# Patient Record
Sex: MALE | Race: WHITE | NOT HISPANIC OR LATINO | URBAN - METROPOLITAN AREA
[De-identification: names, ages, dates, MRNs, and addresses within clinical notes are randomized per-mention and may not be internally consistent; named-entity substitution may affect disease eponyms.]

---

## 2022-03-13 ENCOUNTER — INPATIENT (INPATIENT)
Facility: HOSPITAL | Age: 87
LOS: 4 days | Discharge: HOME | DRG: 698 | End: 2022-03-18
Attending: STUDENT IN AN ORGANIZED HEALTH CARE EDUCATION/TRAINING PROGRAM | Admitting: STUDENT IN AN ORGANIZED HEALTH CARE EDUCATION/TRAINING PROGRAM
Payer: MEDICAID

## 2022-03-13 VITALS
HEART RATE: 86 BPM | DIASTOLIC BLOOD PRESSURE: 65 MMHG | RESPIRATION RATE: 19 BRPM | SYSTOLIC BLOOD PRESSURE: 115 MMHG | OXYGEN SATURATION: 97 %

## 2022-03-13 DIAGNOSIS — N17.9 ACUTE KIDNEY FAILURE, UNSPECIFIED: ICD-10-CM

## 2022-03-13 DIAGNOSIS — E87.1 HYPO-OSMOLALITY AND HYPONATREMIA: ICD-10-CM

## 2022-03-13 DIAGNOSIS — E78.5 HYPERLIPIDEMIA, UNSPECIFIED: ICD-10-CM

## 2022-03-13 DIAGNOSIS — E87.5 HYPERKALEMIA: ICD-10-CM

## 2022-03-13 DIAGNOSIS — I48.91 UNSPECIFIED ATRIAL FIBRILLATION: ICD-10-CM

## 2022-03-13 DIAGNOSIS — I10 ESSENTIAL (PRIMARY) HYPERTENSION: ICD-10-CM

## 2022-03-13 DIAGNOSIS — N40.0 BENIGN PROSTATIC HYPERPLASIA WITHOUT LOWER URINARY TRACT SYMPTOMS: ICD-10-CM

## 2022-03-13 DIAGNOSIS — Z29.9 ENCOUNTER FOR PROPHYLACTIC MEASURES, UNSPECIFIED: ICD-10-CM

## 2022-03-13 DIAGNOSIS — I50.9 HEART FAILURE, UNSPECIFIED: ICD-10-CM

## 2022-03-13 DIAGNOSIS — R07.9 CHEST PAIN, UNSPECIFIED: ICD-10-CM

## 2022-03-13 DIAGNOSIS — N39.0 URINARY TRACT INFECTION, SITE NOT SPECIFIED: ICD-10-CM

## 2022-03-13 DIAGNOSIS — Z71.89 OTHER SPECIFIED COUNSELING: ICD-10-CM

## 2022-03-13 LAB
ALBUMIN SERPL ELPH-MCNC: 3.3 G/DL — LOW (ref 3.5–5)
ALBUMIN SERPL ELPH-MCNC: 3.6 G/DL — SIGNIFICANT CHANGE UP (ref 3.5–5)
ALP SERPL-CCNC: 79 U/L — SIGNIFICANT CHANGE UP (ref 40–120)
ALP SERPL-CCNC: 80 U/L — SIGNIFICANT CHANGE UP (ref 40–120)
ALT FLD-CCNC: 35 U/L DA — SIGNIFICANT CHANGE UP (ref 10–60)
ALT FLD-CCNC: 42 U/L DA — SIGNIFICANT CHANGE UP (ref 10–60)
ANION GAP SERPL CALC-SCNC: 12 MMOL/L — SIGNIFICANT CHANGE UP (ref 5–17)
ANION GAP SERPL CALC-SCNC: 14 MMOL/L — SIGNIFICANT CHANGE UP (ref 5–17)
ANION GAP SERPL CALC-SCNC: 14 MMOL/L — SIGNIFICANT CHANGE UP (ref 5–17)
APPEARANCE UR: ABNORMAL
APPEARANCE UR: CLEAR — SIGNIFICANT CHANGE UP
APTT BLD: 22.1 SEC — LOW (ref 27.5–35.5)
AST SERPL-CCNC: 20 U/L — SIGNIFICANT CHANGE UP (ref 10–40)
AST SERPL-CCNC: 24 U/L — SIGNIFICANT CHANGE UP (ref 10–40)
BACTERIA # UR AUTO: ABNORMAL /HPF
BASE EXCESS BLDV CALC-SCNC: -7.3 MMOL/L — SIGNIFICANT CHANGE UP
BASOPHILS # BLD AUTO: 0.02 K/UL — SIGNIFICANT CHANGE UP (ref 0–0.2)
BASOPHILS NFR BLD AUTO: 0.1 % — SIGNIFICANT CHANGE UP (ref 0–2)
BILIRUB SERPL-MCNC: 0.4 MG/DL — SIGNIFICANT CHANGE UP (ref 0.2–1.2)
BILIRUB SERPL-MCNC: 0.5 MG/DL — SIGNIFICANT CHANGE UP (ref 0.2–1.2)
BILIRUB UR-MCNC: NEGATIVE — SIGNIFICANT CHANGE UP
BILIRUB UR-MCNC: NEGATIVE — SIGNIFICANT CHANGE UP
BUN SERPL-MCNC: 63 MG/DL — HIGH (ref 7–18)
BUN SERPL-MCNC: 66 MG/DL — HIGH (ref 7–18)
BUN SERPL-MCNC: 68 MG/DL — HIGH (ref 7–18)
CALCIUM SERPL-MCNC: 7.9 MG/DL — LOW (ref 8.4–10.5)
CALCIUM SERPL-MCNC: 8.1 MG/DL — LOW (ref 8.4–10.5)
CALCIUM SERPL-MCNC: 8.6 MG/DL — SIGNIFICANT CHANGE UP (ref 8.4–10.5)
CHLORIDE SERPL-SCNC: 91 MMOL/L — LOW (ref 96–108)
CHLORIDE SERPL-SCNC: 94 MMOL/L — LOW (ref 96–108)
CHLORIDE SERPL-SCNC: 94 MMOL/L — LOW (ref 96–108)
CO2 SERPL-SCNC: 17 MMOL/L — LOW (ref 22–31)
CO2 SERPL-SCNC: 19 MMOL/L — LOW (ref 22–31)
CO2 SERPL-SCNC: 21 MMOL/L — LOW (ref 22–31)
COLOR SPEC: ABNORMAL
COLOR SPEC: YELLOW — SIGNIFICANT CHANGE UP
CREAT ?TM UR-MCNC: 62 MG/DL — SIGNIFICANT CHANGE UP
CREAT SERPL-MCNC: 2.78 MG/DL — HIGH (ref 0.5–1.3)
CREAT SERPL-MCNC: 2.89 MG/DL — HIGH (ref 0.5–1.3)
CREAT SERPL-MCNC: 3.13 MG/DL — HIGH (ref 0.5–1.3)
DIFF PNL FLD: ABNORMAL
DIFF PNL FLD: ABNORMAL
EGFR: 18 ML/MIN/1.73M2 — LOW
EGFR: 20 ML/MIN/1.73M2 — LOW
EGFR: 21 ML/MIN/1.73M2 — LOW
EOSINOPHIL # BLD AUTO: 0.02 K/UL — SIGNIFICANT CHANGE UP (ref 0–0.5)
EOSINOPHIL NFR BLD AUTO: 0.1 % — SIGNIFICANT CHANGE UP (ref 0–6)
EPI CELLS # UR: ABNORMAL /HPF
GLUCOSE BLDC GLUCOMTR-MCNC: 175 MG/DL — HIGH (ref 70–99)
GLUCOSE SERPL-MCNC: 138 MG/DL — HIGH (ref 70–99)
GLUCOSE SERPL-MCNC: 173 MG/DL — HIGH (ref 70–99)
GLUCOSE SERPL-MCNC: 186 MG/DL — HIGH (ref 70–99)
GLUCOSE UR QL: NEGATIVE — SIGNIFICANT CHANGE UP
GLUCOSE UR QL: NEGATIVE — SIGNIFICANT CHANGE UP
HCO3 BLDV-SCNC: 19 MMOL/L — LOW (ref 22–29)
HCT VFR BLD CALC: 37.9 % — LOW (ref 39–50)
HCT VFR BLD CALC: 38.9 % — LOW (ref 39–50)
HGB BLD-MCNC: 12.8 G/DL — LOW (ref 13–17)
HGB BLD-MCNC: 12.9 G/DL — LOW (ref 13–17)
HYALINE CASTS # UR AUTO: ABNORMAL /LPF
IMM GRANULOCYTES NFR BLD AUTO: 0.7 % — SIGNIFICANT CHANGE UP (ref 0–1.5)
INR BLD: 1.27 RATIO — HIGH (ref 0.88–1.16)
KETONES UR-MCNC: NEGATIVE — SIGNIFICANT CHANGE UP
KETONES UR-MCNC: NEGATIVE — SIGNIFICANT CHANGE UP
LEUKOCYTE ESTERASE UR-ACNC: ABNORMAL
LEUKOCYTE ESTERASE UR-ACNC: ABNORMAL
LIDOCAIN IGE QN: 144 U/L — SIGNIFICANT CHANGE UP (ref 73–393)
LYMPHOCYTES # BLD AUTO: 1.39 K/UL — SIGNIFICANT CHANGE UP (ref 1–3.3)
LYMPHOCYTES # BLD AUTO: 10.4 % — LOW (ref 13–44)
MAGNESIUM SERPL-MCNC: 2.5 MG/DL — SIGNIFICANT CHANGE UP (ref 1.6–2.6)
MAGNESIUM SERPL-MCNC: 2.6 MG/DL — SIGNIFICANT CHANGE UP (ref 1.6–2.6)
MCHC RBC-ENTMCNC: 28.4 PG — SIGNIFICANT CHANGE UP (ref 27–34)
MCHC RBC-ENTMCNC: 28.5 PG — SIGNIFICANT CHANGE UP (ref 27–34)
MCHC RBC-ENTMCNC: 33.2 GM/DL — SIGNIFICANT CHANGE UP (ref 32–36)
MCHC RBC-ENTMCNC: 33.8 GM/DL — SIGNIFICANT CHANGE UP (ref 32–36)
MCV RBC AUTO: 84.4 FL — SIGNIFICANT CHANGE UP (ref 80–100)
MCV RBC AUTO: 85.7 FL — SIGNIFICANT CHANGE UP (ref 80–100)
MONOCYTES # BLD AUTO: 0.59 K/UL — SIGNIFICANT CHANGE UP (ref 0–0.9)
MONOCYTES NFR BLD AUTO: 4.4 % — SIGNIFICANT CHANGE UP (ref 2–14)
NEUTROPHILS # BLD AUTO: 11.27 K/UL — HIGH (ref 1.8–7.4)
NEUTROPHILS NFR BLD AUTO: 84.3 % — HIGH (ref 43–77)
NITRITE UR-MCNC: NEGATIVE — SIGNIFICANT CHANGE UP
NITRITE UR-MCNC: POSITIVE
NRBC # BLD: 0 /100 WBCS — SIGNIFICANT CHANGE UP (ref 0–0)
NRBC # BLD: 0 /100 WBCS — SIGNIFICANT CHANGE UP (ref 0–0)
NT-PROBNP SERPL-SCNC: 5522 PG/ML — HIGH (ref 0–450)
OSMOLALITY UR: 379 MOS/KG — SIGNIFICANT CHANGE UP (ref 50–1200)
PCO2 BLDV: 39 MMHG — LOW (ref 42–55)
PH BLDV: 7.29 — LOW (ref 7.32–7.43)
PH UR: 5 — SIGNIFICANT CHANGE UP (ref 5–8)
PH UR: 6 — SIGNIFICANT CHANGE UP (ref 5–8)
PHOSPHATE SERPL-MCNC: 4.3 MG/DL — SIGNIFICANT CHANGE UP (ref 2.5–4.5)
PHOSPHATE SERPL-MCNC: 4.5 MG/DL — SIGNIFICANT CHANGE UP (ref 2.5–4.5)
PLATELET # BLD AUTO: 188 K/UL — SIGNIFICANT CHANGE UP (ref 150–400)
PLATELET # BLD AUTO: 205 K/UL — SIGNIFICANT CHANGE UP (ref 150–400)
PO2 BLDV: 43 MMHG — SIGNIFICANT CHANGE UP
POTASSIUM SERPL-MCNC: 5.4 MMOL/L — HIGH (ref 3.5–5.3)
POTASSIUM SERPL-MCNC: 5.6 MMOL/L — HIGH (ref 3.5–5.3)
POTASSIUM SERPL-MCNC: 5.7 MMOL/L — HIGH (ref 3.5–5.3)
POTASSIUM SERPL-SCNC: 5.4 MMOL/L — HIGH (ref 3.5–5.3)
POTASSIUM SERPL-SCNC: 5.6 MMOL/L — HIGH (ref 3.5–5.3)
POTASSIUM SERPL-SCNC: 5.7 MMOL/L — HIGH (ref 3.5–5.3)
PROT SERPL-MCNC: 6.8 G/DL — SIGNIFICANT CHANGE UP (ref 6–8.3)
PROT SERPL-MCNC: 7 G/DL — SIGNIFICANT CHANGE UP (ref 6–8.3)
PROT UR-MCNC: 100
PROT UR-MCNC: 100
PROTHROM AB SERPL-ACNC: 15.2 SEC — HIGH (ref 10.5–13.4)
RBC # BLD: 4.49 M/UL — SIGNIFICANT CHANGE UP (ref 4.2–5.8)
RBC # BLD: 4.54 M/UL — SIGNIFICANT CHANGE UP (ref 4.2–5.8)
RBC # FLD: 11.9 % — SIGNIFICANT CHANGE UP (ref 10.3–14.5)
RBC # FLD: 12.4 % — SIGNIFICANT CHANGE UP (ref 10.3–14.5)
RBC CASTS # UR COMP ASSIST: >50 /HPF (ref 0–2)
SAO2 % BLDV: 69.6 % — SIGNIFICANT CHANGE UP
SARS-COV-2 RNA SPEC QL NAA+PROBE: SIGNIFICANT CHANGE UP
SODIUM SERPL-SCNC: 124 MMOL/L — LOW (ref 135–145)
SODIUM SERPL-SCNC: 125 MMOL/L — LOW (ref 135–145)
SODIUM SERPL-SCNC: 127 MMOL/L — LOW (ref 135–145)
SODIUM UR-SCNC: 20 MMOL/L — SIGNIFICANT CHANGE UP
SP GR SPEC: 1.01 — SIGNIFICANT CHANGE UP (ref 1.01–1.02)
SP GR SPEC: 1.01 — SIGNIFICANT CHANGE UP (ref 1.01–1.02)
TROPONIN I, HIGH SENSITIVITY RESULT: 21.4 NG/L — SIGNIFICANT CHANGE UP
TROPONIN I, HIGH SENSITIVITY RESULT: 26.1 NG/L — SIGNIFICANT CHANGE UP
UROBILINOGEN FLD QL: NEGATIVE — SIGNIFICANT CHANGE UP
UROBILINOGEN FLD QL: NEGATIVE — SIGNIFICANT CHANGE UP
WBC # BLD: 12.93 K/UL — HIGH (ref 3.8–10.5)
WBC # BLD: 13.38 K/UL — HIGH (ref 3.8–10.5)
WBC # FLD AUTO: 12.93 K/UL — HIGH (ref 3.8–10.5)
WBC # FLD AUTO: 13.38 K/UL — HIGH (ref 3.8–10.5)
WBC UR QL: ABNORMAL /HPF (ref 0–5)

## 2022-03-13 PROCEDURE — G1004: CPT

## 2022-03-13 PROCEDURE — 71045 X-RAY EXAM CHEST 1 VIEW: CPT | Mod: 26

## 2022-03-13 PROCEDURE — 71045 X-RAY EXAM CHEST 1 VIEW: CPT | Mod: 26,77

## 2022-03-13 PROCEDURE — 93010 ELECTROCARDIOGRAM REPORT: CPT

## 2022-03-13 PROCEDURE — 99223 1ST HOSP IP/OBS HIGH 75: CPT | Mod: GC

## 2022-03-13 PROCEDURE — 74176 CT ABD & PELVIS W/O CONTRAST: CPT | Mod: 26,MG

## 2022-03-13 PROCEDURE — 99285 EMERGENCY DEPT VISIT HI MDM: CPT

## 2022-03-13 RX ORDER — FAMOTIDINE 10 MG/ML
20 INJECTION INTRAVENOUS ONCE
Refills: 0 | Status: COMPLETED | OUTPATIENT
Start: 2022-03-13 | End: 2022-03-13

## 2022-03-13 RX ORDER — ACETAMINOPHEN 500 MG
650 TABLET ORAL EVERY 6 HOURS
Refills: 0 | Status: DISCONTINUED | OUTPATIENT
Start: 2022-03-13 | End: 2022-03-18

## 2022-03-13 RX ORDER — SODIUM CHLORIDE 9 MG/ML
1000 INJECTION INTRAMUSCULAR; INTRAVENOUS; SUBCUTANEOUS ONCE
Refills: 0 | Status: COMPLETED | OUTPATIENT
Start: 2022-03-13 | End: 2022-03-13

## 2022-03-13 RX ORDER — DEXTROSE 50 % IN WATER 50 %
50 SYRINGE (ML) INTRAVENOUS ONCE
Refills: 0 | Status: COMPLETED | OUTPATIENT
Start: 2022-03-13 | End: 2022-03-13

## 2022-03-13 RX ORDER — PANTOPRAZOLE SODIUM 20 MG/1
40 TABLET, DELAYED RELEASE ORAL
Refills: 0 | Status: DISCONTINUED | OUTPATIENT
Start: 2022-03-13 | End: 2022-03-18

## 2022-03-13 RX ORDER — CEFTRIAXONE 500 MG/1
INJECTION, POWDER, FOR SOLUTION INTRAMUSCULAR; INTRAVENOUS
Refills: 0 | Status: COMPLETED | OUTPATIENT
Start: 2022-03-13 | End: 2022-03-17

## 2022-03-13 RX ORDER — SIMVASTATIN 20 MG/1
20 TABLET, FILM COATED ORAL AT BEDTIME
Refills: 0 | Status: DISCONTINUED | OUTPATIENT
Start: 2022-03-13 | End: 2022-03-18

## 2022-03-13 RX ORDER — HALOPERIDOL DECANOATE 100 MG/ML
0.5 INJECTION INTRAMUSCULAR ONCE
Refills: 0 | Status: COMPLETED | OUTPATIENT
Start: 2022-03-13 | End: 2022-03-13

## 2022-03-13 RX ORDER — HALOPERIDOL DECANOATE 100 MG/ML
2 INJECTION INTRAMUSCULAR ONCE
Refills: 0 | Status: COMPLETED | OUTPATIENT
Start: 2022-03-13 | End: 2022-03-13

## 2022-03-13 RX ORDER — SODIUM ZIRCONIUM CYCLOSILICATE 10 G/10G
5 POWDER, FOR SUSPENSION ORAL EVERY 8 HOURS
Refills: 0 | Status: COMPLETED | OUTPATIENT
Start: 2022-03-13 | End: 2022-03-14

## 2022-03-13 RX ORDER — SODIUM ZIRCONIUM CYCLOSILICATE 10 G/10G
10 POWDER, FOR SUSPENSION ORAL ONCE
Refills: 0 | Status: COMPLETED | OUTPATIENT
Start: 2022-03-13 | End: 2022-03-13

## 2022-03-13 RX ORDER — ASPIRIN/CALCIUM CARB/MAGNESIUM 324 MG
81 TABLET ORAL DAILY
Refills: 0 | Status: DISCONTINUED | OUTPATIENT
Start: 2022-03-13 | End: 2022-03-16

## 2022-03-13 RX ORDER — METOPROLOL TARTRATE 50 MG
100 TABLET ORAL DAILY
Refills: 0 | Status: DISCONTINUED | OUTPATIENT
Start: 2022-03-13 | End: 2022-03-14

## 2022-03-13 RX ORDER — CEFTRIAXONE 500 MG/1
1000 INJECTION, POWDER, FOR SOLUTION INTRAMUSCULAR; INTRAVENOUS ONCE
Refills: 0 | Status: COMPLETED | OUTPATIENT
Start: 2022-03-13 | End: 2022-03-13

## 2022-03-13 RX ORDER — APIXABAN 2.5 MG/1
2.5 TABLET, FILM COATED ORAL
Refills: 0 | Status: DISCONTINUED | OUTPATIENT
Start: 2022-03-13 | End: 2022-03-13

## 2022-03-13 RX ORDER — HALOPERIDOL DECANOATE 100 MG/ML
1 INJECTION INTRAMUSCULAR EVERY 6 HOURS
Refills: 0 | Status: DISCONTINUED | OUTPATIENT
Start: 2022-03-13 | End: 2022-03-18

## 2022-03-13 RX ORDER — INSULIN HUMAN 100 [IU]/ML
5 INJECTION, SOLUTION SUBCUTANEOUS ONCE
Refills: 0 | Status: COMPLETED | OUTPATIENT
Start: 2022-03-13 | End: 2022-03-13

## 2022-03-13 RX ORDER — CEFTRIAXONE 500 MG/1
1000 INJECTION, POWDER, FOR SOLUTION INTRAMUSCULAR; INTRAVENOUS EVERY 24 HOURS
Refills: 0 | Status: COMPLETED | OUTPATIENT
Start: 2022-03-14 | End: 2022-03-16

## 2022-03-13 RX ORDER — HALOPERIDOL DECANOATE 100 MG/ML
1 INJECTION INTRAMUSCULAR EVERY 4 HOURS
Refills: 0 | Status: DISCONTINUED | OUTPATIENT
Start: 2022-03-13 | End: 2022-03-13

## 2022-03-13 RX ORDER — ONDANSETRON 8 MG/1
4 TABLET, FILM COATED ORAL EVERY 8 HOURS
Refills: 0 | Status: DISCONTINUED | OUTPATIENT
Start: 2022-03-13 | End: 2022-03-18

## 2022-03-13 RX ORDER — LANOLIN ALCOHOL/MO/W.PET/CERES
3 CREAM (GRAM) TOPICAL AT BEDTIME
Refills: 0 | Status: DISCONTINUED | OUTPATIENT
Start: 2022-03-13 | End: 2022-03-18

## 2022-03-13 RX ORDER — ONDANSETRON 8 MG/1
4 TABLET, FILM COATED ORAL ONCE
Refills: 0 | Status: COMPLETED | OUTPATIENT
Start: 2022-03-13 | End: 2022-03-13

## 2022-03-13 RX ORDER — FUROSEMIDE 40 MG
40 TABLET ORAL ONCE
Refills: 0 | Status: COMPLETED | OUTPATIENT
Start: 2022-03-13 | End: 2022-03-13

## 2022-03-13 RX ORDER — HEPARIN SODIUM 5000 [USP'U]/ML
5000 INJECTION INTRAVENOUS; SUBCUTANEOUS EVERY 12 HOURS
Refills: 0 | Status: DISCONTINUED | OUTPATIENT
Start: 2022-03-13 | End: 2022-03-14

## 2022-03-13 RX ORDER — DOXAZOSIN MESYLATE 4 MG
2 TABLET ORAL AT BEDTIME
Refills: 0 | Status: DISCONTINUED | OUTPATIENT
Start: 2022-03-13 | End: 2022-03-18

## 2022-03-13 RX ADMIN — SIMVASTATIN 20 MILLIGRAM(S): 20 TABLET, FILM COATED ORAL at 21:22

## 2022-03-13 RX ADMIN — Medication 2 MILLIGRAM(S): at 21:22

## 2022-03-13 RX ADMIN — HALOPERIDOL DECANOATE 0.5 MILLIGRAM(S): 100 INJECTION INTRAMUSCULAR at 13:26

## 2022-03-13 RX ADMIN — CEFTRIAXONE 100 MILLIGRAM(S): 500 INJECTION, POWDER, FOR SOLUTION INTRAMUSCULAR; INTRAVENOUS at 11:18

## 2022-03-13 RX ADMIN — FAMOTIDINE 20 MILLIGRAM(S): 10 INJECTION INTRAVENOUS at 06:08

## 2022-03-13 RX ADMIN — SODIUM ZIRCONIUM CYCLOSILICATE 5 GRAM(S): 10 POWDER, FOR SUSPENSION ORAL at 21:22

## 2022-03-13 RX ADMIN — SODIUM CHLORIDE 1000 MILLILITER(S): 9 INJECTION INTRAMUSCULAR; INTRAVENOUS; SUBCUTANEOUS at 06:57

## 2022-03-13 RX ADMIN — HEPARIN SODIUM 5000 UNIT(S): 5000 INJECTION INTRAVENOUS; SUBCUTANEOUS at 17:08

## 2022-03-13 RX ADMIN — Medication 100 MILLIGRAM(S): at 21:22

## 2022-03-13 RX ADMIN — Medication 3 MILLIGRAM(S): at 23:54

## 2022-03-13 RX ADMIN — HALOPERIDOL DECANOATE 1 MILLIGRAM(S): 100 INJECTION INTRAMUSCULAR at 23:54

## 2022-03-13 RX ADMIN — INSULIN HUMAN 5 UNIT(S): 100 INJECTION, SOLUTION SUBCUTANEOUS at 11:19

## 2022-03-13 RX ADMIN — Medication 30 MILLILITER(S): at 07:25

## 2022-03-13 RX ADMIN — Medication 50 MILLILITER(S): at 11:18

## 2022-03-13 RX ADMIN — HALOPERIDOL DECANOATE 2 MILLIGRAM(S): 100 INJECTION INTRAMUSCULAR at 13:43

## 2022-03-13 RX ADMIN — SODIUM CHLORIDE 1000 MILLILITER(S): 9 INJECTION INTRAMUSCULAR; INTRAVENOUS; SUBCUTANEOUS at 06:07

## 2022-03-13 RX ADMIN — HALOPERIDOL DECANOATE 1 MILLIGRAM(S): 100 INJECTION INTRAMUSCULAR at 16:54

## 2022-03-13 RX ADMIN — ONDANSETRON 4 MILLIGRAM(S): 8 TABLET, FILM COATED ORAL at 06:08

## 2022-03-13 RX ADMIN — SODIUM ZIRCONIUM CYCLOSILICATE 10 GRAM(S): 10 POWDER, FOR SUSPENSION ORAL at 07:35

## 2022-03-13 RX ADMIN — Medication 40 MILLIGRAM(S): at 13:28

## 2022-03-13 NOTE — CONSULT NOTE ADULT - ASSESSMENT
89 yo M with b/l hydronephrosis, UTI likely secondary to clogged Chinchilla catheter.    1. Keep Chinchilla catheter  2. Irrigate as needed  3. Monitor urine output  4. F/u urine culture  5. IV abx 89 yo M with b/l hydronephrosis, UTI likely secondary to obstructed Chinchilla catheter, hematuria.    1. Keep Chinchilla catheter  2. Irrigate as needed  3. Monitor urine output  4. F/u urine culture  5. IV abx  6. trend creatinine  7. labs reviewed  8. CT images reviewed  9 cystoscopy as an outpatient for work up of hematuria with his urologist  10. discussed with PA staff  11. will follow

## 2022-03-13 NOTE — H&P ADULT - HISTORY OF PRESENT ILLNESS
89 y/o male with PMHx of HTN, BPH on chronic salmon since 2016 , A. fib on eliquis, anemia, hyperlipidemia presents with nausea, vomiting and chest discomfort yesterday night .  As per daughter at bedside, she reports that yesterday  the patient told them he did not feel well. and that he felt nauseous and vomited along with some hurt burn . they checked his BP and piulse and he had low BP and low HR as per daughter ( 80/60 , HR 41 ) which was concerning for them because he usually runs on the higher side. Patient him self is denying any symptoms at the time of interview and he reports that all his symptoms resolved , however he looked uncomfortable in the bed and was attributing this to the uncomfortablity of ED beds .   patient denies any headache, chills, fever , dizziness, chest pain, palpitations, shortness of breath, abdominal pain, nausea/vomiting/diarrhea, urinary symptoms or any other acute complaint at the time of interview.

## 2022-03-13 NOTE — ED ADULT NURSE REASSESSMENT NOTE - NS ED NURSE REASSESS COMMENT FT1
Pt resting in bed, A&Ox3, daughter Della at bedside. No acute distress noted, denies chest pain, no shortness of breath indicated. Pt resting in bed, A&Ox3, daughter Della at bedside. 16FR Chinchilla cathter noted, leg bag. Area clean/intact. No acute distress noted, denies chest pain, no shortness of breath indicated.

## 2022-03-13 NOTE — ED PROVIDER NOTE - NSICDXPASTMEDICALHX_GEN_ALL_CORE_FT
PAST MEDICAL HISTORY:  Atrial fibrillation     BPH (benign prostatic hyperplasia)     Hypertension

## 2022-03-13 NOTE — CONSULT NOTE ADULT - SUBJECTIVE AND OBJECTIVE BOX
Urology Consultation Note    Patient is a 89y old  Male who presents with a chief complaint of CP,vomiting (13 Mar 2022 13:36)      HPI:  89 y/o male with PMHx of HTN, BPH on chronic salmon since 2016 , A. fib on eliquis, anemia, hyperlipidemia presents with nausea, vomiting and chest discomfort yesterday night .  As per daughter at bedside, she reports that yesterday  the patient told them he did not feel well. and that he felt nauseous and vomited along with some hurt burn . they checked his BP and piulse and he had low BP and low HR as per daughter ( 80/60 , HR 41 ) which was concerning for them because he usually runs on the higher side. Patient him self is denying any symptoms at the time of interview and he reports that all his symptoms resolved , however he looked uncomfortable in the bed and was attributing this to the uncomfortablity of ED beds .   patient denies any headache, chills, fever , dizziness, chest pain, palpitations, shortness of breath, abdominal pain, nausea/vomiting/diarrhea, urinary symptoms or any other acute complaint at the time of interview.    (13 Mar 2022 11:05)      INTERVAL HPI:  89yo M with PMH HTN, BPH with chronic Salmon since , Afib on eliquis presented with chest discomfort yesterday.  Per daughter at bedside, Pt c/o not feeling well yesterday and had vomiting.  She noticed that the BP was lower than usual.  Pt was also noted to have altered mental status.  Daughter denies fever, chills, urinary urgency or frequency.      PAST MEDICAL & SURGICAL HISTORY:  Hypertension    Atrial fibrillation    BPH (benign prostatic hyperplasia)    Chronic kidney disease, unspecified CKD stage    Hyperlipidemia        Allergies    No Known Allergies    Intolerances        MEDICATIONS  (STANDING):  aspirin  chewable 81 milliGRAM(s) Oral daily  cefTRIAXone   IVPB      doxazosin 2 milliGRAM(s) Oral at bedtime  heparin   Injectable 5000 Unit(s) SubCutaneous every 12 hours  metoprolol tartrate 100 milliGRAM(s) Oral daily  pantoprazole    Tablet 40 milliGRAM(s) Oral before breakfast  simvastatin 20 milliGRAM(s) Oral at bedtime  sodium zirconium cyclosilicate 5 Gram(s) Oral every 8 hours    MEDICATIONS  (PRN):  acetaminophen     Tablet .. 650 milliGRAM(s) Oral every 6 hours PRN Temp greater or equal to 38C (100.4F), Mild Pain (1 - 3)  haloperidol    Injectable 1 milliGRAM(s) IV Push every 4 hours PRN agitation  melatonin 3 milliGRAM(s) Oral at bedtime PRN Insomnia  ondansetron Injectable 4 milliGRAM(s) IV Push every 8 hours PRN Nausea and/or Vomiting      Vital Signs Last 24 Hrs  T(C): 36.3 (13 Mar 2022 11:11), Max: 36.3 (13 Mar 2022 06:14)  T(F): 97.4 (13 Mar 2022 11:11), Max: 97.4 (13 Mar 2022 06:14)  HR: 87 (13 Mar 2022 11:11) (81 - 87)  BP: 118/70 (13 Mar 2022 11:11) (115/65 - 118/70)  BP(mean): --  RR: 20 (13 Mar 2022 11:11) (18 - 20)  SpO2: 97% (13 Mar 2022 11:11) (97% - 98%)    Physical Exam:  Gen: awake not alert or oriented,. Very confused   HEENT: anicteric  Abd: obese soft  Back:  unable to assess  Pelvic: small amount of blood or urethral meatus.  16F Salmon catheter placed without difficulty.   Ext: warm to touch no c/c/e    Labs:                          12.9   13.38 )-----------( 205      ( 13 Mar 2022 06:03 )             38.9     03-13    125<L>  |  94<L>  |  63<H>  ----------------------------<  173<H>  5.7<H>   |  17<L>  |  2.89<H>    Ca    7.9<L>      13 Mar 2022 10:25  Phos  4.5     03-13  Mg     2.6     03-13    TPro  7.0  /  Alb  3.6  /  TBili  0.5  /  DBili  x   /  AST  24  /  ALT  42  /  AlkPhos  79  03-13    PT/INR - ( 13 Mar 2022 08:39 )   PT: 15.2 sec;   INR: 1.27 ratio         PTT - ( 13 Mar 2022 08:39 )  PTT:22.1 sec  Urinalysis Basic - ( 13 Mar 2022 10:28 )    Color: Yellow / Appearance: Clear / S.015 / pH: x  Gluc: x / Ketone: Negative  / Bili: Negative / Urobili: Negative   Blood: x / Protein: 100 / Nitrite: Negative   Leuk Esterase: Moderate / RBC: 5-10 /HPF / WBC 6-10 /HPF   Sq Epi: x / Non Sq Epi: Few /HPF / Bacteria: Moderate /HPF        Radiological Exams:  < from: CT Abdomen and Pelvis No Cont (22 @ 08:17) >  IMPRESSION:    1. Mild bilateral hydroureteronephrosis.  2. Hyperdense material within the urinary bladder, possibly blood   products. Recommend direct visualization forfurther assessment.      < end of copied text >   Urology Consultation Note    Patient is a 89y old  Male who presents with a chief complaint of CP,vomiting (13 Mar 2022 13:36)      HPI:  89 y/o male with PMHx of HTN, BPH with chronic salmon since 2016 , A. fib on eliquis, anemia, hyperlipidemia presents with nausea, vomiting and chest discomfort yesterday.  As per daughter at bedside, she reports that yesterday the patient told them he did not feel well. and that he felt nauseated and vomited along with some "hurtburn". they checked his BP and piulse and he had low BP and low HR as per daughter ( 80/60 , HR 41). Patient denies any symptoms at the time of interview and he reports that all his symptoms resolved. Patient denies any headache, chills, fever, dizziness, chest pain, palpitations, shortness of breath, abdominal pain, nausea/vomiting/diarrhea, urinary symptoms or any other acute complaint at the time of interview.    (13 Mar 2022 11:05)      INTERVAL HPI:  89yo M with PMH HTN, BPH with chronic Salmon since , Afib on eliquis presented with chest discomfort yesterday.  Per daughter at bedside, Pt c/o not feeling well yesterday and had vomiting.  She noticed that the BP was lower than usual.  Pt was also noted to have altered mental status.  Daughter denies fever, chills, urinary urgency or frequency.  No specific timing to his symptoms. No aggravating or alleviating factors that they know of. Patient has had a chronic catheter for 6 years, changed at regular intervals without issue.    PAST MEDICAL & SURGICAL HISTORY:  Hypertension    Atrial fibrillation    BPH (benign prostatic hyperplasia)    Chronic kidney disease, unspecified CKD stage    Hyperlipidemia    Family History: No family history of  malignancy  Social History: Does not smoke    ROS: All others negative except as noted above.      Allergies    No Known Allergies    Intolerances        MEDICATIONS  (STANDING):  aspirin  chewable 81 milliGRAM(s) Oral daily  cefTRIAXone   IVPB      doxazosin 2 milliGRAM(s) Oral at bedtime  heparin   Injectable 5000 Unit(s) SubCutaneous every 12 hours  metoprolol tartrate 100 milliGRAM(s) Oral daily  pantoprazole    Tablet 40 milliGRAM(s) Oral before breakfast  simvastatin 20 milliGRAM(s) Oral at bedtime  sodium zirconium cyclosilicate 5 Gram(s) Oral every 8 hours    MEDICATIONS  (PRN):  acetaminophen     Tablet .. 650 milliGRAM(s) Oral every 6 hours PRN Temp greater or equal to 38C (100.4F), Mild Pain (1 - 3)  haloperidol    Injectable 1 milliGRAM(s) IV Push every 4 hours PRN agitation  melatonin 3 milliGRAM(s) Oral at bedtime PRN Insomnia  ondansetron Injectable 4 milliGRAM(s) IV Push every 8 hours PRN Nausea and/or Vomiting      Vital Signs Last 24 Hrs  T(C): 36.3 (13 Mar 2022 11:11), Max: 36.3 (13 Mar 2022 06:14)  T(F): 97.4 (13 Mar 2022 11:11), Max: 97.4 (13 Mar 2022 06:14)  HR: 87 (13 Mar 2022 11:11) (81 - 87)  BP: 118/70 (13 Mar 2022 11:11) (115/65 - 118/70)  BP(mean): --  RR: 20 (13 Mar 2022 11:11) (18 - 20)  SpO2: 97% (13 Mar 2022 11:11) (97% - 98%)      Labs:                          12.9   13.38 )-----------( 205      ( 13 Mar 2022 06:03 )             38.9     03-13    125<L>  |  94<L>  |  63<H>  ----------------------------<  173<H>  5.7<H>   |  17<L>  |  2.89<H>    Ca    7.9<L>      13 Mar 2022 10:25  Phos  4.5     03-13  Mg     2.6     03-13    TPro  7.0  /  Alb  3.6  /  TBili  0.5  /  DBili  x   /  AST  24  /  ALT  42  /  AlkPhos  79  03-13    PT/INR - ( 13 Mar 2022 08:39 )   PT: 15.2 sec;   INR: 1.27 ratio         PTT - ( 13 Mar 2022 08:39 )  PTT:22.1 sec  Urinalysis Basic - ( 13 Mar 2022 10:28 )    Color: Yellow / Appearance: Clear / S.015 / pH: x  Gluc: x / Ketone: Negative  / Bili: Negative / Urobili: Negative   Blood: x / Protein: 100 / Nitrite: Negative   Leuk Esterase: Moderate / RBC: 5-10 /HPF / WBC 6-10 /HPF   Sq Epi: x / Non Sq Epi: Few /HPF / Bacteria: Moderate /HPF        Radiological Exams:  < from: CT Abdomen and Pelvis No Cont (22 @ 08:17) >  IMPRESSION:    1. Mild bilateral hydroureteronephrosis.  2. Hyperdense material within the urinary bladder, possibly blood   products. Recommend direct visualization for further assessment.      < end of copied text >

## 2022-03-13 NOTE — H&P ADULT - PROBLEM SELECTOR PLAN 5
- Patient c/o SOB after 1 l bolus in ED   - chest xray showed Cardiomegaly. Mildly worsened mild pulmonary vascular congestion. Small   right pleural effusion.  - ProBNP 5522  - trop neg x 2   - monitor on tele   - cardiology consulted  - Patient c/o SOB after 1 l bolus in ED   - chest xray showed Cardiomegaly. Mildly worsened mild pulmonary vascular congestion. Small   right pleural effusion.  - ProBNP 5522  - trop neg x 2   - monitor on tele   - given 40 IV lasix   - cardiology consulted Dr Garcia

## 2022-03-13 NOTE — CONSULT NOTE ADULT - ASSESSMENT
89 y/o male with PMHx of HTN, BPH on chronic salmon since 2016 , A. fib on eliquis, anemia, hyperlipidemia,CHF (F 30%) presents with nausea, vomiting and chest discomfort yesterday night,renal failure,hematurea,hyponatremia.  1.Tele monitoring.  2.JAMES and hyperkalemia-s/p salmon-?CBI-hold eliquis.  3.Hyponatremia-consider nacl tabsno further IVF.  4.CHF and HTN-cont b blocker.  5.Hyperkalemia-lokelma.  6.GI and DVT prophylaxis.  7.Will obtain outpatient records.  8.Hematurea and UTI-urine cx, abx started.  9.Check TSH.

## 2022-03-13 NOTE — H&P ADULT - PROBLEM SELECTOR PROBLEM 6
"This note was copied from the mother's chart.     11/01/20 1050   Maternal Assessment   Breast Shape Bilateral:;round   Breast Density Bilateral:;soft   Areola Bilateral:;elastic   Nipples Bilateral:;everted   Left Nipple Symptoms bruised  (per mom)   Maternal Infant Feeding   Maternal Emotional State assist needed  (verbal)   Infant Positioning clutch/football   Signs of Milk Transfer audible swallow;infant jaw motion present   Pain with Feeding yes  (initially "tender" then goes to "0")   Pain Location nipple, right   Pain Description soreness   Nipple Shape After Feeding, Right round   Latch Assistance no   Lactation note:  To room to assist with breastfeeding. Mom nursing infant and only provided minimal verbal assistance to make position more comfortable for baby. Infant nursing effectively with stimulation. Reviewed normal day 2 expectations for feedings. Mom to feed baby 8 or more times in 24 hours with cues until content.  phone number on board for further assistance as needed.  "
This note was copied from the mother's chart.     10/31/20 1715   Maternal Assessment   Breast Shape Bilateral:;round   Breast Density Bilateral:;soft   Areola Bilateral:;elastic   Nipples Bilateral:;everted   Maternal Infant Feeding   Maternal Emotional State assist needed   Infant Positioning clutch/football   Signs of Milk Transfer audible swallow;infant jaw motion present   Latch Assistance yes   assisted pt with position and latch. Baby able to latch to breast easily. Good tugs and pulls observed. Basic breastfeeding education provided questions answered. Pt hs bruise to right areola from incorrect latch. Discuss proper latch to prevent increase bruising.Encouraged skin to skin.   
BPH (benign prostatic hyperplasia)

## 2022-03-13 NOTE — H&P ADULT - NSICDXPASTMEDICALHX_GEN_ALL_CORE_FT
PAST MEDICAL HISTORY:  Atrial fibrillation     BPH (benign prostatic hyperplasia)     Chronic kidney disease, unspecified CKD stage     Hyperlipidemia     Hypertension

## 2022-03-13 NOTE — ED PROVIDER NOTE - PROGRESS NOTE DETAILS
potassium 5.6 non-hemolyzed.  Creat of 3, unknown baseline.  CT pending, changed to non-contrast. PT has indwelling catheter for past 5 years. On my evaluation, patient noted to be mildly dyspneic, sat 90% on room air, increases to 95 on 4 liters PT has indwelling catheter for past 5 years. On my evaluation, patient noted to be mildly dyspneic, sat 90% on room air, increases to 95 on 4 liters. will repeat chemistry troponin and xray.

## 2022-03-13 NOTE — CONSULT NOTE ADULT - SUBJECTIVE AND OBJECTIVE BOX
CHIEF COMPLAINT:Patient is a 89y old  Male who presents with a chief complaint of CP,vomiting    HPI:  89 y/o male with PMHx of HTN, BPH on chronic salmon since 2016 , A. sujata on eliquis, anemia, hyperlipidemia,CHF (F 30%) presents with nausea, vomiting and chest discomfort yesterday night .  As per daughter at bedside, she reports that yesterday  the patient told them he did not feel well. and that he felt nauseous and vomited along with some hurt burn . they checked his BP and piulse and he had low BP and low HR as per daughter ( 80/60 , HR 41 ) which was concerning for them because he usually runs on the higher side. Patient him self is denying any symptoms at the time of interview and he reports that all his symptoms resolved , however he looked uncomfortable in the bed and was attributing this to the uncomfortablity of ED beds .   patient denies any headache, chills, fever , dizziness, chest pain, palpitations, shortness of breath, abdominal pain, nausea/vomiting/diarrhea, urinary symptoms or any other acute complaint at the time of interview.    (13 Mar 2022 11:05)      PAST MEDICAL & SURGICAL HISTORY:  Hypertension    Atrial fibrillation    BPH (benign prostatic hyperplasia)    Chronic kidney disease, unspecified CKD stage    Hyperlipidemia        MEDICATIONS  (STANDING):  apixaban 2.5 milliGRAM(s) Oral two times a day  cefTRIAXone   IVPB      doxazosin 2 milliGRAM(s) Oral at bedtime  haloperidol    Injectable 2 milliGRAM(s) IV Push once  metoprolol tartrate 100 milliGRAM(s) Oral daily  simvastatin 20 milliGRAM(s) Oral at bedtime  sodium zirconium cyclosilicate 5 Gram(s) Oral every 8 hours    MEDICATIONS  (PRN):  acetaminophen     Tablet .. 650 milliGRAM(s) Oral every 6 hours PRN Temp greater or equal to 38C (100.4F), Mild Pain (1 - 3)  melatonin 3 milliGRAM(s) Oral at bedtime PRN Insomnia  ondansetron Injectable 4 milliGRAM(s) IV Push every 8 hours PRN Nausea and/or Vomiting      FAMILY HISTORY:unable to obtain      SOCIAL HISTORY:    unable to obtain    Allergies    No Known Allergies    Intolerances    	    REVIEW OF SYSTEMS:  unable to obtain    PHYSICAL EXAM:  T(C): 36.3 (03-13-22 @ 11:11), Max: 36.3 (03-13-22 @ 06:14)  HR: 87 (03-13-22 @ 11:11) (81 - 87)  BP: 118/70 (03-13-22 @ 11:11) (115/65 - 118/70)  RR: 20 (03-13-22 @ 11:11) (18 - 20)  SpO2: 97% (03-13-22 @ 11:11) (97% - 98%)  Wt(kg): --  I&O's Summary    13 Mar 2022 07:01  -  13 Mar 2022 13:37  --------------------------------------------------------  IN: 0 mL / OUT: 250 mL / NET: -250 mL        Appearance: Normal	  HEENT:   Normal oral mucosa, PERRL, EOMI	  Lymphatic: No lymphadenopathy  Cardiovascular: Normal S1 S2, No JVD, No murmurs, No edema  Respiratory: Dec BS at bases  Gastrointestinal:  Soft, Non-tender, + BS	  Skin: No rashes, No ecchymoses, No cyanosis	  Extremities: Normal range of motion, No clubbing, cyanosis or edema  Vascular: Peripheral pulses palpable 2+ bilaterally    	    ECG:  	nsr,rbbb,lafb  	  	  LABS:	 	    Troponin I, High Sensitivity Result: 26.1 ng/L (03-13-22 @ 10:25)  Troponin I, High Sensitivity Result: 21.4 ng/L (03-13-22 @ 06:03)                          12.9   13.38 )-----------( 205      ( 13 Mar 2022 06:03 )             38.9     03-13    125<L>  |  94<L>  |  63<H>  ----------------------------<  173<H>  5.7<H>   |  17<L>  |  2.89<H>    Ca    7.9<L>      13 Mar 2022 10:25  Phos  4.5     03-13  Mg     2.6     03-13    TPro  7.0  /  Alb  3.6  /  TBili  0.5  /  DBili  x   /  AST  24  /  ALT  42  /  AlkPhos  79  03-13    proBNP: Serum Pro-Brain Natriuretic Peptide: 5522 pg/mL (03-13 @ 10:25)  rd< from: CT Abdomen and Pelvis No Cont (03.13.22 @ 08:17) >  ACC: [id number] EXAM:  CT ABDOMEN AND PELVIS                          PROCEDURE DATE:  03/13/2022          INTERPRETATION:  CLINICAL INFORMATION: Abdominal pain    COMPARISON: None.    CONTRAST/COMPLICATIONS:  IV Contrast: NONE  Oral Contrast: NONE  Complications: None reported at time of study completion    PROCEDURE:  CT of the Abdomen and Pelvis was performed.  Sagittal and coronal reformats were performed.    FINDINGS:  LOWER CHEST: Small bilateral pleural effusions. Basilar atelectasis.    Please note that evaluation of the abdominal organs and vascular   structures is limited by lack of intravenous contrast.    LIVER: Multiple hypodensities, possibly cysts.  BILE DUCTS: Mild dilatation.  GALLBLADDER: Within normal limits.  SPLEEN: Within normal limits.  PANCREAS: Within normal limits.  ADRENALS: Nonspecific left adrenal thickening.  KIDNEYS/URETERS: Bilateral hypodensities, possibly cysts. Mild bilateral   hydroureteronephrosis.    BLADDER: Salmon catheter. Contains hyperdense material.  REPRODUCTIVE ORGANS: Prostate gland calcifications.    BOWEL: No bowel obstruction. Appendix is within normal limits.  PERITONEUM: No ascites.  VESSELS: Atherosclerotic calcifications.  RETROPERITONEUM/LYMPH NODES: No lymphadenopathy.  ABDOMINAL WALL: Umbilical hernia containing fat and fluid.  BONES: Degenerative changes.    IMPRESSION:    1. Mild bilateral hydroureteronephrosis.  2. Hyperdense material within the urinary bladder, possibly blood   products. Recommend direct visualization forfurther assessment.        --- End of Report ---            MARCO العراقي MD; Attending Radiologist    < end of copied text >  < from: Xray Chest 1 View-PORTABLE IMMEDIATE (Xray Chest 1 View-PORTABLE IMMEDIATE .) (03.13.22 @ 10:35) >  ACC: [id number] EXAM:  XR CHEST PORTABLE IMMED 1V                          PROCEDURE DATE:  03/13/2022          INTERPRETATION:  CLINICAL STATEMENT: Shortness of breath. Hypotension    TECHNIQUE: AP view of the chest.      COMPARISON: Earlier study same day at 6:34 AM    The heart is enlarged, may be exaggerated by AP technique. Aortic   calcification. Mildly worsened mild pulmonary vascular congestion. Small   right pleural effusion. Otherwise, no focal lung consolidation. No   significant osseous abnormality.    IMPRESSION:    Cardiomegaly. Mildly worsened mild pulmonary vascular congestion. Small   right pleural effusion.    --- End of Report ---            BUNNY ALVAREZ MD; Attending Radiologist  This document has been electronically signed. Mar 13 2022 10:39AM    < end of copied text >

## 2022-03-13 NOTE — H&P ADULT - ASSESSMENT
89 y/o male with PMHx of HTN, BPH on chronic salmon since 2016 , A. fib on eliquis, anemia, hyperlipidemia presents with nausea, vomiting and chest discomfort yesterday night .

## 2022-03-13 NOTE — H&P ADULT - NSHPPHYSICALEXAM_GEN_ALL_CORE
ICU Vital Signs Last 24 Hrs  T(C): 36.3 (13 Mar 2022 11:11), Max: 36.3 (13 Mar 2022 06:14)  T(F): 97.4 (13 Mar 2022 11:11), Max: 97.4 (13 Mar 2022 06:14)  HR: 87 (13 Mar 2022 11:11) (81 - 87)  BP: 118/70 (13 Mar 2022 11:11) (115/65 - 118/70)  BP(mean): --  ABP: --  ABP(mean): --  RR: 20 (13 Mar 2022 11:11) (18 - 20)  SpO2: 97% (13 Mar 2022 11:11) (97% - 98%)  GENERAL: NAD, uncomfortable in bed   HEAD:  Atraumatic, Normocephalic  EYES: EOMI, PERRLA, conjunctiva and sclera clear  NECK: Supple, No JVD  CHEST/LUNG: Crepitations in lower rt lung base , on NC   HEART: Regular rate and rhythm; No murmurs, rubs, or gallops  ABDOMEN: Soft, Nontender, Nondistended; Bowel sounds present. + salmon   EXTREMITIES:   No clubbing, cyanosis, or edema, chronic skin discolouration   PSYCH: AAOx3  NEUROLOGY: non-focal

## 2022-03-13 NOTE — H&P ADULT - PROBLEM SELECTOR PLAN 4
- Patient PW hyponatremia 124 , given 1 l bolus NS in ED , Na 125   - f/u urine Na and osmolality - Patient PW hyponatremia 124 , given 1 l bolus NS in ED , Na 125   - will give a trial of 40 IV lasix and repeat BMP   - f/u urine Na and osmolality

## 2022-03-13 NOTE — H&P ADULT - PROBLEM SELECTOR PLAN 2
- Patient PW elevated WBCs, on chronic salmon   - UA positive   - started on rocephin   - f/u urine culture

## 2022-03-13 NOTE — ED PROVIDER NOTE - OBJECTIVE STATEMENT
89 y/o male with PMHx of HTN, BPH, A. fib on eliquis, anemia, hyperlipidemia presents with chest/ abdominal pain.  pt is Vietnamese speaking.  Translation services offered however patient prefers to use family at bedside.  As per family, the patient told them he did not feel well. and that he felt nauseous.  The patient than pointed to chest area, saying he felt pain.  The patient went to bed, however was not able to sleep secondary to chest pain and nausea.  Pt denies difficulty breathing or cough.

## 2022-03-13 NOTE — H&P ADULT - PROBLEM SELECTOR PLAN 6
- Patient has history of BPH on chronic salmon since 2016 following with Dr David Doherty urology replacing salmon once/ month . due to replace next sunday   - Resumed home meds   - Asked RN to change salmon in ED - Patient has history of BPH on chronic salmon since 2016 following with Dr David Doherty urology replacing salmon once/ month . due to replace next sunday   - Resumed home meds   - on attempt to change salmon , there was difficulty for reinsertion , urology consultyed to reinsert   - f/u urology

## 2022-03-13 NOTE — H&P ADULT - PROBLEM SELECTOR PLAN 10
- Discussed with patient and daughter at bedside and they wanted some time to think about it , will remain full code for now .

## 2022-03-13 NOTE — H&P ADULT - ATTENDING COMMENTS
Patient seen and examined. Case discussed with Dr. Daniel. American translation provided by grand-daughter at bedside. In brief, this is a 89 yo M with BPH s/p chronic salmon since 2016, HTN, A-fib (on Eliquis), HLD, CKD (unclear baseline) who presents with n/v and epigastric discomfort s/p IVF bolus in the ED with resultant hypoxia and increased WOB. CXR concerning for worsening vascular congestion. Also noted to be hyponatremic and hyperkalemic on lab work with a Cr of 3.13 that did improve with IVF. Per grand-daughter at bedside, patient has been hospitalized in the past for hyponatremia twice in [facility] and was told to limit his fluid intake. History suggestive of possible SIADH vs hypervolemic hypernatremia. Given patient requiring O2 and with pulmonary vascular congestion on imaging, will trial Lasix 40mg IV x1 and reassess blood work. Agree with Lokelma for hyperkalemia. Patient also with +UA, requiring urology assistance for replacement of salmon catheter. Suspect likely acute cystitis with hematuria in the setting of urinary retention from blood clot. Continue ceftriaxone, hold Eliquis (ok with cards) and start Asa 81mg for AC. Patient pulling at the salmon catheter, given Haldol 2mg IV to calm him down. Will leave PRN haldol for now and monitor response. Remaining care as above.

## 2022-03-13 NOTE — H&P ADULT - PROBLEM SELECTOR PLAN 9
RISK                                                          Points  [] Previous VTE                                           3  [] Thrombophilia                                        2  [] Lower limb paralysis                              2   [] Current Cancer                                       2   [] Immobilization > 24 hrs                        1  [] ICU/CCU stay > 24 hours                       1  [x] Age > 60                                                   1    eliquis

## 2022-03-13 NOTE — PATIENT PROFILE ADULT - FALL HARM RISK - HARM RISK INTERVENTIONS

## 2022-03-13 NOTE — H&P ADULT - PROBLEM SELECTOR PLAN 1
- Patient presented with  elevated creatinine level 3.31 , baseline unknown but patient ahs PMH of CKD at baseline  - s/p 1 l bolus in ED , repeat creat 2.89  - CT abdomen showed Mild bilateral hydroureteronephrosis. Hyperdense material within the urinary bladder, possibly blood  products.   - F/U urine electrolytes

## 2022-03-13 NOTE — ED PROVIDER NOTE - CLINICAL SUMMARY MEDICAL DECISION MAKING FREE TEXT BOX
89 y/o male with PMHx of HTN. BPH. a. fib on Eliquis, anemia, HLD presents with chest/epigastric pain.  EKG is RBBB, exam remarkable for epigastric/ pantera umbilical discomfort.  Will check labs, CT abd/pelvis, GI cocktail, and reassess.

## 2022-03-13 NOTE — ED PROVIDER NOTE - CARE PLAN
Principal Discharge DX:	Chest pain   1 Principal Discharge DX:	Chest pain  Secondary Diagnosis:	Hypoxia

## 2022-03-13 NOTE — H&P ADULT - PROBLEM SELECTOR PLAN 3
- Patient PW k 5.6 in ED in the setting of JAMES  , given lokelma   - repeat K 5.7 , given regular insulin and lokelma   - f/u repeat BMP

## 2022-03-14 DIAGNOSIS — R31.9 HEMATURIA, UNSPECIFIED: ICD-10-CM

## 2022-03-14 LAB
ALBUMIN SERPL ELPH-MCNC: 3 G/DL — LOW (ref 3.5–5)
ALBUMIN SERPL ELPH-MCNC: 3.1 G/DL — LOW (ref 3.5–5)
ALP SERPL-CCNC: 75 U/L — SIGNIFICANT CHANGE UP (ref 40–120)
ALP SERPL-CCNC: 76 U/L — SIGNIFICANT CHANGE UP (ref 40–120)
ALT FLD-CCNC: 31 U/L DA — SIGNIFICANT CHANGE UP (ref 10–60)
ALT FLD-CCNC: 33 U/L DA — SIGNIFICANT CHANGE UP (ref 10–60)
ANION GAP SERPL CALC-SCNC: 7 MMOL/L — SIGNIFICANT CHANGE UP (ref 5–17)
ANION GAP SERPL CALC-SCNC: 7 MMOL/L — SIGNIFICANT CHANGE UP (ref 5–17)
ANION GAP SERPL CALC-SCNC: 9 MMOL/L — SIGNIFICANT CHANGE UP (ref 5–17)
AST SERPL-CCNC: 18 U/L — SIGNIFICANT CHANGE UP (ref 10–40)
AST SERPL-CCNC: 30 U/L — SIGNIFICANT CHANGE UP (ref 10–40)
BASOPHILS # BLD AUTO: 0.01 K/UL — SIGNIFICANT CHANGE UP (ref 0–0.2)
BASOPHILS NFR BLD AUTO: 0.1 % — SIGNIFICANT CHANGE UP (ref 0–2)
BILIRUB SERPL-MCNC: 0.5 MG/DL — SIGNIFICANT CHANGE UP (ref 0.2–1.2)
BILIRUB SERPL-MCNC: 0.6 MG/DL — SIGNIFICANT CHANGE UP (ref 0.2–1.2)
BUN SERPL-MCNC: 63 MG/DL — HIGH (ref 7–18)
BUN SERPL-MCNC: 63 MG/DL — HIGH (ref 7–18)
BUN SERPL-MCNC: 64 MG/DL — HIGH (ref 7–18)
CALCIUM SERPL-MCNC: 8.1 MG/DL — LOW (ref 8.4–10.5)
CALCIUM SERPL-MCNC: 8.4 MG/DL — SIGNIFICANT CHANGE UP (ref 8.4–10.5)
CALCIUM SERPL-MCNC: 8.4 MG/DL — SIGNIFICANT CHANGE UP (ref 8.4–10.5)
CHLORIDE SERPL-SCNC: 100 MMOL/L — SIGNIFICANT CHANGE UP (ref 96–108)
CHLORIDE SERPL-SCNC: 95 MMOL/L — LOW (ref 96–108)
CHLORIDE SERPL-SCNC: 98 MMOL/L — SIGNIFICANT CHANGE UP (ref 96–108)
CO2 SERPL-SCNC: 22 MMOL/L — SIGNIFICANT CHANGE UP (ref 22–31)
CO2 SERPL-SCNC: 23 MMOL/L — SIGNIFICANT CHANGE UP (ref 22–31)
CO2 SERPL-SCNC: 25 MMOL/L — SIGNIFICANT CHANGE UP (ref 22–31)
CREAT SERPL-MCNC: 2.42 MG/DL — HIGH (ref 0.5–1.3)
CREAT SERPL-MCNC: 2.59 MG/DL — HIGH (ref 0.5–1.3)
CREAT SERPL-MCNC: 2.8 MG/DL — HIGH (ref 0.5–1.3)
CULTURE RESULTS: SIGNIFICANT CHANGE UP
EGFR: 21 ML/MIN/1.73M2 — LOW
EGFR: 23 ML/MIN/1.73M2 — LOW
EGFR: 24 ML/MIN/1.73M2 — LOW
EOSINOPHIL # BLD AUTO: 0 K/UL — SIGNIFICANT CHANGE UP (ref 0–0.5)
EOSINOPHIL NFR BLD AUTO: 0 % — SIGNIFICANT CHANGE UP (ref 0–6)
GLUCOSE SERPL-MCNC: 126 MG/DL — HIGH (ref 70–99)
GLUCOSE SERPL-MCNC: 132 MG/DL — HIGH (ref 70–99)
GLUCOSE SERPL-MCNC: 137 MG/DL — HIGH (ref 70–99)
HCT VFR BLD CALC: 34.9 % — LOW (ref 39–50)
HGB BLD-MCNC: 12 G/DL — LOW (ref 13–17)
IMM GRANULOCYTES NFR BLD AUTO: 0.7 % — SIGNIFICANT CHANGE UP (ref 0–1.5)
LYMPHOCYTES # BLD AUTO: 1.17 K/UL — SIGNIFICANT CHANGE UP (ref 1–3.3)
LYMPHOCYTES # BLD AUTO: 9.5 % — LOW (ref 13–44)
MAGNESIUM SERPL-MCNC: 2.8 MG/DL — HIGH (ref 1.6–2.6)
MCHC RBC-ENTMCNC: 29 PG — SIGNIFICANT CHANGE UP (ref 27–34)
MCHC RBC-ENTMCNC: 34.4 GM/DL — SIGNIFICANT CHANGE UP (ref 32–36)
MCV RBC AUTO: 84.3 FL — SIGNIFICANT CHANGE UP (ref 80–100)
MONOCYTES # BLD AUTO: 1.25 K/UL — HIGH (ref 0–0.9)
MONOCYTES NFR BLD AUTO: 10.2 % — SIGNIFICANT CHANGE UP (ref 2–14)
NEUTROPHILS # BLD AUTO: 9.79 K/UL — HIGH (ref 1.8–7.4)
NEUTROPHILS NFR BLD AUTO: 79.5 % — HIGH (ref 43–77)
NRBC # BLD: 0 /100 WBCS — SIGNIFICANT CHANGE UP (ref 0–0)
PHOSPHATE SERPL-MCNC: 4.6 MG/DL — HIGH (ref 2.5–4.5)
PLATELET # BLD AUTO: 174 K/UL — SIGNIFICANT CHANGE UP (ref 150–400)
POTASSIUM SERPL-MCNC: 4.4 MMOL/L — SIGNIFICANT CHANGE UP (ref 3.5–5.3)
POTASSIUM SERPL-MCNC: 4.6 MMOL/L — SIGNIFICANT CHANGE UP (ref 3.5–5.3)
POTASSIUM SERPL-MCNC: 5.7 MMOL/L — HIGH (ref 3.5–5.3)
POTASSIUM SERPL-SCNC: 4.4 MMOL/L — SIGNIFICANT CHANGE UP (ref 3.5–5.3)
POTASSIUM SERPL-SCNC: 4.6 MMOL/L — SIGNIFICANT CHANGE UP (ref 3.5–5.3)
POTASSIUM SERPL-SCNC: 5.7 MMOL/L — HIGH (ref 3.5–5.3)
PROT SERPL-MCNC: 6.3 G/DL — SIGNIFICANT CHANGE UP (ref 6–8.3)
PROT SERPL-MCNC: 6.5 G/DL — SIGNIFICANT CHANGE UP (ref 6–8.3)
RBC # BLD: 4.14 M/UL — LOW (ref 4.2–5.8)
RBC # FLD: 12.5 % — SIGNIFICANT CHANGE UP (ref 10.3–14.5)
SODIUM SERPL-SCNC: 126 MMOL/L — LOW (ref 135–145)
SODIUM SERPL-SCNC: 130 MMOL/L — LOW (ref 135–145)
SODIUM SERPL-SCNC: 130 MMOL/L — LOW (ref 135–145)
SPECIMEN SOURCE: SIGNIFICANT CHANGE UP
TSH SERPL-MCNC: 3.68 UU/ML — SIGNIFICANT CHANGE UP (ref 0.34–4.82)
WBC # BLD: 12.31 K/UL — HIGH (ref 3.8–10.5)
WBC # FLD AUTO: 12.31 K/UL — HIGH (ref 3.8–10.5)

## 2022-03-14 PROCEDURE — 99233 SBSQ HOSP IP/OBS HIGH 50: CPT | Mod: GC

## 2022-03-14 PROCEDURE — 99223 1ST HOSP IP/OBS HIGH 75: CPT

## 2022-03-14 PROCEDURE — 93306 TTE W/DOPPLER COMPLETE: CPT | Mod: 26

## 2022-03-14 RX ORDER — ISOSORBIDE DINITRATE 5 MG/1
10 TABLET ORAL THREE TIMES A DAY
Refills: 0 | Status: DISCONTINUED | OUTPATIENT
Start: 2022-03-14 | End: 2022-03-18

## 2022-03-14 RX ORDER — FINASTERIDE 5 MG/1
5 TABLET, FILM COATED ORAL DAILY
Refills: 0 | Status: DISCONTINUED | OUTPATIENT
Start: 2022-03-14 | End: 2022-03-18

## 2022-03-14 RX ORDER — METOPROLOL TARTRATE 50 MG
25 TABLET ORAL
Refills: 0 | Status: DISCONTINUED | OUTPATIENT
Start: 2022-03-14 | End: 2022-03-15

## 2022-03-14 RX ORDER — HYDRALAZINE HCL 50 MG
10 TABLET ORAL EVERY 8 HOURS
Refills: 0 | Status: DISCONTINUED | OUTPATIENT
Start: 2022-03-14 | End: 2022-03-18

## 2022-03-14 RX ORDER — ONDANSETRON 8 MG/1
4 TABLET, FILM COATED ORAL EVERY 8 HOURS
Refills: 0 | Status: DISCONTINUED | OUTPATIENT
Start: 2022-03-14 | End: 2022-03-18

## 2022-03-14 RX ORDER — CALCIUM CARBONATE 500(1250)
1 TABLET ORAL ONCE
Refills: 0 | Status: COMPLETED | OUTPATIENT
Start: 2022-03-14 | End: 2022-03-14

## 2022-03-14 RX ADMIN — HEPARIN SODIUM 5000 UNIT(S): 5000 INJECTION INTRAVENOUS; SUBCUTANEOUS at 06:30

## 2022-03-14 RX ADMIN — Medication 81 MILLIGRAM(S): at 11:20

## 2022-03-14 RX ADMIN — Medication 3 MILLIGRAM(S): at 22:21

## 2022-03-14 RX ADMIN — Medication 10 MILLIGRAM(S): at 22:16

## 2022-03-14 RX ADMIN — Medication 100 MILLIGRAM(S): at 06:31

## 2022-03-14 RX ADMIN — SIMVASTATIN 20 MILLIGRAM(S): 20 TABLET, FILM COATED ORAL at 22:16

## 2022-03-14 RX ADMIN — Medication 1 DROP(S): at 17:47

## 2022-03-14 RX ADMIN — SODIUM ZIRCONIUM CYCLOSILICATE 5 GRAM(S): 10 POWDER, FOR SUSPENSION ORAL at 06:30

## 2022-03-14 RX ADMIN — CEFTRIAXONE 100 MILLIGRAM(S): 500 INJECTION, POWDER, FOR SOLUTION INTRAMUSCULAR; INTRAVENOUS at 11:20

## 2022-03-14 RX ADMIN — Medication 1 DROP(S): at 22:15

## 2022-03-14 RX ADMIN — Medication 1 TABLET(S): at 22:18

## 2022-03-14 RX ADMIN — PANTOPRAZOLE SODIUM 40 MILLIGRAM(S): 20 TABLET, DELAYED RELEASE ORAL at 06:30

## 2022-03-14 RX ADMIN — Medication 2 MILLIGRAM(S): at 22:16

## 2022-03-14 RX ADMIN — Medication 25 MILLIGRAM(S): at 17:47

## 2022-03-14 RX ADMIN — ISOSORBIDE DINITRATE 10 MILLIGRAM(S): 5 TABLET ORAL at 17:47

## 2022-03-14 RX ADMIN — ISOSORBIDE DINITRATE 10 MILLIGRAM(S): 5 TABLET ORAL at 11:20

## 2022-03-14 RX ADMIN — Medication 10 MILLIGRAM(S): at 13:59

## 2022-03-14 NOTE — PROGRESS NOTE ADULT - SUBJECTIVE AND OBJECTIVE BOX
Interval events:     SUBJECTIVE:        OBJECTIVE:  Vital Signs Last 24 Hrs  T(C): 36.7 (14 Mar 2022 10:45), Max: 37.5 (13 Mar 2022 19:58)  T(F): 98 (14 Mar 2022 10:45), Max: 99.5 (13 Mar 2022 19:58)  HR: 101 (14 Mar 2022 10:45) (80 - 101)  BP: 119/52 (14 Mar 2022 10:45) (112/78 - 133/65)  BP(mean): 86 (13 Mar 2022 19:58) (86 - 88)  RR: 20 (14 Mar 2022 10:45) (18 - 22)  SpO2: 97% (14 Mar 2022 10:45) (89% - 97%)    Physical Examination:  GEN: NAD, resting quietly  PULM: symmetric chest rise bilaterally, no increased WOB  ABD: soft        LABS:                        12.0   12.31 )-----------( 174      ( 14 Mar 2022 05:41 )             34.9       03-14    130<L>  |  98  |  63<H>  ----------------------------<  132<H>  4.6   |  25  |  2.59<H>    Ca    8.4      14 Mar 2022 05:41  Phos  4.6     03-14  Mg     2.8     03-14    TPro  6.3  /  Alb  3.0<L>  /  TBili  0.5  /  DBili  x   /  AST  18  /  ALT  31  /  AlkPhos  75  03-14

## 2022-03-14 NOTE — PROGRESS NOTE ADULT - SUBJECTIVE AND OBJECTIVE BOX
PGY-1 Progress Note discussed with attending    PAGER #: [[phone]] TILL 5:00 PM  PLEASE CONTACT ON CALL TEAM:  - On Call Team (Please refer to Angel) FROM 5:00 PM - 8:30PM  - Nightfloat Team FROM 8:30 -7:30 AM    CHIEF COMPLAINT & BRIEF HOSPITAL COURSE:    INTERVAL HPI/OVERNIGHT EVENTS:       REVIEW OF SYSTEMS:  CONSTITUTIONAL: No fever, weight loss, or fatigue  RESPIRATORY: No cough, wheezing, chills or hemoptysis; No shortness of breath  CARDIOVASCULAR: No chest pain, palpitations, dizziness, or leg swelling  GASTROINTESTINAL: No abdominal pain. No nausea, vomiting, or hematemesis; No diarrhea or constipation. No melena or hematochezia.  GENITOURINARY: No dysuria or hematuria, urinary frequency  NEUROLOGICAL: No headaches, memory loss, loss of strength, numbness, or tremors  SKIN: No itching, burning, rashes, or lesions     Vital Signs Last 24 Hrs  T(C): 36.4 (14 Mar 2022 07:35), Max: 37.5 (13 Mar 2022 19:58)  T(F): 97.6 (14 Mar 2022 07:35), Max: 99.5 (13 Mar 2022 19:58)  HR: 96 (14 Mar 2022 07:35) (80 - 98)  BP: 122/59 (14 Mar 2022 07:35) (112/78 - 133/65)  BP(mean): 86 (13 Mar 2022 19:58) (86 - 88)  RR: 18 (14 Mar 2022 07:35) (18 - 22)  SpO2: 96% (14 Mar 2022 07:35) (89% - 97%)    PHYSICAL EXAMINATION:  GENERAL: NAD, well built  HEAD:  Atraumatic, Normocephalic  EYES:  conjunctiva and sclera clear  NECK: Supple, No JVD, Normal thyroid  CHEST/LUNG: Clear to auscultation. Clear to percussion bilaterally; No rales, rhonchi, wheezing, or rubs  HEART: Regular rate and rhythm; No murmurs, rubs, or gallops  ABDOMEN: Soft, Nontender, Nondistended; Bowel sounds present  NERVOUS SYSTEM:  Alert & Oriented X3,    EXTREMITIES:  2+ Peripheral Pulses, No clubbing, cyanosis, or edema  SKIN: warm dry                          12.0   12.31 )-----------( 174      ( 14 Mar 2022 05:41 )             34.9     03-14    130<L>  |  98  |  63<H>  ----------------------------<  132<H>  4.6   |  25  |  2.59<H>    Ca    8.4      14 Mar 2022 05:41  Phos  4.6     03-14  Mg     2.8     03-14    TPro  6.3  /  Alb  3.0<L>  /  TBili  0.5  /  DBili  x   /  AST  18  /  ALT  31  /  AlkPhos  75  03-14    LIVER FUNCTIONS - ( 14 Mar 2022 05:41 )  Alb: 3.0 g/dL / Pro: 6.3 g/dL / ALK PHOS: 75 U/L / ALT: 31 U/L DA / AST: 18 U/L / GGT: x               PT/INR - ( 13 Mar 2022 08:39 )   PT: 15.2 sec;   INR: 1.27 ratio         PTT - ( 13 Mar 2022 08:39 )  PTT:22.1 sec    CAPILLARY BLOOD GLUCOSE      RADIOLOGY & ADDITIONAL TESTS:                   PGY-1 Progress Note discussed with attending    PAGER #: [[phone]] TILL 5:00 PM  PLEASE CONTACT ON CALL TEAM:  - On Call Team (Please refer to Angel) FROM 5:00 PM - 8:30PM  - Nightfloat Team FROM 8:30 -7:30 AM        INTERVAL HPI/OVERNIGHT EVENTS:  Pt feels ok, c/o nausea. Conversation translated by daughter who was at bedside.       REVIEW OF SYSTEMS:  CONSTITUTIONAL: No fever, weight loss, or fatigue  RESPIRATORY: No cough, wheezing, chills or hemoptysis; No shortness of breath  CARDIOVASCULAR: No chest pain, palpitations, dizziness, or leg swelling  GASTROINTESTINAL: No abdominal pain. + nausea,  no vomiting, or hematemesis; No diarrhea or constipation. No melena or hematochezia.  GENITOURINARY: No dysuria or hematuria, urinary frequency  NEUROLOGICAL: No headaches, memory loss, loss of strength, numbness, or tremors  SKIN: No itching, burning, rashes, or lesions     Vital Signs Last 24 Hrs  T(C): 36.4 (14 Mar 2022 07:35), Max: 37.5 (13 Mar 2022 19:58)  T(F): 97.6 (14 Mar 2022 07:35), Max: 99.5 (13 Mar 2022 19:58)  HR: 96 (14 Mar 2022 07:35) (80 - 98)  BP: 122/59 (14 Mar 2022 07:35) (112/78 - 133/65)  BP(mean): 86 (13 Mar 2022 19:58) (86 - 88)  RR: 18 (14 Mar 2022 07:35) (18 - 22)  SpO2: 96% (14 Mar 2022 07:35) (89% - 97%)    PHYSICAL EXAMINATION:  GENERAL: NAD, well built  HEAD:  Atraumatic, Normocephalic  EYES:  conjunctiva and sclera clear  NECK: Supple, No JVD  CHEST/LUNG: Clear to auscultation. Clear to percussion bilaterally; No rales, rhonchi, wheezing, or rubs  HEART: Regular rate and rhythm; No murmurs, rubs, or gallops  ABDOMEN: Soft, Nontender, Nondistended; Bowel sounds present  : salmon catheter in place, noted gross hematuria w/ clots   NERVOUS SYSTEM:  Alert & Oriented X3,    EXTREMITIES:  2+ Peripheral Pulses, No clubbing, cyanosis, or edema  SKIN: warm dry                          12.0   12.31 )-----------( 174      ( 14 Mar 2022 05:41 )             34.9     03-14    130<L>  |  98  |  63<H>  ----------------------------<  132<H>  4.6   |  25  |  2.59<H>    Ca    8.4      14 Mar 2022 05:41  Phos  4.6     03-14  Mg     2.8     03-14    TPro  6.3  /  Alb  3.0<L>  /  TBili  0.5  /  DBili  x   /  AST  18  /  ALT  31  /  AlkPhos  75  03-14    LIVER FUNCTIONS - ( 14 Mar 2022 05:41 )  Alb: 3.0 g/dL / Pro: 6.3 g/dL / ALK PHOS: 75 U/L / ALT: 31 U/L DA / AST: 18 U/L / GGT: x               PT/INR - ( 13 Mar 2022 08:39 )   PT: 15.2 sec;   INR: 1.27 ratio         PTT - ( 13 Mar 2022 08:39 )  PTT:22.1 sec    CAPILLARY BLOOD GLUCOSE      RADIOLOGY & ADDITIONAL TESTS:

## 2022-03-14 NOTE — PROGRESS NOTE ADULT - PROBLEM SELECTOR PLAN 6
- Patient c/o SOB after 1 l bolus in ED   - chest xray showed Cardiomegaly. Mildly worsened mild pulmonary vascular congestion. Small   right pleural effusion.  - ProBNP 5522  - trop neg x 2   - monitor on tele   - cardiology consulted Dr Garcia

## 2022-03-14 NOTE — PROGRESS NOTE ADULT - ASSESSMENT
89 yo M with b/l hydronephrosis, UTI likely secondary to obstructed Chinchilla catheter, hematuria.    1. Keep Chinchilla catheter  2. Irrigate as needed  3. Monitor urine output  4. F/u urine culture  5. IV abx  6. trend creatinine  7. labs reviewed  8. CT images reviewed  9 cystoscopy as an outpatient for work up of hematuria with his urologist  10. discussed with PA staff  11. will follow

## 2022-03-14 NOTE — PROGRESS NOTE ADULT - SUBJECTIVE AND OBJECTIVE BOX
CHIEF COMPLAINT:Patient is a 89y old  Male who presents with a chief complaint of CP,vomiting.Pt appears comfortable.    	  REVIEW OF SYSTEMS:  CONSTITUTIONAL: No fever, weight loss, or fatigue  EYES: No eye pain, visual disturbances, or discharge  ENT:  No difficulty hearing, tinnitus, vertigo; No sinus or throat pain  NECK: No pain or stiffness  RESPIRATORY: No cough, wheezing, chills or hemoptysis; No Shortness of Breath  CARDIOVASCULAR: No chest pain, palpitations, passing out, dizziness, or leg swelling  GASTROINTESTINAL: No abdominal or epigastric pain. No nausea, vomiting, or hematemesis; No diarrhea or constipation. No melena or hematochezia.  GENITOURINARY: No dysuria, frequency, hematuria, or incontinence  NEUROLOGICAL: No headaches, memory loss, loss of strength, numbness, or tremors  SKIN: No itching, burning, rashes, or lesions   LYMPH Nodes: No enlarged glands  ENDOCRINE: No heat or cold intolerance; No hair loss  MUSCULOSKELETAL: No joint pain or swelling; No muscle, back, or extremity pain  PSYCHIATRIC: No depression, anxiety, mood swings, or difficulty sleeping  HEME/LYMPH: No easy bruising, or bleeding gums  ALLERGY AND IMMUNOLOGIC: No hives or eczema	      PHYSICAL EXAM:  T(C): 36.4 (03-14-22 @ 07:35), Max: 37.5 (03-13-22 @ 19:58)  HR: 96 (03-14-22 @ 07:35) (80 - 98)  BP: 122/59 (03-14-22 @ 07:35) (112/78 - 133/65)  RR: 18 (03-14-22 @ 07:35) (18 - 22)  SpO2: 96% (03-14-22 @ 07:35) (89% - 97%)  Wt(kg): --  I&O's Summary    13 Mar 2022 07:01  -  14 Mar 2022 07:00  --------------------------------------------------------  IN: 0 mL / OUT: 3130 mL / NET: -3130 mL        Appearance: Normal	  HEENT:   Normal oral mucosa, PERRL, EOMI	  Lymphatic: No lymphadenopathy  Cardiovascular: Normal S1 S2, No JVD, No murmurs, No edema  Respiratory: Lungs clear to auscultation	  Psychiatry: A & O x 3, Mood & affect appropriate  Gastrointestinal:  Soft, Non-tender, + BS	  Skin: No rashes, No ecchymoses, No cyanosis	  Neurologic: Non-focal  Extremities: Normal range of motion, No clubbing, cyanosis or edema  Vascular: Peripheral pulses palpable 2+ bilaterally    MEDICATIONS  (STANDING):  aspirin  chewable 81 milliGRAM(s) Oral daily  cefTRIAXone   IVPB      cefTRIAXone   IVPB 1000 milliGRAM(s) IV Intermittent every 24 hours  doxazosin 2 milliGRAM(s) Oral at bedtime  metoprolol tartrate 100 milliGRAM(s) Oral daily  pantoprazole    Tablet 40 milliGRAM(s) Oral before breakfast  simvastatin 20 milliGRAM(s) Oral at bedtime          LABS:	 	    Troponin I, High Sensitivity Result: 26.1 ng/L (03-13 @ 10:25)  Troponin I, High Sensitivity Result: 21.4 ng/L (03-13 @ 06:03)                            12.0   12.31 )-----------( 174      ( 14 Mar 2022 05:41 )             34.9     03-14    130<L>  |  98  |  63<H>  ----------------------------<  132<H>  4.6   |  25  |  2.59<H>    Ca    8.4      14 Mar 2022 05:41  Phos  4.6     03-14  Mg     2.8     03-14    TPro  6.3  /  Alb  3.0<L>  /  TBili  0.5  /  DBili  x   /  AST  18  /  ALT  31  /  AlkPhos  75  03-14    proBNP: Serum Pro-Brain Natriuretic Peptide: 5522 pg/mL (03-13 @ 10:25)    TSH: Thyroid Stimulating Hormone, Serum: 3.68 uU/mL (03-14 @ 05:41)

## 2022-03-14 NOTE — PROGRESS NOTE ADULT - ASSESSMENT
89 y/o male with PMHx of HTN, BPH on chronic salmon since 2016 , A. fib on eliquis, anemia, hyperlipidemia,CHF (F 30%) presents with nausea, vomiting and chest discomfort yesterday night,renal failure,hematurea,hyponatremia.  1.Tele monitoring.  2.JAMES and hyperkalemia-s/p salmon-?CBI-hold eliquis.  3.Hyponatremia-improved.  4.CHF and HTN-dec dose of  b blocker,add low dose isordil and hydralazine.  5.Hyperkalemia-lokelma.  6.GI and DVT prophylaxis.  7.Will obtain outpatient records.  8.Hematurea and UTI-urine cx, abx started.

## 2022-03-14 NOTE — PROGRESS NOTE ADULT - PROBLEM SELECTOR PLAN 5
- Patient PW hyponatremia 124 , given 1 l bolus NS in ED , Na 125   - Na improved to 130   - Ordered hypernatremia w/u studies

## 2022-03-14 NOTE — PROGRESS NOTE ADULT - ATTENDING COMMENTS
Patient was seen and examined at bedside   Denies any complains, family at bedside helping with translation    Vitals noted     P/E:  NAD  AAOx2, no focal deficit   CTABL  S1S2 WNL   Abd soft, non tender, BS present   BL LE no edema or calf tenderness   Chinchilla draining hematuria     Labs noted     A/P:  Acute systolic Heart failure   Acute hyponatremia   UTI   JAMES on CKD  HTN   Afib   BPH on chronic Chinchilla   Anemia of chronic disease     Plan:  Cont fluid restriction, watch off Lasix, patient is not clinically volume overloaded   Serum and urine osmolarity, BMP q8hrs   Cont Ceftriaxone, f/u urine culture   Monitor renal function   Cont Chinchilla, irrigate as needed, urology following   Hold Eliquis, verify home meds   Discussed the plan with daughter at bedside   Full code   Rest of the management as above

## 2022-03-14 NOTE — PROGRESS NOTE ADULT - PROBLEM SELECTOR PLAN 4
- Patient PW k 5.6 in ED in the setting of JAMES  , given lokelma   - repeat K 5.7 , was given regular insulin and lokelma   - K normal today  - resolved  - continue monitoring

## 2022-03-14 NOTE — PROGRESS NOTE ADULT - PROBLEM SELECTOR PLAN 8
- Patient has history of Afib on eliquis  - EKG showed not in Afib   - holding NOAC  - c/w BB home dose

## 2022-03-14 NOTE — PROGRESS NOTE ADULT - PROBLEM SELECTOR PLAN 1
- Patient presented with  elevated creatinine level 3.31 , baseline unknown but patient ahs PMH of CKD at baseline  - s/p 1 l bolus in ED , repeat creat 2.89  - Likely pre-renal given improvement after IVF  - CT abdomen showed Mild bilateral hydroureteronephrosis. Hyperdense material within the urinary bladder, possibly blood  products.   - SCr trending down  - F/U urine electrolytes

## 2022-03-14 NOTE — PROGRESS NOTE ADULT - PROBLEM SELECTOR PLAN 7
- Patient has history of BPH on chronic salmon since 2016 following with Dr David Doherty urology replacing salmon once/ month   - Pt needs to continue w/ salmon  - on Doxazosin   - Urology approved starting pt on finasteride

## 2022-03-14 NOTE — PROGRESS NOTE ADULT - PROBLEM SELECTOR PLAN 10
RISK                                                          Points  [] Previous VTE                                           3  [] Thrombophilia                                        2  [] Lower limb paralysis                              2   [] Current Cancer                                       2   [] Immobilization > 24 hrs                        1  [] ICU/CCU stay > 24 hours                       1  [x] Age > 60                                                   1    SCD

## 2022-03-15 LAB
ALBUMIN SERPL ELPH-MCNC: 2.8 G/DL — LOW (ref 3.5–5)
ALP SERPL-CCNC: 68 U/L — SIGNIFICANT CHANGE UP (ref 40–120)
ALT FLD-CCNC: 25 U/L DA — SIGNIFICANT CHANGE UP (ref 10–60)
ANION GAP SERPL CALC-SCNC: 8 MMOL/L — SIGNIFICANT CHANGE UP (ref 5–17)
AST SERPL-CCNC: 20 U/L — SIGNIFICANT CHANGE UP (ref 10–40)
BILIRUB SERPL-MCNC: 0.5 MG/DL — SIGNIFICANT CHANGE UP (ref 0.2–1.2)
BUN SERPL-MCNC: 50 MG/DL — HIGH (ref 7–18)
CALCIUM SERPL-MCNC: 8.6 MG/DL — SIGNIFICANT CHANGE UP (ref 8.4–10.5)
CHLORIDE SERPL-SCNC: 102 MMOL/L — SIGNIFICANT CHANGE UP (ref 96–108)
CO2 SERPL-SCNC: 25 MMOL/L — SIGNIFICANT CHANGE UP (ref 22–31)
CREAT ?TM UR-MCNC: 42 MG/DL — SIGNIFICANT CHANGE UP
CREAT SERPL-MCNC: 2.15 MG/DL — HIGH (ref 0.5–1.3)
EGFR: 28 ML/MIN/1.73M2 — LOW
GLUCOSE SERPL-MCNC: 123 MG/DL — HIGH (ref 70–99)
HCT VFR BLD CALC: 34.4 % — LOW (ref 39–50)
HGB BLD-MCNC: 11.7 G/DL — LOW (ref 13–17)
MAGNESIUM SERPL-MCNC: 3 MG/DL — HIGH (ref 1.6–2.6)
MCHC RBC-ENTMCNC: 28.7 PG — SIGNIFICANT CHANGE UP (ref 27–34)
MCHC RBC-ENTMCNC: 34 GM/DL — SIGNIFICANT CHANGE UP (ref 32–36)
MCV RBC AUTO: 84.3 FL — SIGNIFICANT CHANGE UP (ref 80–100)
NRBC # BLD: 0 /100 WBCS — SIGNIFICANT CHANGE UP (ref 0–0)
OSMOLALITY SERPL: 297 MOSMOL/KG — SIGNIFICANT CHANGE UP (ref 280–301)
OSMOLALITY UR: 260 MOS/KG — SIGNIFICANT CHANGE UP (ref 50–1200)
PHOSPHATE SERPL-MCNC: 3.3 MG/DL — SIGNIFICANT CHANGE UP (ref 2.5–4.5)
PLATELET # BLD AUTO: 175 K/UL — SIGNIFICANT CHANGE UP (ref 150–400)
POTASSIUM SERPL-MCNC: 4.1 MMOL/L — SIGNIFICANT CHANGE UP (ref 3.5–5.3)
POTASSIUM SERPL-SCNC: 4.1 MMOL/L — SIGNIFICANT CHANGE UP (ref 3.5–5.3)
PROT SERPL-MCNC: 6 G/DL — SIGNIFICANT CHANGE UP (ref 6–8.3)
RBC # BLD: 4.08 M/UL — LOW (ref 4.2–5.8)
RBC # FLD: 12.4 % — SIGNIFICANT CHANGE UP (ref 10.3–14.5)
SODIUM SERPL-SCNC: 135 MMOL/L — SIGNIFICANT CHANGE UP (ref 135–145)
SODIUM UR-SCNC: 20 MMOL/L — SIGNIFICANT CHANGE UP
WBC # BLD: 9.23 K/UL — SIGNIFICANT CHANGE UP (ref 3.8–10.5)
WBC # FLD AUTO: 9.23 K/UL — SIGNIFICANT CHANGE UP (ref 3.8–10.5)

## 2022-03-15 PROCEDURE — 99233 SBSQ HOSP IP/OBS HIGH 50: CPT | Mod: GC

## 2022-03-15 PROCEDURE — 74018 RADEX ABDOMEN 1 VIEW: CPT | Mod: 26

## 2022-03-15 PROCEDURE — 76770 US EXAM ABDO BACK WALL COMP: CPT | Mod: 26

## 2022-03-15 RX ORDER — SENNA PLUS 8.6 MG/1
2 TABLET ORAL AT BEDTIME
Refills: 0 | Status: DISCONTINUED | OUTPATIENT
Start: 2022-03-15 | End: 2022-03-18

## 2022-03-15 RX ORDER — POLYETHYLENE GLYCOL 3350 17 G/17G
17 POWDER, FOR SOLUTION ORAL EVERY 12 HOURS
Refills: 0 | Status: DISCONTINUED | OUTPATIENT
Start: 2022-03-15 | End: 2022-03-18

## 2022-03-15 RX ORDER — METOPROLOL TARTRATE 50 MG
25 TABLET ORAL EVERY 8 HOURS
Refills: 0 | Status: DISCONTINUED | OUTPATIENT
Start: 2022-03-15 | End: 2022-03-15

## 2022-03-15 RX ORDER — METOPROLOL TARTRATE 50 MG
25 TABLET ORAL EVERY 8 HOURS
Refills: 0 | Status: DISCONTINUED | OUTPATIENT
Start: 2022-03-15 | End: 2022-03-16

## 2022-03-15 RX ORDER — DIGOXIN 250 MCG
250 TABLET ORAL ONCE
Refills: 0 | Status: COMPLETED | OUTPATIENT
Start: 2022-03-15 | End: 2022-03-15

## 2022-03-15 RX ADMIN — ISOSORBIDE DINITRATE 10 MILLIGRAM(S): 5 TABLET ORAL at 12:26

## 2022-03-15 RX ADMIN — Medication 2 DROP(S): at 18:21

## 2022-03-15 RX ADMIN — PANTOPRAZOLE SODIUM 40 MILLIGRAM(S): 20 TABLET, DELAYED RELEASE ORAL at 05:27

## 2022-03-15 RX ADMIN — Medication 25 MILLIGRAM(S): at 12:26

## 2022-03-15 RX ADMIN — Medication 10 MILLIGRAM(S): at 05:27

## 2022-03-15 RX ADMIN — Medication 2 MILLIGRAM(S): at 21:17

## 2022-03-15 RX ADMIN — CEFTRIAXONE 100 MILLIGRAM(S): 500 INJECTION, POWDER, FOR SOLUTION INTRAMUSCULAR; INTRAVENOUS at 12:28

## 2022-03-15 RX ADMIN — ISOSORBIDE DINITRATE 10 MILLIGRAM(S): 5 TABLET ORAL at 05:28

## 2022-03-15 RX ADMIN — SENNA PLUS 2 TABLET(S): 8.6 TABLET ORAL at 21:16

## 2022-03-15 RX ADMIN — Medication 250 MICROGRAM(S): at 12:27

## 2022-03-15 RX ADMIN — FINASTERIDE 5 MILLIGRAM(S): 5 TABLET, FILM COATED ORAL at 12:26

## 2022-03-15 RX ADMIN — Medication 10 MILLIGRAM(S): at 14:58

## 2022-03-15 RX ADMIN — Medication 10 MILLIGRAM(S): at 21:17

## 2022-03-15 RX ADMIN — Medication 1 DROP(S): at 14:57

## 2022-03-15 RX ADMIN — SIMVASTATIN 20 MILLIGRAM(S): 20 TABLET, FILM COATED ORAL at 21:16

## 2022-03-15 RX ADMIN — Medication 25 MILLIGRAM(S): at 05:27

## 2022-03-15 RX ADMIN — Medication 3 MILLIGRAM(S): at 21:38

## 2022-03-15 RX ADMIN — Medication 1 DROP(S): at 05:28

## 2022-03-15 RX ADMIN — Medication 81 MILLIGRAM(S): at 12:25

## 2022-03-15 RX ADMIN — ISOSORBIDE DINITRATE 10 MILLIGRAM(S): 5 TABLET ORAL at 18:21

## 2022-03-15 RX ADMIN — Medication 25 MILLIGRAM(S): at 21:17

## 2022-03-15 RX ADMIN — POLYETHYLENE GLYCOL 3350 17 GRAM(S): 17 POWDER, FOR SOLUTION ORAL at 18:25

## 2022-03-15 NOTE — PROGRESS NOTE ADULT - PROBLEM SELECTOR PLAN 6
- Patient c/o SOB after 1 l bolus in ED   - chest xray showed Cardiomegaly. Mildly worsened mild pulmonary vascular congestion. Small   right pleural effusion.  - ProBNP 5522  - trop neg x 2   - monitor on tele   - on metoprolol 25 mg PO TID   - cardiology consulted Dr Garcia

## 2022-03-15 NOTE — PROGRESS NOTE ADULT - ASSESSMENT
89 y/o male with PMHx of HTN, BPH on chronic salmon since 2016 , A. fib on eliquis, anemia, hyperlipidemia presents with nausea, vomiting and chest discomfort yesterday night .

## 2022-03-15 NOTE — PROGRESS NOTE ADULT - PROBLEM SELECTOR PLAN 1
- Patient presented with  elevated creatinine level 3.31 , baseline unknown but patient ahs PMH of CKD at baseline  - s/p 1 l bolus in ED , repeat creat 2.89  - Likely pre-renal given improvement after IVF  - CT abdomen showed Mild bilateral hydroureteronephrosis. Hyperdense material within the urinary bladder, possibly blood  products.   - SCr  continues trending down  - FeNa 0.8 (pre-renal)  - resolving

## 2022-03-15 NOTE — PROGRESS NOTE ADULT - ATTENDING COMMENTS
Patient was seen and examined at bedside   Denies any complains     Vitals noted    Tele shows several sustained wide complex tachycardias, but patient is asymptomatic     P/E:  NAD  AAOx1, no focal deficit   CTABL  S1S2 WNL, no MRG   Abd soft, non tender, distended, BS present  BLLE no edema or calf tenderness     Labs noted     A/P:  Persistent wide complex tachycardia  Asymptomatic Vtach  Afib with RVR  UTI Patient was seen and examined at bedside   Denies any complains     Vitals noted    Tele shows several sustained wide complex tachycardias, but patient is asymptomatic     P/E:  NAD  AAOx1, no focal deficit   CTABL  S1S2 WNL, no MRG   Abd soft, non tender, distended, BS present  BLLE no edema or calf tenderness     Labs noted     A/P:  Persistent wide complex tachycardia  Asymptomatic Vtach  Afib with RVR  Acute on Chronic systolic heart failure (EF 42%)  UTI due to GNR  Chronic Urinary retention due to BPH  HTN  HLD  Hyponatremia  Hyperkalemia     Plan:  Metoprolol increased to 25mg TID with one dose of Digoxin, appreciate consult from Dr. Radha Rivas Tele  Monitor electrolytes closely, keep K>4, Mg >2  patient is euvolemic now  Cont current BP meds with holding parameters  I/O, cont salmon, monitor hematuria, hold eliquis for now  Follow urine cx  Na normal  Full code Patient was seen and examined at bedside   Denies any complains     Vitals noted    Tele shows several sustained wide complex tachycardias, but patient is asymptomatic     P/E:  NAD  AAOx1, no focal deficit   CTABL  S1S2 WNL, no MRG   Abd soft, non tender, distended, BS present  BLLE no edema or calf tenderness     Labs noted     A/P:  Persistent wide complex tachycardia  Asymptomatic Vtach  Afib with RVR  Acute on Chronic systolic heart failure (EF 42%)  UTI due to GNR  Chronic Urinary retention due to BPH  HTN  HLD  Hyponatremia  Hyperkalemia     Plan:  Metoprolol increased to 25mg TID with one dose of Digoxin, appreciate consult from Dr. Radha Rivas Tele  Monitor electrolytes closely, keep K>4, Mg >2  patient is euvolemic now  Cont current BP meds with holding parameters  I/O, cont Chinchilla, monitor hematuria, hold Eliquis for now  Follow urine cx  Na normal  Senna and Miralax  Full code  Plan of care was discussed with primary care giver daughter; all questions and concerns were addressed and care was aligned with her wishes.

## 2022-03-15 NOTE — PROGRESS NOTE ADULT - SUBJECTIVE AND OBJECTIVE BOX
Interval events:   Urine clx: GNR   Cr: 2.15      OBJECTIVE:  Vital Signs Last 24 Hrs  T(C): 36.2 (15 Mar 2022 11:02), Max: 36.9 (14 Mar 2022 15:51)  T(F): 97.2 (15 Mar 2022 11:02), Max: 98.5 (14 Mar 2022 15:51)  HR: 115 (15 Mar 2022 11:02) (76 - 124)  BP: 121/68 (15 Mar 2022 11:02) (111/65 - 131/67)  BP(mean): --  RR: 19 (15 Mar 2022 11:02) (18 - 20)  SpO2: 93% (15 Mar 2022 11:02) (92% - 95%)    Physical Examination:  GEN: NAD, resting quietly  PULM: symmetric chest rise bilaterally, no increased WOB  ABD: soft  : salmon catheter draining clear yellow urine       LABS:                        11.7   9.23  )-----------( 175      ( 15 Mar 2022 06:42 )             34.4       03-15    135  |  102  |  50<H>  ----------------------------<  123<H>  4.1   |  25  |  2.15<H>    Ca    8.6      15 Mar 2022 06:42  Phos  3.3     03-15  Mg     3.0     03-15    TPro  6.0  /  Alb  2.8<L>  /  TBili  0.5  /  DBili  x   /  AST  20  /  ALT  25  /  AlkPhos  68  03-15

## 2022-03-15 NOTE — PROGRESS NOTE ADULT - SUBJECTIVE AND OBJECTIVE BOX
CHIEF COMPLAINT:Patient is a 89y old  Male who presents with a chief complaint of CHF,CP.Pt appears comfortable.    	  REVIEW OF SYSTEMS:  CONSTITUTIONAL: No fever, weight loss, or fatigue  EYES: No eye pain, visual disturbances, or discharge  ENT:  No difficulty hearing, tinnitus, vertigo; No sinus or throat pain  NECK: No pain or stiffness  RESPIRATORY: No cough, wheezing, chills or hemoptysis; No Shortness of Breath  CARDIOVASCULAR: No chest pain, palpitations, passing out, dizziness, or leg swelling  GASTROINTESTINAL: No abdominal or epigastric pain. No nausea, vomiting, or hematemesis; No diarrhea or constipation. No melena or hematochezia.  GENITOURINARY: No dysuria, frequency, hematuria, or incontinence  NEUROLOGICAL: No headaches, memory loss, loss of strength, numbness, or tremors  SKIN: No itching, burning, rashes, or lesions   LYMPH Nodes: No enlarged glands  ENDOCRINE: No heat or cold intolerance; No hair loss  MUSCULOSKELETAL: No joint pain or swelling; No muscle, back, or extremity pain  PSYCHIATRIC: No depression, anxiety, mood swings, or difficulty sleeping  HEME/LYMPH: No easy bruising, or bleeding gums  ALLERGY AND IMMUNOLOGIC: No hives or eczema	      PHYSICAL EXAM:  T(C): 36.4 (03-15-22 @ 07:24), Max: 36.9 (03-14-22 @ 15:51)  HR: 107 (03-15-22 @ 07:24) (76 - 124)  BP: 127/69 (03-15-22 @ 07:24) (111/65 - 131/67)  RR: 19 (03-15-22 @ 07:24) (18 - 20)  SpO2: 94% (03-15-22 @ 07:24) (92% - 97%)  Wt(kg): --  I&O's Summary    14 Mar 2022 07:01  -  15 Mar 2022 07:00  --------------------------------------------------------  IN: 0 mL / OUT: 2780 mL / NET: -2780 mL    15 Mar 2022 07:01  -  15 Mar 2022 09:50  --------------------------------------------------------  IN: 50 mL / OUT: 0 mL / NET: 50 mL        Appearance: Normal	  HEENT:   Normal oral mucosa, PERRL, EOMI	  Lymphatic: No lymphadenopathy  Cardiovascular: Normal S1 S2, No JVD, No murmurs, No edema  Respiratory: Lungs clear to auscultation	  Psychiatry: A & O x 3, Mood & affect appropriate  Gastrointestinal:  Soft, Non-tender, + BS	  Skin: No rashes, No ecchymoses, No cyanosis	  Neurologic: Non-focal  Extremities: Normal range of motion, No clubbing, cyanosis or edema  Vascular: Peripheral pulses palpable 2+ bilaterally    MEDICATIONS  (STANDING):  artificial  tears Solution 1 Drop(s) Both EYES three times a day  aspirin  chewable 81 milliGRAM(s) Oral daily  cefTRIAXone   IVPB      cefTRIAXone   IVPB 1000 milliGRAM(s) IV Intermittent every 24 hours  digoxin  Injectable 250 MICROGram(s) IV Push once  doxazosin 2 milliGRAM(s) Oral at bedtime  finasteride 5 milliGRAM(s) Oral daily  hydrALAZINE 10 milliGRAM(s) Oral every 8 hours  isosorbide   dinitrate Tablet (ISORDIL) 10 milliGRAM(s) Oral three times a day  metoprolol tartrate 25 milliGRAM(s) Oral every 8 hours  pantoprazole    Tablet 40 milliGRAM(s) Oral before breakfast  simvastatin 20 milliGRAM(s) Oral at bedtime      TELEMETRY: 	afib upto 140's      LABS:	 	  Troponin I, High Sensitivity Result: 26.1 ng/L (03-13 @ 10:25)  Troponin I, High Sensitivity Result: 21.4 ng/L (03-13 @ 06:03)                            11.7   9.23  )-----------( 175      ( 15 Mar 2022 06:42 )             34.4     03-15    135  |  102  |  50<H>  ----------------------------<  123<H>  4.1   |  25  |  2.15<H>    Ca    8.6      15 Mar 2022 06:42  Phos  3.3     03-15  Mg     3.0     03-15    TPro  6.0  /  Alb  2.8<L>  /  TBili  0.5  /  DBili  x   /  AST  20  /  ALT  25  /  AlkPhos  68  03-15    proBNP: Serum Pro-Brain Natriuretic Peptide: 5522 pg/mL (03-13 @ 10:25)      TSH: Thyroid Stimulating Hormone, Serum: 3.68 uU/mL (03-14 @ 05:41)      	      uinCulture - Urine (03.14.22 @ 01:18)   Specimen Source: Catheterized Catheterized   Culture Results:   50,000 - 99,000 CFU/mL Gram Negative Rods < from: Transthoracic Echocardiogram (03.14.22 @ 12:08) >  OBSERVATIONS:  Mitral Valve: Normal mitral valve. Trace mitral  regurgitation.  Aortic Root: Aortic Root: 3.7 cm. Ascending aorta not well  seen.  Aortic Valve: Aortic valve not well visualized; probably  trileaflet. No aortic stenosis. Mild aortic regurgitation.  Left Atrium: Mildly dilated left atrium.  LA volume index =  37 cc/m2.  Left Ventricle: Mildly decreased left ventricular systolic  function (LVEF42% by biplane). Paradoxical septal motion is  seen, consistent with conduction delay. Mild concentric  left ventricular hypertrophy. Unable to adequately assess  diastolic function due to presence of arrythmia.  Right Heart: Severe right atrial enlargement. Right  ventricular enlargement with grossly normal RV systolic  function (TAPSE 1.9). Tricuspid valve not well visualized,  probably normal. Mild-moderate tricuspid regurgitation.  Pulmonic valve not well seen.  Pericardium/PleuraSmall pericardial effusion. No  echocardiographic evidence of tamponade physiology.  Hemodynamic: RA Pressure is 8 mm Hg. RV systolic pressure  is mildly increased at  37 mm Hg. An atrial septal aneurysm  is noted.

## 2022-03-15 NOTE — PROGRESS NOTE ADULT - ASSESSMENT
89 yo M with b/l hydronephrosis, UTI likely secondary to obstructed Salmon catheter, hematuria.    Salmon catheter in placing draining clear urine.   - Chronic salmon catheter can be exchanged as outpatient   - F/up urine culture: GNR   - continue empiric antibiotics   - Creatinine downtrending   No further urologic intervention     Please call for future questions/concerns    89 yo M with b/l hydronephrosis, UTI likely secondary to obstructed Salmon catheter, hematuria.    Salmon catheter in placing draining clear urine.   - Chronic salmon catheter can be exchanged as outpatient   - F/up urine culture: GNR   - continue empiric antibiotics   - AC per primary team   - Creatinine downtrending   No further urologic intervention     Please call for future questions/concerns

## 2022-03-15 NOTE — PROGRESS NOTE ADULT - PROBLEM SELECTOR PLAN 8
- Patient has history of Afib on eliquis  - EKG showed not in Afib   - holding NOAC  - Pt with episodes of Afib w/ RVR  - As per cardiology dose of metoprolol was changed to 25 mg PO TID. Was also given dose of digoxin 250 mg STAT   - c/w BB home dose

## 2022-03-15 NOTE — PROGRESS NOTE ADULT - ASSESSMENT
89 y/o male with PMHx of HTN, BPH on chronic salmon since 2016 , A. fib on eliquis, anemia, hyperlipidemia,CHF (F 30%) presents with nausea, vomiting and chest discomfort yesterday night,renal failure,hematurea,hyponatremia.  1.Tele monitoring.  2.JAMES and hyperkalemia-s/p salmon-hold eliquis.  3.Hyponatremia-improved.  4.CHF and HTN  b blocker,adddig,cont  low dose isordil and hydralazine.  5.GI and DVT prophylaxis.  6.Hematurea and UTI-urine cx, abx.

## 2022-03-15 NOTE — PROGRESS NOTE ADULT - SUBJECTIVE AND OBJECTIVE BOX
PGY-1 Progress Note discussed with attending    PAGER #: [[phone]] TILL 5:00 PM  PLEASE CONTACT ON CALL TEAM:  - On Call Team (Please refer to Angel) FROM 5:00 PM - 8:30PM  - Nightfloat Team FROM 8:30 -7:30 AM    CHIEF COMPLAINT & BRIEF HOSPITAL COURSE:    INTERVAL HPI/OVERNIGHT EVENTS:       REVIEW OF SYSTEMS:  CONSTITUTIONAL: No fever, weight loss, or fatigue  RESPIRATORY: No cough, wheezing, chills or hemoptysis; No shortness of breath  CARDIOVASCULAR: No chest pain, palpitations, dizziness, or leg swelling  GASTROINTESTINAL: No abdominal pain. No nausea, vomiting, or hematemesis; No diarrhea or constipation. No melena or hematochezia.  GENITOURINARY: No dysuria or hematuria, urinary frequency  NEUROLOGICAL: No headaches, memory loss, loss of strength, numbness, or tremors  SKIN: No itching, burning, rashes, or lesions     Vital Signs Last 24 Hrs  T(C): 36.4 (15 Mar 2022 07:24), Max: 36.9 (14 Mar 2022 15:51)  T(F): 97.6 (15 Mar 2022 07:24), Max: 98.5 (14 Mar 2022 15:51)  HR: 107 (15 Mar 2022 07:24) (76 - 124)  BP: 127/69 (15 Mar 2022 07:24) (111/65 - 131/67)  BP(mean): --  RR: 19 (15 Mar 2022 07:24) (18 - 20)  SpO2: 94% (15 Mar 2022 07:24) (92% - 97%)    PHYSICAL EXAMINATION:  GENERAL: NAD, well built  HEAD:  Atraumatic, Normocephalic  EYES:  conjunctiva and sclera clear  NECK: Supple, No JVD, Normal thyroid  CHEST/LUNG: Clear to auscultation. Clear to percussion bilaterally; No rales, rhonchi, wheezing, or rubs  HEART: Regular rate and rhythm; No murmurs, rubs, or gallops  ABDOMEN: Soft, Nontender, Nondistended; Bowel sounds present  NERVOUS SYSTEM:  Alert & Oriented X3,    EXTREMITIES:  2+ Peripheral Pulses, No clubbing, cyanosis, or edema  SKIN: warm dry                          11.7   9.23  )-----------( 175      ( 15 Mar 2022 06:42 )             34.4     03-15    135  |  102  |  50<H>  ----------------------------<  123<H>  4.1   |  25  |  2.15<H>    Ca    8.6      15 Mar 2022 06:42  Phos  3.3     03-15  Mg     3.0     03-15    TPro  6.0  /  Alb  2.8<L>  /  TBili  0.5  /  DBili  x   /  AST  20  /  ALT  25  /  AlkPhos  68  03-15    LIVER FUNCTIONS - ( 15 Mar 2022 06:42 )  Alb: 2.8 g/dL / Pro: 6.0 g/dL / ALK PHOS: 68 U/L / ALT: 25 U/L DA / AST: 20 U/L / GGT: x                   CAPILLARY BLOOD GLUCOSE      RADIOLOGY & ADDITIONAL TESTS:                   PGY-1 Progress Note discussed with attending    PAGER #: [[phone]] TILL 5:00 PM  PLEASE CONTACT ON CALL TEAM:  - On Call Team (Please refer to Angel) FROM 5:00 PM - 8:30PM  - Nightfloat Team FROM 8:30 -7:30 AM        INTERVAL HPI/OVERNIGHT EVENTS: PT with episode of VT on telemetry monitoring overnight. Pt seen and examined at bedside, conversation translated by Fijian  ID # [id number]. Pt endorses feeling ok, decreased appetite and some nausea.       REVIEW OF SYSTEMS:  CONSTITUTIONAL: No fever, weight loss, or fatigue  RESPIRATORY: No cough, wheezing, chills or hemoptysis; No shortness of breath  CARDIOVASCULAR: No chest pain, palpitations, dizziness, or leg swelling  GASTROINTESTINAL: No abdominal pain. + nausea, no vomiting, or hematemesis; No diarrhea or constipation. No melena or hematochezia.  GENITOURINARY: No dysuria or hematuria, urinary frequency  NEUROLOGICAL: No headaches, memory loss, loss of strength, numbness, or tremors  SKIN: No itching, burning, rashes, or lesions     Vital Signs Last 24 Hrs  T(C): 36.4 (15 Mar 2022 07:24), Max: 36.9 (14 Mar 2022 15:51)  T(F): 97.6 (15 Mar 2022 07:24), Max: 98.5 (14 Mar 2022 15:51)  HR: 107 (15 Mar 2022 07:24) (76 - 124)  BP: 127/69 (15 Mar 2022 07:24) (111/65 - 131/67)  BP(mean): --  RR: 19 (15 Mar 2022 07:24) (18 - 20)  SpO2: 94% (15 Mar 2022 07:24) (92% - 97%)    PHYSICAL EXAMINATION:  GENERAL: NAD, well built  HEAD:  Atraumatic, Normocephalic  EYES:  conjunctiva and sclera clear  NECK: Supple, No JVD  CHEST/LUNG: Clear to auscultation. Clear to percussion bilaterally; No rales, rhonchi, wheezing, or rubs  HEART: Regular rate and rhythm; No murmurs, rubs, or gallops  ABDOMEN: Soft, Nontender, mildly distended; Bowel sounds present, but decreased  : salmon catheter in place, draining hematuric urine w/o clots   NERVOUS SYSTEM:  Alert & Oriented X3,    EXTREMITIES:  2+ Peripheral Pulses, No clubbing, cyanosis, or edema  SKIN: warm dry                          11.7   9.23  )-----------( 175      ( 15 Mar 2022 06:42 )             34.4     03-15    135  |  102  |  50<H>  ----------------------------<  123<H>  4.1   |  25  |  2.15<H>    Ca    8.6      15 Mar 2022 06:42  Phos  3.3     03-15  Mg     3.0     03-15    TPro  6.0  /  Alb  2.8<L>  /  TBili  0.5  /  DBili  x   /  AST  20  /  ALT  25  /  AlkPhos  68  03-15    LIVER FUNCTIONS - ( 15 Mar 2022 06:42 )  Alb: 2.8 g/dL / Pro: 6.0 g/dL / ALK PHOS: 68 U/L / ALT: 25 U/L DA / AST: 20 U/L / GGT: x                   CAPILLARY BLOOD GLUCOSE      RADIOLOGY & ADDITIONAL TESTS:

## 2022-03-16 ENCOUNTER — TRANSCRIPTION ENCOUNTER (OUTPATIENT)
Age: 87
End: 2022-03-16

## 2022-03-16 DIAGNOSIS — K56.7 ILEUS, UNSPECIFIED: ICD-10-CM

## 2022-03-16 LAB
-  AMIKACIN: SIGNIFICANT CHANGE UP
-  AMOXICILLIN/CLAVULANIC ACID: SIGNIFICANT CHANGE UP
-  AMPICILLIN/SULBACTAM: SIGNIFICANT CHANGE UP
-  AMPICILLIN: SIGNIFICANT CHANGE UP
-  AZTREONAM: SIGNIFICANT CHANGE UP
-  CEFAZOLIN: SIGNIFICANT CHANGE UP
-  CEFEPIME: SIGNIFICANT CHANGE UP
-  CEFOXITIN: SIGNIFICANT CHANGE UP
-  CEFTRIAXONE: SIGNIFICANT CHANGE UP
-  CIPROFLOXACIN: SIGNIFICANT CHANGE UP
-  ERTAPENEM: SIGNIFICANT CHANGE UP
-  GENTAMICIN: SIGNIFICANT CHANGE UP
-  IMIPENEM: SIGNIFICANT CHANGE UP
-  LEVOFLOXACIN: SIGNIFICANT CHANGE UP
-  MEROPENEM: SIGNIFICANT CHANGE UP
-  NITROFURANTOIN: SIGNIFICANT CHANGE UP
-  PIPERACILLIN/TAZOBACTAM: SIGNIFICANT CHANGE UP
-  TIGECYCLINE: SIGNIFICANT CHANGE UP
-  TOBRAMYCIN: SIGNIFICANT CHANGE UP
-  TRIMETHOPRIM/SULFAMETHOXAZOLE: SIGNIFICANT CHANGE UP
ALBUMIN SERPL ELPH-MCNC: 2.8 G/DL — LOW (ref 3.5–5)
ALP SERPL-CCNC: 65 U/L — SIGNIFICANT CHANGE UP (ref 40–120)
ALT FLD-CCNC: 25 U/L DA — SIGNIFICANT CHANGE UP (ref 10–60)
ANION GAP SERPL CALC-SCNC: 5 MMOL/L — SIGNIFICANT CHANGE UP (ref 5–17)
AST SERPL-CCNC: 18 U/L — SIGNIFICANT CHANGE UP (ref 10–40)
BILIRUB SERPL-MCNC: 0.4 MG/DL — SIGNIFICANT CHANGE UP (ref 0.2–1.2)
BUN SERPL-MCNC: 48 MG/DL — HIGH (ref 7–18)
CALCIUM SERPL-MCNC: 8.5 MG/DL — SIGNIFICANT CHANGE UP (ref 8.4–10.5)
CHLORIDE SERPL-SCNC: 105 MMOL/L — SIGNIFICANT CHANGE UP (ref 96–108)
CO2 SERPL-SCNC: 27 MMOL/L — SIGNIFICANT CHANGE UP (ref 22–31)
CREAT SERPL-MCNC: 1.95 MG/DL — HIGH (ref 0.5–1.3)
CULTURE RESULTS: SIGNIFICANT CHANGE UP
EGFR: 32 ML/MIN/1.73M2 — LOW
GLUCOSE SERPL-MCNC: 114 MG/DL — HIGH (ref 70–99)
HCT VFR BLD CALC: 36.9 % — LOW (ref 39–50)
HGB BLD-MCNC: 12.2 G/DL — LOW (ref 13–17)
MAGNESIUM SERPL-MCNC: 2.9 MG/DL — HIGH (ref 1.6–2.6)
MCHC RBC-ENTMCNC: 28.9 PG — SIGNIFICANT CHANGE UP (ref 27–34)
MCHC RBC-ENTMCNC: 33.1 GM/DL — SIGNIFICANT CHANGE UP (ref 32–36)
MCV RBC AUTO: 87.4 FL — SIGNIFICANT CHANGE UP (ref 80–100)
METHOD TYPE: SIGNIFICANT CHANGE UP
NRBC # BLD: 0 /100 WBCS — SIGNIFICANT CHANGE UP (ref 0–0)
ORGANISM # SPEC MICROSCOPIC CNT: SIGNIFICANT CHANGE UP
ORGANISM # SPEC MICROSCOPIC CNT: SIGNIFICANT CHANGE UP
PHOSPHATE SERPL-MCNC: 2.8 MG/DL — SIGNIFICANT CHANGE UP (ref 2.5–4.5)
PLATELET # BLD AUTO: 180 K/UL — SIGNIFICANT CHANGE UP (ref 150–400)
POTASSIUM SERPL-MCNC: 4.1 MMOL/L — SIGNIFICANT CHANGE UP (ref 3.5–5.3)
POTASSIUM SERPL-SCNC: 4.1 MMOL/L — SIGNIFICANT CHANGE UP (ref 3.5–5.3)
PROT SERPL-MCNC: 6.1 G/DL — SIGNIFICANT CHANGE UP (ref 6–8.3)
RBC # BLD: 4.22 M/UL — SIGNIFICANT CHANGE UP (ref 4.2–5.8)
RBC # FLD: 12.6 % — SIGNIFICANT CHANGE UP (ref 10.3–14.5)
SODIUM SERPL-SCNC: 137 MMOL/L — SIGNIFICANT CHANGE UP (ref 135–145)
SPECIMEN SOURCE: SIGNIFICANT CHANGE UP
WBC # BLD: 8.16 K/UL — SIGNIFICANT CHANGE UP (ref 3.8–10.5)
WBC # FLD AUTO: 8.16 K/UL — SIGNIFICANT CHANGE UP (ref 3.8–10.5)

## 2022-03-16 PROCEDURE — 99233 SBSQ HOSP IP/OBS HIGH 50: CPT | Mod: GC

## 2022-03-16 RX ORDER — CEFTRIAXONE 500 MG/1
1000 INJECTION, POWDER, FOR SOLUTION INTRAMUSCULAR; INTRAVENOUS EVERY 24 HOURS
Refills: 0 | Status: COMPLETED | OUTPATIENT
Start: 2022-03-17 | End: 2022-03-17

## 2022-03-16 RX ORDER — METOPROLOL TARTRATE 50 MG
25 TABLET ORAL ONCE
Refills: 0 | Status: COMPLETED | OUTPATIENT
Start: 2022-03-16 | End: 2022-03-16

## 2022-03-16 RX ORDER — SODIUM CHLORIDE 9 MG/ML
1000 INJECTION, SOLUTION INTRAVENOUS
Refills: 0 | Status: DISCONTINUED | OUTPATIENT
Start: 2022-03-16 | End: 2022-03-17

## 2022-03-16 RX ORDER — APIXABAN 2.5 MG/1
2.5 TABLET, FILM COATED ORAL
Refills: 0 | Status: DISCONTINUED | OUTPATIENT
Start: 2022-03-16 | End: 2022-03-18

## 2022-03-16 RX ORDER — DIGOXIN 250 MCG
125 TABLET ORAL EVERY OTHER DAY
Refills: 0 | Status: DISCONTINUED | OUTPATIENT
Start: 2022-03-16 | End: 2022-03-18

## 2022-03-16 RX ORDER — METOPROLOL TARTRATE 50 MG
50 TABLET ORAL
Refills: 0 | Status: DISCONTINUED | OUTPATIENT
Start: 2022-03-16 | End: 2022-03-17

## 2022-03-16 RX ADMIN — Medication 10 MILLIGRAM(S): at 15:05

## 2022-03-16 RX ADMIN — Medication 2 MILLIGRAM(S): at 21:44

## 2022-03-16 RX ADMIN — SIMVASTATIN 20 MILLIGRAM(S): 20 TABLET, FILM COATED ORAL at 21:44

## 2022-03-16 RX ADMIN — Medication 125 MICROGRAM(S): at 11:36

## 2022-03-16 RX ADMIN — Medication 50 MILLIGRAM(S): at 17:50

## 2022-03-16 RX ADMIN — FINASTERIDE 5 MILLIGRAM(S): 5 TABLET, FILM COATED ORAL at 11:36

## 2022-03-16 RX ADMIN — POLYETHYLENE GLYCOL 3350 17 GRAM(S): 17 POWDER, FOR SOLUTION ORAL at 05:26

## 2022-03-16 RX ADMIN — Medication 10 MILLIGRAM(S): at 05:26

## 2022-03-16 RX ADMIN — APIXABAN 2.5 MILLIGRAM(S): 2.5 TABLET, FILM COATED ORAL at 17:50

## 2022-03-16 RX ADMIN — Medication 2 DROP(S): at 11:34

## 2022-03-16 RX ADMIN — Medication 10 MILLIGRAM(S): at 21:44

## 2022-03-16 RX ADMIN — SODIUM CHLORIDE 50 MILLILITER(S): 9 INJECTION, SOLUTION INTRAVENOUS at 18:11

## 2022-03-16 RX ADMIN — Medication 25 MILLIGRAM(S): at 11:39

## 2022-03-16 RX ADMIN — POLYETHYLENE GLYCOL 3350 17 GRAM(S): 17 POWDER, FOR SOLUTION ORAL at 17:50

## 2022-03-16 RX ADMIN — PANTOPRAZOLE SODIUM 40 MILLIGRAM(S): 20 TABLET, DELAYED RELEASE ORAL at 06:08

## 2022-03-16 RX ADMIN — Medication 2 DROP(S): at 21:46

## 2022-03-16 RX ADMIN — SENNA PLUS 2 TABLET(S): 8.6 TABLET ORAL at 21:45

## 2022-03-16 RX ADMIN — CEFTRIAXONE 100 MILLIGRAM(S): 500 INJECTION, POWDER, FOR SOLUTION INTRAMUSCULAR; INTRAVENOUS at 11:35

## 2022-03-16 RX ADMIN — APIXABAN 2.5 MILLIGRAM(S): 2.5 TABLET, FILM COATED ORAL at 11:35

## 2022-03-16 RX ADMIN — ISOSORBIDE DINITRATE 10 MILLIGRAM(S): 5 TABLET ORAL at 05:26

## 2022-03-16 RX ADMIN — Medication 2 DROP(S): at 05:26

## 2022-03-16 RX ADMIN — Medication 25 MILLIGRAM(S): at 05:26

## 2022-03-16 RX ADMIN — ISOSORBIDE DINITRATE 10 MILLIGRAM(S): 5 TABLET ORAL at 11:41

## 2022-03-16 NOTE — PROGRESS NOTE ADULT - PROBLEM SELECTOR PLAN 10
RISK                                                          Points  [] Previous VTE                                           3  [] Thrombophilia                                        2  [] Lower limb paralysis                              2   [] Current Cancer                                       2   [] Immobilization > 24 hrs                        1  [] ICU/CCU stay > 24 hours                       1  [x] Age > 60                                                   1    [facility] - Patient has history of HLD   - Resumed home meds

## 2022-03-16 NOTE — PROGRESS NOTE ADULT - SUBJECTIVE AND OBJECTIVE BOX
CHIEF COMPLAINT:Patient is a 89y old  Male who presents with a chief complaint of CHF,CP.Pt appears comfortable.    	  REVIEW OF SYSTEMS:  CONSTITUTIONAL: No fever, weight loss, or fatigue  EYES: No eye pain, visual disturbances, or discharge  ENT:  No difficulty hearing, tinnitus, vertigo; No sinus or throat pain  NECK: No pain or stiffness  RESPIRATORY: No cough, wheezing, chills or hemoptysis; No Shortness of Breath  CARDIOVASCULAR: No chest pain, palpitations, passing out, dizziness, or leg swelling  GASTROINTESTINAL: No abdominal or epigastric pain. No nausea, vomiting, or hematemesis; No diarrhea or constipation. No melena or hematochezia.  GENITOURINARY: No dysuria, frequency, hematuria, or incontinence  NEUROLOGICAL: No headaches, memory loss, loss of strength, numbness, or tremors  SKIN: No itching, burning, rashes, or lesions   LYMPH Nodes: No enlarged glands  ENDOCRINE: No heat or cold intolerance; No hair loss  MUSCULOSKELETAL: No joint pain or swelling; No muscle, back, or extremity pain  PSYCHIATRIC: No depression, anxiety, mood swings, or difficulty sleeping  HEME/LYMPH: No easy bruising, or bleeding gums  ALLERGY AND IMMUNOLOGIC: No hives or eczema	      PHYSICAL EXAM:  T(C): 36.6 (03-16-22 @ 07:00), Max: 37.6 (03-15-22 @ 20:28)  HR: 112 (03-16-22 @ 07:00) (110 - 127)  BP: 131/71 (03-16-22 @ 07:00) (115/70 - 142/83)  RR: 18 (03-16-22 @ 07:00) (18 - 19)  SpO2: 97% (03-16-22 @ 07:00) (93% - 97%)  Wt(kg): --  I&O's Summary    15 Mar 2022 07:01  -  16 Mar 2022 07:00  --------------------------------------------------------  IN: 300 mL / OUT: 4710 mL / NET: -4410 mL        Appearance: Normal	  HEENT:   Normal oral mucosa, PERRL, EOMI	  Lymphatic: No lymphadenopathy  Cardiovascular: Normal S1 S2, No JVD, No murmurs, No edema  Respiratory: Lungs clear to auscultation	  Psychiatry: A & O x 3, Mood & affect appropriate  Gastrointestinal:  Soft, Non-tender, + BS	  Skin: No rashes, No ecchymoses, No cyanosis	  Neurologic: Non-focal  Extremities: Normal range of motion, No clubbing, cyanosis or edema  Vascular: Peripheral pulses palpable 2+ bilaterally    MEDICATIONS  (STANDING):  apixaban 2.5 milliGRAM(s) Oral two times a day  artificial  tears Solution 2 Drop(s) Both EYES every 4 hours  cefTRIAXone   IVPB      cefTRIAXone   IVPB 1000 milliGRAM(s) IV Intermittent every 24 hours  doxazosin 2 milliGRAM(s) Oral at bedtime  finasteride 5 milliGRAM(s) Oral daily  hydrALAZINE 10 milliGRAM(s) Oral every 8 hours  isosorbide   dinitrate Tablet (ISORDIL) 10 milliGRAM(s) Oral three times a day  metoprolol tartrate 25 milliGRAM(s) Oral every 8 hours  pantoprazole    Tablet 40 milliGRAM(s) Oral before breakfast  polyethylene glycol 3350 17 Gram(s) Oral every 12 hours  senna 2 Tablet(s) Oral at bedtime  simvastatin 20 milliGRAM(s) Oral at bedtime      	    ECG:  	afib upto 110's   	  	  LABS:	 	        Troponin I, High Sensitivity Result: 26.1 ng/L (03-13 @ 10:25)                            12.2   8.16  )-----------( 180      ( 16 Mar 2022 07:40 )             36.9     03-16    137  |  105  |  48<H>  ----------------------------<  114<H>  4.1   |  27  |  1.95<H>    Ca    8.5      16 Mar 2022 07:40  Phos  2.8     03-16  Mg     2.9     03-16    TPro  6.1  /  Alb  2.8<L>  /  TBili  0.4  /  DBili  x   /  AST  18  /  ALT  25  /  AlkPhos  65  03-16    proBNP: Serum Pro-Brain Natriuretic Peptide: 5522 pg/mL (03-13 @ 10:25)      TSH: Thyroid Stimulating Hormone, Serum: 3.68 uU/mL (03-14 @ 05:41)      	  < from: US Kidney and Bladder (03.15.22 @ 16:21) >  ACC: [id number] EXAM:  US KIDNEYS AND BLADDER                          PROCEDURE DATE:  03/15/2022          INTERPRETATION:  CLINICAL INFORMATION: Hematuria    COMPARISON: CT scan 3/13/2022.    TECHNIQUE: Sonography of the kidneys and bladder.    FINDINGS: There is mild bilateral hydronephrosis.    Right kidney: 9.0 cm. No renal mass or calculi. Mid pole cyst measures   2.9 x 2.7 x 2.4 cm.    Left kidney: 12.3 cm. No renal mass or calculi. Cyst is seen measuring   4.5 x 4.1 x 3.2 cm in the upper pole.    Urinary bladder: Nondistended in the presence of a Chinchilla catheter.    IMPRESSION:  Mild bilateral hydronephrosis. Bilateral cysts.    --- End of Report ---            BRANDYN AWAN MD; Attending Radiologist  This document has been electronically signed. Mar 15 2022  4:36PM    < end of copied text >

## 2022-03-16 NOTE — PROGRESS NOTE ADULT - ATTENDING COMMENTS
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 Garcia   Cont Tele  Monitor electrolytes closely, keep K>4, Mg >2  patient is euvolemic now  Cont current BP meds with holding parameters  I/O, cont Chinchilla, monitor hematuria, can resume Eliquis as per Urology  Follow urine cx  Na normal  Senna and Miralax, cont NPO, passing gas  Full code Patient was seen and examined at bedside   Denies any complains     Vitals noted    Tele wide complex tachycardias, but patient is asymptomatic     P/E:  NAD  AAOx1, no focal deficit   CTABL  S1S2 WNL, no MRG   Abd soft, non tender, distended, BS present  BLLE no edema or calf tenderness     Labs noted     A/P:  Persistent wide complex tachycardia  Afib with RVR  Acute on Chronic systolic heart failure (EF 42%)  UTI due to GNR  Ileus  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 Garcia   Cont Tele  Monitor electrolytes closely, keep K>4, Mg >2  patient is euvolemic now  Cont current BP meds with holding parameters  I/O, cont Chinchilla, monitor hematuria, can resume Eliquis as per Urology  Follow urine cx  Na normal  Senna and Miralax, cont NPO, passing gas, low rate IVF, FS q6hrs, repeat AXR tomorrow   Full code

## 2022-03-16 NOTE — PROGRESS NOTE ADULT - PROBLEM SELECTOR PLAN 4
- Patient PW k 5.6 in ED in the setting of JAMES  , given lokelma   - repeat K 5.7 , was given regular insulin and lokelma   - K normal today  - resolved  - continue monitoring Pt w/o BM but able to pass gas  Abd Xray showing mild ileus w/o obstruction  c/w miralax for now  monitor clinically

## 2022-03-16 NOTE — DISCHARGE NOTE PROVIDER - HOSPITAL COURSE
89 yo M with BPH s/p chronic Salmon since 2016, HTN, A-fib (on Eliquis), HLD, CKD, who presented with n/v and epigastric discomfort.  Given IVF bolus in the ED with resultant hypoxia and increased WOB. CXR concerning for worsening vascular congestion. Also noted to be hyponatremic and hyperkalemic on lab work with a Cr of 3.13 that did improve with IVF. In the ED given patient requiring O2 and with pulmonary vascular congestion on imaging was given trial Lasix 40mg IV x1. He was also given Lokelma for hyperkalemia. Patient also with +UA, requiring urology assistance for replacement of salmon catheter. Suspect likely acute cystitis with hematuria in the setting of urinary retention from blood clot. Pt was started on ceftriaxone. Admitted for JAMES on CKD, complicated UTI, Hematuria, Hyperkalemia, Hyponatremia and CHF exacerbation.     For JAMES on CKD pt underwent CT abdomen showed Mild bilateral hydroureteronephrosis w/ hyperdense material within the urinary bladder and possibly blood  products.  Renal US performed later on during hospital stay showed mild b/l hydronephrosis. JAMES was considered to be both prerenal given response to IVF and supported by a  calculated FeNA 0.8 w/ also obstructive (post-renal) component given imaging findings. Pt's SCr continued trending down, currently _ at the time of discharge. Hyperkalemia resolved and hyponatremia corrected.     For his complicated  UTI, UCX from 3/14 was positive for  K. pneumonia sensitive to_____.He has completed 5 days of tx w/ ceftriaxone.     In regards to his hematuria likely d/t UTI, Eliquis and heparin for DVT prophylaxis were held. Urology was consulted,  recommending pt to be continued on Salmon catheter, w/ catheter irrigation as needed . After hematuria improved and approval was obtained from urology, AC was resumed. Recommended cystoscopy as an outpatient for further w/u.    Hospital course was complicated by episode of Afib w/ RVR (HR running up to 170's). As per cardiology dose of metoprolol was adjusted to 50 PO BID and started on Digoxin 125 mg every other day. Home dose of  eliquis was resumed on 3/16.     For HTN he was started on hydralazine 10 mg PO TID and Imdur 10 mg PO TID in addition to BB w/ good BP control.     For CHF CXR showed Cardiomegaly. Mildly worsened mild pulmonary vascular congestion. Small right pleural effusion, ProBNP 5522, trop neg x 2, was continued on BB.  Stayed euvolemic w/ good UO.     Hospital course was also complicated by Ileus. Pt was kept NPO  which has ________________.    For BPH  pt was continued on Salmon.,  was  also continued on Doxazosin and was started on Finasteride                    89 yo M with BPH s/p chronic Salmon since 2016, HTN, A-fib (on Eliquis), HLD, CKD, who presented with n/v and epigastric discomfort.  Given IVF bolus in the ED with resultant hypoxia and increased WOB. CXR concerning for worsening vascular congestion. Also noted to be hyponatremic and hyperkalemic on lab work with a Cr of 3.13 that did improve with IVF. In the ED given patient requiring O2 and with pulmonary vascular congestion on imaging was given trial Lasix 40mg IV x1. He was also given Lokelma for hyperkalemia. Patient also with +UA, requiring urology assistance for replacement of salmon catheter. Suspect likely acute cystitis with hematuria in the setting of urinary retention from blood clot. Pt was started on ceftriaxone. Admitted for JAMES on CKD, complicated UTI, Hematuria, Hyperkalemia, Hyponatremia and CHF exacerbation.     For JAMES on CKD pt underwent CT abdomen showed Mild bilateral hydroureteronephrosis w/ hyperdense material within the urinary bladder and possibly blood  products.  Renal US performed later on during hospital stay showed mild b/l hydronephrosis. JAMES was considered to be both prerenal given response to IVF and supported by a  calculated FeNA 0.8 w/ also obstructive (post-renal) component given imaging findings. Pt's SCr continued trending down, currently _ at the time of discharge. Hyperkalemia resolved and hyponatremia corrected.     For his complicated  UTI, UCX from 3/14 was positive for  K. pneumonia sensitive to ceftriaxone. He has completed 6 days of ABX    In regards to his hematuria likely d/t UTI, Eliquis and heparin for DVT prophylaxis were held. Urology was consulted,  recommending pt to be continued on Salmon catheter, w/ catheter irrigation as needed . After hematuria improved and approval was obtained from urology, AC was resumed. Recommended cystoscopy as an outpatient for further w/u.    Hospital course was complicated by episode of Afib w/ RVR (HR running up to 170's). As per cardiology dose of metoprolol was adjusted to 50 PO TID and started on Digoxin 125 mg every other day. Home dose of  eliquis was resumed on 3/16.     For HTN he was started on hydralazine 10 mg PO TID and Imdur 10 mg PO TID in addition to BB w/ good BP control.     For CHF CXR showed Cardiomegaly. Mildly worsened mild pulmonary vascular congestion. Small right pleural effusion, ProBNP 5522, trop neg x 2, was continued on BB. Underwent TTE 3/14 which showed  mild concentric LVH, LVEF 42%. Paradoxical septal motion w/c conduction delay.    Stayed euvolemic w/ good UO.     Hospital course was also complicated by Ileus. Xray of the abdomen showed mild ileus w/o obstruction. Pt was kept NPO. After he was able to have BM, regular diet was resumed.     For BPH  pt was continued on Salmon, he was  also continued on Doxazosin and was started on Finasteride     Given clinical improvement  pt is to be discharged home today on medically stable condition. He is getting discharged w/ visiting nurse and home PT services. Pt needs to follow up w/ his PCP/cardiology  in 1 wk from discharge. He also needs to follow up w/ urology OP to determine need for cystoscopy.

## 2022-03-16 NOTE — DISCHARGE NOTE PROVIDER - NSDCCPCAREPLAN_GEN_ALL_CORE_FT
PRINCIPAL DISCHARGE DIAGNOSIS  Diagnosis: Acute kidney injury superimposed on CKD  Assessment and Plan of Treatment: you have history of chronic kideny disease. When you came to the hospital you were found to have elevated creatine levels at 3.1, as per records from your PCP on 2/22 your creatinine was 2.0. You were consdiered to have acute kidney injury superimposed on chronic kidney disease. Imaging of your kidneys showed accumulation of fluid in your kidneys.  The kidney injury was considered to be due to poor oral intake in the setting of nausea and vomiting and also due to obstruction of your urinary tract likely due to prostate enlargement. Your creatinine levels trended down and your do not longer have electrolyte abnormalities.   Upon discharge please follow up with your PCP in 1 wk from dischage to monitor your renal function.      SECONDARY DISCHARGE DIAGNOSES  Diagnosis: UTI (urinary tract infection)  Assessment and Plan of Treatment: You were found to have a urinary tract infection on your admission.    You were treated with ceftriaxone for 5 days of iv antibiotics in the hospital with the goal of clearing this infection  You will be discharged on *** Please continue to take your medications as prescribed. Urinary tract infections can cause weakness, confusion, or spread to other organ systems in the body. If you experience continued symptoms of infection (fever, chills, weakness, or burning with urination), please follow up with your primary care provider.    Diagnosis: Hematuria  Assessment and Plan of Treatment: You were found to have blood in your urine,  likely due to the active urinary tract infection. For this reason anticoagulation was held and your hemoglobin was monitored daily. Uurology service was consulted.  The blood in your urine has significantly improved.   Upon discharge please follow up with Dr. Monge (urologist) in 1 week from your discharge date, as they will like to perform a procedure called cystostopy to rule out other causes of blood in your urine.    Diagnosis: Hyperkalemia  Assessment and Plan of Treatment: You were found to have elevated potassium levels likely due to the acute on chronic kidney injury. You were given a medication called lokelma and your potassium levels normalized. Please follow up with your PCP in 1 week from discharge to monitor your electrolytes.    Diagnosis: Hyponatremia  Assessment and Plan of Treatment: You were found to have low sodium levels that have now been corrected.  Please follow up with your PCP in 1 week from discharge to monitor your electrolyte levels.    Diagnosis: Atrial fibrillation  Assessment and Plan of Treatment: You had episode of uncontrolled atrial fibrilation. For this reason cardiology service was consulted,  as per their recommendations you are currently on  metoprolol tartrate 50 mg oral twice daily and digoxin 125 mg oral every other day . With this new regimen your heart rate has been better controlled. Also Eliquis (apixaban) has been resumed.    Diagnosis: CHF (congestive heart failure)  Assessment and Plan of Treatment:     Diagnosis: HLD (hyperlipidemia)  Assessment and Plan of Treatment: You have history of elevated cholesterol levels. We continued your home medication of atorvastatin. Upon discharge please continue to take your medication as directed, comply to a diet low in fats  and follow up with your PCP on a regular basis to monitor your cholesterol levels and adjust your medication as needed.    Diagnosis: Ileus  Assessment and Plan of Treatment:     Diagnosis: BPH (benign prostatic hyperplasia)  Assessment and Plan of Treatment:      PRINCIPAL DISCHARGE DIAGNOSIS  Diagnosis: Acute kidney injury superimposed on CKD  Assessment and Plan of Treatment: you have history of chronic kidney disease. When you came to the hospital you were found to have elevated creatine levels at 3.1, as per records from your PCP on 2/22 your creatinine was 2.0. You were considered to have acute kidney injury superimposed on chronic kidney disease. Imaging of your kidneys showed accumulation of fluid in your kidneys.  The kidney injury was considered to be due to poor oral intake in the setting of nausea and vomiting and also due to obstruction of your urinary tract likely due to prostate enlargement. Your creatinine levels trended down and your do not longer have electrolyte abnormalities.   Upon discharge please follow up with your PCP in 1 wk from dischage to monitor your renal function.      SECONDARY DISCHARGE DIAGNOSES  Diagnosis: UTI (urinary tract infection)  Assessment and Plan of Treatment: You were found to have a urinary tract infection on your admission.    You were treated with ceftriaxone for 6 days of iv antibiotics in the hospital with the goal of clearing this infection  Please continue to take your medications as prescribed. Urinary tract infections can cause weakness, confusion, or spread to other organ systems in the body. If you experience continued symptoms of infection (fever, chills, weakness, or burning with urination), please follow up with your primary care provider.    Diagnosis: Hematuria  Assessment and Plan of Treatment: You were found to have blood in your urine,  likely due to the active urinary tract infection. For this reason anticoagulation was held and your hemoglobin was monitored daily. Urology service was consulted.  The blood in your urine has resolved.  Upon discharge please follow up with Dr. Monge (urologist) in 1 week from your discharge date to determine the need for a  procedure called cystostopy to rule out other causes of blood in your urine.    Diagnosis: Hyperkalemia  Assessment and Plan of Treatment: You were found to have elevated potassium levels likely due to the acute on chronic kidney injury. You were given a medication called lokelma and your potassium levels normalized. Please follow up with your PCP in 1 week from discharge to monitor your electrolytes.    Diagnosis: Hyponatremia  Assessment and Plan of Treatment: You were found to have low sodium levels that have now been corrected.  Please follow up with your PCP in 1 week from discharge to monitor your electrolyte levels.    Diagnosis: Atrial fibrillation  Assessment and Plan of Treatment: You had episode of uncontrolled atrial fibrilation. For this reason cardiology service was consulted,  as per their recommendations you are currently on  metoprolol tartrate 50 mg oral three timeas a day and you wer also started on digoxin 125 mg oral every other day . With this new regimen your heart rate has been better controlled. Also Eliquis (apixaban) has been resumed.  Please follow up with your PCP/Cardiology Dr. Tatum in 1 week from discharge to monitor you closely.    Diagnosis: CHF (congestive heart failure)  Assessment and Plan of Treatment: You have history of heart failure with reduced ejection fraction. You did not present signs of fluid overload. Please continue to take metoprolol as indicated, monitored for signs of fluid overload (like increased swelling of your legs, increased work of breathing). Also follow up on a regular basis with your cardiologist or PCP    Diagnosis: HLD (hyperlipidemia)  Assessment and Plan of Treatment: You have history of elevated cholesterol levels. We continued your home medication of atorvastatin. Upon discharge please continue to take your medication as directed, comply to a diet low in fats  and follow up with your PCP/Cardiology on a regular basis to monitor your cholesterol levels and adjust your medication as needed.    Diagnosis: Ileus  Assessment and Plan of Treatment: During your hospital stay you developed what is known as temporary lack of the normal muscle contraction of the intestine. Imaging of your abdomen was negative for obstruction. We dicontinued temporarily oral feeds to give your bowel a break. Once you were able to have bowel movement (which indicates resolution of the ileus), regular diet was resumed.       Diagnosis: Constipation  Assessment and Plan of Treatment: During your hospital stay you developed constipation. To avoid this please eat whole grains fruits and vegetables. Also take miralax and senna as instructed.    Diagnosis: BPH (benign prostatic hyperplasia)  Assessment and Plan of Treatment: You have history of enlargement of your prostate. For this reason you have been using salmon catheter since 2016. On arrival to the hospital urology service replaced your urinary catheter. You were continued on your home medication doxazosin at home . You were also started on finasteride. Please continue to take this medications as instructed. Please follow up with urology to determne when you need to have urinary catheter replaced.     PRINCIPAL DISCHARGE DIAGNOSIS  Diagnosis: Acute kidney injury superimposed on CKD  Assessment and Plan of Treatment: you have history of chronic kidney disease. When you came to the hospital you were found to have elevated creatine levels at 3.1, as per records from your PCP on 2/22 your creatinine was 2.0. You were considered to have acute kidney injury superimposed on chronic kidney disease. Imaging of your kidneys showed accumulation of fluid in your kidneys.  The kidney injury was considered to be due to poor oral intake in the setting of nausea and vomiting and also due to obstruction of your urinary tract likely due to prostate enlargement. Your creatinine levels trended down and your do not longer have electrolyte abnormalities.   Upon discharge please follow up with your PCP in 1 wk from dischage to monitor your renal function.      SECONDARY DISCHARGE DIAGNOSES  Diagnosis: UTI (urinary tract infection)  Assessment and Plan of Treatment: You were found to have a urinary tract infection on your admission.    You were treated with ceftriaxone for 6 days of iv antibiotics in the hospital with the goal of clearing this infection  Please continue to take your medications as prescribed. Urinary tract infections can cause weakness, confusion, or spread to other organ systems in the body. If you experience continued symptoms of infection (fever, chills, weakness, or burning with urination), please follow up with your primary care provider.    Diagnosis: Hematuria  Assessment and Plan of Treatment: You were found to have blood in your urine,  likely due to the active urinary tract infection. For this reason anticoagulation was held and your hemoglobin was monitored daily. Urology service was consulted.  The blood in your urine has resolved.  Upon discharge please follow up with Dr. Monge (urologist) in 1 week from your discharge date to determine the need for a  procedure called cystostopy to rule out other causes of blood in your urine.    Diagnosis: Hyperkalemia  Assessment and Plan of Treatment: You were found to have elevated potassium levels likely due to the acute on chronic kidney injury. You were given a medication called lokelma and your potassium levels normalized. Please follow up with your PCP in 1 week from discharge to monitor your electrolytes.    Diagnosis: Hyponatremia  Assessment and Plan of Treatment: You were found to have low sodium levels that have now been corrected.  Please follow up with your PCP in 1 week from discharge to monitor your electrolyte levels.    Diagnosis: Ileus  Assessment and Plan of Treatment: During your hospital stay you developed what is known as temporary lack of the normal muscle contraction of the intestine. Imaging of your abdomen was negative for obstruction. We dicontinued temporarily oral feeds to give your bowel a break. Once you were able to have bowel movement (which indicates resolution of the ileus), regular diet was resumed.       Diagnosis: Atrial fibrillation  Assessment and Plan of Treatment: You had episode of uncontrolled atrial fibrilation. For this reason cardiology service was consulted,  as per their recommendations you are currently on  metoprolol tartrate 50 mg oral three timeas a day and you wer also started on digoxin 125 mg oral every other day . With this new regimen your heart rate has been better controlled. Also Eliquis (apixaban) has been resumed. Your digoxin level has to be monitored every 3days initially.  Please follow up with your PCP/Cardiology Dr. Garcia or your primary cardiologistAlfred Tatum in 1 week from discharge to monitor you closely.    Diagnosis: CHF (congestive heart failure)  Assessment and Plan of Treatment: You have history of heart failure with reduced ejection fraction. You did not present signs of fluid overload. Please continue to take metoprolol as indicated, monitored for signs of fluid overload (like increased swelling of your legs, increased work of breathing). Also follow up on a regular basis with your cardiologist or PCP    Diagnosis: HLD (hyperlipidemia)  Assessment and Plan of Treatment: You have history of elevated cholesterol levels. We continued your home medication of atorvastatin. Upon discharge please continue to take your medication as directed, comply to a diet low in fats  and follow up with your PCP/Cardiology on a regular basis to monitor your cholesterol levels and adjust your medication as needed.    Diagnosis: BPH (benign prostatic hyperplasia)  Assessment and Plan of Treatment: You have history of enlargement of your prostate. For this reason you have been using salmon catheter since 2016. On arrival to the hospital urology service replaced your urinary catheter. You were continued on your home medication doxazosin at home . You were also started on finasteride. Please continue to take this medications as instructed. Please follow up with urology to determine when you need to have urinary catheter replaced.    Diagnosis: Constipation  Assessment and Plan of Treatment: During your hospital stay you developed constipation. To avoid this please eat whole grains fruits and vegetables. Also take miralax and senna as instructed.

## 2022-03-16 NOTE — DISCHARGE NOTE PROVIDER - NSDCMRMEDTOKEN_GEN_ALL_CORE_FT
doxazosin 2 mg oral tablet: 1 tab(s) orally once a day  Eliquis 2.5 mg oral tablet: 1 tab(s) orally 2 times a day  Metoprolol Tartrate 100 mg oral tablet: 1 tab(s) orally once a day  simvastatin 20 mg oral tablet: 1 tab(s) orally once a day (at bedtime)   Digitek 125 mcg (0.125 mg) oral tablet: 1 tab(s) orally every other day  doxazosin 2 mg oral tablet: 1 tab(s) orally once a day  Eliquis 2.5 mg oral tablet: 1 tab(s) orally 2 times a day  finasteride 5 mg oral tablet: 1 tab(s) orally once a day  hydrALAZINE 10 mg oral tablet: 1 tab(s) orally every 8 hours  isosorbide dinitrate 10 mg oral tablet: 1 tab(s) orally 3 times a day  metoprolol tartrate 50 mg oral tablet: 1 tab(s) orally every 8 hours  ocular lubricant ophthalmic solution: 2 drop(s) to each affected eye every 4 hours  polyethylene glycol 3350 oral powder for reconstitution: 17 gram(s) orally every 12 hours  senna oral tablet: 2 tab(s) orally once a day (at bedtime)  simvastatin 20 mg oral tablet: 1 tab(s) orally once a day (at bedtime)

## 2022-03-16 NOTE — PROGRESS NOTE ADULT - PROBLEM SELECTOR PLAN 11
- FULL CODE RISK                                                          Points  [] Previous VTE                                           3  [] Thrombophilia                                        2  [] Lower limb paralysis                              2   [] Current Cancer                                       2   [] Immobilization > 24 hrs                        1  [] ICU/CCU stay > 24 hours                       1  [x] Age > 60                                                   1    [place]

## 2022-03-16 NOTE — PROGRESS NOTE ADULT - PROBLEM SELECTOR PLAN 1
- Patient presented with  elevated creatinine level 3.31 , baseline unknown but patient ahs PMH of CKD at baseline  - s/p 1 l bolus in ED , repeat creat 2.89  - Likely pre-renal given improvement after IVF  - CT abdomen showed Mild bilateral hydroureteronephrosis. Hyperdense material within the urinary bladder, possibly blood  products.   - SCr  continues trending down- today 1.95  - FeNa 0.8 (pre-renal)  - Obtained OP records from PCP, SCr of 2.0 (on 2/27/22)  - resolving - Patient presented with  elevated creatinine level 3.31 , baseline unknown but patient ahs PMH of CKD at baseline  - s/p 1 l bolus in ED , repeat creat 2.89  - Likely pre-renal given improvement after IVF  - CT abdomen showed Mild bilateral hydroureteronephrosis. Hyperdense material within the urinary bladder, possibly blood  products.   - Renal US (3/15) showing b/l mild hydronephrosis   - SCr  continues trending down- today 1.95  - FeNa 0.8 (pre-renal)  - Obtained OP records from PCP, SCr of 2.0 (on 2/27/22)  - resolving

## 2022-03-16 NOTE — PROGRESS NOTE ADULT - PROBLEM SELECTOR PROBLEM 11
Conveyed PCP's msg to pt via [email or web address].  Pt last active on 9/20/20.       Counseling regarding goals of care DVT prophylaxis

## 2022-03-16 NOTE — DISCHARGE NOTE PROVIDER - CARE PROVIDER_API CALL
Owen Crowley)  Urology  [address] [facility]  [place] [address]  Phone: [phone]  Fax: [phone]  Follow Up Time: 1 week   Owen Crowley)  Urology  [address] [facility]  [place] [address]  Phone: [phone]  Fax: [phone]  Follow Up Time: 1 week    Coleman Tatum)  Cardiology Medicine  [address] [facility], [address]  [place] [address]  Phone: [phone]  Fax: [phone]  Established Patient  Follow Up Time: 1 week

## 2022-03-16 NOTE — PROGRESS NOTE ADULT - PROBLEM SELECTOR PLAN 7
- Patient has history of BPH on chronic salmon since 2016 following with Dr David Doherty urology replacing salmon once/ month   - Pt needs to continue w/ salmon  - on Doxazosin   - Urology approved starting pt on finasteride - Patient c/o SOB after 1 l bolus in ED   - chest xray showed Cardiomegaly. Mildly worsened mild pulmonary vascular congestion. Small   right pleural effusion.  - ProBNP 5522  - trop neg x 2   - c/w tele   - on metoprolol 25 mg PO TID   - cardiology consulted Dr Garcia

## 2022-03-16 NOTE — PROGRESS NOTE ADULT - SUBJECTIVE AND OBJECTIVE BOX
Case d/w Attending physician     PAGER #: [[phone]] TILL 5:00 PM  PLEASE CONTACT ON CALL TEAM:  - On Call Team (Please refer to Angel) FROM 5:00 PM - 8:30PM  - Nightfloat Team FROM 8:30 -7:30 AM      INTERVAL HPI/OVERNIGHT EVENTS: Overnight pt with episode of Afib with HR running between 120-130. Pt seen and examined at bedside, conversation translated by Colombian  ID # [id number]. Pt endorses feeling ok, denies abdominal pain, no BM. Endorses passing gas     REVIEW OF SYSTEMS:  CONSTITUTIONAL: No fever, weight loss, or fatigue  RESPIRATORY: No cough, wheezing, chills or hemoptysis; No shortness of breath  CARDIOVASCULAR: No chest pain, palpitations, dizziness, or leg swelling  GASTROINTESTINAL: No abdominal pain. no nausea, no vomiting, or hematemesis;  +constipation. No melena or hematochezia.  GENITOURINARY: No dysuria or hematuria, urinary frequency  NEUROLOGICAL: No headaches, memory loss, loss of strength, numbness, or tremors  SKIN: No itching, burning, rashes, or lesions     Vital Signs Last 24 Hrs  T(C): 36.4 (15 Mar 2022 07:24), Max: 36.9 (14 Mar 2022 15:51)  T(F): 97.6 (15 Mar 2022 07:24), Max: 98.5 (14 Mar 2022 15:51)  HR: 107 (15 Mar 2022 07:24) (76 - 124)  BP: 127/69 (15 Mar 2022 07:24) (111/65 - 131/67)  BP(mean): --  RR: 19 (15 Mar 2022 07:24) (18 - 20)  SpO2: 94% (15 Mar 2022 07:24) (92% - 97%)    PHYSICAL EXAMINATION:  GENERAL: NAD, well built  HEAD:  Atraumatic, Normocephalic  EYES:  conjunctiva and sclera clear  NECK: Supple, No JVD  CHEST/LUNG: Clear to auscultation. Clear to percussion bilaterally; No rales, rhonchi, wheezing, or rubs  HEART: Regular rate and rhythm; No murmurs, rubs, or gallops  ABDOMEN: Soft, Nontender, mildly distended; Bowel sounds present, but decreased  : salmon catheter in place, draining urine w/ slight hematuria  NERVOUS SYSTEM:  Alert & Oriented X3,    EXTREMITIES:  2+ Peripheral Pulses, No clubbing, cyanosis, or edema  SKIN: warm dry                          11.7   9.23  )-----------( 175      ( 15 Mar 2022 06:42 )             34.4     03-15    135  |  102  |  50<H>  ----------------------------<  123<H>  4.1   |  25  |  2.15<H>    Ca    8.6      15 Mar 2022 06:42  Phos  3.3     03-15  Mg     3.0     03-15    TPro  6.0  /  Alb  2.8<L>  /  TBili  0.5  /  DBili  x   /  AST  20  /  ALT  25  /  AlkPhos  68  03-15    LIVER FUNCTIONS - ( 15 Mar 2022 06:42 )  Alb: 2.8 g/dL / Pro: 6.0 g/dL / ALK PHOS: 68 U/L / ALT: 25 U/L DA / AST: 20 U/L / GGT: x                   CAPILLARY BLOOD GLUCOSE      RADIOLOGY & ADDITIONAL TESTS:

## 2022-03-16 NOTE — PROGRESS NOTE ADULT - PROBLEM SELECTOR PLAN 6
- Patient c/o SOB after 1 l bolus in ED   - chest xray showed Cardiomegaly. Mildly worsened mild pulmonary vascular congestion. Small   right pleural effusion.  - ProBNP 5522  - trop neg x 2   - c/w tele   - on metoprolol 25 mg PO TID   - cardiology consulted Dr Garcia - Patient PW hyponatremia 124 , given 1 l bolus NS in ED , Na 125   - Na today 135  - resolved

## 2022-03-16 NOTE — DISCHARGE NOTE PROVIDER - NSDCPNSUBOBJ_GEN_ALL_CORE
Patient is a 89y old  Male who presents with a chief complaint of CHF,CP (18 Mar 2022 08:48)    patient was seen and examined at bedside   Denies any complains, happy after BM     INTERVAL HPI/OVERNIGHT EVENTS:  T(C): 36.6 (03-18-22 @ 16:16), Max: 36.8 (03-18-22 @ 11:14)  HR: 111 (03-18-22 @ 16:16) (109 - 111)  BP: 115/60 (03-18-22 @ 16:16) (107/60 - 148/84)  RR: 17 (03-18-22 @ 16:16) (16 - 18)  SpO2: 96% (03-18-22 @ 16:16) (95% - 98%)  Wt(kg): --  I&O's Summary    17 Mar 2022 07:01  -  18 Mar 2022 07:00  --------------------------------------------------------  IN: 0 mL / OUT: 1880 mL / NET: -1880 mL    18 Mar 2022 07:01  -  18 Mar 2022 17:58  --------------------------------------------------------  IN: 0 mL / OUT: 300 mL / NET: -300 mL        REVIEW OF SYSTEMS: denies fever, chills, SOB, palpitations, chest pain, abdominal pain, nausea, vomiting, diarrhea, constipation, dizziness    MEDICATIONS  (STANDING):  apixaban 2.5 milliGRAM(s) Oral two times a day  artificial  tears Solution 2 Drop(s) Both EYES every 4 hours  digoxin     Tablet 125 MICROGram(s) Oral every other day  doxazosin 2 milliGRAM(s) Oral at bedtime  finasteride 5 milliGRAM(s) Oral daily  hydrALAZINE 10 milliGRAM(s) Oral every 8 hours  isosorbide   dinitrate Tablet (ISORDIL) 10 milliGRAM(s) Oral three times a day  metoprolol tartrate 50 milliGRAM(s) Oral every 8 hours  pantoprazole    Tablet 40 milliGRAM(s) Oral before breakfast  polyethylene glycol 3350 17 Gram(s) Oral daily  senna 2 Tablet(s) Oral at bedtime  simvastatin 20 milliGRAM(s) Oral at bedtime    MEDICATIONS  (PRN):  acetaminophen     Tablet .. 650 milliGRAM(s) Oral every 6 hours PRN Temp greater or equal to 38C (100.4F), Mild Pain (1 - 3)  haloperidol    Injectable 1 milliGRAM(s) IntraMuscular every 6 hours PRN Agitation  melatonin 3 milliGRAM(s) Oral at bedtime PRN Insomnia  ondansetron    Tablet 4 milliGRAM(s) Oral every 8 hours PRN Nausea and/or Vomiting  ondansetron Injectable 4 milliGRAM(s) IV Push every 8 hours PRN Nausea and/or Vomiting      PHYSICAL EXAM:  GENERAL: NAD, well-groomed, well-developed  HEAD:  Atraumatic, Normocephalic  NERVOUS SYSTEM:  Alert & Oriented X2, no focal deficit   CHEST/LUNG: Clear to auscultation bilaterally; No rales, rhonchi, wheezing, or rubs  HEART: Regular rate and rhythm; No murmurs, rubs, or gallops  ABDOMEN: Soft, Nontender, Nondistended; Bowel sounds present  EXTREMITIES:  2+ Peripheral Pulses, No clubbing, cyanosis, or edema  SKIN: No rashes or lesions  Chinchilla draining clear urine     LABS:                        13.3   8.41  )-----------( 205      ( 17 Mar 2022 06:49 )             40.8     03-17    140  |  108  |  35<H>  ----------------------------<  106<H>  4.1   |  26  |  1.67<H>    Ca    8.8      17 Mar 2022 06:49  Phos  2.8     03-17  Mg     3.0     03-17    TPro  6.6  /  Alb  3.1<L>  /  TBili  0.4  /  DBili  x   /  AST  17  /  ALT  25  /  AlkPhos  75  03-17        CAPILLARY BLOOD GLUCOSE      POCT Blood Glucose.: 105 mg/dL (18 Mar 2022 16:34)  POCT Blood Glucose.: 133 mg/dL (18 Mar 2022 11:35)  POCT Blood Glucose.: 96 mg/dL (18 Mar 2022 07:45)  POCT Blood Glucose.: 78 mg/dL (17 Mar 2022 21:10)    A/P:  Persistent wide complex tachycardia- improved  Afib with RVR  Acute on Chronic systolic heart failure (EF 42%)  UTI due to klebsiella  Ileus  Chronic Urinary retention due to BPH  HTN  HLD  Hyponatremia resolved  Hyperkalemia resolved    Plan:  Repeat AXR noted, patient had bowel movement and is tolerating diet   Increased metoprolol for tachycardia, improved, cont Digoxin  BP stable   cont Chinchilla, resumed Eliquis, follow up with Urology as outpatient, daughter understands the risks and benefits of AC  Cont Senna and Miralax  Full code  Stable to be discharged. Plan of care was discussed with patient's daughter; all questions and concerns were addressed and care was aligned with patient's wishes.

## 2022-03-16 NOTE — PROGRESS NOTE ADULT - PROBLEM SELECTOR PLAN 8
- Patient has history of Afib on eliquis  - EKG showed not in Afib   - Pt with episodes of Afib w/ RVR  - s/p digoxin 250 mg (on 3/15)  - As per cardiology dose of metoprolol was changed to 25 mg PO TID   - c/w BB home dose  - apixaban was resumed today at home dose given significant improvement of hematuria - Patient has history of BPH on chronic salmon since 2016 following with Dr David Doherty urology replacing salmon once/ month   - Pt needs to continue w/ salmon  - on Doxazosin   - Urology approved starting pt on finasteride

## 2022-03-16 NOTE — DISCHARGE NOTE PROVIDER - PROVIDER TOKENS
PROVIDER:[TOKEN:[[id number]],FOLLOWUP:[1 week]] PROVIDER:[TOKEN:[[id number]],FOLLOWUP:[1 week]],PROVIDER:[TOKEN:[[id number]],FOLLOWUP:[1 week],ESTABLISHEDPATIENT:[T]]

## 2022-03-16 NOTE — PROGRESS NOTE ADULT - ASSESSMENT
89 y/o male with PMHx of HTN, BPH on chronic salmon since 2016 , A. fib on eliquis, anemia, hyperlipidemia,CHF (F 30%) presents with nausea, vomiting and chest discomfort yesterday night,renal failure,hematurea,hyponatremia.  1.Tele monitoring.  2.JAMES and hyperkalemia-s/p salmon-D/W  can resume eliquis.  3.Hyponatremia-improved.  4.CHF and HTN -inc  b blocker 50mg bid,add dig .125mg qod,cont  low dose isordil and hydralazine.  5.GI and DVT prophylaxis.  6.Hematurea and UTI- abx.

## 2022-03-16 NOTE — PROGRESS NOTE ADULT - PROBLEM SELECTOR PLAN 5
- Patient PW hyponatremia 124 , given 1 l bolus NS in ED , Na 125   - Na today 135  - resolved - Patient PW k 5.6 in ED in the setting of JAMES  , given lokelma   - repeat K 5.7 , was given regular insulin and lokelma   - K normal today  - resolved  - continue monitoring

## 2022-03-16 NOTE — PROGRESS NOTE ADULT - PROBLEM SELECTOR PLAN 9
- Patient has history of HLD   - Resumed home meds - Patient has history of Afib on eliquis  - EKG showed not in Afib   - Pt with episodes of Afib w/ RVR  - s/p digoxin 250 mg (on 3/15)  - As per cardiology dose of metoprolol was changed to 25 mg PO TID   - c/w BB home dose  - apixaban was resumed today at home dose given significant improvement of hematuria

## 2022-03-17 LAB
ALBUMIN SERPL ELPH-MCNC: 3.1 G/DL — LOW (ref 3.5–5)
ALP SERPL-CCNC: 75 U/L — SIGNIFICANT CHANGE UP (ref 40–120)
ALT FLD-CCNC: 25 U/L DA — SIGNIFICANT CHANGE UP (ref 10–60)
ANION GAP SERPL CALC-SCNC: 6 MMOL/L — SIGNIFICANT CHANGE UP (ref 5–17)
AST SERPL-CCNC: 17 U/L — SIGNIFICANT CHANGE UP (ref 10–40)
BILIRUB SERPL-MCNC: 0.4 MG/DL — SIGNIFICANT CHANGE UP (ref 0.2–1.2)
BUN SERPL-MCNC: 35 MG/DL — HIGH (ref 7–18)
CALCIUM SERPL-MCNC: 8.8 MG/DL — SIGNIFICANT CHANGE UP (ref 8.4–10.5)
CHLORIDE SERPL-SCNC: 108 MMOL/L — SIGNIFICANT CHANGE UP (ref 96–108)
CO2 SERPL-SCNC: 26 MMOL/L — SIGNIFICANT CHANGE UP (ref 22–31)
CREAT SERPL-MCNC: 1.67 MG/DL — HIGH (ref 0.5–1.3)
EGFR: 38 ML/MIN/1.73M2 — LOW
GLUCOSE BLDC GLUCOMTR-MCNC: 111 MG/DL — HIGH (ref 70–99)
GLUCOSE BLDC GLUCOMTR-MCNC: 111 MG/DL — HIGH (ref 70–99)
GLUCOSE BLDC GLUCOMTR-MCNC: 78 MG/DL — SIGNIFICANT CHANGE UP (ref 70–99)
GLUCOSE SERPL-MCNC: 106 MG/DL — HIGH (ref 70–99)
HCT VFR BLD CALC: 40.8 % — SIGNIFICANT CHANGE UP (ref 39–50)
HGB BLD-MCNC: 13.3 G/DL — SIGNIFICANT CHANGE UP (ref 13–17)
MAGNESIUM SERPL-MCNC: 3 MG/DL — HIGH (ref 1.6–2.6)
MCHC RBC-ENTMCNC: 28.2 PG — SIGNIFICANT CHANGE UP (ref 27–34)
MCHC RBC-ENTMCNC: 32.6 GM/DL — SIGNIFICANT CHANGE UP (ref 32–36)
MCV RBC AUTO: 86.4 FL — SIGNIFICANT CHANGE UP (ref 80–100)
NRBC # BLD: 0 /100 WBCS — SIGNIFICANT CHANGE UP (ref 0–0)
PHOSPHATE SERPL-MCNC: 2.8 MG/DL — SIGNIFICANT CHANGE UP (ref 2.5–4.5)
PLATELET # BLD AUTO: 205 K/UL — SIGNIFICANT CHANGE UP (ref 150–400)
POTASSIUM SERPL-MCNC: 4.1 MMOL/L — SIGNIFICANT CHANGE UP (ref 3.5–5.3)
POTASSIUM SERPL-SCNC: 4.1 MMOL/L — SIGNIFICANT CHANGE UP (ref 3.5–5.3)
PROT SERPL-MCNC: 6.6 G/DL — SIGNIFICANT CHANGE UP (ref 6–8.3)
RBC # BLD: 4.72 M/UL — SIGNIFICANT CHANGE UP (ref 4.2–5.8)
RBC # FLD: 12.2 % — SIGNIFICANT CHANGE UP (ref 10.3–14.5)
SODIUM SERPL-SCNC: 140 MMOL/L — SIGNIFICANT CHANGE UP (ref 135–145)
WBC # BLD: 8.41 K/UL — SIGNIFICANT CHANGE UP (ref 3.8–10.5)
WBC # FLD AUTO: 8.41 K/UL — SIGNIFICANT CHANGE UP (ref 3.8–10.5)

## 2022-03-17 PROCEDURE — 99233 SBSQ HOSP IP/OBS HIGH 50: CPT | Mod: GC

## 2022-03-17 PROCEDURE — 74018 RADEX ABDOMEN 1 VIEW: CPT | Mod: 26

## 2022-03-17 RX ORDER — METOPROLOL TARTRATE 50 MG
50 TABLET ORAL EVERY 8 HOURS
Refills: 0 | Status: DISCONTINUED | OUTPATIENT
Start: 2022-03-17 | End: 2022-03-17

## 2022-03-17 RX ORDER — METOPROLOL TARTRATE 50 MG
50 TABLET ORAL
Refills: 0 | Status: DISCONTINUED | OUTPATIENT
Start: 2022-03-17 | End: 2022-03-18

## 2022-03-17 RX ADMIN — Medication 3 MILLIGRAM(S): at 00:02

## 2022-03-17 RX ADMIN — ISOSORBIDE DINITRATE 10 MILLIGRAM(S): 5 TABLET ORAL at 11:32

## 2022-03-17 RX ADMIN — Medication 2 DROP(S): at 17:02

## 2022-03-17 RX ADMIN — SODIUM CHLORIDE 50 MILLILITER(S): 9 INJECTION, SOLUTION INTRAVENOUS at 14:40

## 2022-03-17 RX ADMIN — FINASTERIDE 5 MILLIGRAM(S): 5 TABLET, FILM COATED ORAL at 11:34

## 2022-03-17 RX ADMIN — Medication 2 MILLIGRAM(S): at 21:40

## 2022-03-17 RX ADMIN — Medication 2 DROP(S): at 11:31

## 2022-03-17 RX ADMIN — Medication 50 MILLIGRAM(S): at 17:02

## 2022-03-17 RX ADMIN — PANTOPRAZOLE SODIUM 40 MILLIGRAM(S): 20 TABLET, DELAYED RELEASE ORAL at 06:18

## 2022-03-17 RX ADMIN — APIXABAN 2.5 MILLIGRAM(S): 2.5 TABLET, FILM COATED ORAL at 17:02

## 2022-03-17 RX ADMIN — Medication 50 MILLIGRAM(S): at 06:19

## 2022-03-17 RX ADMIN — SIMVASTATIN 20 MILLIGRAM(S): 20 TABLET, FILM COATED ORAL at 21:39

## 2022-03-17 RX ADMIN — ISOSORBIDE DINITRATE 10 MILLIGRAM(S): 5 TABLET ORAL at 17:02

## 2022-03-17 RX ADMIN — Medication 2 DROP(S): at 14:27

## 2022-03-17 RX ADMIN — Medication 10 MILLIGRAM(S): at 14:27

## 2022-03-17 RX ADMIN — SENNA PLUS 2 TABLET(S): 8.6 TABLET ORAL at 21:39

## 2022-03-17 RX ADMIN — Medication 2 DROP(S): at 21:39

## 2022-03-17 RX ADMIN — CEFTRIAXONE 100 MILLIGRAM(S): 500 INJECTION, POWDER, FOR SOLUTION INTRAMUSCULAR; INTRAVENOUS at 16:14

## 2022-03-17 RX ADMIN — POLYETHYLENE GLYCOL 3350 17 GRAM(S): 17 POWDER, FOR SOLUTION ORAL at 17:02

## 2022-03-17 RX ADMIN — Medication 10 MILLIGRAM(S): at 21:39

## 2022-03-17 NOTE — PROGRESS NOTE ADULT - PROBLEM SELECTOR PLAN 11
RISK                                                          Points  [] Previous VTE                                           3  [] Thrombophilia                                        2  [] Lower limb paralysis                              2   [] Current Cancer                                       2   [] Immobilization > 24 hrs                        1  [] ICU/CCU stay > 24 hours                       1  [x] Age > 60                                                   1    Apixaban

## 2022-03-17 NOTE — PROGRESS NOTE ADULT - ATTENDING COMMENTS
Patient was seen and examined at bedside   Denies any complains     Vitals noted      P/E:  NAD  AAOx1, no focal deficit   CTABL  S1S2 WNL, no MRG   Abd soft, non tender, distended, BS present  BLLE no edema or calf tenderness     Labs noted     A/P:  Persistent wide complex tachycardia  Afib with RVR  Acute on Chronic systolic heart failure (EF 42%)  UTI due to GNR  Ileus  Chronic Urinary retention due to BPH  HTN  HLD  Hyponatremia  Hyperkalemia     Plan:  Repeat AXR  Cont current regimen for tachycardia, improved   Cont Tele  Monitor electrolytes closely, keep K>4, Mg >2  patient is euvolemic now  Cont current BP meds with holding parameters  I/O, cont Chinchilla, monitor hematuria, can resume Eliquis as per Urology  Follow urine cx  Na normal  Senna and Miralax, cont NPO, passing gas, low rate IVF, FS q6hrs  Full code Render Post-Care Instructions In Note?: no Duration Of Freeze Thaw-Cycle (Seconds): 2 Post-Care Instructions: I reviewed with the patient in detail post-care instructions. Patient is to wear sunprotection, and avoid picking at any of the treated lesions. Pt may apply Vaseline to crusted or scabbing areas. Detail Level: Detailed Consent: The patient's consent was obtained including but not limited to risks of crusting, scabbing, blistering, scarring, darker or lighter pigmentary change, recurrence, incomplete removal and infection. Show Applicator Variable?: Yes Number Of Freeze-Thaw Cycles: 3 freeze-thaw cycles

## 2022-03-17 NOTE — PROGRESS NOTE ADULT - PROBLEM SELECTOR PLAN 1
- Patient presented with  elevated creatinine level 3.31 , baseline unknown but patient ahs PMH of CKD at baseline  - s/p 1 l bolus in ED , repeat creat 2.89  - Likely pre-renal given improvement after IVF  - CT abdomen showed Mild bilateral hydroureteronephrosis. Hyperdense material within the urinary bladder, possibly blood  products.   - Renal US (3/15) showing b/l mild hydronephrosis   - SCr  continues trending down- today 1.67  - FeNa 0.8 (pre-renal)  - Obtained OP records from PCP, SCr of 2.0 (on 2/27/22)  - resolving

## 2022-03-17 NOTE — PROGRESS NOTE ADULT - PROBLEM SELECTOR PLAN 8
- Patient has history of BPH on chronic salmon since 2016 following with Dr David Doherty urology replacing salmon once/ month   - Pt needs to continue w/ salmon  - on Doxazosin   - Urology approved finasteride for pt

## 2022-03-17 NOTE — PROGRESS NOTE ADULT - PROBLEM SELECTOR PLAN 9
- Patient has history of Afib on eliquis  - EKG showed not in Afib   - Pt with episodes of Afib w/ RVR  - s/p digoxin 250 mg (on 3/15)  - As per cardiology dose of metoprolol was changed to 25 mg PO TID   - c/w BB home dose  - c/w dose adjusted apixaban

## 2022-03-17 NOTE — PHYSICAL THERAPY INITIAL EVALUATION ADULT - GENERAL OBSERVATIONS, REHAB EVAL
patient npo maintain, resident MD rounding, tele unit referral, 89 year male, in care of daughter, chronic salmon/bad, good sitting balance and ambulatory bedside with assistance of walker

## 2022-03-17 NOTE — PROGRESS NOTE ADULT - PROBLEM SELECTOR PLAN 2
- Patient PW elevated WBCs, on chronic salmon   - UA positive   - on rocephin today D2  - urine culture (testing)
- Patient PW elevated WBCs, on chronic salmon   - UA positive   - on rocephin today D4  - urine culture 3/13 contaminated  - urine culture 3/14 K. pneumonia  - c/w ABX   - f/u sensitivities
- Patient PW elevated WBCs, on chronic salmon   - UA positive   - on rocephin today D5   - urine culture 3/13 contaminated  - urine culture 3/14 K. pneumonia (S) to Ceftriaxone   - c/w ABX
- Patient PW elevated WBCs, on chronic salmon   - UA positive   - on rocephin today D3  - urine culture 3/13 contaminated  - urine culture 3/14 G(-) rods (prelim)  - c/w ABX   - f/u sensitivities

## 2022-03-17 NOTE — PROGRESS NOTE ADULT - ASSESSMENT
89 y/o male with PMHx of HTN, BPH on chronic salmon since 2016 , A. fib on eliquis, anemia, hyperlipidemia,CHF (F 30%) presents with nausea, vomiting and chest discomfort yesterday night,renal failure,hematurea,hyponatremia.  1.Tele monitoring.  2.JAMES and hyperkalemia-s/p salmon-D/W  can resume eliquis-refusing po meds.  3.Hyponatremia-improved.  4.CHF and HTN - b blocker 50mg bid,add dig .125mg qod,cont  low dose isordil and hydralazine.  5.GI and DVT prophylaxis.  6.Hematurea and UTI- abx.

## 2022-03-17 NOTE — PROGRESS NOTE ADULT - PROBLEM SELECTOR PLAN 7
- Patient c/o SOB after 1 l bolus in ED   - chest xray showed Cardiomegaly. Mildly worsened mild pulmonary vascular congestion. Small   right pleural effusion.  - ProBNP 5522  - trop neg x 2   - c/w tele   - on metoprolol 25 mg PO TID   - cardiology consulted Dr Garcia - Patient c/o SOB after 1 l bolus in ED   - chest xray showed Cardiomegaly. Mildly worsened mild pulmonary vascular congestion. Small   right pleural effusion.  - ProBNP 5522  - trop neg x 2   - TTE (3/14) mild dilated LA, mild concentric LVH, LVEF 42% w/ paradoxical septal motion c/w conduction delay. Atrial septal aneurysm   - c/w tele   - on metoprolol 25 mg PO TID   - cardiology consulted Dr Garcia

## 2022-03-17 NOTE — PROGRESS NOTE ADULT - PROBLEM SELECTOR PLAN 4
Pt w/o BM but able to pass gas  Abd Xray showing mild ileus w/o obstruction  Repeat Abd x ray still w/ evidence of ileus w/o obstruction  started on clear liquid diet   monitor clinically

## 2022-03-17 NOTE — PROGRESS NOTE ADULT - PROBLEM SELECTOR PLAN 3
Likely d/t UTI  holding AC (eliquis)  Also d/tiffany heparin for DVT prophylaxis  Urology recommending pt to be continued on Chinchilla catheter, irrigate catheter as needed   Evaluated by urology PA today, hematuria improving  cystoscopy as an outpatient for work up of hematuria with his urologist  urology consulted- Dr. Crowley
Likely d/t UTI  holding AC (eliquis)  Also d/tiffany heparin for DVT prophylaxis  Urology recommending pt to be continued on Chinchilla catheter, irrigate catheter as needed   After improvement of hematuria, cleared by urology to resume AC  Resolved  cystoscopy as an outpatient for work up of hematuria with his urologist if hematuria recurs   urology consulted- Dr. Crowley
Likely d/t UTI  holding AC (eliquis)  Also d/tiffany heparin for DVT prophylaxis  H/H slightly downtrended   Urology recommending pt to be continued on Chinchilla catheter, irrigate catheter as needed   cystoscopy as an outpatient for work up of hematuria with his urologist  urology consulted- Dr. Crowley
Likely d/t UTI  holding AC (eliquis)  Also d/tiffany heparin for DVT prophylaxis  H/H stable  Urology recommending pt to be continued on Chinchilla catheter, irrigate catheter as needed   cystoscopy as an outpatient for work up of hematuria with his urologist  urology consulted- Dr. Crowley

## 2022-03-17 NOTE — PROGRESS NOTE ADULT - SUBJECTIVE AND OBJECTIVE BOX
CHIEF COMPLAINT:Patient is a 89y old  Male who presents with a chief complaint of CHF,CP,Hematurea.Pt appears comfortable,refusing po meds.    	  REVIEW OF SYSTEMS:  unable to obtain      PHYSICAL EXAM:  T(C): 36.7 (03-17-22 @ 07:17), Max: 36.9 (03-16-22 @ 16:00)  HR: 111 (03-17-22 @ 07:17) (106 - 114)  BP: 137/81 (03-17-22 @ 07:17) (118/75 - 149/73)  RR: 19 (03-17-22 @ 07:17) (18 - 19)  SpO2: 98% (03-17-22 @ 07:17) (92% - 98%)  Wt(kg): --  I&O's Summary    16 Mar 2022 07:01  -  17 Mar 2022 07:00  --------------------------------------------------------  IN: 0 mL / OUT: 1900 mL / NET: -1900 mL        Appearance: Normal	  HEENT:   Normal oral mucosa, PERRL, EOMI	  Lymphatic: No lymphadenopathy  Cardiovascular: Normal S1 S2, No JVD, No murmurs, No edema  Respiratory: Lungs clear to auscultation	  Gastrointestinal:  Soft, Non-tender, + BS	  Skin: No rashes, No ecchymoses, No cyanosis	  Neurologic: Non-focal  Extremities: Normal range of motion, No clubbing, cyanosis or edema  Vascular: Peripheral pulses palpable 2+ bilaterally    MEDICATIONS  (STANDING):  apixaban 2.5 milliGRAM(s) Oral two times a day  artificial  tears Solution 2 Drop(s) Both EYES every 4 hours  cefTRIAXone   IVPB 1000 milliGRAM(s) IV Intermittent every 24 hours  dextrose 5% + sodium chloride 0.9%. 1000 milliLiter(s) (50 mL/Hr) IV Continuous <Continuous>  digoxin     Tablet 125 MICROGram(s) Oral every other day  doxazosin 2 milliGRAM(s) Oral at bedtime  finasteride 5 milliGRAM(s) Oral daily  hydrALAZINE 10 milliGRAM(s) Oral every 8 hours  isosorbide   dinitrate Tablet (ISORDIL) 10 milliGRAM(s) Oral three times a day  metoprolol tartrate 50 milliGRAM(s) Oral two times a day  pantoprazole    Tablet 40 milliGRAM(s) Oral before breakfast  polyethylene glycol 3350 17 Gram(s) Oral every 12 hours  senna 2 Tablet(s) Oral at bedtime  simvastatin 20 milliGRAM(s) Oral at bedtime      TELEMETRY: 	afib upto 140's      	  LABS:	 	                      13.3   8.41  )-----------( 205      ( 17 Mar 2022 06:49 )             40.8     03-17    140  |  108  |  35<H>  ----------------------------<  106<H>  4.1   |  26  |  1.67<H>    Ca    8.8      17 Mar 2022 06:49  Phos  2.8     03-17  Mg     3.0     03-17    TPro  6.6  /  Alb  3.1<L>  /  TBili  0.4  /  DBili  x   /  AST  17  /  ALT  25  /  AlkPhos  75  03-17    proBNP: Serum Pro-Brain Natriuretic Peptide: 5522 pg/mL (03-13 @ 10:25)    Lipid Profile:   HgA1c:   TSH: Thyroid Stimulating Hormone, Serum: 3.68 uU/mL (03-14 @ 05:41)

## 2022-03-17 NOTE — PROGRESS NOTE ADULT - SUBJECTIVE AND OBJECTIVE BOX
PGY-1 Progress Note discussed with attending    PAGER #: [[phone]] TILL 5:00 PM  PLEASE CONTACT ON CALL TEAM:  - On Call Team (Please refer to Angel) FROM 5:00 PM - 8:30PM  - Nightfloat Team FROM 8:30 -7:30 AM    CHIEF COMPLAINT & BRIEF HOSPITAL COURSE:    INTERVAL HPI/OVERNIGHT EVENTS:       REVIEW OF SYSTEMS:  CONSTITUTIONAL: No fever, weight loss, or fatigue  RESPIRATORY: No cough, wheezing, chills or hemoptysis; No shortness of breath  CARDIOVASCULAR: No chest pain, palpitations, dizziness, or leg swelling  GASTROINTESTINAL: No abdominal pain. No nausea, vomiting, or hematemesis; No diarrhea or constipation. No melena or hematochezia.  GENITOURINARY: No dysuria or hematuria, urinary frequency  NEUROLOGICAL: No headaches, memory loss, loss of strength, numbness, or tremors  SKIN: No itching, burning, rashes, or lesions     Vital Signs Last 24 Hrs  T(C): 36.7 (17 Mar 2022 07:17), Max: 36.9 (16 Mar 2022 16:00)  T(F): 98 (17 Mar 2022 07:17), Max: 98.5 (16 Mar 2022 20:30)  HR: 78 (17 Mar 2022 09:11) (78 - 114)  BP: 140/83 (17 Mar 2022 09:11) (118/75 - 149/73)  BP(mean): --  RR: 19 (17 Mar 2022 07:17) (18 - 19)  SpO2: 98% (17 Mar 2022 07:17) (92% - 98%)    PHYSICAL EXAMINATION:  GENERAL: NAD, well built  HEAD:  Atraumatic, Normocephalic  EYES:  conjunctiva and sclera clear  NECK: Supple, No JVD, Normal thyroid  CHEST/LUNG: Clear to auscultation. Clear to percussion bilaterally; No rales, rhonchi, wheezing, or rubs  HEART: Regular rate and rhythm; No murmurs, rubs, or gallops  ABDOMEN: Soft, Nontender, Nondistended; Bowel sounds present  NERVOUS SYSTEM:  Alert & Oriented X3,    EXTREMITIES:  2+ Peripheral Pulses, No clubbing, cyanosis, or edema  SKIN: warm dry                          13.3   8.41  )-----------( 205      ( 17 Mar 2022 06:49 )             40.8     03-17    140  |  108  |  35<H>  ----------------------------<  106<H>  4.1   |  26  |  1.67<H>    Ca    8.8      17 Mar 2022 06:49  Phos  2.8     03-17  Mg     3.0     03-17    TPro  6.6  /  Alb  3.1<L>  /  TBili  0.4  /  DBili  x   /  AST  17  /  ALT  25  /  AlkPhos  75  03-17    LIVER FUNCTIONS - ( 17 Mar 2022 06:49 )  Alb: 3.1 g/dL / Pro: 6.6 g/dL / ALK PHOS: 75 U/L / ALT: 25 U/L DA / AST: 17 U/L / GGT: x                   CAPILLARY BLOOD GLUCOSE      RADIOLOGY & ADDITIONAL TESTS:                   PGY-1 Progress Note discussed with attending    PAGER #: [[phone]] TILL 5:00 PM  PLEASE CONTACT ON CALL TEAM:  - On Call Team (Please refer to Angel) FROM 5:00 PM - 8:30PM  - Nightfloat Team FROM 8:30 -7:30 AM        INTERVAL HPI/OVERNIGHT EVENTS:  Pt was upset about the fact that he was kept NPO. With the help of [place]  (ID # [id number]) explained him that because he was found to have ileus, he was kept NPO and also explained that we are giving him fluids through IV. Feels ok otherwise.  Still not having BM, but passing gas and no abdominal discomfort       REVIEW OF SYSTEMS:  CONSTITUTIONAL: No fever, weight loss, or fatigue  RESPIRATORY: No cough, wheezing, chills or hemoptysis; No shortness of breath  CARDIOVASCULAR: No chest pain, palpitations, dizziness, or leg swelling  GASTROINTESTINAL: No abdominal pain. No nausea, vomiting, or hematemesis; No diarrhea or constipation. No melena or hematochezia.  GENITOURINARY: No dysuria or hematuria, urinary frequency  NEUROLOGICAL: No headaches, memory loss, loss of strength, numbness, or tremors  SKIN: No itching, burning, rashes, or lesions     Vital Signs Last 24 Hrs  T(C): 36.7 (17 Mar 2022 07:17), Max: 36.9 (16 Mar 2022 16:00)  T(F): 98 (17 Mar 2022 07:17), Max: 98.5 (16 Mar 2022 20:30)  HR: 78 (17 Mar 2022 09:11) (78 - 114)  BP: 140/83 (17 Mar 2022 09:11) (118/75 - 149/73)  BP(mean): --  RR: 19 (17 Mar 2022 07:17) (18 - 19)  SpO2: 98% (17 Mar 2022 07:17) (92% - 98%)    PHYSICAL EXAMINATION:  GENERAL: NAD, well built  HEAD:  Atraumatic, Normocephalic  EYES:  conjunctiva and sclera clear  NECK: Supple, No JVD  CHEST/LUNG: Clear to auscultation. Clear to percussion bilaterally; No rales, rhonchi, wheezing, or rubs  HEART: Regular rate and rhythm; No murmurs, rubs, or gallops  ABDOMEN: Soft, Nontender, Nondistended; Bowel sounds +  NERVOUS SYSTEM:  Alert & Oriented X3,    EXTREMITIES:  2+ Peripheral Pulses, No clubbing, cyanosis, or edema  SKIN: warm dry                          13.3   8.41  )-----------( 205      ( 17 Mar 2022 06:49 )             40.8     03-17    140  |  108  |  35<H>  ----------------------------<  106<H>  4.1   |  26  |  1.67<H>    Ca    8.8      17 Mar 2022 06:49  Phos  2.8     03-17  Mg     3.0     03-17    TPro  6.6  /  Alb  3.1<L>  /  TBili  0.4  /  DBili  x   /  AST  17  /  ALT  25  /  AlkPhos  75  03-17    LIVER FUNCTIONS - ( 17 Mar 2022 06:49 )  Alb: 3.1 g/dL / Pro: 6.6 g/dL / ALK PHOS: 75 U/L / ALT: 25 U/L DA / AST: 17 U/L / GGT: x                   CAPILLARY BLOOD GLUCOSE      RADIOLOGY & ADDITIONAL TESTS:

## 2022-03-18 ENCOUNTER — TRANSCRIPTION ENCOUNTER (OUTPATIENT)
Age: 87
End: 2022-03-18

## 2022-03-18 VITALS
DIASTOLIC BLOOD PRESSURE: 60 MMHG | RESPIRATION RATE: 17 BRPM | SYSTOLIC BLOOD PRESSURE: 115 MMHG | HEART RATE: 111 BPM | TEMPERATURE: 98 F | OXYGEN SATURATION: 96 %

## 2022-03-18 LAB
GLUCOSE BLDC GLUCOMTR-MCNC: 105 MG/DL — HIGH (ref 70–99)
GLUCOSE BLDC GLUCOMTR-MCNC: 133 MG/DL — HIGH (ref 70–99)
GLUCOSE BLDC GLUCOMTR-MCNC: 96 MG/DL — SIGNIFICANT CHANGE UP (ref 70–99)
SARS-COV-2 RNA SPEC QL NAA+PROBE: SIGNIFICANT CHANGE UP

## 2022-03-18 PROCEDURE — 83930 ASSAY OF BLOOD OSMOLALITY: CPT

## 2022-03-18 PROCEDURE — 82570 ASSAY OF URINE CREATININE: CPT

## 2022-03-18 PROCEDURE — 85025 COMPLETE CBC W/AUTO DIFF WBC: CPT

## 2022-03-18 PROCEDURE — G1004: CPT

## 2022-03-18 PROCEDURE — 93005 ELECTROCARDIOGRAM TRACING: CPT

## 2022-03-18 PROCEDURE — 85610 PROTHROMBIN TIME: CPT

## 2022-03-18 PROCEDURE — 71045 X-RAY EXAM CHEST 1 VIEW: CPT

## 2022-03-18 PROCEDURE — 97161 PT EVAL LOW COMPLEX 20 MIN: CPT

## 2022-03-18 PROCEDURE — 87077 CULTURE AEROBIC IDENTIFY: CPT

## 2022-03-18 PROCEDURE — 87086 URINE CULTURE/COLONY COUNT: CPT

## 2022-03-18 PROCEDURE — 84300 ASSAY OF URINE SODIUM: CPT

## 2022-03-18 PROCEDURE — 80048 BASIC METABOLIC PNL TOTAL CA: CPT

## 2022-03-18 PROCEDURE — 99285 EMERGENCY DEPT VISIT HI MDM: CPT | Mod: 25

## 2022-03-18 PROCEDURE — 84100 ASSAY OF PHOSPHORUS: CPT

## 2022-03-18 PROCEDURE — 85730 THROMBOPLASTIN TIME PARTIAL: CPT

## 2022-03-18 PROCEDURE — 99239 HOSP IP/OBS DSCHRG MGMT >30: CPT | Mod: GC

## 2022-03-18 PROCEDURE — 74176 CT ABD & PELVIS W/O CONTRAST: CPT | Mod: MG

## 2022-03-18 PROCEDURE — 96374 THER/PROPH/DIAG INJ IV PUSH: CPT

## 2022-03-18 PROCEDURE — 83735 ASSAY OF MAGNESIUM: CPT

## 2022-03-18 PROCEDURE — 36415 COLL VENOUS BLD VENIPUNCTURE: CPT

## 2022-03-18 PROCEDURE — 83690 ASSAY OF LIPASE: CPT

## 2022-03-18 PROCEDURE — 82803 BLOOD GASES ANY COMBINATION: CPT

## 2022-03-18 PROCEDURE — 80053 COMPREHEN METABOLIC PANEL: CPT

## 2022-03-18 PROCEDURE — 74018 RADEX ABDOMEN 1 VIEW: CPT

## 2022-03-18 PROCEDURE — 76770 US EXAM ABDO BACK WALL COMP: CPT

## 2022-03-18 PROCEDURE — 82962 GLUCOSE BLOOD TEST: CPT

## 2022-03-18 PROCEDURE — 83880 ASSAY OF NATRIURETIC PEPTIDE: CPT

## 2022-03-18 PROCEDURE — 83935 ASSAY OF URINE OSMOLALITY: CPT

## 2022-03-18 PROCEDURE — 87635 SARS-COV-2 COVID-19 AMP PRB: CPT

## 2022-03-18 PROCEDURE — 87186 SC STD MICRODIL/AGAR DIL: CPT

## 2022-03-18 PROCEDURE — 84484 ASSAY OF TROPONIN QUANT: CPT

## 2022-03-18 PROCEDURE — 85027 COMPLETE CBC AUTOMATED: CPT

## 2022-03-18 PROCEDURE — 93306 TTE W/DOPPLER COMPLETE: CPT

## 2022-03-18 PROCEDURE — 84443 ASSAY THYROID STIM HORMONE: CPT

## 2022-03-18 PROCEDURE — 81001 URINALYSIS AUTO W/SCOPE: CPT

## 2022-03-18 PROCEDURE — 96375 TX/PRO/DX INJ NEW DRUG ADDON: CPT

## 2022-03-18 RX ORDER — ISOSORBIDE MONONITRATE 60 MG/1
1 TABLET, EXTENDED RELEASE ORAL
Qty: 6 | Refills: 0
Start: 2022-03-18 | End: 2022-03-19

## 2022-03-18 RX ORDER — FINASTERIDE 5 MG/1
1 TABLET, FILM COATED ORAL
Qty: 2 | Refills: 0
Start: 2022-03-18 | End: 2022-03-19

## 2022-03-18 RX ORDER — METOPROLOL TARTRATE 50 MG
1 TABLET ORAL
Qty: 6 | Refills: 0
Start: 2022-03-18 | End: 2022-03-19

## 2022-03-18 RX ORDER — DIGOXIN 250 MCG
1 TABLET ORAL
Qty: 1 | Refills: 0
Start: 2022-03-18 | End: 2022-03-18

## 2022-03-18 RX ORDER — POLYETHYLENE GLYCOL 3350 17 G/17G
17 POWDER, FOR SOLUTION ORAL
Qty: 2 | Refills: 0
Start: 2022-03-18 | End: 2022-03-19

## 2022-03-18 RX ORDER — POLYETHYLENE GLYCOL 3350 17 G/17G
17 POWDER, FOR SOLUTION ORAL
Qty: 30 | Refills: 0
Start: 2022-03-18 | End: 2022-04-16

## 2022-03-18 RX ORDER — FINASTERIDE 5 MG/1
1 TABLET, FILM COATED ORAL
Qty: 30 | Refills: 0
Start: 2022-03-18 | End: 2022-04-16

## 2022-03-18 RX ORDER — METOPROLOL TARTRATE 50 MG
1 TABLET ORAL
Qty: 90 | Refills: 0
Start: 2022-03-18 | End: 2022-04-16

## 2022-03-18 RX ORDER — HYDRALAZINE HCL 50 MG
1 TABLET ORAL
Qty: 6 | Refills: 0
Start: 2022-03-18 | End: 2022-03-19

## 2022-03-18 RX ORDER — METOPROLOL TARTRATE 50 MG
50 TABLET ORAL EVERY 8 HOURS
Refills: 0 | Status: DISCONTINUED | OUTPATIENT
Start: 2022-03-18 | End: 2022-03-18

## 2022-03-18 RX ORDER — ISOSORBIDE DINITRATE 5 MG/1
1 TABLET ORAL
Qty: 90 | Refills: 0
Start: 2022-03-18 | End: 2022-04-16

## 2022-03-18 RX ORDER — HYDRALAZINE HCL 50 MG
1 TABLET ORAL
Qty: 90 | Refills: 0
Start: 2022-03-18 | End: 2022-04-16

## 2022-03-18 RX ORDER — SENNA PLUS 8.6 MG/1
2 TABLET ORAL
Qty: 60 | Refills: 0
Start: 2022-03-18 | End: 2022-04-16

## 2022-03-18 RX ORDER — METOPROLOL TARTRATE 50 MG
1 TABLET ORAL
Qty: 0 | Refills: 0 | DISCHARGE

## 2022-03-18 RX ORDER — SODIUM CHLORIDE 9 MG/ML
1000 INJECTION INTRAMUSCULAR; INTRAVENOUS; SUBCUTANEOUS
Refills: 0 | Status: DISCONTINUED | OUTPATIENT
Start: 2022-03-18 | End: 2022-03-18

## 2022-03-18 RX ORDER — DIGOXIN 250 MCG
1 TABLET ORAL
Qty: 15 | Refills: 0
Start: 2022-03-18 | End: 2022-04-16

## 2022-03-18 RX ORDER — HYDRALAZINE HCL 50 MG
1 TABLET ORAL
Qty: 0 | Refills: 0 | DISCHARGE
Start: 2022-03-18 | End: 2022-04-16

## 2022-03-18 RX ADMIN — Medication 10 MILLIGRAM(S): at 14:53

## 2022-03-18 RX ADMIN — APIXABAN 2.5 MILLIGRAM(S): 2.5 TABLET, FILM COATED ORAL at 05:23

## 2022-03-18 RX ADMIN — Medication 50 MILLIGRAM(S): at 14:53

## 2022-03-18 RX ADMIN — ISOSORBIDE DINITRATE 10 MILLIGRAM(S): 5 TABLET ORAL at 11:32

## 2022-03-18 RX ADMIN — Medication 2 DROP(S): at 11:32

## 2022-03-18 RX ADMIN — FINASTERIDE 5 MILLIGRAM(S): 5 TABLET, FILM COATED ORAL at 11:32

## 2022-03-18 RX ADMIN — Medication 2 DROP(S): at 05:22

## 2022-03-18 RX ADMIN — APIXABAN 2.5 MILLIGRAM(S): 2.5 TABLET, FILM COATED ORAL at 17:22

## 2022-03-18 RX ADMIN — SODIUM CHLORIDE 65 MILLILITER(S): 9 INJECTION INTRAMUSCULAR; INTRAVENOUS; SUBCUTANEOUS at 10:54

## 2022-03-18 RX ADMIN — Medication 10 MILLIGRAM(S): at 05:22

## 2022-03-18 RX ADMIN — Medication 50 MILLIGRAM(S): at 05:22

## 2022-03-18 RX ADMIN — POLYETHYLENE GLYCOL 3350 17 GRAM(S): 17 POWDER, FOR SOLUTION ORAL at 05:23

## 2022-03-18 RX ADMIN — Medication 125 MICROGRAM(S): at 11:32

## 2022-03-18 RX ADMIN — ISOSORBIDE DINITRATE 10 MILLIGRAM(S): 5 TABLET ORAL at 05:22

## 2022-03-18 RX ADMIN — ISOSORBIDE DINITRATE 10 MILLIGRAM(S): 5 TABLET ORAL at 17:22

## 2022-03-18 RX ADMIN — PANTOPRAZOLE SODIUM 40 MILLIGRAM(S): 20 TABLET, DELAYED RELEASE ORAL at 05:22

## 2022-03-18 NOTE — PROGRESS NOTE ADULT - PROVIDER SPECIALTY LIST ADULT
Cardiology
Internal Medicine
Cardiology
Urology
Urology
Cardiology

## 2022-03-18 NOTE — PROGRESS NOTE ADULT - SUBJECTIVE AND OBJECTIVE BOX
CHIEF COMPLAINT:Patient is a 89y old  Male who presents with a chief complaint of CHF,CP,Hematurea.Pt appears comfortable.    	  REVIEW OF SYSTEMS:  CONSTITUTIONAL: No fever, weight loss, or fatigue  EYES: No eye pain, visual disturbances, or discharge  ENT:  No difficulty hearing, tinnitus, vertigo; No sinus or throat pain  NECK: No pain or stiffness  RESPIRATORY: No cough, wheezing, chills or hemoptysis; No Shortness of Breath  CARDIOVASCULAR: No chest pain, palpitations, passing out, dizziness, or leg swelling  GASTROINTESTINAL: No abdominal or epigastric pain. No nausea, vomiting, or hematemesis; No diarrhea or constipation. No melena or hematochezia.  GENITOURINARY: No dysuria, frequency, hematuria, or incontinence  NEUROLOGICAL: No headaches, memory loss, loss of strength, numbness, or tremors  SKIN: No itching, burning, rashes, or lesions   LYMPH Nodes: No enlarged glands  ENDOCRINE: No heat or cold intolerance; No hair loss  MUSCULOSKELETAL: No joint pain or swelling; No muscle, back, or extremity pain  PSYCHIATRIC: No depression, anxiety, mood swings, or difficulty sleeping  HEME/LYMPH: No easy bruising, or bleeding gums  ALLERGY AND IMMUNOLOGIC: No hives or eczema	        PHYSICAL EXAM:  T(C): 36.4 (03-18-22 @ 07:43), Max: 36.5 (03-17-22 @ 20:19)  HR: 109 (03-18-22 @ 07:43) (78 - 116)  BP: 113/58 (03-18-22 @ 07:43) (107/60 - 148/84)  RR: 16 (03-18-22 @ 07:43) (16 - 18)  SpO2: 98% (03-18-22 @ 07:43) (95% - 98%)  Wt(kg): --  I&O's Summary    17 Mar 2022 07:01  -  18 Mar 2022 07:00  --------------------------------------------------------  IN: 0 mL / OUT: 1880 mL / NET: -1880 mL        Appearance: Normal	  HEENT:   Normal oral mucosa, PERRL, EOMI	  Lymphatic: No lymphadenopathy  Cardiovascular: Normal S1 S2, No JVD, No murmurs, No edema  Respiratory: Lungs clear to auscultation	  Psychiatry: A & O x 3, Mood & affect appropriate  Gastrointestinal:  Soft, Non-tender, + BS	  Skin: No rashes, No ecchymoses, No cyanosis	  Neurologic: Non-focal  Extremities: Normal range of motion, No clubbing, cyanosis or edema  Vascular: Peripheral pulses palpable 2+ bilaterally    MEDICATIONS  (STANDING):  apixaban 2.5 milliGRAM(s) Oral two times a day  artificial  tears Solution 2 Drop(s) Both EYES every 4 hours  digoxin     Tablet 125 MICROGram(s) Oral every other day  doxazosin 2 milliGRAM(s) Oral at bedtime  finasteride 5 milliGRAM(s) Oral daily  hydrALAZINE 10 milliGRAM(s) Oral every 8 hours  isosorbide   dinitrate Tablet (ISORDIL) 10 milliGRAM(s) Oral three times a day  metoprolol tartrate 50 milliGRAM(s) Oral two times a day  pantoprazole    Tablet 40 milliGRAM(s) Oral before breakfast  polyethylene glycol 3350 17 Gram(s) Oral every 12 hours  senna 2 Tablet(s) Oral at bedtime  simvastatin 20 milliGRAM(s) Oral at bedtime      TELEMETRY: afib 130's	    	  	  LABS:	 	                        13.3   8.41  )-----------( 205      ( 17 Mar 2022 06:49 )             40.8     03-17    140  |  108  |  35<H>  ----------------------------<  106<H>  4.1   |  26  |  1.67<H>    Ca    8.8      17 Mar 2022 06:49  Phos  2.8     03-17  Mg     3.0     03-17    TPro  6.6  /  Alb  3.1<L>  /  TBili  0.4  /  DBili  x   /  AST  17  /  ALT  25  /  AlkPhos  75  03-17    proBNP: Serum Pro-Brain Natriuretic Peptide: 5522 pg/mL (03-13 @ 10:25)      TSH: Thyroid Stimulating Hormone, Serum: 3.68 uU/mL (03-14 @ 05:41)

## 2022-03-18 NOTE — PROGRESS NOTE ADULT - ASSESSMENT
89 y/o male with PMHx of HTN, BPH on chronic salmon since 2016 , A. fib on eliquis, anemia, hyperlipidemia,CHF (F 30%) presents with nausea, vomiting and chest discomfort yesterday night,renal failure,hematurea,hyponatremia.  1.Tele monitoring.  2.JAMES and hyperkalemia-s/p salmon-D/W  hematurea resolved, cont eliquis.  3.Hyponatremia-improved.  4.CHF and HTN -inc  b blocker 50mg tid, dig .125mg qod,cont  low dose isordil and hydralazine.  5.GI and DVT prophylaxis.

## 2022-03-18 NOTE — DISCHARGE NOTE NURSING/CASE MANAGEMENT/SOCIAL WORK - NSDCPEFALRISK_GEN_ALL_CORE
For information on Fall & Injury Prevention, visit: [email or web address] OR  [email or web address] OR  https://www.cdc.gov/steadi/patient.html

## 2022-03-18 NOTE — DISCHARGE NOTE NURSING/CASE MANAGEMENT/SOCIAL WORK - PATIENT PORTAL LINK FT
You can access the FollowMyHealth Patient Portal offered by [facility] by registering at the following website: [email or web address]. By joining [employer]’s FollowMyHealth portal, you will also be able to view your health information using other applications (apps) compatible with our system.

## 2022-04-04 ENCOUNTER — EMERGENCY (EMERGENCY)
Facility: HOSPITAL | Age: 87
LOS: 1 days | Discharge: HOME | End: 2022-04-04
Attending: EMERGENCY MEDICINE
Payer: MEDICAID

## 2022-04-04 PROCEDURE — 99283 EMERGENCY DEPT VISIT LOW MDM: CPT

## 2022-04-05 VITALS
OXYGEN SATURATION: 98 % | HEIGHT: 67 IN | WEIGHT: 176.37 LBS | HEART RATE: 98 BPM | DIASTOLIC BLOOD PRESSURE: 67 MMHG | TEMPERATURE: 98 F | SYSTOLIC BLOOD PRESSURE: 120 MMHG | RESPIRATION RATE: 17 BRPM

## 2022-04-05 PROBLEM — E78.5 HYPERLIPIDEMIA, UNSPECIFIED: Chronic | Status: ACTIVE | Noted: 2022-03-13

## 2022-04-05 PROBLEM — I48.91 UNSPECIFIED ATRIAL FIBRILLATION: Chronic | Status: ACTIVE | Noted: 2022-03-13

## 2022-04-05 PROBLEM — I10 ESSENTIAL (PRIMARY) HYPERTENSION: Chronic | Status: ACTIVE | Noted: 2022-03-13

## 2022-04-05 PROBLEM — N18.9 CHRONIC KIDNEY DISEASE, UNSPECIFIED: Chronic | Status: ACTIVE | Noted: 2022-03-13

## 2022-04-05 PROBLEM — N40.0 BENIGN PROSTATIC HYPERPLASIA WITHOUT LOWER URINARY TRACT SYMPTOMS: Chronic | Status: ACTIVE | Noted: 2022-03-13

## 2022-04-05 LAB
APPEARANCE UR: ABNORMAL
BILIRUB UR-MCNC: NEGATIVE — SIGNIFICANT CHANGE UP
COLOR SPEC: ABNORMAL
DIFF PNL FLD: ABNORMAL
GLUCOSE UR QL: NEGATIVE — SIGNIFICANT CHANGE UP
KETONES UR-MCNC: NEGATIVE — SIGNIFICANT CHANGE UP
LEUKOCYTE ESTERASE UR-ACNC: ABNORMAL
NITRITE UR-MCNC: NEGATIVE — SIGNIFICANT CHANGE UP
PH UR: 6 — SIGNIFICANT CHANGE UP (ref 5–8)
PROT UR-MCNC: 100
SP GR SPEC: 1.01 — SIGNIFICANT CHANGE UP (ref 1.01–1.02)
UROBILINOGEN FLD QL: NEGATIVE — SIGNIFICANT CHANGE UP

## 2022-04-05 PROCEDURE — 99283 EMERGENCY DEPT VISIT LOW MDM: CPT

## 2022-04-05 PROCEDURE — 87086 URINE CULTURE/COLONY COUNT: CPT

## 2022-04-05 PROCEDURE — 87186 SC STD MICRODIL/AGAR DIL: CPT

## 2022-04-05 PROCEDURE — 81001 URINALYSIS AUTO W/SCOPE: CPT

## 2022-04-05 RX ORDER — MOXIFLOXACIN HYDROCHLORIDE TABLETS, 400 MG 400 MG/1
1 TABLET, FILM COATED ORAL
Qty: 7 | Refills: 0
Start: 2022-04-05 | End: 2022-04-11

## 2022-04-05 RX ORDER — CIPROFLOXACIN LACTATE 400MG/40ML
500 VIAL (ML) INTRAVENOUS ONCE
Refills: 0 | Status: COMPLETED | OUTPATIENT
Start: 2022-04-05 | End: 2022-04-05

## 2022-04-05 RX ADMIN — Medication 500 MILLIGRAM(S): at 03:11

## 2022-04-05 NOTE — ED PROVIDER NOTE - CLINICAL SUMMARY MEDICAL DECISION MAKING FREE TEXT BOX
89 year old male with PMHx of HTN, Afib, BPH, HLD, and CKD with an indwelling Chinchilla catheter presenting to the ED for a catheter replacement. Will check UA, give first dose of Cipro in the ED, and reassess. 89 year old male with PMHx of HTN, Afib, BPH, HLD, and CKD with an indwelling Chinchilla catheter presenting to the ED for a catheter replacement. Will check UA, give first dose of Cipro in the ED, and reassess.  UA confirms UTI-given cipro, patient stable for discharge with leg bag.

## 2022-04-05 NOTE — ED PROVIDER NOTE - NSFOLLOWUPINSTRUCTIONS_ED_ALL_ED_FT
take medication as prescribed.  Followup with urologist for reevaluation.  Return to ED if you develop fever, vomiting or abdominal pain.      Urinary Tract Infection, Adult       A urinary tract infection (UTI) is an infection of any part of the urinary tract. The urinary tract includes the kidneys, ureters, bladder, and urethra. These organs make, store, and get rid of urine in the body.    An upper UTI affects the ureters and kidneys. A lower UTI affects the bladder and urethra.      What are the causes?    Most urinary tract infections are caused by bacteria in your genital area around your urethra, where urine leaves your body. These bacteria grow and cause inflammation of your urinary tract.      What increases the risk?    You are more likely to develop this condition if:  •You have a urinary catheter that stays in place.      •You are not able to control when you urinate or have a bowel movement (incontinence).    •You are female and you:  •Use a spermicide or diaphragm for birth control.      •Have low estrogen levels.      •Are pregnant.        •You have certain genes that increase your risk.      •You are sexually active.      •You take antibiotic medicines.    •You have a condition that causes your flow of urine to slow down, such as:  •An enlarged prostate, if you are male.      •Blockage in your urethra.      •A kidney stone.      •A nerve condition that affects your bladder control (neurogenic bladder).      •Not getting enough to drink, or not urinating often.      •You have certain medical conditions, such as:  •Diabetes.      •A weak disease-fighting system (immunesystem).      •Sickle cell disease.      •Gout.      •Spinal cord injury.          What are the signs or symptoms?    Symptoms of this condition include:  •Needing to urinate right away (urgency).      •Frequent urination. This may include small amounts of urine each time you urinate.      •Pain or burning with urination.      •Blood in the urine.      •Urine that smells bad or unusual.      •Trouble urinating.      •Cloudy urine.      •Vaginal discharge, if you are female.      •Pain in the abdomen or the lower back.      You may also have:  •Vomiting or a decreased appetite.      •Confusion.      •Irritability or tiredness.      •A fever or chills.      •Diarrhea.      The first symptom in older adults may be confusion. In some cases, they may not have any symptoms until the infection has worsened.      How is this diagnosed?    This condition is diagnosed based on your medical history and a physical exam. You may also have other tests, including:  •Urine tests.      •Blood tests.      •Tests for STIs (sexually transmitted infections).      If you have had more than one UTI, a cystoscopy or imaging studies may be done to determine the cause of the infections.      How is this treated?    Treatment for this condition includes:  •Antibiotic medicine.      •Over-the-counter medicines to treat discomfort.      •Drinking enough water to stay hydrated.      If you have frequent infections or have other conditions such as a kidney stone, you may need to see a health care provider who specializes in the urinary tract (urologist).    In rare cases, urinary tract infections can cause sepsis. Sepsis is a life-threatening condition that occurs when the body responds to an infection. Sepsis is treated in the hospital with IV antibiotics, fluids, and other medicines.      Follow these instructions at home:     Medicines     •Take over-the-counter and prescription medicines only as told by your health care provider.      •If you were prescribed an antibiotic medicine, take it as told by your health care provider. Do not stop using the antibiotic even if you start to feel better.      General instructions   •Make sure you:  •Empty your bladder often and completely. Do not hold urine for long periods of time.      •Empty your bladder after sex.      •Wipe from front to back after urinating or having a bowel movement if you are female. Use each tissue only one time when you wipe.        •Drink enough fluid to keep your urine pale yellow.      •Keep all follow-up visits. This is important.        Contact a health care provider if:    •Your symptoms do not get better after 1–2 days.      •Your symptoms go away and then return.        Get help right away if:    •You have severe pain in your back or your lower abdomen.      •You have a fever or chills.      •You have nausea or vomiting.        Summary    •A urinary tract infection (UTI) is an infection of any part of the urinary tract, which includes the kidneys, ureters, bladder, and urethra.      •Most urinary tract infections are caused by bacteria in your genital area.      •Treatment for this condition often includes antibiotic medicines.      •If you were prescribed an antibiotic medicine, take it as told by your health care provider. Do not stop using the antibiotic even if you start to feel better.      •Keep all follow-up visits. This is important.

## 2022-04-05 NOTE — ED PROVIDER NOTE - CARE PLAN
Principal Discharge DX:	Acute UTI  Secondary Diagnosis:	Chinchilla catheter problem, initial encounter   1

## 2022-04-05 NOTE — ED PROVIDER NOTE - PATIENT PORTAL LINK FT
You can access the FollowMyHealth Patient Portal offered by [facility] by registering at the following website: [email or web address]. By joining [employer]’s FollowMyHealth portal, you will also be able to view your health information using other applications (apps) compatible with our system.

## 2022-04-05 NOTE — ED PROVIDER NOTE - TOBACCO USE
[facility]   Unknown if ever smoked

## 2022-04-05 NOTE — ED PROVIDER NOTE - OBJECTIVE STATEMENT
89 year old male with PMHx of HTN, Afib, BPH, HLD, and CKD with an indwelling Chinchilla catheter presents to the ED for a catheter replacement. Patient states that around 22:00 tonight his urinary catheter fell out. Patient's wife reports that earlier in the day patient's urologist called to inform them that his urine test performed last week showed an infection, and thus sent a prescription for Cipro to his pharmacy. Per wife, the pharmacy did not receive the antibiotic, and thus patient needs a new prescription. Patient denies any fevers, vomiting, abdominal pain, and all other acute complaints. NKDA.

## 2022-04-08 LAB
-  AMIKACIN: SIGNIFICANT CHANGE UP
-  AZTREONAM: SIGNIFICANT CHANGE UP
-  CEFEPIME: SIGNIFICANT CHANGE UP
-  CEFTAZIDIME: SIGNIFICANT CHANGE UP
-  CIPROFLOXACIN: SIGNIFICANT CHANGE UP
-  GENTAMICIN: SIGNIFICANT CHANGE UP
-  IMIPENEM: SIGNIFICANT CHANGE UP
-  LEVOFLOXACIN: SIGNIFICANT CHANGE UP
-  MEROPENEM: SIGNIFICANT CHANGE UP
-  PIPERACILLIN/TAZOBACTAM: SIGNIFICANT CHANGE UP
-  TOBRAMYCIN: SIGNIFICANT CHANGE UP
CULTURE RESULTS: SIGNIFICANT CHANGE UP
METHOD TYPE: SIGNIFICANT CHANGE UP
ORGANISM # SPEC MICROSCOPIC CNT: SIGNIFICANT CHANGE UP
ORGANISM # SPEC MICROSCOPIC CNT: SIGNIFICANT CHANGE UP
SPECIMEN SOURCE: SIGNIFICANT CHANGE UP

## 2022-04-12 ENCOUNTER — INPATIENT (INPATIENT)
Facility: HOSPITAL | Age: 87
LOS: 6 days | Discharge: ANOTHER HOSPITAL | DRG: 305 | End: 2022-04-19
Attending: STUDENT IN AN ORGANIZED HEALTH CARE EDUCATION/TRAINING PROGRAM | Admitting: STUDENT IN AN ORGANIZED HEALTH CARE EDUCATION/TRAINING PROGRAM
Payer: MEDICAID

## 2022-04-12 VITALS
OXYGEN SATURATION: 98 % | DIASTOLIC BLOOD PRESSURE: 43 MMHG | WEIGHT: 173.5 LBS | HEART RATE: 34 BPM | HEIGHT: 67 IN | SYSTOLIC BLOOD PRESSURE: 78 MMHG | TEMPERATURE: 98 F | RESPIRATION RATE: 18 BRPM

## 2022-04-12 DIAGNOSIS — R00.1 BRADYCARDIA, UNSPECIFIED: ICD-10-CM

## 2022-04-12 LAB
ALBUMIN SERPL ELPH-MCNC: 3.1 G/DL — LOW (ref 3.5–5)
ALP SERPL-CCNC: 69 U/L — SIGNIFICANT CHANGE UP (ref 40–120)
ALT FLD-CCNC: 28 U/L DA — SIGNIFICANT CHANGE UP (ref 10–60)
ANION GAP SERPL CALC-SCNC: 5 MMOL/L — SIGNIFICANT CHANGE UP (ref 5–17)
ANION GAP SERPL CALC-SCNC: 8 MMOL/L — SIGNIFICANT CHANGE UP (ref 5–17)
APTT BLD: 32.6 SEC — SIGNIFICANT CHANGE UP (ref 27.5–35.5)
AST SERPL-CCNC: 18 U/L — SIGNIFICANT CHANGE UP (ref 10–40)
BASOPHILS # BLD AUTO: 0.05 K/UL — SIGNIFICANT CHANGE UP (ref 0–0.2)
BASOPHILS NFR BLD AUTO: 0.3 % — SIGNIFICANT CHANGE UP (ref 0–2)
BILIRUB SERPL-MCNC: 0.2 MG/DL — SIGNIFICANT CHANGE UP (ref 0.2–1.2)
BUN SERPL-MCNC: 40 MG/DL — HIGH (ref 7–18)
BUN SERPL-MCNC: 44 MG/DL — HIGH (ref 7–18)
CALCIUM SERPL-MCNC: 8.7 MG/DL — SIGNIFICANT CHANGE UP (ref 8.4–10.5)
CALCIUM SERPL-MCNC: 8.8 MG/DL — SIGNIFICANT CHANGE UP (ref 8.4–10.5)
CHLORIDE SERPL-SCNC: 101 MMOL/L — SIGNIFICANT CHANGE UP (ref 96–108)
CHLORIDE SERPL-SCNC: 103 MMOL/L — SIGNIFICANT CHANGE UP (ref 96–108)
CO2 SERPL-SCNC: 19 MMOL/L — LOW (ref 22–31)
CO2 SERPL-SCNC: 23 MMOL/L — SIGNIFICANT CHANGE UP (ref 22–31)
CREAT SERPL-MCNC: 2.77 MG/DL — HIGH (ref 0.5–1.3)
CREAT SERPL-MCNC: 2.95 MG/DL — HIGH (ref 0.5–1.3)
DIGOXIN SERPL-MCNC: 0.9 NG/ML — SIGNIFICANT CHANGE UP (ref 0.8–2)
EGFR: 19 ML/MIN/1.73M2 — LOW
EGFR: 21 ML/MIN/1.73M2 — LOW
EOSINOPHIL # BLD AUTO: 0.03 K/UL — SIGNIFICANT CHANGE UP (ref 0–0.5)
EOSINOPHIL NFR BLD AUTO: 0.2 % — SIGNIFICANT CHANGE UP (ref 0–6)
GLUCOSE SERPL-MCNC: 169 MG/DL — HIGH (ref 70–99)
GLUCOSE SERPL-MCNC: 229 MG/DL — HIGH (ref 70–99)
HCT VFR BLD CALC: 35.6 % — LOW (ref 39–50)
HCT VFR BLD CALC: 37.4 % — LOW (ref 39–50)
HGB BLD-MCNC: 11.5 G/DL — LOW (ref 13–17)
HGB BLD-MCNC: 11.9 G/DL — LOW (ref 13–17)
IMM GRANULOCYTES NFR BLD AUTO: 0.8 % — SIGNIFICANT CHANGE UP (ref 0–1.5)
INR BLD: 1.27 RATIO — HIGH (ref 0.88–1.16)
LACTATE SERPL-SCNC: 1.9 MMOL/L — SIGNIFICANT CHANGE UP (ref 0.7–2)
LYMPHOCYTES # BLD AUTO: 1.69 K/UL — SIGNIFICANT CHANGE UP (ref 1–3.3)
LYMPHOCYTES # BLD AUTO: 9.9 % — LOW (ref 13–44)
MAGNESIUM SERPL-MCNC: 2 MG/DL — SIGNIFICANT CHANGE UP (ref 1.6–2.6)
MAGNESIUM SERPL-MCNC: 2.1 MG/DL — SIGNIFICANT CHANGE UP (ref 1.6–2.6)
MCHC RBC-ENTMCNC: 28.4 PG — SIGNIFICANT CHANGE UP (ref 27–34)
MCHC RBC-ENTMCNC: 28.5 PG — SIGNIFICANT CHANGE UP (ref 27–34)
MCHC RBC-ENTMCNC: 31.8 GM/DL — LOW (ref 32–36)
MCHC RBC-ENTMCNC: 32.3 GM/DL — SIGNIFICANT CHANGE UP (ref 32–36)
MCV RBC AUTO: 87.9 FL — SIGNIFICANT CHANGE UP (ref 80–100)
MCV RBC AUTO: 89.7 FL — SIGNIFICANT CHANGE UP (ref 80–100)
MONOCYTES # BLD AUTO: 0.64 K/UL — SIGNIFICANT CHANGE UP (ref 0–0.9)
MONOCYTES NFR BLD AUTO: 3.7 % — SIGNIFICANT CHANGE UP (ref 2–14)
NEUTROPHILS # BLD AUTO: 14.56 K/UL — HIGH (ref 1.8–7.4)
NEUTROPHILS NFR BLD AUTO: 85.1 % — HIGH (ref 43–77)
NRBC # BLD: 0 /100 WBCS — SIGNIFICANT CHANGE UP (ref 0–0)
NRBC # BLD: 0 /100 WBCS — SIGNIFICANT CHANGE UP (ref 0–0)
PHOSPHATE SERPL-MCNC: 2.4 MG/DL — LOW (ref 2.5–4.5)
PLATELET # BLD AUTO: 202 K/UL — SIGNIFICANT CHANGE UP (ref 150–400)
PLATELET # BLD AUTO: 247 K/UL — SIGNIFICANT CHANGE UP (ref 150–400)
POTASSIUM SERPL-MCNC: 5.1 MMOL/L — SIGNIFICANT CHANGE UP (ref 3.5–5.3)
POTASSIUM SERPL-MCNC: 5.9 MMOL/L — HIGH (ref 3.5–5.3)
POTASSIUM SERPL-SCNC: 5.1 MMOL/L — SIGNIFICANT CHANGE UP (ref 3.5–5.3)
POTASSIUM SERPL-SCNC: 5.9 MMOL/L — HIGH (ref 3.5–5.3)
PROT SERPL-MCNC: 6.5 G/DL — SIGNIFICANT CHANGE UP (ref 6–8.3)
PROTHROM AB SERPL-ACNC: 15.1 SEC — HIGH (ref 10.5–13.4)
RBC # BLD: 4.05 M/UL — LOW (ref 4.2–5.8)
RBC # BLD: 4.17 M/UL — LOW (ref 4.2–5.8)
RBC # FLD: 12.2 % — SIGNIFICANT CHANGE UP (ref 10.3–14.5)
RBC # FLD: 12.4 % — SIGNIFICANT CHANGE UP (ref 10.3–14.5)
SARS-COV-2 RNA SPEC QL NAA+PROBE: SIGNIFICANT CHANGE UP
SODIUM SERPL-SCNC: 129 MMOL/L — LOW (ref 135–145)
SODIUM SERPL-SCNC: 130 MMOL/L — LOW (ref 135–145)
TROPONIN I, HIGH SENSITIVITY RESULT: 46.3 NG/L — SIGNIFICANT CHANGE UP
WBC # BLD: 17.11 K/UL — HIGH (ref 3.8–10.5)
WBC # BLD: 22.56 K/UL — HIGH (ref 3.8–10.5)
WBC # FLD AUTO: 17.11 K/UL — HIGH (ref 3.8–10.5)
WBC # FLD AUTO: 22.56 K/UL — HIGH (ref 3.8–10.5)

## 2022-04-12 PROCEDURE — 71045 X-RAY EXAM CHEST 1 VIEW: CPT | Mod: 26

## 2022-04-12 PROCEDURE — 99291 CRITICAL CARE FIRST HOUR: CPT

## 2022-04-12 PROCEDURE — 99292 CRITICAL CARE ADDL 30 MIN: CPT

## 2022-04-12 RX ORDER — GLUCAGON INJECTION, SOLUTION 0.5 MG/.1ML
0.05 INJECTION, SOLUTION SUBCUTANEOUS
Qty: 5 | Refills: 0 | Status: DISCONTINUED | OUTPATIENT
Start: 2022-04-12 | End: 2022-04-12

## 2022-04-12 RX ORDER — ALBUTEROL 90 UG/1
15 AEROSOL, METERED ORAL ONCE
Refills: 0 | Status: COMPLETED | OUTPATIENT
Start: 2022-04-12 | End: 2022-04-12

## 2022-04-12 RX ORDER — HYDROCORTISONE 20 MG
200 TABLET ORAL ONCE
Refills: 0 | Status: COMPLETED | OUTPATIENT
Start: 2022-04-12 | End: 2022-04-12

## 2022-04-12 RX ORDER — OVINE DIGOXIN IMMUNE FAB 40 MG/1
160 INJECTION, POWDER, FOR SOLUTION INTRAVENOUS ONCE
Refills: 0 | Status: DISCONTINUED | OUTPATIENT
Start: 2022-04-12 | End: 2022-04-13

## 2022-04-12 RX ORDER — METOCLOPRAMIDE HCL 10 MG
10 TABLET ORAL ONCE
Refills: 0 | Status: COMPLETED | OUTPATIENT
Start: 2022-04-12 | End: 2022-04-12

## 2022-04-12 RX ORDER — SODIUM CHLORIDE 9 MG/ML
1000 INJECTION INTRAMUSCULAR; INTRAVENOUS; SUBCUTANEOUS ONCE
Refills: 0 | Status: COMPLETED | OUTPATIENT
Start: 2022-04-12 | End: 2022-04-12

## 2022-04-12 RX ORDER — ATROPINE SULFATE 0.1 MG/ML
0.5 SYRINGE (ML) INJECTION ONCE
Refills: 0 | Status: COMPLETED | OUTPATIENT
Start: 2022-04-12 | End: 2022-04-12

## 2022-04-12 RX ORDER — CEFEPIME 1 G/1
1000 INJECTION, POWDER, FOR SOLUTION INTRAMUSCULAR; INTRAVENOUS DAILY
Refills: 0 | Status: DISCONTINUED | OUTPATIENT
Start: 2022-04-12 | End: 2022-04-13

## 2022-04-12 RX ORDER — PIPERACILLIN AND TAZOBACTAM 4; .5 G/20ML; G/20ML
3.38 INJECTION, POWDER, LYOPHILIZED, FOR SOLUTION INTRAVENOUS ONCE
Refills: 0 | Status: COMPLETED | OUTPATIENT
Start: 2022-04-12 | End: 2022-04-12

## 2022-04-12 RX ORDER — CALCIUM GLUCONATE 100 MG/ML
2 VIAL (ML) INTRAVENOUS ONCE
Refills: 0 | Status: COMPLETED | OUTPATIENT
Start: 2022-04-12 | End: 2022-04-12

## 2022-04-12 RX ORDER — GLUCAGON INJECTION, SOLUTION 0.5 MG/.1ML
0.05 INJECTION, SOLUTION SUBCUTANEOUS
Qty: 20 | Refills: 0 | Status: DISCONTINUED | OUTPATIENT
Start: 2022-04-12 | End: 2022-04-13

## 2022-04-12 RX ORDER — INSULIN HUMAN 100 [IU]/ML
5 INJECTION, SOLUTION SUBCUTANEOUS ONCE
Refills: 0 | Status: COMPLETED | OUTPATIENT
Start: 2022-04-12 | End: 2022-04-12

## 2022-04-12 RX ORDER — OVINE DIGOXIN IMMUNE FAB 40 MG/1
80 INJECTION, POWDER, FOR SOLUTION INTRAVENOUS ONCE
Refills: 0 | Status: DISCONTINUED | OUTPATIENT
Start: 2022-04-12 | End: 2022-04-12

## 2022-04-12 RX ORDER — DEXTROSE 50 % IN WATER 50 %
50 SYRINGE (ML) INTRAVENOUS ONCE
Refills: 0 | Status: COMPLETED | OUTPATIENT
Start: 2022-04-12 | End: 2022-04-12

## 2022-04-12 RX ORDER — EPINEPHRINE 0.3 MG/.3ML
0.01 INJECTION INTRAMUSCULAR; SUBCUTANEOUS
Qty: 4 | Refills: 0 | Status: DISCONTINUED | OUTPATIENT
Start: 2022-04-12 | End: 2022-04-12

## 2022-04-12 RX ORDER — ONDANSETRON 8 MG/1
4 TABLET, FILM COATED ORAL ONCE
Refills: 0 | Status: COMPLETED | OUTPATIENT
Start: 2022-04-12 | End: 2022-04-12

## 2022-04-12 RX ORDER — GLUCAGON INJECTION, SOLUTION 0.5 MG/.1ML
5 INJECTION, SOLUTION SUBCUTANEOUS ONCE
Refills: 0 | Status: COMPLETED | OUTPATIENT
Start: 2022-04-12 | End: 2022-04-12

## 2022-04-12 RX ORDER — CHLORHEXIDINE GLUCONATE 213 G/1000ML
1 SOLUTION TOPICAL
Refills: 0 | Status: DISCONTINUED | OUTPATIENT
Start: 2022-04-12 | End: 2022-04-19

## 2022-04-12 RX ADMIN — Medication 50 MILLILITER(S): at 19:05

## 2022-04-12 RX ADMIN — INSULIN HUMAN 5 UNIT(S): 100 INJECTION, SOLUTION SUBCUTANEOUS at 19:06

## 2022-04-12 RX ADMIN — CHLORHEXIDINE GLUCONATE 1 APPLICATION(S): 213 SOLUTION TOPICAL at 23:15

## 2022-04-12 RX ADMIN — GLUCAGON INJECTION, SOLUTION 39.4 MG/KG/HR: 0.5 INJECTION, SOLUTION SUBCUTANEOUS at 22:16

## 2022-04-12 RX ADMIN — ONDANSETRON 4 MILLIGRAM(S): 8 TABLET, FILM COATED ORAL at 22:00

## 2022-04-12 RX ADMIN — SODIUM CHLORIDE 3000 MILLILITER(S): 9 INJECTION INTRAMUSCULAR; INTRAVENOUS; SUBCUTANEOUS at 18:56

## 2022-04-12 RX ADMIN — Medication 10 MILLIGRAM(S): at 23:36

## 2022-04-12 RX ADMIN — Medication 0.5 MILLIGRAM(S): at 18:24

## 2022-04-12 RX ADMIN — EPINEPHRINE 2.95 MICROGRAM(S)/KG/MIN: 0.3 INJECTION INTRAMUSCULAR; SUBCUTANEOUS at 20:00

## 2022-04-12 RX ADMIN — CEFEPIME 100 MILLIGRAM(S): 1 INJECTION, POWDER, FOR SOLUTION INTRAMUSCULAR; INTRAVENOUS at 23:15

## 2022-04-12 RX ADMIN — ALBUTEROL 2.5 MILLIGRAM(S): 90 AEROSOL, METERED ORAL at 19:05

## 2022-04-12 RX ADMIN — GLUCAGON INJECTION, SOLUTION 5 MILLIGRAM(S): 0.5 INJECTION, SOLUTION SUBCUTANEOUS at 19:28

## 2022-04-12 RX ADMIN — GLUCAGON INJECTION, SOLUTION 39.4 MG/KG/HR: 0.5 INJECTION, SOLUTION SUBCUTANEOUS at 23:36

## 2022-04-12 RX ADMIN — PIPERACILLIN AND TAZOBACTAM 200 GRAM(S): 4; .5 INJECTION, POWDER, LYOPHILIZED, FOR SOLUTION INTRAVENOUS at 19:28

## 2022-04-12 RX ADMIN — Medication 200 MILLIGRAM(S): at 19:32

## 2022-04-12 RX ADMIN — Medication 200 GRAM(S): at 20:20

## 2022-04-12 RX ADMIN — GLUCAGON INJECTION, SOLUTION 39.4 MG/KG/HR: 0.5 INJECTION, SOLUTION SUBCUTANEOUS at 20:54

## 2022-04-12 NOTE — H&P ADULT - HISTORY OF PRESENT ILLNESS
Patient is a 89y old  Male who presents with a chief complaint of     HPI:   Patient is a 89 year old male with PMHx of Atrial Fibrillation on Eliquis, HLD, CKD, BPH with chronic Salmon, recent pseudomonas bacteruria, who presented with one day of general malaise with noted hypotension and bradycardia at home. Normal -110, noted it was 90s to low 100s, with low HR. History assisted by patient's daughter, Della at bedside. patient has been at his usual state of health at home. ER visit 6 days ago for Salmon exchange, noted to have pseudomonas in urine culture, was dced on cipro. At time of evaluation, patient awake and alert, feels improved compared to earlier. No chest pain, palpitations, SOb. No fever, chills. No hematuria.    In the ED, suspected bradycardia from polypharmacy. Dig levels were 0.9, received digoxin binding ab. Patient was given glucagon with reported improvement in hemodynamics. Cardiology was consulted recommended ICU for close monitoring    ICU Vital Signs Last 24 Hrs  T(C): 36.8 (12 Apr 2022 20:23), Max: 36.8 (12 Apr 2022 20:23)  T(F): 98.2 (12 Apr 2022 20:23), Max: 98.2 (12 Apr 2022 20:23)  HR: 74 (12 Apr 2022 20:32) (34 - 74)  BP: 132/57 (12 Apr 2022 20:32) (78/43 - 132/57)  BP(mean): --  ABP: --  ABP(mean): --  RR: 18 (12 Apr 2022 20:32) (18 - 18)  SpO2: 97% (12 Apr 2022 20:32) (97% - 98%)    I&O's Summary        LABS:                        11.5   17.11 )-----------( 247      ( 12 Apr 2022 18:13 )             35.6     04-12    129<L>  |  101  |  40<H>  ----------------------------<  169<H>  5.9<H>   |  23  |  2.77<H>    Ca    8.8      12 Apr 2022 18:13  Mg     2.1     04-12    TPro  6.5  /  Alb  3.1<L>  /  TBili  0.2  /  DBili  x   /  AST  18  /  ALT  28  /  AlkPhos  69  04-12    PT/INR - ( 12 Apr 2022 18:13 )   PT: 15.1 sec;   INR: 1.27 ratio         PTT - ( 12 Apr 2022 18:13 )  PTT:32.6 sec    CAPILLARY BLOOD GLUCOSE            RADIOLOGY & ADDITIONAL TESTS:    Consultant(s) Notes Reviewed:  [x ] YES  [ ] NO    MEDICATIONS  (STANDING):  digoxin immune MELY [DigiFab] Infusion 160 milliGRAM(s) IV Infusion once  glucagon Infusion 0.05 mG/kG/Hr (39.4 mL/Hr) IV Continuous <Continuous>    MEDICATIONS  (PRN):      PHYSICAL EXAM:  GENERAL: well developed, elderly, awake and alert  HEAD:  Atraumatic, Normocephalic, left nose abrasion  EYES: EOMI, PERRLA, conjunctiva and sclera clear  ENT: No tonsillar erythema, exudates, or enlargement; Moist mucous membranes, Good dentition, No lesions  NECK: Supple, No JVD, Normal thyroid, no enlarged nodes  NERVOUS SYSTEM:  Alert & Oriented X3, no focal deficits  CHEST/LUNG: B/L good air entry; No rales, rhonchi, or wheezing  HEART: bradycardic  ABDOMEN: Soft, Nontender, Nondistended; Bowel sounds present  EXTREMITIES:  2+ Peripheral Pulses, No clubbing, cyanosis, or edema  LYMPH: No lymphadenopathy noted  : chronic salmon draining clear yellow urine  SKIN: left nasal abrasion    Care Discussed with Consultants/Other Providers [ x] YES  [ ] NO

## 2022-04-12 NOTE — ED PROVIDER NOTE - PROGRESS NOTE DETAILS
Discussed with , reviewed EKG and hypertension. Newly UTI. Recently antibiotics. Hypertension is not related to infection, but is related to his underlining condition to his bradycardia. Discussed with pharmacy and they are making it up. Discussed with pharmacy. Reviewing the dosing of the medication. Will determine if it will be given. No sign of infection. Concern symptoms may be related to hyperkalemia. Will give patient insulin. Heart rate: 40 bpm  Improved blood pressure.  For future evaluation appears that the glucagon has been the cause. Patient has high WBC count. Discussed with pharmacy. Reviewing the dosing of the medication. Will determine if it will be given. dw dr ceron, agrees w plan to icu here.

## 2022-04-12 NOTE — ED PROVIDER NOTE - CARE PLAN
Principal Discharge DX:	Symptomatic bradycardia  Secondary Diagnosis:	Hyperkalemia  Secondary Diagnosis:	Hypotension   1 Principal Discharge DX:	Symptomatic bradycardia  Assessment and plan of treatment:	BRASH syndrome  Secondary Diagnosis:	Hyperkalemia  Secondary Diagnosis:	Hypotension

## 2022-04-12 NOTE — H&P ADULT - ASSESSMENT
Patient is a 89 year old male with PMHx of Atrial Fibrillation on Eliquis, HLD, CKD, BPH with chronic Salmon, recent pseudomonas bacteruria, who presented with malaise, found with hypotension and bradycardia. Suspected to be due to medication toxicity from digoxin/metoprolol. Admitted to the ICU for glucagon infusion for BB toxicity and close monitoring.    Assessment:  # Bradycardia  # betablocker toxicity  # atrial fibrillation  # acute on chronic kidney injury  # Pseudomonas UTI  # BPH with chronic salmon      Plan:  Neuro:  # no acute issues    CV:  # bradycardia 2/2 betablocker toxicity vs digoxin  - reportedly improved with glucagon push, started with glucagon gtt  - Cardio, Dr. ceron, continue to monitor  - bedside pacemaker, hold epinephrine gtt  - digoxin levels 0.9, s/p dig binding ab    #Atrial Fibrillation  - junctional rhythm on ECG  - hold eliquis in case of need for ppm, follow up cardio  - hold betablocker given bradycardia    Pulm:  # No acute issues    ID:  # Pseudomonas UTI  - as demonstrated on UCx 1 week ago  - reportedly completed course of cipro, will start cefepime    Nephro:  # Acute on chronic kidney injury  - possibly in the setting of UTI, prerenal from hypotension  - keep normotensive, repeat BMP  - urinelytes    #BPH with chronic salmon  - no acute issues, treatment for pseudomonas UTI with cefepime.    GI:  Keep NPO    Heme:  - no indication for transfusion     Endo:  # No acute issues    FEN:  - minimize IVF to avoid worsening breathing- CHF    Prophy:  hold eliquis, start scds    Dispo:  - monitor in the ICU

## 2022-04-12 NOTE — CONSULT NOTE ADULT - SUBJECTIVE AND OBJECTIVE BOX
MEDICAL TOXICOLOGY CONSULT  HPI:   Patient is a 89 year old male with PMHx of Atrial Fibrillation on Eliquis, HLD, CKD, BPH with chronic Chinchilla, recent pseudomonas bacteruria, who presented with one day of general malaise and dizziness with exertion, noted to be hypotensive and bradycardic at home. In the ED Hr was 34 and bp 78/43. P was started on digoxin 125 mcg every other day. Pt also noted to be on metoprolol.     While in the ED, patient was advised to received 4 vials of Digifab, which digoxin level shortly after revealing therapeutic level of 0.9. Patient then received glucagon with improvement in hemodynamics.     PAST MEDICAL & SURGICAL HISTORY:  Hypertension    Atrial fibrillation    BPH (benign prostatic hyperplasia)    Chronic kidney disease, unspecified CKD stage    Hyperlipidemia    No significant past surgical history    MEDICATION HISTORY:  apixaban 2.5 milliGRAM(s) Oral two times a day  chlorhexidine 2% Cloths 1 Application(s) Topical <User Schedule>  glucagon Infusion 0.0125 mG/kG/Hr IV Continuous <Continuous>      LABS:                        11.5   17.11 )-----------( 247      ( 2022 18:13 )             35.6     04-12    129<L>  |  101  |  40<H>  ----------------------------<  169<H>  5.9<H>   |  23  |  2.77<H>    Ca    8.8      2022 18:13  Mg     2.1     04-12    TPro  6.5  /  Alb  3.1<L>  /  TBili  0.2  /  DBili  x   /  AST  18  /  ALT  28  /  AlkPhos  69  04-12    PT/INR - ( 2022 18:13 )   PT: 15.1 sec;   INR: 1.27 ratio         PTT - ( 2022 18:13 )  PTT:32.6 sec    CAPILLARY BLOOD GLUCOSE    Digoxin: 0.9    PHYSICAL EXAM  Vital Signs Last 24 Hrs  T(C):  36.8 (2022 20:23)  T(F): 98.2 (2022 20:23)  HR: 34 (2022 17:54)   BP: 78/43 (2022 17:54)   RR: 18 (2022 17:54)   SpO2: 98% (2022 17:54)   SIGNIFICANT LABORATORY STUDIES:                        11.6   20.22 )-----------( 198      ( 2022 03:36 )             34.8       04-13    132<L>  |  103  |  46<H>  ----------------------------<  103<H>  4.4   |  20<L>  |  2.77<H>    Ca    9.0      2022 03:36  Phos  1.9     04-13  Mg     2.0     04-13    TPro  6.5  /  Alb  3.1<L>  /  TBili  0.2  /  DBili  x   /  AST  18  /  ALT  28  /  AlkPhos  69  04-12      PT/INR - ( 2022 18:13 )   PT: 15.1 sec;   INR: 1.27 ratio         PTT - ( 2022 18:13 )  PTT:32.6 sec    Anion Gap: 8 04-12 @ 23:11  CK: --  @ 23:11  Troponin:  --   @ 23:11  Pro-BNP:  --   @ 23:11  VBG:  --   @ 23:11  Carboxyhemoglobin %:  --   @ 23:11  Methemoglobin %:  --   @ 23:11  Osmolality Serum:  --   @ 23:11  Aspirin Level: --   @ 23:11  Acetaminophen Level:  --   @ 23:11  Ethanol Level:  --   @ 23:11  Digoxin Level:  --   @ 23:11  Phenytoin Level:  --   @ 23:11  Carbamazepine level:  --   @ 23:11  Lamotrigine level:  --   @ 23:11  Anion Gap: 5 - @ 18:13  CK: --  @ 18:13  Troponin:  --   @ 18:13  Pro-BNP:  --   @ 18:13  VBG:  --   @ 18:13  Carboxyhemoglobin %:  --   @ 18:13  Methemoglobin %:  --   @ 18:13  Osmolality Serum:  --   @ 18:13  Aspirin Level: --   @ 18:13  Acetaminophen Level:  --   @ 18:13  Ethanol Level:  --   @ 18:13  Digoxin Level:  0.9   @ 18:13  Phenytoin Level:  --   @ 18:13  Carbamazepine level:  --   @ 18:13  Lamotrigine level:  --   @ 18:13    EC rate, idioventricular rhythm, -- OK, 132QRS, 399QTc

## 2022-04-12 NOTE — CONSULT NOTE ADULT - ATTENDING COMMENTS
MD Hunter phone consultation:  patient encounter discussed at-length with the fellow, and I agree with the impression & plan.  89 yo M, c/o hypotension/bradycardia in context of recent initiation of digoxin therapy for afib.    Given degree of hypotension and bradycardia, as well as temporal proximity of symptom-onset to starting digoxin, empiric treatment with 5v DigFAB is indicated at this time.  If no response to DigFAB, would also recommend empiric dosing with 5mg glucagon for possible beta blocker toxicity.    Finally, if no response to glucagon, could also consider high dose insulin-euglycemic therapy (1u/kg/kr; max 16u/kg/hr).

## 2022-04-12 NOTE — ED PROVIDER NOTE - OBJECTIVE STATEMENT
Patient is a 89 year old male recently in the hospital for UTI positive for Pseudomonas with PHMx of atrial fibrillation, BPH, Chronic kidney disease, hyperlipidemia, and hypertension  presents to the ED with complaints of dizziness. Patient states he felt fatigue today. Patient denies any chest discomfort, no cough, and any other symptoms. NKDA.

## 2022-04-12 NOTE — CONSULT NOTE ADULT - TIME BILLING
Overdose on xenobiotic(s) necessitating emergency department intervention, laboratory & ECG evaluation, as well as and disposition recommendations by Med Tox service.

## 2022-04-12 NOTE — H&P ADULT - ATTENDING COMMENTS
Patient is a 89 year old male with PMHx of Atrial Fibrillation on Eliquis, HLD, CKD, BPH with chronic Chinchilla, recent pseudomonas bacteruria, who presented with malaise, found with hypotension and bradycardia. Suspected to be due to medication toxicity from digoxin/metoprolol. Admitted to the ICU symptomatic bradycardia presumably 2/2 Bblocker vs Dig overdose vs Primary underlying cardiac conduction defect. Co existing sepsis 2/2 UTI   Treat for Dig overdose - Received DigFab   BBlocker - Glucagon, Calcium, Insulin + D50 infusions if persistent bradycardia/hypotension.    Consideration for cardiac pacing if unstable or above measures unsuccessful   Vasopressors - Epi for bradycardia/hypotension if refractory to above measures    Abx and FU Cultures   Add on stress dose steroids   Monitor electrolytes including hyperkalemia and treat as needed   Poor prognosis

## 2022-04-12 NOTE — ED PROVIDER NOTE - CLINICAL SUMMARY MEDICAL DECISION MAKING FREE TEXT BOX
initial suspicion for dig overdose causing hypotension and bradycardia, chronic dig use. digibind administered emperically w discussion w tox. found to have hyperkalemia and theraputic dig level. hyperk treatment and dig reversal did not improve, so glugon was rec. we gave this w improvement hr 30 -> 45 and bp normalization. given cause, hesitant to place temp pacemaker. dw card. admit to icu.
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
## 2022-04-12 NOTE — CONSULT NOTE ADULT - ASSESSMENT
89 yo M presents to the ED with hypotension and bradycardia in the setting of new medication - digoxin     Recommendation  - sp Digibind 4 vials with digoxin level of 0.9; patient's symptoms less consistent with digoxin toxicity.   - Improvement in hemodynamics sp glucagon. This is more consistent with Beta blocker toxicity 2/2 to decreased renal clearance in the setting of JAMES.   - Consider glucagon and or epinephrine infusion in the ICU setting.   - High dose insulin therapy not recommended at this time as patient is unlikely to tolerate a significant fluid load.   - discussed with attending.    Thank you for this consult  Toxicology consults: [phone]

## 2022-04-12 NOTE — PATIENT PROFILE ADULT - FALL HARM RISK - HARM RISK INTERVENTIONS
Assistance with ambulation/Assistance OOB with selected safe patient handling equipment/Communicate Risk of Fall with Harm to all staff/Discuss with provider need for PT consult/Monitor gait and stability/Provide patient with walking aids - walker, cane, crutches/Reinforce activity limits and safety measures with patient and family/Sit up slowly, dangle for a short time, stand at bedside before walking/Tailored Fall Risk Interventions/Visual Cue: Yellow wristband and red socks/Bed in lowest position, wheels locked, appropriate side rails in place/Call bell, personal items and telephone in reach/Instruct patient to call for assistance before getting out of bed or chair/Non-slip footwear when patient is out of bed/[place] to call system/Physically safe environment - no spills, clutter or unnecessary equipment/Purposeful Proactive Rounding/Room/bathroom lighting operational, light cord in reach

## 2022-04-13 DIAGNOSIS — R00.1 BRADYCARDIA, UNSPECIFIED: ICD-10-CM

## 2022-04-13 LAB
ANION GAP SERPL CALC-SCNC: 9 MMOL/L — SIGNIFICANT CHANGE UP (ref 5–17)
BUN SERPL-MCNC: 46 MG/DL — HIGH (ref 7–18)
CALCIUM SERPL-MCNC: 9 MG/DL — SIGNIFICANT CHANGE UP (ref 8.4–10.5)
CHLORIDE SERPL-SCNC: 103 MMOL/L — SIGNIFICANT CHANGE UP (ref 96–108)
CO2 SERPL-SCNC: 20 MMOL/L — LOW (ref 22–31)
CREAT SERPL-MCNC: 2.77 MG/DL — HIGH (ref 0.5–1.3)
EGFR: 21 ML/MIN/1.73M2 — LOW
GLUCOSE SERPL-MCNC: 103 MG/DL — HIGH (ref 70–99)
HCT VFR BLD CALC: 34.8 % — LOW (ref 39–50)
HGB BLD-MCNC: 11.6 G/DL — LOW (ref 13–17)
MAGNESIUM SERPL-MCNC: 2 MG/DL — SIGNIFICANT CHANGE UP (ref 1.6–2.6)
MCHC RBC-ENTMCNC: 28.7 PG — SIGNIFICANT CHANGE UP (ref 27–34)
MCHC RBC-ENTMCNC: 33.3 GM/DL — SIGNIFICANT CHANGE UP (ref 32–36)
MCV RBC AUTO: 86.1 FL — SIGNIFICANT CHANGE UP (ref 80–100)
MRSA PCR RESULT.: SIGNIFICANT CHANGE UP
NRBC # BLD: 0 /100 WBCS — SIGNIFICANT CHANGE UP (ref 0–0)
PHOSPHATE SERPL-MCNC: 1.9 MG/DL — LOW (ref 2.5–4.5)
PLATELET # BLD AUTO: 198 K/UL — SIGNIFICANT CHANGE UP (ref 150–400)
POTASSIUM SERPL-MCNC: 4.4 MMOL/L — SIGNIFICANT CHANGE UP (ref 3.5–5.3)
POTASSIUM SERPL-SCNC: 4.4 MMOL/L — SIGNIFICANT CHANGE UP (ref 3.5–5.3)
RBC # BLD: 4.04 M/UL — LOW (ref 4.2–5.8)
RBC # FLD: 12.1 % — SIGNIFICANT CHANGE UP (ref 10.3–14.5)
S AUREUS DNA NOSE QL NAA+PROBE: SIGNIFICANT CHANGE UP
SODIUM SERPL-SCNC: 132 MMOL/L — LOW (ref 135–145)
WBC # BLD: 20.22 K/UL — HIGH (ref 3.8–10.5)
WBC # FLD AUTO: 20.22 K/UL — HIGH (ref 3.8–10.5)

## 2022-04-13 PROCEDURE — 99233 SBSQ HOSP IP/OBS HIGH 50: CPT | Mod: 57

## 2022-04-13 RX ORDER — POLYETHYLENE GLYCOL 3350 17 G/17G
17 POWDER, FOR SOLUTION ORAL AT BEDTIME
Refills: 0 | Status: DISCONTINUED | OUTPATIENT
Start: 2022-04-13 | End: 2022-04-19

## 2022-04-13 RX ORDER — GLUCAGON INJECTION, SOLUTION 0.5 MG/.1ML
0.03 INJECTION, SOLUTION SUBCUTANEOUS
Qty: 20 | Refills: 0 | Status: DISCONTINUED | OUTPATIENT
Start: 2022-04-13 | End: 2022-04-13

## 2022-04-13 RX ORDER — APIXABAN 2.5 MG/1
2.5 TABLET, FILM COATED ORAL
Refills: 0 | Status: DISCONTINUED | OUTPATIENT
Start: 2022-04-13 | End: 2022-04-18

## 2022-04-13 RX ORDER — GLUCAGON INJECTION, SOLUTION 0.5 MG/.1ML
0.01 INJECTION, SOLUTION SUBCUTANEOUS
Qty: 20 | Refills: 0 | Status: DISCONTINUED | OUTPATIENT
Start: 2022-04-13 | End: 2022-04-14

## 2022-04-13 RX ADMIN — POLYETHYLENE GLYCOL 3350 17 GRAM(S): 17 POWDER, FOR SOLUTION ORAL at 22:15

## 2022-04-13 RX ADMIN — APIXABAN 2.5 MILLIGRAM(S): 2.5 TABLET, FILM COATED ORAL at 17:09

## 2022-04-13 RX ADMIN — CHLORHEXIDINE GLUCONATE 1 APPLICATION(S): 213 SOLUTION TOPICAL at 05:45

## 2022-04-13 RX ADMIN — GLUCAGON INJECTION, SOLUTION 9.84 MG/KG/HR: 0.5 INJECTION, SOLUTION SUBCUTANEOUS at 13:26

## 2022-04-13 RX ADMIN — GLUCAGON INJECTION, SOLUTION 19.7 MG/KG/HR: 0.5 INJECTION, SOLUTION SUBCUTANEOUS at 05:47

## 2022-04-13 NOTE — PROGRESS NOTE ADULT - ATTENDING COMMENTS
Patient is a 89 year old male withf Atrial Fibrillation on Eliquis, HLD, CKD, BPH with chronic Salmon, recent pseudomonas bacteruria, who presented with malaise, found with hypotension and bradycardia probable  due to medication toxicity from digoxin/metoprolol. Admitted to the ICU for glucagon infusion for BB toxicity and close monitoring.    Assessment:  -  Bradycardia  -  Betablocker toxicity  -  Atrial fibrillation  -  Acute on chronic kidney injury  -  Pseudomonas UTI  -  BPH with chronic salmon' cath       Plan   - O2 supp as needed   - Hemodynamic monitoring  - Glucogan iv   - Monitor heart rate   - Beta- and dig held  - No antibx for now   - Diet   - Salmon's cath   - S/P uti tx 1 week ago

## 2022-04-13 NOTE — CONSULT NOTE ADULT - SUBJECTIVE AND OBJECTIVE BOX
CHIEF COMPLAINT:Patient is a 89y old  Male who presents with a chief complaint of Bradycardia .      HPI:  Patient is a 89y old  Male who presents with a chief complaint of hypotension,weakness.    HPI:   Patient is a 89 year old male with PMHx of Atrial Fibrillation on Eliquis, HLD, CKD, BPH with chronic Chinchilla, recent pseudomonas bacteruria, who presented with one day of general malaise with noted hypotension and bradycardia at home. Normal -110, noted it was 90s to low 100s, with low HR. History assisted by patient's daughter, Della at bedside. patient has been at his usual state of health at home. ER visit 6 days ago for Chinchilla exchange, noted to have pseudomonas in urine culture, was dced on cipro. At time of evaluation, patient awake and alert, feels improved compared to earlier. No chest pain, palpitations, SOb. No fever, chills. No hematuria.    In the ED, suspected bradycardia from polypharmacy. Dig levels were 0.9, received digoxin binding ab. Patient was given glucagon with reported improvement in hemodynamics. Cardiology was consulted recommended ICU for close monitoring              PAST MEDICAL & SURGICAL HISTORY:  Hypertension    Atrial fibrillation    BPH (benign prostatic hyperplasia)    Chronic kidney disease, unspecified CKD stage    Hyperlipidemia          MEDICATIONS  (STANDING):  apixaban 2.5 milliGRAM(s) Oral two times a day  chlorhexidine 2% Cloths 1 Application(s) Topical <User Schedule>  digoxin immune MELY [DigiFab] Infusion 160 milliGRAM(s) IV Infusion once  glucagon Infusion 0.025 mG/kG/Hr (19.7 mL/Hr) IV Continuous <Continuous>    MEDICATIONS  (PRN):      FAMILY HISTORY:  No hx of CAD    SOCIAL HISTORY:    [x ] Non-smoker    [x ] Alcohol-denies    Allergies    No Known Allergies    Intolerances    	    REVIEW OF SYSTEMS:  CONSTITUTIONAL: No fever, weight loss, or fatigue  EYES: No eye pain, visual disturbances, or discharge  ENT:  No difficulty hearing, tinnitus, vertigo; No sinus or throat pain  NECK: No pain or stiffness  RESPIRATORY: No cough, wheezing, chills or hemoptysis; No Shortness of Breath  CARDIOVASCULAR: No chest pain, palpitations, passing out, dizziness, or leg swelling  GASTROINTESTINAL: No abdominal or epigastric pain. No nausea, vomiting, or hematemesis; No diarrhea or constipation. No melena or hematochezia.  GENITOURINARY: No dysuria, frequency, hematuria, or incontinence  NEUROLOGICAL: No headaches, memory loss, loss of strength, numbness, or tremors  SKIN: No itching, burning, rashes, or lesions   LYMPH Nodes: No enlarged glands  ENDOCRINE: No heat or cold intolerance; No hair loss  MUSCULOSKELETAL: No joint pain or swelling; No muscle, back, or extremity pain  PSYCHIATRIC: No depression, anxiety, mood swings, or difficulty sleeping  HEME/LYMPH: No easy bruising, or bleeding gums  ALLERGY AND IMMUNOLOGIC: No hives or eczema	        PHYSICAL EXAM:  T(C): 36.7 (04-13-22 @ 07:00), Max: 36.8 (04-12-22 @ 20:23)  HR: 78 (04-13-22 @ 11:00) (34 - 88)  BP: 127/48 (04-13-22 @ 11:00) (78/43 - 151/60)  RR: 20 (04-13-22 @ 11:00) (13 - 22)  SpO2: 98% (04-13-22 @ 11:00) (96% - 98%)  Wt(kg): --  I&O's Summary    12 Apr 2022 07:01  -  13 Apr 2022 07:00  --------------------------------------------------------  IN: 315.2 mL / OUT: 225 mL / NET: 90.2 mL    13 Apr 2022 07:01  -  13 Apr 2022 12:07  --------------------------------------------------------  IN: 78.8 mL / OUT: 200 mL / NET: -121.2 mL        Appearance: Normal	  HEENT:   Normal oral mucosa, PERRL, EOMI	  Lymphatic: No lymphadenopathy  Cardiovascular: Normal S1 S2, No JVD, No murmurs, No edema  Respiratory: Lungs clear to auscultation	  Psychiatry: A & O x 3, Mood & affect appropriate  Gastrointestinal:  Soft, Non-tender, + BS	  Skin: No rashes, No ecchymoses, No cyanosis	  Neurologic: Non-focal  Extremities: Normal range of motion, No clubbing, cyanosis or edema  Vascular: Peripheral pulses palpable 2+ bilaterally    	    ECG:  	idioventricular rhythm at 34 bpm  	  	  LABS:	 	    Troponin I, High Sensitivity Result: 46.3 ng/L (04-12-22 @ 18:13)                          11.6   20.22 )-----------( 198      ( 13 Apr 2022 03:36 )             34.8     04-13    132<L>  |  103  |  46<H>  ----------------------------<  103<H>  4.4   |  20<L>  |  2.77<H>    Ca    9.0      13 Apr 2022 03:36  Phos  1.9     04-13  Mg     2.0     04-13    TPro  6.5  /  Alb  3.1<L>  /  TBili  0.2  /  DBili  x   /  AST  18  /  ALT  28  /  AlkPhos  69  04-12        Transthoracic Echocardiogram (03.14.22 @ 12:08) >  OBSERVATIONS:  Mitral Valve: Normal mitral valve. Trace mitral  regurgitation.  Aortic Root: Aortic Root: 3.7 cm. Ascending aorta not well  seen.  Aortic Valve: Aortic valve not well visualized; probably  trileaflet. No aortic stenosis. Mild aortic regurgitation.  Left Atrium: Mildly dilated left atrium.  LA volume index =  37 cc/m2.  Left Ventricle: Mildly decreased left ventricular systolic  function (LVEF42% by biplane). Paradoxical septal motion is  seen, consistent with conduction delay. Mild concentric  left ventricular hypertrophy. Unable to adequately assess  diastolic function due to presence of arrythmia.  Right Heart: Severe right atrial enlargement. Right  ventricular enlargement with grossly normal RV systolic  function (TAPSE 1.9). Tricuspid valve not well visualized,  probably normal. Mild-moderate tricuspid regurgitation.  Pulmonic valve not well seen.  Pericardium/PleuraSmall pericardial effusion. No  echocardiographic evidence of tamponade physiology.  Hemodynamic: RA Pressure is 8 mm Hg. RV systolic pressure  is mildly increased at  37 mm Hg. An atrial septal aneurysm  is noted.

## 2022-04-13 NOTE — CONSULT NOTE ADULT - NS_MD_PANP_GEN_ALL_CORE
The patient is a 59y Female complaining of vomiting. Attending and PA/NP shared services statement (NON-critical care):

## 2022-04-13 NOTE — PROGRESS NOTE ADULT - SUBJECTIVE AND OBJECTIVE BOX
INTERVAL HPI/OVERNIGHT EVENTS: ***    PRESSORS: [ ] YES [ ] NO  WHICH:    Antimicrobial:  cefepime   IVPB 1000 milliGRAM(s) IV Intermittent daily    Cardiovascular:    Pulmonary:    Hematalogic:  apixaban 2.5 milliGRAM(s) Oral two times a day    Other:  chlorhexidine 2% Cloths 1 Application(s) Topical <User Schedule>  digoxin immune MELY [DigiFab] Infusion 160 milliGRAM(s) IV Infusion once  glucagon Infusion 0.025 mG/kG/Hr IV Continuous <Continuous>    apixaban 2.5 milliGRAM(s) Oral two times a day  cefepime   IVPB 1000 milliGRAM(s) IV Intermittent daily  chlorhexidine 2% Cloths 1 Application(s) Topical <User Schedule>  digoxin immune MELY [DigiFab] Infusion 160 milliGRAM(s) IV Infusion once  glucagon Infusion 0.025 mG/kG/Hr IV Continuous <Continuous>    Drug Dosing Weight  Height (cm): 170.2 (12 Apr 2022 17:52)  Weight (kg): 78.7 (12 Apr 2022 17:52)  BMI (kg/m2): 27.2 (12 Apr 2022 17:52)  BSA (m2): 1.9 (12 Apr 2022 17:52)    CENTRAL LINE: [ ] YES [ ] NO  LOCATION:         ARROYO: [ ] YES [ ] NO          A-LINE:  [ ] YES [ ] NO  LOCATION:             ICU Vital Signs Last 24 Hrs  T(C): 36.5 (13 Apr 2022 04:00), Max: 36.8 (12 Apr 2022 20:23)  T(F): 97.7 (13 Apr 2022 04:00), Max: 98.2 (12 Apr 2022 20:23)  HR: 73 (13 Apr 2022 08:30) (34 - 88)  BP: 139/46 (13 Apr 2022 08:30) (78/43 - 151/60)  BP(mean): 69 (13 Apr 2022 08:30) (60 - 82)  ABP: --  ABP(mean): --  RR: 15 (13 Apr 2022 08:30) (13 - 22)  SpO2: 96% (13 Apr 2022 08:30) (96% - 98%)            04-12 @ 07:01  -  04-13 @ 07:00  --------------------------------------------------------  IN: 295.5 mL / OUT: 225 mL / NET: 70.5 mL              PHYSICAL EXAM:    GENERAL: NAD, well-groomed, well-developed  EYES: EOMI, PERRLA,   NECK: Supple, No JVD; Normal thyroid; Trachea midline  NERVOUS SYSTEM:  Alert & Oriented X3,  Motor Strength 5/5 B/L upper and lower extremities; DTRs 2+ intact and symmetric  CHEST/LUNG: No rales, rhonchi, wheezing   HEART: Regular rate and rhythm; No murmurs,   ABDOMEN: Soft, Nontender, Nondistended; Bowel sounds present  EXTREMITIES:  2+ Peripheral Pulses, No clubbing, cyanosis, or edema        LABS:  CBC Full  -  ( 13 Apr 2022 03:36 )  WBC Count : 20.22 K/uL  RBC Count : 4.04 M/uL  Hemoglobin : 11.6 g/dL  Hematocrit : 34.8 %  Platelet Count - Automated : 198 K/uL  Mean Cell Volume : 86.1 fl  Mean Cell Hemoglobin : 28.7 pg  Mean Cell Hemoglobin Concentration : 33.3 gm/dL  Auto Neutrophil # : x  Auto Lymphocyte # : x  Auto Monocyte # : x  Auto Eosinophil # : x  Auto Basophil # : x  Auto Neutrophil % : x  Auto Lymphocyte % : x  Auto Monocyte % : x  Auto Eosinophil % : x  Auto Basophil % : x    04-13    132<L>  |  103  |  46<H>  ----------------------------<  103<H>  4.4   |  20<L>  |  2.77<H>    Ca    9.0      13 Apr 2022 03:36  Phos  1.9     04-13  Mg     2.0     04-13    TPro  6.5  /  Alb  3.1<L>  /  TBili  0.2  /  DBili  x   /  AST  18  /  ALT  28  /  AlkPhos  69  04-12    PT/INR - ( 12 Apr 2022 18:13 )   PT: 15.1 sec;   INR: 1.27 ratio         PTT - ( 12 Apr 2022 18:13 )  PTT:32.6 sec        RADIOLOGY & ADDITIONAL STUDIES REVIEWED:  ***      CRITICAL CARE TIME SPENT: 35 minutes INTERVAL HPI/OVERNIGHT EVENTS: Patient currently on glucagon drip. No chest pain, palpitations, SOB.         Antimicrobial:  cefepime   IVPB 1000 milliGRAM(s) IV Intermittent daily    Cardiovascular:    Pulmonary:    Hematalogic:  apixaban 2.5 milliGRAM(s) Oral two times a day    Other:  chlorhexidine 2% Cloths 1 Application(s) Topical <User Schedule>  digoxin immune MELY [DigiFab] Infusion 160 milliGRAM(s) IV Infusion once  glucagon Infusion 0.025 mG/kG/Hr IV Continuous <Continuous>    apixaban 2.5 milliGRAM(s) Oral two times a day  cefepime   IVPB 1000 milliGRAM(s) IV Intermittent daily  chlorhexidine 2% Cloths 1 Application(s) Topical <User Schedule>  digoxin immune MELY [DigiFab] Infusion 160 milliGRAM(s) IV Infusion once  glucagon Infusion 0.025 mG/kG/Hr IV Continuous <Continuous>    Drug Dosing Weight  Height (cm): 170.2 (12 Apr 2022 17:52)  Weight (kg): 78.7 (12 Apr 2022 17:52)  BMI (kg/m2): 27.2 (12 Apr 2022 17:52)  BSA (m2): 1.9 (12 Apr 2022 17:52)       ICU Vital Signs Last 24 Hrs  T(C): 36.5 (13 Apr 2022 04:00), Max: 36.8 (12 Apr 2022 20:23)  T(F): 97.7 (13 Apr 2022 04:00), Max: 98.2 (12 Apr 2022 20:23)  HR: 73 (13 Apr 2022 08:30) (34 - 88)  BP: 139/46 (13 Apr 2022 08:30) (78/43 - 151/60)  BP(mean): 69 (13 Apr 2022 08:30) (60 - 82)  ABP: --  ABP(mean): --  RR: 15 (13 Apr 2022 08:30) (13 - 22)  SpO2: 96% (13 Apr 2022 08:30) (96% - 98%)            04-12 @ 07:01  -  04-13 @ 07:00  --------------------------------------------------------  IN: 295.5 mL / OUT: 225 mL / NET: 70.5 mL              PHYSICAL EXAM:    GENERAL: NAD, well-groomed, well-developed  EYES: EOMI, PERRLA,   NECK: Supple, No JVD; Normal thyroid; Trachea midline  NERVOUS SYSTEM:  Alert & Oriented X3,  Motor Strength 5/5 B/L upper and lower extremities; DTRs 2+ intact and symmetric  CHEST/LUNG: No rales, rhonchi, wheezing   HEART: Regular rate and rhythm; No murmurs,   ABDOMEN: Soft, Nontender, Nondistended; Bowel sounds present  EXTREMITIES:  2+ Peripheral Pulses, No clubbing, cyanosis, or edema        LABS:  CBC Full  -  ( 13 Apr 2022 03:36 )  WBC Count : 20.22 K/uL  RBC Count : 4.04 M/uL  Hemoglobin : 11.6 g/dL  Hematocrit : 34.8 %  Platelet Count - Automated : 198 K/uL  Mean Cell Volume : 86.1 fl  Mean Cell Hemoglobin : 28.7 pg  Mean Cell Hemoglobin Concentration : 33.3 gm/dL  Auto Neutrophil # : x  Auto Lymphocyte # : x  Auto Monocyte # : x  Auto Eosinophil # : x  Auto Basophil # : x  Auto Neutrophil % : x  Auto Lymphocyte % : x  Auto Monocyte % : x  Auto Eosinophil % : x  Auto Basophil % : x    04-13    132<L>  |  103  |  46<H>  ----------------------------<  103<H>  4.4   |  20<L>  |  2.77<H>    Ca    9.0      13 Apr 2022 03:36  Phos  1.9     04-13  Mg     2.0     04-13    TPro  6.5  /  Alb  3.1<L>  /  TBili  0.2  /  DBili  x   /  AST  18  /  ALT  28  /  AlkPhos  69  04-12    PT/INR - ( 12 Apr 2022 18:13 )   PT: 15.1 sec;   INR: 1.27 ratio         PTT - ( 12 Apr 2022 18:13 )  PTT:32.6 sec        RADIOLOGY & ADDITIONAL STUDIES REVIEWED:  ***      CRITICAL CARE TIME SPENT: 35 minutes

## 2022-04-13 NOTE — CONSULT NOTE ADULT - ASSESSMENT
89 year old male with PMHx of Parox Atrial Fibrillation on Eliquis, HLD, CKD, BPH with chronic Chinchilla, recent pseudomonas bacteruria, who presented with one day of general malaise with noted hypotension and bradycardia at home,hyperkalemia,UTI.  1.ICU monitoring.  2.Bradycardia multifactorial-hyperkalemia +/_ b blocker, dig level .9-therapeutic. EP eval called.  3.UTI-ABX.  4.PAF-eliquis, resume low dose b blocker.  5.PPI. 89 year old male with PMHx of Parox Atrial Fibrillation on Eliquis, HLD, CKD, BPH with chronic Chinchilla, recent pseudomonas bacteruria, who presented with one day of general malaise with noted hypotension and bradycardia at home,hyperkalemia,s/p UTI.  1.ICU monitoring.  2.Bradycardia multifactorial-hyperkalemia +/_ b blocker, dig level .9-therapeutic. EP eval called.  3.UTI-s/p ABX.  4.PAF-eliquis, resume low dose b blocker.  5.PPI.

## 2022-04-13 NOTE — PROGRESS NOTE ADULT - ASSESSMENT
Patient is a 89 year old male with PMHx of Atrial Fibrillation on Eliquis, HLD, CKD, BPH with chronic Salmon, recent pseudomonas bacteruria, who presented with malaise, found with hypotension and bradycardia. Suspected to be due to medication toxicity from digoxin/metoprolol. Admitted to the ICU for glucagon infusion for BB toxicity and close monitoring.    Assessment:  # Bradycardia  # betablocker toxicity  # atrial fibrillation  # acute on chronic kidney injury  # Pseudomonas UTI  # BPH with chronic salmon      Plan:  Neuro:  # no acute issues    CV:  # bradycardia 2/2 betablocker toxicity vs digoxin  - reportedly improved with glucagon push, started with glucagon gtt  - Cardio, Dr. ceron, continue to monitor  - bedside pacemaker, hold epinephrine gtt  - digoxin levels 0.9, s/p dig binding ab    #Atrial Fibrillation  - junctional rhythm on ECG  - hold eliquis in case of need for ppm, follow up cardio  - hold betablocker given bradycardia    Pulm:  # No acute issues    ID:  # Pseudomonas UTI  - as demonstrated on UCx 1 week ago  - reportedly completed course of cipro, will start cefepime    Nephro:  # Acute on chronic kidney injury  - possibly in the setting of UTI, prerenal from hypotension  - keep normotensive, repeat BMP  - urinelytes    #BPH with chronic salmon  - no acute issues, treatment for pseudomonas UTI with cefepime.    GI:  Keep NPO    Heme:  - no indication for transfusion     Endo:  # No acute issues    FEN:  - minimize IVF to avoid worsening breathing- CHF    Prophy:  hold eliquis, start scds    Dispo:  - monitor in the ICU  Patient is a 89 year old male with PMHx of Atrial Fibrillation on Eliquis, HLD, CKD, BPH with chronic Salmon, recent pseudomonas bacteruria, who presented with malaise, found with hypotension and bradycardia. Suspected to be due to medication toxicity from digoxin/metoprolol. Admitted to the ICU for glucagon infusion for BB toxicity and close monitoring.    Assessment:  # Bradycardia  # betablocker toxicity  # atrial fibrillation  # acute on chronic kidney injury  # Pseudomonas UTI  # BPH with chronic salmon      Plan:  Neuro:  # no acute issues    CV:  # bradycardia 2/2 betablocker toxicity vs digoxin  - reportedly improved with glucagon push, started with glucagon gtt  - Cardio, Dr. ceron, continue to monitor  - bedside pacemaker, hold epinephrine gtt  - digoxin levels 0.9, s/p dig binding ab    #Atrial Fibrillation  - Afib on ECG  - resume eliquis  - hold betablocker given bradycardia    Pulm:  # No acute issues    ID:  - hx of Pseudomonas UTI  - as demonstrated on UCx 1 week ago  - reportedly completed course of cipro  - hold abx for now    Nephro:  # Acute on chronic kidney injury  - possibly in the setting of UTI, prerenal from hypotension  - keep normotensive, repeat BMP  - urine lytes    #BPH with chronic salmon  - no acute issues    GI:  Keep NPO    Heme:  - no indication for transfusion     Endo:  # No acute issues    FEN:  - minimize IVF to avoid worsening breathing- CHF    Prophy:  eliquis    Dispo:  - monitor in the ICU

## 2022-04-13 NOTE — CONSULT NOTE ADULT - PROBLEM SELECTOR RECOMMENDATION 9
-multifactorial-hyperkalemia +/_ b blocker, digoxin  -currently resolved  -resume low dose b blocker   -patient will need micra pacemaker placement when stable -multifactorial-hyperkalemia +/_ b blocker, digoxin  -currently resolved  -hold AV nodals  -patient will need micra pacemaker placement when stable

## 2022-04-13 NOTE — CONSULT NOTE ADULT - ASSESSMENT
Patient is a 89 year old male with PMHx of Atrial Fibrillation on Eliquis, HLD, CKD, BPH with chronic Chinchilla, recent pseudomonas bacteruria, who presented with one day of general malaise with noted hypotension, neck pain and bradycardia at home. In the ED, suspected bradycardia from polypharmacy. Dig levels were 0.9, received digoxin binding ab. Patient was given glucagon with reported improvement in hemodynamics. Patient was transferred to ICU. EP was consulted

## 2022-04-13 NOTE — CONSULT NOTE ADULT - SUBJECTIVE AND OBJECTIVE BOX
CHIEF COMPLAINT:    HPI:    PAST MEDICAL & SURGICAL HISTORY:  Hypertension    Atrial fibrillation    BPH (benign prostatic hyperplasia)    Chronic kidney disease, unspecified CKD stage    Hyperlipidemia    No significant past surgical history        Allergies    No Known Allergies    Intolerances        MEDICATIONS  (STANDING):  apixaban 2.5 milliGRAM(s) Oral two times a day  chlorhexidine 2% Cloths 1 Application(s) Topical <User Schedule>  glucagon Infusion 0.0125 mG/kG/Hr (9.84 mL/Hr) IV Continuous <Continuous>    MEDICATIONS  (PRN):      FAMILY HISTORY:      ***No family history of premature coronary artery disease or sudden cardiac death    SOCIAL HISTORY:  Smoking-  Alcohol-  Illicit Drug use-    REVIEW OF SYSTEMS:  Constitutional: [ ] fever, [ ]weight loss,  [ ]fatigue  Eyes: [ ] visual changes  Respiratory: [ ]shortness of breath;  [ ] cough, [ ]wheezing, [ ]chills, [ ]hemoptysis  Cardiovascular: [ ] chest pain, [ ]palpitations, [ ]dizziness,  [ ]leg swelling [ ]syncope  Gastrointestinal: [ ] abdominal pain, [ ]nausea, [ ]vomiting,  [ ]diarrhea   Genitourinary: [ ] dysuria, [ ] hematuria  Neurologic: [ ] headaches [ ] tremors  [ ] weakness [ ] lightheadedness  Skin: [ ] itching, [ ]burning, [ ] rashes  Endocrine: [ ] heat or cold intolerance  Musculoskeletal: [ ] joint pain or swelling; [ ] muscle, back, or extremity pain  Psychiatric: [ ] depression, [ ]anxiety, [ ]mood swings, or [ ]difficulty sleeping  Hematologic: [ ] easy bruising, [ ] bleeding gums     [ x] All others negative	  [ ] Unable to obtain    Vital Signs Last 24 Hrs  T(C): 36.6 (13 Apr 2022 11:00), Max: 36.8 (12 Apr 2022 20:23)  T(F): 97.8 (13 Apr 2022 11:00), Max: 98.2 (12 Apr 2022 20:23)  HR: 72 (13 Apr 2022 14:00) (34 - 88)  BP: 108/39 (13 Apr 2022 14:00) (78/43 - 151/60)  BP(mean): 55 (13 Apr 2022 14:00) (55 - 98)  RR: 15 (13 Apr 2022 14:00) (13 - 22)  SpO2: 98% (13 Apr 2022 14:00) (92% - 98%)  I&O's Summary    12 Apr 2022 07:01  -  13 Apr 2022 07:00  --------------------------------------------------------  IN: 315.2 mL / OUT: 225 mL / NET: 90.2 mL    13 Apr 2022 07:01  -  13 Apr 2022 14:48  --------------------------------------------------------  IN: 728 mL / OUT: 350 mL / NET: 378 mL        PHYSICAL EXAM:  General: No acute distress  HEENT: EOMI  Neck:  No JVD  Lungs: Clear to auscultation bilaterally; No rales or wheezing  Heart: Regular rate and rhythm; No murmurs, rubs, or gallops  Abdomen: soft, non tender, non distended   Extremities: warm, no edema   Nervous system:  Alert & Oriented X3  Psychiatric: Normal affect  Skin: No rashes or lesions    LABS:  04-13    132<L>  |  103  |  46<H>  ----------------------------<  103<H>  4.4   |  20<L>  |  2.77<H>    Ca    9.0      13 Apr 2022 03:36  Phos  1.9     04-13  Mg     2.0     04-13    TPro  6.5  /  Alb  3.1<L>  /  TBili  0.2  /  DBili  x   /  AST  18  /  ALT  28  /  AlkPhos  69  04-12    Creatinine Trend: 2.77<--, 2.95<--, 2.77<--, 1.67<--, 1.95<--, 2.15<--                        11.6   20.22 )-----------( 198      ( 13 Apr 2022 03:36 )             34.8     PT/INR - ( 12 Apr 2022 18:13 )   PT: 15.1 sec;   INR: 1.27 ratio         PTT - ( 12 Apr 2022 18:13 )  PTT:32.6 sec    Lipid Panel:   Cardiac Enzymes:           RADIOLOGY:    ECG [my interpretation]:    TELEMETRY:    ECHO:    STRESS TEST:    CATHETERIZATION: CHIEF COMPLAINT:    HPI: Patient is a 89 year old male with PMHx of Atrial Fibrillation on Eliquis, HLD, CKD, BPH with chronic Chinchilla, recent pseudomonas bacteruria, who presented with one day of general malaise with noted hypotension, neck pain and bradycardia at home. Normal -110, noted it was 90s to low 100s, with low HR. History assisted by patient's daughter, Della at bedside. Patient has been at his usual state of health at home. ER visit 6 days ago for Chinchilla exchange, noted to have pseudomonas in urine culture, was dced on cipro. At time of evaluation, patient awake and alert, feels improved compared to earlier. No chest pain, palpitations, SOb. No fever, chills. No hematuria.      In the ED, suspected bradycardia from polypharmacy. Dig levels were 0.9, received digoxin binding ab. Patient was given glucagon with reported improvement in hemodynamics. Patient was transferred to ICU for close monitoring      PAST MEDICAL & SURGICAL HISTORY:  Hypertension    Atrial fibrillation    BPH (benign prostatic hyperplasia)    Chronic kidney disease, unspecified CKD stage    Hyperlipidemia    No significant past surgical history        Allergies    No Known Allergies    Intolerances        MEDICATIONS  (STANDING):  apixaban 2.5 milliGRAM(s) Oral two times a day  chlorhexidine 2% Cloths 1 Application(s) Topical <User Schedule>  glucagon Infusion 0.0125 mG/kG/Hr (9.84 mL/Hr) IV Continuous <Continuous>    MEDICATIONS  (PRN):      FAMILY HISTORY:      ***No family history of premature coronary artery disease or sudden cardiac death    SOCIAL HISTORY:  Smoking-former smoker  Alcohol-socially  Illicit Drug use-denies    REVIEW OF SYSTEMS:  Constitutional: [ ] fever, [ ]weight loss,  [X ]fatigue  Eyes: [ ] visual changes  Respiratory: [ ]shortness of breath;  [ ] cough, [ ]wheezing, [ ]chills, [ ]hemoptysis  Cardiovascular: [ ] chest pain, [ ]palpitations, [ ]dizziness,  [ ]leg swelling [ ]syncope  Gastrointestinal: [ ] abdominal pain, [ ]nausea, [ ]vomiting,  [ ]diarrhea   Genitourinary: [ ] dysuria, [ ] hematuria  Neurologic: [ ] headaches [ ] tremors  [ ] weakness [ ] lightheadedness  Skin: [ ] itching, [ ]burning, [ ] rashes  Endocrine: [ ] heat or cold intolerance  Musculoskeletal: [ ] joint pain or swelling; [ ] muscle, back, or extremity pain  Psychiatric: [ ] depression, [ ]anxiety, [ ]mood swings, or [ ]difficulty sleeping  Hematologic: [ ] easy bruising, [ ] bleeding gums     [ x] All others negative	  [ ] Unable to obtain    Vital Signs Last 24 Hrs  T(C): 36.6 (13 Apr 2022 11:00), Max: 36.8 (12 Apr 2022 20:23)  T(F): 97.8 (13 Apr 2022 11:00), Max: 98.2 (12 Apr 2022 20:23)  HR: 72 (13 Apr 2022 14:00) (34 - 88)  BP: 108/39 (13 Apr 2022 14:00) (78/43 - 151/60)  BP(mean): 55 (13 Apr 2022 14:00) (55 - 98)  RR: 15 (13 Apr 2022 14:00) (13 - 22)  SpO2: 98% (13 Apr 2022 14:00) (92% - 98%)  I&O's Summary    12 Apr 2022 07:01  -  13 Apr 2022 07:00  --------------------------------------------------------  IN: 315.2 mL / OUT: 225 mL / NET: 90.2 mL    13 Apr 2022 07:01  -  13 Apr 2022 14:48  --------------------------------------------------------  IN: 728 mL / OUT: 350 mL / NET: 378 mL        PHYSICAL EXAM:  General: No acute distress  HEENT: EOMI  Neck:  No JVD  Lungs: Clear to auscultation bilaterally; No rales or wheezing  Heart: Regular rate and rhythm; No murmurs, rubs, or gallops  Abdomen: soft, non tender, non distended   Extremities: warm, no edema   Nervous system:  Alert & Oriented X3  Psychiatric: Normal affect  Skin: No rashes or lesions,   LABS:  04-13    132<L>  |  103  |  46<H>  ----------------------------<  103<H>  4.4   |  20<L>  |  2.77<H>    Ca    9.0      13 Apr 2022 03:36  Phos  1.9     04-13  Mg     2.0     04-13    TPro  6.5  /  Alb  3.1<L>  /  TBili  0.2  /  DBili  x   /  AST  18  /  ALT  28  /  AlkPhos  69  04-12    Creatinine Trend: 2.77<--, 2.95<--, 2.77<--, 1.67<--, 1.95<--, 2.15<--                        11.6   20.22 )-----------( 198      ( 13 Apr 2022 03:36 )             34.8     PT/INR - ( 12 Apr 2022 18:13 )   PT: 15.1 sec;   INR: 1.27 ratio         PTT - ( 12 Apr 2022 18:13 )  PTT:32.6 sec      RADIOLOGY:  < from: Xray Chest 1 View- PORTABLE-Urgent (04.12.22 @ 19:24) >  IMPRESSION:    No acute infiltrate. Cardiomegaly.    --- End of Report ---    < end of copied text >      ECG [my interpretation]:  4/12/2022 Afib with complete heart block HR 34, left axis deviation, RBBB    TELEMETRY: Afib with complete heart block HR 30-84    ECHO:  < from: Transthoracic Echocardiogram (03.14.22 @ 12:08) >  CONCLUSIONS:  1. Normal mitral valve. Trace mitral regurgitation.  2. Aortic valve not well visualized; probably trileaflet.  No aortic stenosis. Mild aortic regurgitation.  3. Mildly dilated left atrium.  LA volume index = 37 cc/m2.  4. Mild concentric left ventricular hypertrophy.  5. Mildly decreased left ventricular systolic function  (LVEF42% by biplane). Paradoxical septal motion is seen,  consistent with conduction delay.  6. Unable to adequately assess diastolic function due to  presence of arrythmia.  7. Severe right atrial enlargement.  8. Right ventricular enlargement with grossly normal RV  systolic function (TAPSE 1.9).  9. RA Pressure is 8 mm Hg.  10. RV systolic pressure is mildly increased at  37 mm Hg.  11. Tricuspid valve not well visualized, probably normal.  Mild-moderate tricuspid regurgitation.  12. Pulmonic valve not well seen.  13. An atrial septal aneurysm is noted.  14. Small pericardial effusion. No echocardiographic  evidence of tamponade physiology.    < end of copied text >      STRESS TEST:    CATHETERIZATION:

## 2022-04-14 DIAGNOSIS — I44.2 ATRIOVENTRICULAR BLOCK, COMPLETE: ICD-10-CM

## 2022-04-14 LAB
ANION GAP SERPL CALC-SCNC: 7 MMOL/L — SIGNIFICANT CHANGE UP (ref 5–17)
BUN SERPL-MCNC: 55 MG/DL — HIGH (ref 7–18)
CALCIUM SERPL-MCNC: 9 MG/DL — SIGNIFICANT CHANGE UP (ref 8.4–10.5)
CHLORIDE SERPL-SCNC: 110 MMOL/L — HIGH (ref 96–108)
CO2 SERPL-SCNC: 19 MMOL/L — LOW (ref 22–31)
CREAT SERPL-MCNC: 2.31 MG/DL — HIGH (ref 0.5–1.3)
EGFR: 26 ML/MIN/1.73M2 — LOW
GLUCOSE SERPL-MCNC: 162 MG/DL — HIGH (ref 70–99)
HCT VFR BLD CALC: 34.5 % — LOW (ref 39–50)
HGB BLD-MCNC: 11.7 G/DL — LOW (ref 13–17)
MAGNESIUM SERPL-MCNC: 2.2 MG/DL — SIGNIFICANT CHANGE UP (ref 1.6–2.6)
MCHC RBC-ENTMCNC: 29 PG — SIGNIFICANT CHANGE UP (ref 27–34)
MCHC RBC-ENTMCNC: 33.9 GM/DL — SIGNIFICANT CHANGE UP (ref 32–36)
MCV RBC AUTO: 85.4 FL — SIGNIFICANT CHANGE UP (ref 80–100)
NRBC # BLD: 0 /100 WBCS — SIGNIFICANT CHANGE UP (ref 0–0)
PHOSPHATE SERPL-MCNC: 2.9 MG/DL — SIGNIFICANT CHANGE UP (ref 2.5–4.5)
PLATELET # BLD AUTO: 178 K/UL — SIGNIFICANT CHANGE UP (ref 150–400)
POTASSIUM SERPL-MCNC: 4.2 MMOL/L — SIGNIFICANT CHANGE UP (ref 3.5–5.3)
POTASSIUM SERPL-SCNC: 4.2 MMOL/L — SIGNIFICANT CHANGE UP (ref 3.5–5.3)
RBC # BLD: 4.04 M/UL — LOW (ref 4.2–5.8)
RBC # FLD: 12.3 % — SIGNIFICANT CHANGE UP (ref 10.3–14.5)
SODIUM SERPL-SCNC: 136 MMOL/L — SIGNIFICANT CHANGE UP (ref 135–145)
WBC # BLD: 12.53 K/UL — HIGH (ref 3.8–10.5)
WBC # FLD AUTO: 12.53 K/UL — HIGH (ref 3.8–10.5)

## 2022-04-14 RX ORDER — POTASSIUM PHOSPHATE, MONOBASIC POTASSIUM PHOSPHATE, DIBASIC 236; 224 MG/ML; MG/ML
30 INJECTION, SOLUTION INTRAVENOUS ONCE
Refills: 0 | Status: DISCONTINUED | OUTPATIENT
Start: 2022-04-14 | End: 2022-04-14

## 2022-04-14 RX ADMIN — APIXABAN 2.5 MILLIGRAM(S): 2.5 TABLET, FILM COATED ORAL at 17:04

## 2022-04-14 RX ADMIN — APIXABAN 2.5 MILLIGRAM(S): 2.5 TABLET, FILM COATED ORAL at 05:08

## 2022-04-14 RX ADMIN — CHLORHEXIDINE GLUCONATE 1 APPLICATION(S): 213 SOLUTION TOPICAL at 05:08

## 2022-04-14 NOTE — PROGRESS NOTE ADULT - SUBJECTIVE AND OBJECTIVE BOX
CHIEF COMPLAINT:Patient is a 89y old  Male who presents with a chief complaint of Bradycardia.Pt appears comfortable.    	  REVIEW OF SYSTEMS:  CONSTITUTIONAL: No fever, weight loss, or fatigue  EYES: No eye pain, visual disturbances, or discharge  ENT:  No difficulty hearing, tinnitus, vertigo; No sinus or throat pain  NECK: No pain or stiffness  RESPIRATORY: No cough, wheezing, chills or hemoptysis; No Shortness of Breath  CARDIOVASCULAR: No chest pain, palpitations, passing out, dizziness, or leg swelling  GASTROINTESTINAL: No abdominal or epigastric pain. No nausea, vomiting, or hematemesis; No diarrhea or constipation. No melena or hematochezia.  GENITOURINARY: No dysuria, frequency, hematuria, or incontinence  NEUROLOGICAL: No headaches, memory loss, loss of strength, numbness, or tremors  SKIN: No itching, burning, rashes, or lesions   LYMPH Nodes: No enlarged glands  ENDOCRINE: No heat or cold intolerance; No hair loss  MUSCULOSKELETAL: No joint pain or swelling; No muscle, back, or extremity pain  PSYCHIATRIC: No depression, anxiety, mood swings, or difficulty sleeping  HEME/LYMPH: No easy bruising, or bleeding gums  ALLERGY AND IMMUNOLOGIC: No hives or eczema	      PHYSICAL EXAM:  T(C): 36.6 (04-14-22 @ 08:00), Max: 37.1 (04-13-22 @ 20:00)  HR: 70 (04-14-22 @ 12:00) (65 - 81)  BP: 126/40 (04-14-22 @ 12:00) (97/33 - 137/52)  RR: 14 (04-14-22 @ 12:00) (13 - 24)  SpO2: 97% (04-14-22 @ 12:00) (93% - 98%)  Wt(kg): --  I&O's Summary    13 Apr 2022 07:01  -  14 Apr 2022 07:00  --------------------------------------------------------  IN: 1214.6 mL / OUT: 2425 mL / NET: -1210.4 mL    14 Apr 2022 07:01  -  14 Apr 2022 12:25  --------------------------------------------------------  IN: 320 mL / OUT: 250 mL / NET: 70 mL        Appearance: Normal	  HEENT:   Normal oral mucosa, PERRL, EOMI	  Lymphatic: No lymphadenopathy  Cardiovascular: Normal S1 S2, No JVD, No murmurs, No edema  Respiratory: Lungs clear to auscultation	  Psychiatry: A & O x 3, Mood & affect appropriate  Gastrointestinal:  Soft, Non-tender, + BS	  Skin: No rashes, No ecchymoses, No cyanosis	  Neurologic: Non-focal  Extremities: Normal range of motion, No clubbing, cyanosis or edema  Vascular: Peripheral pulses palpable 2+ bilaterally    MEDICATIONS  (STANDING):  apixaban 2.5 milliGRAM(s) Oral two times a day  chlorhexidine 2% Cloths 1 Application(s) Topical <User Schedule>  polyethylene glycol 3350 17 Gram(s) Oral at bedtime      TELEMETRY: nsr	     	  	  LABS:	 	      Troponin I, High Sensitivity Result: 46.3 ng/L (04-12 @ 18:13)                            11.7   12.53 )-----------( 178      ( 14 Apr 2022 05:08 )             34.5     04-14    136  |  110<H>  |  55<H>  ----------------------------<  162<H>  4.2   |  19<L>  |  2.31<H>    Ca    9.0      14 Apr 2022 05:08  Phos  2.9     04-14  Mg     2.2     04-14    TPro  6.5  /  Alb  3.1<L>  /  TBili  0.2  /  DBili  x   /  AST  18  /  ALT  28  /  AlkPhos  69  04-12

## 2022-04-14 NOTE — PROGRESS NOTE ADULT - ATTENDING COMMENTS
Patient is a 89 year old male withf Atrial Fibrillation on Eliquis, HLD, CKD, BPH with chronic Salmon, recent pseudomonas bacteruria, who presented with malaise, found with hypotension and bradycardia probable  due to medication toxicity from digoxin/metoprolol. Admitted to the ICU for glucagon infusion for BB toxicity and close monitoring.    Assessment:  -  Bradycardia  -  Betablocker toxicity  -  Atrial fibrillation  -  Acute on chronic kidney injury  -  Pseudomonas UTI  -  BPH with chronic salmon' cath       Plan   - O2 supp as needed   - Hemodynamic monitoring  - Glucogan iv  d/c , heart rate improved   - Monitor heart rate   - Beta- and dig held  - No antibx for now   - Diet   - Salmon's cath   - S/P uti tx 1 week ago .

## 2022-04-14 NOTE — PHYSICAL THERAPY INITIAL EVALUATION ADULT - GENERAL OBSERVATIONS, REHAB EVAL
Consult received, chart reviewed. Patient received supine in bed, NAD, + tele, +IV R UE, +Chinchilla catheter. Patient agreed to EVALUATION from Physical Therapist.

## 2022-04-14 NOTE — PROGRESS NOTE ADULT - ASSESSMENT
89 year old male with PMHx of Parox Atrial Fibrillation on Eliquis, HLD, CKD, BPH with chronic Chinchilla, recent pseudomonas bacteruria, who presented with one day of general malaise with noted hypotension and bradycardia at home,hyperkalemia,UTI.  1.Transfer to tele.  2.Bradycardia multifactorial-hyperkalemia +/_ b blocker, dig level .9-therapeutic. EP eval noted, PPM next week.  3.UTI-ABX.  4.PAF-eliquis, resume low dose b blocker.  5.PPI. 89 year old male with PMHx of Parox Atrial Fibrillation on Eliquis, HLD, CKD, BPH with chronic Chinchilla, recent pseudomonas bacteruria, who presented with one day of general malaise with noted hypotension and bradycardia at home,hyperkalemia,s/p UTI.  1.Transfer to tele.  2.Bradycardia multifactorial-hyperkalemia +/_ b blocker, dig level .9-therapeutic. EP eval noted, PPM next week.  3.UTI-s/p ABX.  4.PAF-eliquis, resume low dose b blocker.  5.PPI.

## 2022-04-14 NOTE — PHYSICAL THERAPY INITIAL EVALUATION ADULT - DIAGNOSIS, PT EVAL
Pt. is a 89 y.o. male presenting w/general malaise, hypotension, and bradycardia secondary to suspected polypharmacy Beta-blockers and Digoxin.

## 2022-04-14 NOTE — PROGRESS NOTE ADULT - ASSESSMENT
Patient is a 89 year old male with PMHx of Atrial Fibrillation on Eliquis, HLD, CKD, BPH with chronic Salmon, recent pseudomonas bacteruria, who presented with malaise, found with hypotension and bradycardia. Suspected to be due to medication toxicity from digoxin/metoprolol. Admitted to the ICU for glucagon infusion for BB toxicity and close monitoring.    Assessment:  # Bradycardia  # betablocker toxicity  # atrial fibrillation  # acute on chronic kidney injury  # Pseudomonas UTI  # BPH with chronic salmon      Plan:  Neuro:  # no acute issues    CV:  # bradycardia 2/2 betablocker toxicity vs digoxin  - reportedly improved with glucagon push, started with glucagon gtt  - Cardio, Dr. ceron, continue to monitor  - bedside pacemaker, hold epinephrine gtt  - digoxin levels 0.9, s/p dig binding ab    #Atrial Fibrillation  - Afib on ECG  - resume eliquis  - hold betablocker given bradycardia    Pulm:  # No acute issues    ID:  - hx of Pseudomonas UTI  - as demonstrated on UCx 1 week ago  - reportedly completed course of cipro  - hold abx for now    Nephro:  # Acute on chronic kidney injury  - possibly in the setting of UTI, prerenal from hypotension  - keep normotensive, repeat BMP  - urine lytes    #BPH with chronic salmon  - no acute issues    GI:  Keep NPO    Heme:  - no indication for transfusion     Endo:  # No acute issues    FEN:  - minimize IVF to avoid worsening breathing- CHF    Prophy:  eliquis    Dispo:  - monitor in the ICU  Patient is a 89 year old male with PMHx of Atrial Fibrillation on Eliquis, HLD, CKD, BPH with chronic Salmon, recent pseudomonas bacteruria, who presented with malaise, found with hypotension and bradycardia. Suspected to be due to medication toxicity from digoxin/metoprolol. Admitted to the ICU for glucagon infusion for BB toxicity and close monitoring.    Assessment:  # Bradycardia  # betablocker toxicity  # atrial fibrillation  # acute on chronic kidney injury  # Pseudomonas UTI  # BPH with chronic salmon      Plan:  Neuro:  # no acute issues    CV:  # bradycardia 2/2 betablocker toxicity vs digoxin  - reportedly improved with glucagon push, started with glucagon gtt  - Cardio, Dr. ceron, continue to monitor  - bedside pacemaker, hold epinephrine gtt  - digoxin levels 0.9, s/p dig binding ab  - Glucagon drip discontinued this am     #Atrial Fibrillation  - Afib on ECG  - resume eliquis  - hold betablocker given bradycardia    Pulm:  # No acute issues    ID:  - hx of Pseudomonas UTI  - as demonstrated on UCx 1 week ago  - reportedly completed course of cipro  - hold abx for now    Nephro:  # Acute on chronic kidney injury  - possibly in the setting of UTI, prerenal from hypotension  - keep normotensive, repeat BMP  - urine lytes    #BPH with chronic salmon  - no acute issues    GI:  Keep NPO    Heme:  - no indication for transfusion     Endo:  # No acute issues    FEN:  - minimize IVF to avoid worsening breathing- CHF    Prophy:  eliquis    Dispo:  - monitor in the ICU

## 2022-04-14 NOTE — PHYSICAL THERAPY INITIAL EVALUATION ADULT - ACTIVE RANGE OF MOTION EXAMINATION, REHAB EVAL
dre. upper extremity Active ROM was WNL (within normal limits)/bilateral lower extremity Active ROM was WNL (within normal limits)

## 2022-04-14 NOTE — PHYSICAL THERAPY INITIAL EVALUATION ADULT - ADDITIONAL COMMENTS
Pt. reports that prior to admission he was ambulating independently for household and community distances with use of a rollator.

## 2022-04-14 NOTE — PHYSICAL THERAPY INITIAL EVALUATION ADULT - PERTINENT HX OF CURRENT PROBLEM, REHAB EVAL
Pt. was admitted from home w/general malaise, hypotension, and bradycardia. PMH includes, Atrial Fibrillation (on Eliquis), HLD, CKD, BPH with chronic Chinchilla, and recent Pseudomonas bacteruria.

## 2022-04-14 NOTE — PROGRESS NOTE ADULT - SUBJECTIVE AND OBJECTIVE BOX
INTERVAL HPI/OVERNIGHT EVENTS: ***    PRESSORS: [ ] YES [ ] NO  WHICH:    Antimicrobial:    Cardiovascular:    Pulmonary:    Hematalogic:  apixaban 2.5 milliGRAM(s) Oral two times a day    Other:  chlorhexidine 2% Cloths 1 Application(s) Topical <User Schedule>  polyethylene glycol 3350 17 Gram(s) Oral at bedtime    apixaban 2.5 milliGRAM(s) Oral two times a day  chlorhexidine 2% Cloths 1 Application(s) Topical <User Schedule>  polyethylene glycol 3350 17 Gram(s) Oral at bedtime    Drug Dosing Weight  Height (cm): 170.2 (12 Apr 2022 17:52)  Weight (kg): 78.7 (12 Apr 2022 17:52)  BMI (kg/m2): 27.2 (12 Apr 2022 17:52)  BSA (m2): 1.9 (12 Apr 2022 17:52)    CENTRAL LINE: [ ] YES [ ] NO  LOCATION:         ARROYO: [ ] YES [ ] NO          A-LINE:  [ ] YES [ ] NO  LOCATION:             ICU Vital Signs Last 24 Hrs  T(C): 36.6 (14 Apr 2022 08:00), Max: 37.1 (13 Apr 2022 20:00)  T(F): 97.8 (14 Apr 2022 08:00), Max: 98.7 (13 Apr 2022 20:00)  HR: 69 (14 Apr 2022 11:00) (65 - 81)  BP: 103/32 (14 Apr 2022 11:00) (97/33 - 137/52)  BP(mean): 51 (14 Apr 2022 11:00) (49 - 101)  ABP: --  ABP(mean): --  RR: 15 (14 Apr 2022 11:00) (13 - 24)  SpO2: 97% (14 Apr 2022 11:00) (93% - 98%)            04-13 @ 07:01  -  04-14 @ 07:00  --------------------------------------------------------  IN: 1214.6 mL / OUT: 2425 mL / NET: -1210.4 mL              PHYSICAL EXAM:    GENERAL: NAD, well-groomed, well-developed  EYES: EOMI, PERRLA,   NECK: Supple, No JVD; Normal thyroid; Trachea midline  NERVOUS SYSTEM:  Alert & Oriented X3,  Motor Strength 5/5 B/L upper and lower extremities; DTRs 2+ intact and symmetric  CHEST/LUNG: No rales, rhonchi, wheezing   HEART: Regular rate and rhythm; No murmurs,   ABDOMEN: Soft, Nontender, Nondistended; Bowel sounds present  EXTREMITIES:  2+ Peripheral Pulses, No clubbing, cyanosis, or edema        LABS:  CBC Full  -  ( 14 Apr 2022 05:08 )  WBC Count : 12.53 K/uL  RBC Count : 4.04 M/uL  Hemoglobin : 11.7 g/dL  Hematocrit : 34.5 %  Platelet Count - Automated : 178 K/uL  Mean Cell Volume : 85.4 fl  Mean Cell Hemoglobin : 29.0 pg  Mean Cell Hemoglobin Concentration : 33.9 gm/dL  Auto Neutrophil # : x  Auto Lymphocyte # : x  Auto Monocyte # : x  Auto Eosinophil # : x  Auto Basophil # : x  Auto Neutrophil % : x  Auto Lymphocyte % : x  Auto Monocyte % : x  Auto Eosinophil % : x  Auto Basophil % : x    04-14    136  |  110<H>  |  55<H>  ----------------------------<  162<H>  4.2   |  19<L>  |  2.31<H>    Ca    9.0      14 Apr 2022 05:08  Phos  2.9     04-14  Mg     2.2     04-14    TPro  6.5  /  Alb  3.1<L>  /  TBili  0.2  /  DBili  x   /  AST  18  /  ALT  28  /  AlkPhos  69  04-12    PT/INR - ( 12 Apr 2022 18:13 )   PT: 15.1 sec;   INR: 1.27 ratio         PTT - ( 12 Apr 2022 18:13 )  PTT:32.6 sec    Culture Results:   No growth to date. (04-13 @ 02:53)  Culture Results:   No growth to date. (04-13 @ 02:53)      RADIOLOGY & ADDITIONAL STUDIES REVIEWED:  ***      CRITICAL CARE TIME SPENT: 35 minutes INTERVAL HPI/OVERNIGHT EVENTS: No acute overnight events      Hematalogic:  apixaban 2.5 milliGRAM(s) Oral two times a day    Other:  chlorhexidine 2% Cloths 1 Application(s) Topical <User Schedule>  polyethylene glycol 3350 17 Gram(s) Oral at bedtime    apixaban 2.5 milliGRAM(s) Oral two times a day  chlorhexidine 2% Cloths 1 Application(s) Topical <User Schedule>  polyethylene glycol 3350 17 Gram(s) Oral at bedtime    Drug Dosing Weight  Height (cm): 170.2 (12 Apr 2022 17:52)  Weight (kg): 78.7 (12 Apr 2022 17:52)  BMI (kg/m2): 27.2 (12 Apr 2022 17:52)  BSA (m2): 1.9 (12 Apr 2022 17:52)          ICU Vital Signs Last 24 Hrs  T(C): 36.6 (14 Apr 2022 08:00), Max: 37.1 (13 Apr 2022 20:00)  T(F): 97.8 (14 Apr 2022 08:00), Max: 98.7 (13 Apr 2022 20:00)  HR: 69 (14 Apr 2022 11:00) (65 - 81)  BP: 103/32 (14 Apr 2022 11:00) (97/33 - 137/52)  BP(mean): 51 (14 Apr 2022 11:00) (49 - 101)  ABP: --  ABP(mean): --  RR: 15 (14 Apr 2022 11:00) (13 - 24)  SpO2: 97% (14 Apr 2022 11:00) (93% - 98%)            04-13 @ 07:01  -  04-14 @ 07:00  --------------------------------------------------------  IN: 1214.6 mL / OUT: 2425 mL / NET: -1210.4 mL              PHYSICAL EXAM:    GENERAL: NAD, well-groomed, well-developed  EYES: EOMI, PERRLA,   NECK: Supple, No JVD; Normal thyroid; Trachea midline  NERVOUS SYSTEM:  Alert & Oriented X3,  Motor Strength 5/5 B/L upper and lower extremities; DTRs 2+ intact and symmetric  CHEST/LUNG: No rales, rhonchi, wheezing   HEART: Regular rate and rhythm; No murmurs,   ABDOMEN: Soft, Nontender, Nondistended; Bowel sounds present  EXTREMITIES:  2+ Peripheral Pulses, No clubbing, cyanosis, or edema        LABS:  CBC Full  -  ( 14 Apr 2022 05:08 )  WBC Count : 12.53 K/uL  RBC Count : 4.04 M/uL  Hemoglobin : 11.7 g/dL  Hematocrit : 34.5 %  Platelet Count - Automated : 178 K/uL  Mean Cell Volume : 85.4 fl  Mean Cell Hemoglobin : 29.0 pg  Mean Cell Hemoglobin Concentration : 33.9 gm/dL  Auto Neutrophil # : x  Auto Lymphocyte # : x  Auto Monocyte # : x  Auto Eosinophil # : x  Auto Basophil # : x  Auto Neutrophil % : x  Auto Lymphocyte % : x  Auto Monocyte % : x  Auto Eosinophil % : x  Auto Basophil % : x    04-14    136  |  110<H>  |  55<H>  ----------------------------<  162<H>  4.2   |  19<L>  |  2.31<H>    Ca    9.0      14 Apr 2022 05:08  Phos  2.9     04-14  Mg     2.2     04-14    TPro  6.5  /  Alb  3.1<L>  /  TBili  0.2  /  DBili  x   /  AST  18  /  ALT  28  /  AlkPhos  69  04-12    PT/INR - ( 12 Apr 2022 18:13 )   PT: 15.1 sec;   INR: 1.27 ratio         PTT - ( 12 Apr 2022 18:13 )  PTT:32.6 sec    Culture Results:   No growth to date. (04-13 @ 02:53)  Culture Results:   No growth to date. (04-13 @ 02:53)      RADIOLOGY & ADDITIONAL STUDIES REVIEWED:  ***      CRITICAL CARE TIME SPENT: 35 minutes

## 2022-04-15 LAB
ANION GAP SERPL CALC-SCNC: 6 MMOL/L — SIGNIFICANT CHANGE UP (ref 5–17)
BUN SERPL-MCNC: 46 MG/DL — HIGH (ref 7–18)
CALCIUM SERPL-MCNC: 9.2 MG/DL — SIGNIFICANT CHANGE UP (ref 8.4–10.5)
CHLORIDE SERPL-SCNC: 112 MMOL/L — HIGH (ref 96–108)
CO2 SERPL-SCNC: 22 MMOL/L — SIGNIFICANT CHANGE UP (ref 22–31)
CREAT SERPL-MCNC: 1.84 MG/DL — HIGH (ref 0.5–1.3)
EGFR: 34 ML/MIN/1.73M2 — LOW
GLUCOSE SERPL-MCNC: 97 MG/DL — SIGNIFICANT CHANGE UP (ref 70–99)
HCT VFR BLD CALC: 34.5 % — LOW (ref 39–50)
HGB BLD-MCNC: 11.4 G/DL — LOW (ref 13–17)
MAGNESIUM SERPL-MCNC: 2.1 MG/DL — SIGNIFICANT CHANGE UP (ref 1.6–2.6)
MCHC RBC-ENTMCNC: 28.3 PG — SIGNIFICANT CHANGE UP (ref 27–34)
MCHC RBC-ENTMCNC: 33 GM/DL — SIGNIFICANT CHANGE UP (ref 32–36)
MCV RBC AUTO: 85.6 FL — SIGNIFICANT CHANGE UP (ref 80–100)
NRBC # BLD: 0 /100 WBCS — SIGNIFICANT CHANGE UP (ref 0–0)
PHOSPHATE SERPL-MCNC: 3 MG/DL — SIGNIFICANT CHANGE UP (ref 2.5–4.5)
PLATELET # BLD AUTO: 212 K/UL — SIGNIFICANT CHANGE UP (ref 150–400)
POTASSIUM SERPL-MCNC: 4.8 MMOL/L — SIGNIFICANT CHANGE UP (ref 3.5–5.3)
POTASSIUM SERPL-SCNC: 4.8 MMOL/L — SIGNIFICANT CHANGE UP (ref 3.5–5.3)
RBC # BLD: 4.03 M/UL — LOW (ref 4.2–5.8)
RBC # FLD: 12.6 % — SIGNIFICANT CHANGE UP (ref 10.3–14.5)
SODIUM SERPL-SCNC: 140 MMOL/L — SIGNIFICANT CHANGE UP (ref 135–145)
WBC # BLD: 8.44 K/UL — SIGNIFICANT CHANGE UP (ref 3.8–10.5)
WBC # FLD AUTO: 8.44 K/UL — SIGNIFICANT CHANGE UP (ref 3.8–10.5)

## 2022-04-15 RX ORDER — FINASTERIDE 5 MG/1
5 TABLET, FILM COATED ORAL DAILY
Refills: 0 | Status: DISCONTINUED | OUTPATIENT
Start: 2022-04-15 | End: 2022-04-19

## 2022-04-15 RX ORDER — SIMVASTATIN 20 MG/1
20 TABLET, FILM COATED ORAL AT BEDTIME
Refills: 0 | Status: DISCONTINUED | OUTPATIENT
Start: 2022-04-15 | End: 2022-04-19

## 2022-04-15 RX ORDER — SENNA PLUS 8.6 MG/1
2 TABLET ORAL AT BEDTIME
Refills: 0 | Status: DISCONTINUED | OUTPATIENT
Start: 2022-04-15 | End: 2022-04-19

## 2022-04-15 RX ADMIN — APIXABAN 2.5 MILLIGRAM(S): 2.5 TABLET, FILM COATED ORAL at 05:08

## 2022-04-15 RX ADMIN — APIXABAN 2.5 MILLIGRAM(S): 2.5 TABLET, FILM COATED ORAL at 17:15

## 2022-04-15 RX ADMIN — SENNA PLUS 2 TABLET(S): 8.6 TABLET ORAL at 21:17

## 2022-04-15 RX ADMIN — SIMVASTATIN 20 MILLIGRAM(S): 20 TABLET, FILM COATED ORAL at 21:17

## 2022-04-15 RX ADMIN — POLYETHYLENE GLYCOL 3350 17 GRAM(S): 17 POWDER, FOR SOLUTION ORAL at 21:16

## 2022-04-15 RX ADMIN — FINASTERIDE 5 MILLIGRAM(S): 5 TABLET, FILM COATED ORAL at 11:12

## 2022-04-15 RX ADMIN — CHLORHEXIDINE GLUCONATE 1 APPLICATION(S): 213 SOLUTION TOPICAL at 05:08

## 2022-04-15 NOTE — PROGRESS NOTE ADULT - ASSESSMENT
Patient is a 89 year old male with PMHx of Atrial Fibrillation on Eliquis, HLD, CKD, BPH with chronic Salmon, recent pseudomonas bacteruria, who presented with malaise, found with hypotension and bradycardia. Suspected to be due to medication toxicity from digoxin/metoprolol. Admitted to the ICU for glucagon infusion for BB toxicity and close monitoring.    Assessment:  # Bradycardia  # betablocker toxicity  # atrial fibrillation  # acute on chronic kidney injury  # Pseudomonas UTI  # BPH with chronic salmon      Plan:  Neuro:  # no acute issues    CV:  # bradycardia 2/2 betablocker toxicity vs digoxin  - reportedly improved with glucagon push, started with glucagon gtt  - Cardio, Dr. ceron, continue to monitor  - bedside pacemaker, hold epinephrine gtt  - digoxin levels 0.9, s/p dig binding ab  - Glucagon drip discontinued this am     #Atrial Fibrillation  - Afib on ECG  - resume eliquis  - hold betablocker given bradycardia    Pulm:  # No acute issues    ID:  - hx of Pseudomonas UTI  - as demonstrated on UCx 1 week ago  - reportedly completed course of cipro  - hold abx for now    Nephro:  # Acute on chronic kidney injury  - possibly in the setting of UTI, prerenal from hypotension  - keep normotensive, repeat BMP  - urine lytes    #BPH with chronic salmon  - no acute issues    GI:  Keep NPO    Heme:  - no indication for transfusion     Endo:  # No acute issues    FEN:  - minimize IVF to avoid worsening breathing- CHF    Prophy:  eliquis    Dispo:  - monitor in the ICU  Patient is a 89 year old male with PMHx of Atrial Fibrillation on Eliquis, HLD, CKD, BPH with chronic Salmon, recent pseudomonas bacteruria, who presented with malaise, found with hypotension and bradycardia. Suspected to be due to medication toxicity from digoxin/metoprolol. Admitted to the ICU for glucagon infusion for BB toxicity and close monitoring.    Assessment:  # Bradycardia  # betablocker toxicity  # atrial fibrillation  # acute on chronic kidney injury  # Pseudomonas UTI  # BPH with chronic salmon      Plan:  Neuro:  # no acute issues    CV:  # bradycardia 2/2 betablocker toxicity vs digoxin  - reportedly improved with glucagon push, started with glucagon gtt  - Cardio, Dr. ceron, continue to monitor  - bedside pacemaker, hold epinephrine gtt  - digoxin levels 0.9, s/p dig binding ab  - Glucagon drip discontinued 4/15    #Atrial Fibrillation  - Afib on ECG  - resume eliquis  - hold betablocker given bradycardia and micro placement     Pulm:  # No acute issues    ID:  - hx of Pseudomonas UTI  - as demonstrated on UCx 1 week ago  - reportedly completed course of cipro  - hold abx for now    Nephro:  # Acute on chronic kidney injury  - possibly in the setting of UTI, prerenal from hypotension  - keep normotensive, repeat BMP  - urine lytes    #BPH with chronic salmon  - no acute issues    GI:  Keep NPO    Heme:  - no indication for transfusion     Endo:  # No acute issues    FEN:  - minimize IVF to avoid worsening breathing- CHF    Prophy:  eliquis    Dispo:  - monitor in the ICU

## 2022-04-15 NOTE — PROGRESS NOTE ADULT - ASSESSMENT
89 year old male with PMHx of Parox Atrial Fibrillation on Eliquis, HLD, CKD, BPH with chronic Chinchilla, recent pseudomonas bacteruria, who presented with one day of general malaise with noted hypotension and bradycardia at home,hyperkalemia,UTI.  1.Transfer to tele.  2.Bradycardia multifactorial-hyperkalemia +/_ b blocker, dig level .9-therapeutic. EP eval noted, PPM next week.  3.UTI-ABX.  4.PAF-eliquis, resume low dose b blocker.  5.PPI. 89 year old male with PMHx of Parox Atrial Fibrillation on Eliquis, HLD, CKD, BPH with chronic Chinchilla, recent pseudomonas bacteruria, who presented with one day of general malaise with noted hypotension and bradycardia at home,hyperkalemia.  1.Transfer to tele.  2.Bradycardia multifactorial-hyperkalemia +/_ b blocker, dig level .9-therapeutic. EP eval noted, PPM next week.  3.UTI-s/p ABX.  4.PAF-eliquis, resume low dose b blocker.  5.PPI.

## 2022-04-15 NOTE — PROGRESS NOTE ADULT - ATTENDING COMMENTS
Patient is a 89 year old male withf Atrial Fibrillation on Eliquis, HLD, CKD, BPH with chronic Salmon, recent pseudomonas bacteruria, who presented with malaise, found with hypotension and bradycardia probable  due to medication toxicity from digoxin/metoprolol. Admitted to the ICU for glucagon infusion for BB toxicity and close monitoring.    Assessment:  -  Bradycardia  -  Betablocker toxicity  -  Atrial fibrillation  -  Acute on chronic kidney injury  -  Pseudomonas UTI  -  BPH with chronic salmon' cath       Plan   - O2 supp as needed   - Hemodynamic monitoring  - Glucogan iv  d/c , heart rate improved   - Monitor heart rate   - Beta- and dig held  - No antibx for now   - Diet   - Salmon's cath   - Transfer to tele

## 2022-04-15 NOTE — PROGRESS NOTE ADULT - SUBJECTIVE AND OBJECTIVE BOX
CHIEF COMPLAINT:Patient is a 89y old  Male who presents with a chief complaint of Bradycardia.Pt appears comfortable.    	  REVIEW OF SYSTEMS:  CONSTITUTIONAL: No fever, weight loss, or fatigue  EYES: No eye pain, visual disturbances, or discharge  ENT:  No difficulty hearing, tinnitus, vertigo; No sinus or throat pain  NECK: No pain or stiffness  RESPIRATORY: No cough, wheezing, chills or hemoptysis; No Shortness of Breath  CARDIOVASCULAR: No chest pain, palpitations, passing out, dizziness, or leg swelling  GASTROINTESTINAL: No abdominal or epigastric pain. No nausea, vomiting, or hematemesis; No diarrhea or constipation. No melena or hematochezia.  GENITOURINARY: No dysuria, frequency, hematuria, or incontinence  NEUROLOGICAL: No headaches, memory loss, loss of strength, numbness, or tremors  SKIN: No itching, burning, rashes, or lesions   LYMPH Nodes: No enlarged glands  ENDOCRINE: No heat or cold intolerance; No hair loss  MUSCULOSKELETAL: No joint pain or swelling; No muscle, back, or extremity pain  PSYCHIATRIC: No depression, anxiety, mood swings, or difficulty sleeping  HEME/LYMPH: No easy bruising, or bleeding gums  ALLERGY AND IMMUNOLOGIC: No hives or eczema	        PHYSICAL EXAM:  T(C): 36.4 (04-15-22 @ 07:00), Max: 36.6 (04-15-22 @ 00:00)  HR: 75 (04-15-22 @ 12:00) (67 - 82)  BP: 131/48 (04-15-22 @ 12:00) (109/39 - 159/52)  RR: 21 (04-15-22 @ 12:00) (12 - 24)  SpO2: 97% (04-15-22 @ 12:00) (96% - 100%)  Wt(kg): --  I&O's Summary    14 Apr 2022 07:01  -  15 Apr 2022 07:00  --------------------------------------------------------  IN: 560 mL / OUT: 2100 mL / NET: -1540 mL    15 Apr 2022 07:01  -  15 Apr 2022 12:54  --------------------------------------------------------  IN: 120 mL / OUT: 250 mL / NET: -130 mL    Tele-nsr,pac's    Appearance: Normal	  HEENT:   Normal oral mucosa, PERRL, EOMI	  Lymphatic: No lymphadenopathy  Cardiovascular: Normal S1 S2, No JVD, No murmurs, No edema  Respiratory: Lungs clear to auscultation	  Psychiatry: A & O x 3, Mood & affect appropriate  Gastrointestinal:  Soft, Non-tender, + BS	  Skin: No rashes, No ecchymoses, No cyanosis	  Neurologic: Non-focal  Extremities: Normal range of motion, No clubbing, cyanosis or edema  Vascular: Peripheral pulses palpable 2+ bilaterally    MEDICATIONS  (STANDING):  apixaban 2.5 milliGRAM(s) Oral two times a day  chlorhexidine 2% Cloths 1 Application(s) Topical <User Schedule>  finasteride 5 milliGRAM(s) Oral daily  polyethylene glycol 3350 17 Gram(s) Oral at bedtime  senna 2 Tablet(s) Oral at bedtime  simvastatin 20 milliGRAM(s) Oral at bedtime      	  LABS:	 	      Troponin I, High Sensitivity Result: 46.3 ng/L (04-12 @ 18:13)                            11.4   8.44  )-----------( 212      ( 15 Apr 2022 04:33 )             34.5     04-15    140  |  112<H>  |  46<H>  ----------------------------<  97  4.8   |  22  |  1.84<H>    Ca    9.2      15 Apr 2022 04:33  Phos  3.0     04-15  Mg     2.1     04-15

## 2022-04-16 LAB
ALBUMIN SERPL ELPH-MCNC: 2.6 G/DL — LOW (ref 3.5–5)
ALP SERPL-CCNC: 73 U/L — SIGNIFICANT CHANGE UP (ref 40–120)
ALT FLD-CCNC: 35 U/L DA — SIGNIFICANT CHANGE UP (ref 10–60)
ANION GAP SERPL CALC-SCNC: 5 MMOL/L — SIGNIFICANT CHANGE UP (ref 5–17)
AST SERPL-CCNC: 21 U/L — SIGNIFICANT CHANGE UP (ref 10–40)
BILIRUB SERPL-MCNC: 0.5 MG/DL — SIGNIFICANT CHANGE UP (ref 0.2–1.2)
BUN SERPL-MCNC: 37 MG/DL — HIGH (ref 7–18)
CALCIUM SERPL-MCNC: 9 MG/DL — SIGNIFICANT CHANGE UP (ref 8.4–10.5)
CHLORIDE SERPL-SCNC: 109 MMOL/L — HIGH (ref 96–108)
CO2 SERPL-SCNC: 24 MMOL/L — SIGNIFICANT CHANGE UP (ref 22–31)
CREAT SERPL-MCNC: 1.7 MG/DL — HIGH (ref 0.5–1.3)
EGFR: 37 ML/MIN/1.73M2 — LOW
GLUCOSE SERPL-MCNC: 98 MG/DL — SIGNIFICANT CHANGE UP (ref 70–99)
HCT VFR BLD CALC: 36.7 % — LOW (ref 39–50)
HGB BLD-MCNC: 12.2 G/DL — LOW (ref 13–17)
MAGNESIUM SERPL-MCNC: 1.8 MG/DL — SIGNIFICANT CHANGE UP (ref 1.6–2.6)
MCHC RBC-ENTMCNC: 28.6 PG — SIGNIFICANT CHANGE UP (ref 27–34)
MCHC RBC-ENTMCNC: 33.2 GM/DL — SIGNIFICANT CHANGE UP (ref 32–36)
MCV RBC AUTO: 85.9 FL — SIGNIFICANT CHANGE UP (ref 80–100)
NRBC # BLD: 0 /100 WBCS — SIGNIFICANT CHANGE UP (ref 0–0)
PHOSPHATE SERPL-MCNC: 2.5 MG/DL — SIGNIFICANT CHANGE UP (ref 2.5–4.5)
PLATELET # BLD AUTO: 207 K/UL — SIGNIFICANT CHANGE UP (ref 150–400)
POTASSIUM SERPL-MCNC: 4.7 MMOL/L — SIGNIFICANT CHANGE UP (ref 3.5–5.3)
POTASSIUM SERPL-SCNC: 4.7 MMOL/L — SIGNIFICANT CHANGE UP (ref 3.5–5.3)
PROT SERPL-MCNC: 6 G/DL — SIGNIFICANT CHANGE UP (ref 6–8.3)
RBC # BLD: 4.27 M/UL — SIGNIFICANT CHANGE UP (ref 4.2–5.8)
RBC # FLD: 12.7 % — SIGNIFICANT CHANGE UP (ref 10.3–14.5)
SODIUM SERPL-SCNC: 138 MMOL/L — SIGNIFICANT CHANGE UP (ref 135–145)
WBC # BLD: 8.08 K/UL — SIGNIFICANT CHANGE UP (ref 3.8–10.5)
WBC # FLD AUTO: 8.08 K/UL — SIGNIFICANT CHANGE UP (ref 3.8–10.5)

## 2022-04-16 RX ADMIN — APIXABAN 2.5 MILLIGRAM(S): 2.5 TABLET, FILM COATED ORAL at 06:32

## 2022-04-16 RX ADMIN — SENNA PLUS 2 TABLET(S): 8.6 TABLET ORAL at 21:12

## 2022-04-16 RX ADMIN — APIXABAN 2.5 MILLIGRAM(S): 2.5 TABLET, FILM COATED ORAL at 17:30

## 2022-04-16 RX ADMIN — POLYETHYLENE GLYCOL 3350 17 GRAM(S): 17 POWDER, FOR SOLUTION ORAL at 21:11

## 2022-04-16 RX ADMIN — SIMVASTATIN 20 MILLIGRAM(S): 20 TABLET, FILM COATED ORAL at 21:12

## 2022-04-16 RX ADMIN — CHLORHEXIDINE GLUCONATE 1 APPLICATION(S): 213 SOLUTION TOPICAL at 05:18

## 2022-04-16 RX ADMIN — FINASTERIDE 5 MILLIGRAM(S): 5 TABLET, FILM COATED ORAL at 11:20

## 2022-04-16 NOTE — DIETITIAN INITIAL EVALUATION ADULT - PERTINENT MEDS FT
MEDICATIONS  (STANDING):  apixaban 2.5 milliGRAM(s) Oral two times a day  chlorhexidine 2% Cloths 1 Application(s) Topical <User Schedule>  finasteride 5 milliGRAM(s) Oral daily  polyethylene glycol 3350 17 Gram(s) Oral at bedtime  senna 2 Tablet(s) Oral at bedtime  simvastatin 20 milliGRAM(s) Oral at bedtime    MEDICATIONS  (PRN):

## 2022-04-16 NOTE — DIETITIAN INITIAL EVALUATION ADULT - OTHER INFO
Pt lives home with family PTA, alert, oriented, asleep when visited in ICU today; Limited intake/wt change history data available at present; appetite very good, 100% meal intake, no GI distress reported per RN; s/p  consult  Pt lives home with family PTA, alert, oriented, asleep when visited in ICU today; Limited intake/wt change history data available at present; appetite very good, 100% meal intake, no GI distress reported per RN, 76 to 100% intake per flow sheet; s/p  consult

## 2022-04-16 NOTE — DIETITIAN INITIAL EVALUATION ADULT - NSICDXPASTMEDICALHX_GEN_ALL_CORE_FT
PAST MEDICAL HISTORY:  Atrial fibrillation     BPH (benign prostatic hyperplasia)     Chronic kidney disease, unspecified CKD stage     Hyperlipidemia     Hypertension        (2) spontaneous and intermittent (24 hrs old)

## 2022-04-16 NOTE — DIETITIAN INITIAL EVALUATION ADULT - PERTINENT LABORATORY DATA
04-16    138  |  109<H>  |  37<H>  ----------------------------<  98  4.7   |  24  |  1.70<H>    Ca    9.0      16 Apr 2022 04:21  Phos  2.5     04-16  Mg     1.8     04-16    TPro  6.0  /  Alb  2.6<L>  /  TBili  0.5  /  DBili  x   /  AST  21  /  ALT  35  /  AlkPhos  73  04-16

## 2022-04-16 NOTE — CHART NOTE - NSCHARTNOTEFT_GEN_A_CORE
Patient is a 89 year old male with PMHx of Atrial Fibrillation on Eliquis, HLD, CKD, BPH with chronic Chinchilla, recent pseudomonas bacteruria, who presented with one day of general malaise with noted hypotension and bradycardia at home. Normal -110, noted it was 90s to low 100s, with low HR. History assisted by patient's daughter, Della at bedside. patient has been at his usual state of health at home. ER visit 6 days ago for Chinchilla exchange, noted to have pseudomonas in urine culture, was dced on cipro. At time of evaluation, patient awake and alert, feels improved compared to earlier. No chest pain, palpitations, SOb. No fever, chills. No hematuria.    In the ED, suspected bradycardia from polypharmacy. Dig levels were 0.9, received digoxin binding ab. Patient was given glucagon with reported improvement in hemodynamics. Cardiology was consulted recommended ICU for close monitoring.     ICU course:   Pt was started on glucagon drip. HR improved and glucagon drip was discontinued on 4/14. Patient will need a micro pacemaker as per cards. Eliquis was resumed for afib.       Things to follow:   1) Micro placement   2) Cards recommendations on restarted home antiarrhythmics       Patient is stable for downgrade to floor under care of  ------------- for further management , covering resident ---------- was informed.        <Things to follow up> Patient is a 89 year old male with PMHx of Atrial Fibrillation on Eliquis, HLD, CKD, BPH with chronic Chinchilla, recent pseudomonas bacteruria, who presented with one day of general malaise with noted hypotension and bradycardia at home. Normal -110, noted it was 90s to low 100s, with low HR. History assisted by patient's daughter, Della at bedside. patient has been at his usual state of health at home. ER visit 6 days ago for Chinchilla exchange, noted to have pseudomonas in urine culture, was dced on cipro. At time of evaluation, patient awake and alert, feels improved compared to earlier. No chest pain, palpitations, SOb. No fever, chills. No hematuria.    In the ED, suspected bradycardia from polypharmacy. Dig levels were 0.9, received digoxin binding ab. Patient was given glucagon with reported improvement in hemodynamics. Cardiology was consulted recommended ICU for close monitoring.     ICU course:   Pt was started on glucagon drip. HR improved and glucagon drip was discontinued on 4/14. Patient will need a micro pacemaker as per cards. Eliquis was resumed for afib.       Things to follow:   1) Micro placement   2) Cards recommendations on restarted home antiarrhythmics       Patient is stable for downgrade to floor under care of  ------------- for further management , covering resident Dr. Schwartz was informed. Patient is a 89 year old male with PMHx of Atrial Fibrillation on Eliquis, HLD, CKD, BPH with chronic Chinchilla, recent pseudomonas bacteruria, who presented with one day of general malaise with noted hypotension and bradycardia at home. Normal -110, noted it was 90s to low 100s, with low HR. History assisted by patient's daughter, Della at bedside. patient has been at his usual state of health at home. ER visit 6 days ago for Chinchilla exchange, noted to have pseudomonas in urine culture, was dced on cipro. At time of evaluation, patient awake and alert, feels improved compared to earlier. No chest pain, palpitations, SOb. No fever, chills. No hematuria.    In the ED, suspected bradycardia from polypharmacy. Dig levels were 0.9, received digoxin binding ab. Patient was given glucagon with reported improvement in hemodynamics. Cardiology was consulted recommended ICU for close monitoring.     ICU course:   Pt was started on glucagon drip. HR improved and glucagon drip was discontinued on 4/14. Patient will need a micro pacemaker as per cards. Eliquis was resumed for afib.  HR and blood pressure stable off of glucagon. Currently HR controlled off BB.       Things to follow:   1) Micro placement   2) Cards recommendations on restarted home antiarrhythmics       Patient is stable for downgrade to floor under care of Dr. Gonsalves for further management , covering resident Dr. Schwartz was informed.

## 2022-04-16 NOTE — DIETITIAN INITIAL EVALUATION ADULT - FEEDING ASSISTANCE
Provide food choices within diet Rx as available/updated; Nursing to continue feeding assistance and encouragement as needed

## 2022-04-16 NOTE — PROGRESS NOTE ADULT - ASSESSMENT
89 year old male with PMHx of Parox Atrial Fibrillation on Eliquis, HLD, CKD, BPH with chronic Chinchilla, recent pseudomonas bacteruria, who presented with one day of general malaise with noted hypotension and bradycardia at home,hyperkalemia,UTI.  1.Transfer to tele.  2.Bradycardia multifactorial-hyperkalemia +/_ b blocker, dig level .9-therapeutic. EP eval noted, PPM after infection resolved.  3.UTI-ABX.  4.PAF-eliquis, resume low dose b blocker.  5.PPI. 89 year old male with PMHx of Parox Atrial Fibrillation on Eliquis, HLD, CKD, BPH with chronic Chinchilla, recent pseudomonas bacteruria, who presented with one day of general malaise with noted hypotension and bradycardia at home,hyperkalemia,UTI.  1.Transfer to tele.  2.Bradycardia multifactorial-hyperkalemia +/_ b blocker, dig level .9-therapeutic. EP eval noted, PPM after infection resolved.  3.UTI-s/p ABX.  4.PAF-eliquis, resume low dose b blocker.  5.PPI.

## 2022-04-16 NOTE — PROGRESS NOTE ADULT - SUBJECTIVE AND OBJECTIVE BOX
CHIEF COMPLAINT:Patient is a 89y old  Male who presents with a chief complaint of Bradycardia,JAMES on CRI with hyperkalemia.Pt appears comfortable.     (16 Apr 2022 09:26)    	  REVIEW OF SYSTEMS:  CONSTITUTIONAL: No fever, weight loss, or fatigue  EYES: No eye pain, visual disturbances, or discharge  ENT:  No difficulty hearing, tinnitus, vertigo; No sinus or throat pain  NECK: No pain or stiffness  RESPIRATORY: No cough, wheezing, chills or hemoptysis; No Shortness of Breath  CARDIOVASCULAR: No chest pain, palpitations, passing out, dizziness, or leg swelling  GASTROINTESTINAL: No abdominal or epigastric pain. No nausea, vomiting, or hematemesis; No diarrhea or constipation. No melena or hematochezia.  GENITOURINARY: No dysuria, frequency, hematuria, or incontinence  NEUROLOGICAL: No headaches, memory loss, loss of strength, numbness, or tremors  SKIN: No itching, burning, rashes, or lesions   LYMPH Nodes: No enlarged glands  ENDOCRINE: No heat or cold intolerance; No hair loss  MUSCULOSKELETAL: No joint pain or swelling; No muscle, back, or extremity pain  PSYCHIATRIC: No depression, anxiety, mood swings, or difficulty sleeping  HEME/LYMPH: No easy bruising, or bleeding gums  ALLERGY AND IMMUNOLOGIC: No hives or eczema	      PHYSICAL EXAM:  T(C): 36.6 (04-16-22 @ 12:00), Max: 36.6 (04-15-22 @ 19:32)  HR: 78 (04-16-22 @ 12:00) (67 - 82)  BP: 147/62 (04-16-22 @ 12:00) (108/51 - 168/58)  RR: 22 (04-16-22 @ 12:00) (12 - 22)  SpO2: 98% (04-16-22 @ 12:00) (96% - 100%)  Wt(kg): --  I&O's Summary    15 Apr 2022 07:01  -  16 Apr 2022 07:00  --------------------------------------------------------  IN: 360 mL / OUT: 2050 mL / NET: -1690 mL    16 Apr 2022 07:01  -  16 Apr 2022 12:49  --------------------------------------------------------  IN: 700 mL / OUT: 675 mL / NET: 25 mL        Appearance: Normal	  HEENT:   Normal oral mucosa, PERRL, EOMI	  Lymphatic: No lymphadenopathy  Cardiovascular: Normal S1 S2, No JVD, No murmurs, No edema  Respiratory: Lungs clear to auscultation	  Psychiatry: A & O x 3, Mood & affect appropriate  Gastrointestinal:  Soft, Non-tender, + BS	  Skin: No rashes, No ecchymoses, No cyanosis	  Neurologic: Non-focal  Extremities: Normal range of motion, No clubbing, cyanosis or edema  Vascular: Peripheral pulses palpable 2+ bilaterally    MEDICATIONS  (STANDING):  apixaban 2.5 milliGRAM(s) Oral two times a day  chlorhexidine 2% Cloths 1 Application(s) Topical <User Schedule>  finasteride 5 milliGRAM(s) Oral daily  polyethylene glycol 3350 17 Gram(s) Oral at bedtime  senna 2 Tablet(s) Oral at bedtime  simvastatin 20 milliGRAM(s) Oral at bedtime      	  	  LABS:	 	                     12.2   8.08  )-----------( 207      ( 16 Apr 2022 04:21 )             36.7     04-16    138  |  109<H>  |  37<H>  ----------------------------<  98  4.7   |  24  |  1.70<H>    Ca    9.0      16 Apr 2022 04:21  Phos  2.5     04-16  Mg     1.8     04-16    TPro  6.0  /  Alb  2.6<L>  /  TBili  0.5  /  DBili  x   /  AST  21  /  ALT  35  /  AlkPhos  73  04-16

## 2022-04-16 NOTE — PROGRESS NOTE ADULT - ATTENDING COMMENTS
Patient is a 89 year old male withf Atrial Fibrillation on Eliquis, HLD, CKD, BPH with chronic Salmon, recent pseudomonas bacteruria, who presented with malaise, found with hypotension and bradycardia probable  due to medication toxicity from digoxin/metoprolol. Admitted to the ICU for glucagon infusion for BB toxicity and close monitoring.    Assessment:  -  Bradycardia  -  Betablocker toxicity  -  Atrial fibrillation  -  Acute on chronic kidney injury  -  Pseudomonas UTI  -  BPH with chronic salmon' cath       Plan   - O2 supp as needed   - Hemodynamic monitoring  - Glucogan iv  d/c , heart rate improved   - Monitor heart rate   - Beta- and dig held  - No antibx for now   - Diet   - Salmon's cath   - Transfer to tele .

## 2022-04-16 NOTE — PROGRESS NOTE ADULT - ASSESSMENT
Patient is a 89 year old male with PMHx of Atrial Fibrillation on Eliquis, HLD, CKD, BPH with chronic Salmon, recent pseudomonas bacteruria, who presented with malaise, found with hypotension and bradycardia. Suspected to be due to medication toxicity from digoxin/metoprolol. Admitted to the ICU for glucagon infusion for BB toxicity and close monitoring.    Assessment:  # Bradycardia  # betablocker toxicity  # atrial fibrillation  # acute on chronic kidney injury  # Pseudomonas UTI  # BPH with chronic salmon      Plan:  Neuro:  # no acute issues    CV:  # bradycardia 2/2 betablocker toxicity vs digoxin  - reportedly improved with glucagon push, started with glucagon gtt  - Cardio, Dr. ceron, continue to monitor  - bedside pacemaker, hold epinephrine gtt  - digoxin levels 0.9, s/p dig binding ab  - Glucagon drip discontinued 4/15    #Atrial Fibrillation  - Afib on ECG  - resume eliquis  - hold betablocker given bradycardia and micro placement     Pulm:  # No acute issues    ID:  - hx of Pseudomonas UTI  - as demonstrated on UCx 1 week ago  - reportedly completed course of cipro  - hold abx for now    Nephro:  # Acute on chronic kidney injury  - possibly in the setting of UTI, prerenal from hypotension  - keep normotensive, repeat BMP  - urine lytes    #BPH with chronic salmon  - no acute issues    GI:  Keep NPO    Heme:  - no indication for transfusion     Endo:  # No acute issues    FEN:  - minimize IVF to avoid worsening breathing- CHF    Prophy:  eliquis    Dispo:  - monitor in the ICU

## 2022-04-16 NOTE — PROGRESS NOTE ADULT - SUBJECTIVE AND OBJECTIVE BOX
INTERVAL HPI/OVERNIGHT EVENTS: *** Patient was seen and examined at bed side.     PRESSORS: [ ] YES [ ] NO  WHICH:    ANTIBIOTICS:                  DATE STARTED:  ANTIBIOTICS:                  DATE STARTED:  ANTIBIOTICS:                  DATE STARTED:    Antimicrobial:    Cardiovascular:    Pulmonary:    Hematalogic:  apixaban 2.5 milliGRAM(s) Oral two times a day    Other:  chlorhexidine 2% Cloths 1 Application(s) Topical <User Schedule>  finasteride 5 milliGRAM(s) Oral daily  polyethylene glycol 3350 17 Gram(s) Oral at bedtime  senna 2 Tablet(s) Oral at bedtime  simvastatin 20 milliGRAM(s) Oral at bedtime    apixaban 2.5 milliGRAM(s) Oral two times a day  chlorhexidine 2% Cloths 1 Application(s) Topical <User Schedule>  finasteride 5 milliGRAM(s) Oral daily  polyethylene glycol 3350 17 Gram(s) Oral at bedtime  senna 2 Tablet(s) Oral at bedtime  simvastatin 20 milliGRAM(s) Oral at bedtime    Drug Dosing Weight  Height (cm): 170.2 (12 Apr 2022 17:52)  Weight (kg): 78.7 (12 Apr 2022 17:52)  BMI (kg/m2): 27.2 (12 Apr 2022 17:52)  BSA (m2): 1.9 (12 Apr 2022 17:52)    CENTRAL LINE: [ ] YES [ ] NO  LOCATION:   DATE INSERTED:  REMOVE: [ ] YES [ ] NO  EXPLAIN:    ARROYO: [ ] YES [ ] NO    DATE INSERTED:  REMOVE:  [ ] YES [ ] NO  EXPLAIN:    A-LINE:  [ ] YES [ ] NO  LOCATION:   DATE INSERTED:  REMOVE:  [ ] YES [ ] NO  EXPLAIN:        ICU Vital Signs Last 24 Hrs  T(C): 36.2 (16 Apr 2022 07:00), Max: 36.6 (15 Apr 2022 19:32)  T(F): 97.1 (16 Apr 2022 07:00), Max: 97.9 (15 Apr 2022 19:32)  HR: 70 (16 Apr 2022 07:00) (67 - 82)  BP: 145/67 (16 Apr 2022 07:00) (116/45 - 168/58)  BP(mean): 85 (16 Apr 2022 07:00) (61 - 88)  ABP: --  ABP(mean): --  RR: 15 (16 Apr 2022 07:00) (12 - 24)  SpO2: 99% (16 Apr 2022 07:00) (96% - 100%)            04-15 @ 07:01  -  04-16 @ 07:00  --------------------------------------------------------  IN: 360 mL / OUT: 2050 mL / NET: -1690 mL            PHYSICAL EXAM:    GENERAL:NAD, well-groomed, well-developed  HEAD:  Atraumatic, Normocephalic  EYES: EOMI, PERRLA, conjunctiva and sclera clear  ENMT: No tonsillar erythema, exudates, or enlargement; Moist mucous membranes, Good dentition, No lesions  NECK: Supple, normal appearance, No JVD; Normal thyroid; Trachea midline  NERVOUS SYSTEM: Alert & Oriented X3, Good concentration; Motor Strength 5/5 B/L upper and lower extremities; DTRs 2+ intact and symmetric  CHEST/LUNG: No chest deformity;Normal percussion bilaterally; No rales, rhonchi, wheezing   HEART: Regular rate and rhythm; No murmurs, rubs, or gallops  ABDOMEN: Soft, Nontender, Nondistended; Bowel sounds present  EXTREMITIES: 2+ Peripheral Pulses, No clubbing, cyanosis, or edema  LYMPH: No lymphadenopathy noted  SKIN:No rashes or lesions; Good capillary refill      LABS:  CBC Full  -  ( 16 Apr 2022 04:21 )  WBC Count : 8.08 K/uL  RBC Count : 4.27 M/uL  Hemoglobin : 12.2 g/dL  Hematocrit : 36.7 %  Platelet Count - Automated : 207 K/uL  Mean Cell Volume : 85.9 fl  Mean Cell Hemoglobin : 28.6 pg  Mean Cell Hemoglobin Concentration : 33.2 gm/dL  Auto Neutrophil # : x  Auto Lymphocyte # : x  Auto Monocyte # : x  Auto Eosinophil # : x  Auto Basophil # : x  Auto Neutrophil % : x  Auto Lymphocyte % : x  Auto Monocyte % : x  Auto Eosinophil % : x  Auto Basophil % : x    04-16    138  |  109<H>  |  37<H>  ----------------------------<  98  4.7   |  24  |  1.70<H>    Ca    9.0      16 Apr 2022 04:21  Phos  2.5     04-16  Mg     1.8     04-16    TPro  6.0  /  Alb  2.6<L>  /  TBili  0.5  /  DBili  x   /  AST  21  /  ALT  35  /  AlkPhos  73  04-16            RADIOLOGY & ADDITIONAL STUDIES REVIEWED:  ***    GOALS OF CARE DISCUSSION WITH PATIENT/FAMILY/PROXY: full code/DNR/DNI    CRITICAL CARE TIME SPENT: 35 minutes INTERVAL HPI/OVERNIGHT EVENTS: Patient was seen and examined at bed side.     PRESSORS: [ ] YES [ ] NO  WHICH:    ANTIBIOTICS:                  DATE STARTED:  ANTIBIOTICS:                  DATE STARTED:  ANTIBIOTICS:                  DATE STARTED:    Antimicrobial:    Cardiovascular:    Pulmonary:    Hematalogic:  apixaban 2.5 milliGRAM(s) Oral two times a day    Other:  chlorhexidine 2% Cloths 1 Application(s) Topical <User Schedule>  finasteride 5 milliGRAM(s) Oral daily  polyethylene glycol 3350 17 Gram(s) Oral at bedtime  senna 2 Tablet(s) Oral at bedtime  simvastatin 20 milliGRAM(s) Oral at bedtime    apixaban 2.5 milliGRAM(s) Oral two times a day  chlorhexidine 2% Cloths 1 Application(s) Topical <User Schedule>  finasteride 5 milliGRAM(s) Oral daily  polyethylene glycol 3350 17 Gram(s) Oral at bedtime  senna 2 Tablet(s) Oral at bedtime  simvastatin 20 milliGRAM(s) Oral at bedtime    Drug Dosing Weight  Height (cm): 170.2 (12 Apr 2022 17:52)  Weight (kg): 78.7 (12 Apr 2022 17:52)  BMI (kg/m2): 27.2 (12 Apr 2022 17:52)  BSA (m2): 1.9 (12 Apr 2022 17:52)    CENTRAL LINE: [ ] YES [ ] NO  LOCATION:   DATE INSERTED:  REMOVE: [ ] YES [ ] NO  EXPLAIN:    ARROYO: [ ] YES [ ] NO    DATE INSERTED:  REMOVE:  [ ] YES [ ] NO  EXPLAIN:    A-LINE:  [ ] YES [ ] NO  LOCATION:   DATE INSERTED:  REMOVE:  [ ] YES [ ] NO  EXPLAIN:        ICU Vital Signs Last 24 Hrs  T(C): 36.2 (16 Apr 2022 07:00), Max: 36.6 (15 Apr 2022 19:32)  T(F): 97.1 (16 Apr 2022 07:00), Max: 97.9 (15 Apr 2022 19:32)  HR: 70 (16 Apr 2022 07:00) (67 - 82)  BP: 145/67 (16 Apr 2022 07:00) (116/45 - 168/58)  BP(mean): 85 (16 Apr 2022 07:00) (61 - 88)  ABP: --  ABP(mean): --  RR: 15 (16 Apr 2022 07:00) (12 - 24)  SpO2: 99% (16 Apr 2022 07:00) (96% - 100%)            04-15 @ 07:01  -  04-16 @ 07:00  --------------------------------------------------------  IN: 360 mL / OUT: 2050 mL / NET: -1690 mL            PHYSICAL EXAM:    GENERAL:NAD, well-groomed, well-developed  HEAD:  Atraumatic, Normocephalic  EYES: EOMI, PERRLA, conjunctiva and sclera clear  ENMT: No tonsillar erythema, exudates, or enlargement; Moist mucous membranes, Good dentition, No lesions  NECK: Supple, normal appearance, No JVD; Normal thyroid; Trachea midline  NERVOUS SYSTEM: Alert & Oriented X3, Good concentration; Motor Strength 5/5 B/L upper and lower extremities; DTRs 2+ intact and symmetric  CHEST/LUNG: No chest deformity;Normal percussion bilaterally; No rales, rhonchi, wheezing   HEART: Regular rate and rhythm; No murmurs, rubs, or gallops  ABDOMEN: Soft, Nontender, Nondistended; Bowel sounds present  EXTREMITIES: 2+ Peripheral Pulses, No clubbing, cyanosis, or edema  LYMPH: No lymphadenopathy noted  SKIN:No rashes or lesions; Good capillary refill      LABS:  CBC Full  -  ( 16 Apr 2022 04:21 )  WBC Count : 8.08 K/uL  RBC Count : 4.27 M/uL  Hemoglobin : 12.2 g/dL  Hematocrit : 36.7 %  Platelet Count - Automated : 207 K/uL  Mean Cell Volume : 85.9 fl  Mean Cell Hemoglobin : 28.6 pg  Mean Cell Hemoglobin Concentration : 33.2 gm/dL  Auto Neutrophil # : x  Auto Lymphocyte # : x  Auto Monocyte # : x  Auto Eosinophil # : x  Auto Basophil # : x  Auto Neutrophil % : x  Auto Lymphocyte % : x  Auto Monocyte % : x  Auto Eosinophil % : x  Auto Basophil % : x    04-16    138  |  109<H>  |  37<H>  ----------------------------<  98  4.7   |  24  |  1.70<H>    Ca    9.0      16 Apr 2022 04:21  Phos  2.5     04-16  Mg     1.8     04-16    TPro  6.0  /  Alb  2.6<L>  /  TBili  0.5  /  DBili  x   /  AST  21  /  ALT  35  /  AlkPhos  73  04-16            RADIOLOGY & ADDITIONAL STUDIES REVIEWED:  ***    GOALS OF CARE DISCUSSION WITH PATIENT/FAMILY/PROXY: full code/DNR/DNI    CRITICAL CARE TIME SPENT: 35 minutes INTERVAL HPI/OVERNIGHT EVENTS: Patient was seen and examined at bed side.     PRESSORS: [ ] YES [ ] NO  WHICH:    ANTIBIOTICS:                  DATE STARTED:  ANTIBIOTICS:                  DATE STARTED:  ANTIBIOTICS:                  DATE STARTED:    Antimicrobial:    Cardiovascular:    Pulmonary:    Hematalogic:  apixaban 2.5 milliGRAM(s) Oral two times a day    Other:  chlorhexidine 2% Cloths 1 Application(s) Topical <User Schedule>  finasteride 5 milliGRAM(s) Oral daily  polyethylene glycol 3350 17 Gram(s) Oral at bedtime  senna 2 Tablet(s) Oral at bedtime  simvastatin 20 milliGRAM(s) Oral at bedtime    apixaban 2.5 milliGRAM(s) Oral two times a day  chlorhexidine 2% Cloths 1 Application(s) Topical <User Schedule>  finasteride 5 milliGRAM(s) Oral daily  polyethylene glycol 3350 17 Gram(s) Oral at bedtime  senna 2 Tablet(s) Oral at bedtime  simvastatin 20 milliGRAM(s) Oral at bedtime    Drug Dosing Weight  Height (cm): 170.2 (12 Apr 2022 17:52)  Weight (kg): 78.7 (12 Apr 2022 17:52)  BMI (kg/m2): 27.2 (12 Apr 2022 17:52)  BSA (m2): 1.9 (12 Apr 2022 17:52)    CENTRAL LINE: [ ] YES [ ] NO  LOCATION:   DATE INSERTED:  REMOVE: [ ] YES [ ] NO  EXPLAIN:    ARROYO: [ ] YES [ ] NO    DATE INSERTED:  REMOVE:  [ ] YES [ ] NO  EXPLAIN:    A-LINE:  [ ] YES [ ] NO  LOCATION:   DATE INSERTED:  REMOVE:  [ ] YES [ ] NO  EXPLAIN:        ICU Vital Signs Last 24 Hrs  T(C): 36.2 (16 Apr 2022 07:00), Max: 36.6 (15 Apr 2022 19:32)  T(F): 97.1 (16 Apr 2022 07:00), Max: 97.9 (15 Apr 2022 19:32)  HR: 70 (16 Apr 2022 07:00) (67 - 82)  BP: 145/67 (16 Apr 2022 07:00) (116/45 - 168/58)  BP(mean): 85 (16 Apr 2022 07:00) (61 - 88)  ABP: --  ABP(mean): --  RR: 15 (16 Apr 2022 07:00) (12 - 24)  SpO2: 99% (16 Apr 2022 07:00) (96% - 100%)            04-15 @ 07:01  -  04-16 @ 07:00  --------------------------------------------------------  IN: 360 mL / OUT: 2050 mL / NET: -1690 mL            PHYSICAL EXAM:    GENERAL: NAD  EYES: EOMI, PERRLA,   NECK: Supple, No JVD; Normal thyroid; Trachea midline  NERVOUS SYSTEM:  Alert & Oriented X3,  Motor Strength 5/5 B/L upper and lower extremities; DTRs 2+ intact and symmetric  CHEST/LUNG: No rales, rhonchi, wheezing   HEART: Regular rate and rhythm; No murmurs,   ABDOMEN: Soft, Nontender, Nondistended; Bowel sounds present  EXTREMITIES:  2+ Peripheral Pulses, No clubbing, cyanosis, or edema      LABS:  CBC Full  -  ( 16 Apr 2022 04:21 )  WBC Count : 8.08 K/uL  RBC Count : 4.27 M/uL  Hemoglobin : 12.2 g/dL  Hematocrit : 36.7 %  Platelet Count - Automated : 207 K/uL  Mean Cell Volume : 85.9 fl  Mean Cell Hemoglobin : 28.6 pg  Mean Cell Hemoglobin Concentration : 33.2 gm/dL  Auto Neutrophil # : x  Auto Lymphocyte # : x  Auto Monocyte # : x  Auto Eosinophil # : x  Auto Basophil # : x  Auto Neutrophil % : x  Auto Lymphocyte % : x  Auto Monocyte % : x  Auto Eosinophil % : x  Auto Basophil % : x    04-16    138  |  109<H>  |  37<H>  ----------------------------<  98  4.7   |  24  |  1.70<H>    Ca    9.0      16 Apr 2022 04:21  Phos  2.5     04-16  Mg     1.8     04-16    TPro  6.0  /  Alb  2.6<L>  /  TBili  0.5  /  DBili  x   /  AST  21  /  ALT  35  /  AlkPhos  73  04-16            RADIOLOGY & ADDITIONAL STUDIES REVIEWED:  ***    GOALS OF CARE DISCUSSION WITH PATIENT/FAMILY/PROXY: full code/DNR/DNI    CRITICAL CARE TIME SPENT: 35 minutes

## 2022-04-17 DIAGNOSIS — I48.91 UNSPECIFIED ATRIAL FIBRILLATION: ICD-10-CM

## 2022-04-17 DIAGNOSIS — Z29.9 ENCOUNTER FOR PROPHYLACTIC MEASURES, UNSPECIFIED: ICD-10-CM

## 2022-04-17 DIAGNOSIS — N40.0 BENIGN PROSTATIC HYPERPLASIA WITHOUT LOWER URINARY TRACT SYMPTOMS: ICD-10-CM

## 2022-04-17 DIAGNOSIS — I10 ESSENTIAL (PRIMARY) HYPERTENSION: ICD-10-CM

## 2022-04-17 DIAGNOSIS — N18.9 CHRONIC KIDNEY DISEASE, UNSPECIFIED: ICD-10-CM

## 2022-04-17 DIAGNOSIS — E78.5 HYPERLIPIDEMIA, UNSPECIFIED: ICD-10-CM

## 2022-04-17 DIAGNOSIS — R00.1 BRADYCARDIA, UNSPECIFIED: ICD-10-CM

## 2022-04-17 LAB
ANION GAP SERPL CALC-SCNC: 9 MMOL/L — SIGNIFICANT CHANGE UP (ref 5–17)
BUN SERPL-MCNC: 34 MG/DL — HIGH (ref 7–18)
CALCIUM SERPL-MCNC: 9.2 MG/DL — SIGNIFICANT CHANGE UP (ref 8.4–10.5)
CHLORIDE SERPL-SCNC: 106 MMOL/L — SIGNIFICANT CHANGE UP (ref 96–108)
CO2 SERPL-SCNC: 26 MMOL/L — SIGNIFICANT CHANGE UP (ref 22–31)
CREAT SERPL-MCNC: 1.61 MG/DL — HIGH (ref 0.5–1.3)
EGFR: 40 ML/MIN/1.73M2 — LOW
GLUCOSE SERPL-MCNC: 105 MG/DL — HIGH (ref 70–99)
HCT VFR BLD CALC: 39.7 % — SIGNIFICANT CHANGE UP (ref 39–50)
HGB BLD-MCNC: 12.9 G/DL — LOW (ref 13–17)
MAGNESIUM SERPL-MCNC: 1.8 MG/DL — SIGNIFICANT CHANGE UP (ref 1.6–2.6)
MCHC RBC-ENTMCNC: 28 PG — SIGNIFICANT CHANGE UP (ref 27–34)
MCHC RBC-ENTMCNC: 32.5 GM/DL — SIGNIFICANT CHANGE UP (ref 32–36)
MCV RBC AUTO: 86.1 FL — SIGNIFICANT CHANGE UP (ref 80–100)
NRBC # BLD: 0 /100 WBCS — SIGNIFICANT CHANGE UP (ref 0–0)
PHOSPHATE SERPL-MCNC: 3.1 MG/DL — SIGNIFICANT CHANGE UP (ref 2.5–4.5)
PLATELET # BLD AUTO: 220 K/UL — SIGNIFICANT CHANGE UP (ref 150–400)
POTASSIUM SERPL-MCNC: 4.4 MMOL/L — SIGNIFICANT CHANGE UP (ref 3.5–5.3)
POTASSIUM SERPL-SCNC: 4.4 MMOL/L — SIGNIFICANT CHANGE UP (ref 3.5–5.3)
RBC # BLD: 4.61 M/UL — SIGNIFICANT CHANGE UP (ref 4.2–5.8)
RBC # FLD: 12.5 % — SIGNIFICANT CHANGE UP (ref 10.3–14.5)
SODIUM SERPL-SCNC: 141 MMOL/L — SIGNIFICANT CHANGE UP (ref 135–145)
WBC # BLD: 7.16 K/UL — SIGNIFICANT CHANGE UP (ref 3.8–10.5)
WBC # FLD AUTO: 7.16 K/UL — SIGNIFICANT CHANGE UP (ref 3.8–10.5)

## 2022-04-17 PROCEDURE — 99233 SBSQ HOSP IP/OBS HIGH 50: CPT | Mod: GC

## 2022-04-17 RX ORDER — METOPROLOL TARTRATE 50 MG
12.5 TABLET ORAL
Refills: 0 | Status: DISCONTINUED | OUTPATIENT
Start: 2022-04-17 | End: 2022-04-19

## 2022-04-17 RX ADMIN — FINASTERIDE 5 MILLIGRAM(S): 5 TABLET, FILM COATED ORAL at 11:34

## 2022-04-17 RX ADMIN — APIXABAN 2.5 MILLIGRAM(S): 2.5 TABLET, FILM COATED ORAL at 17:26

## 2022-04-17 RX ADMIN — SIMVASTATIN 20 MILLIGRAM(S): 20 TABLET, FILM COATED ORAL at 20:52

## 2022-04-17 RX ADMIN — Medication 12.5 MILLIGRAM(S): at 17:26

## 2022-04-17 RX ADMIN — CHLORHEXIDINE GLUCONATE 1 APPLICATION(S): 213 SOLUTION TOPICAL at 05:19

## 2022-04-17 RX ADMIN — APIXABAN 2.5 MILLIGRAM(S): 2.5 TABLET, FILM COATED ORAL at 05:18

## 2022-04-17 NOTE — PROGRESS NOTE ADULT - PROBLEM SELECTOR PLAN 3
Afib on ECG  - resume eliquis  - hold betablocker given bradycardia and micro placement   Cardio [place]

## 2022-04-17 NOTE — PROGRESS NOTE ADULT - SUBJECTIVE AND OBJECTIVE BOX
CHIEF COMPLAINT:Patient is a 89y old  Male who presents with a chief complaint of Bradycardia .Pt appears comfortable.    	  REVIEW OF SYSTEMS:  CONSTITUTIONAL: No fever, weight loss, or fatigue  EYES: No eye pain, visual disturbances, or discharge  ENT:  No difficulty hearing, tinnitus, vertigo; No sinus or throat pain  NECK: No pain or stiffness  RESPIRATORY: No cough, wheezing, chills or hemoptysis; No Shortness of Breath  CARDIOVASCULAR: No chest pain, palpitations, passing out, dizziness, or leg swelling  GASTROINTESTINAL: No abdominal or epigastric pain. No nausea, vomiting, or hematemesis; No diarrhea or constipation. No melena or hematochezia.  GENITOURINARY: No dysuria, frequency, hematuria, or incontinence  NEUROLOGICAL: No headaches, memory loss, loss of strength, numbness, or tremors  SKIN: No itching, burning, rashes, or lesions   LYMPH Nodes: No enlarged glands  ENDOCRINE: No heat or cold intolerance; No hair loss  MUSCULOSKELETAL: No joint pain or swelling; No muscle, back, or extremity pain  PSYCHIATRIC: No depression, anxiety, mood swings, or difficulty sleeping  HEME/LYMPH: No easy bruising, or bleeding gums  ALLERGY AND IMMUNOLOGIC: No hives or eczema	        PHYSICAL EXAM:  T(C): 36.3 (04-17-22 @ 07:47), Max: 37.1 (04-16-22 @ 23:00)  HR: 67 (04-17-22 @ 07:47) (67 - 81)  BP: 137/55 (04-17-22 @ 07:47) (100/57 - 164/62)  RR: 19 (04-17-22 @ 07:47) (13 - 22)  SpO2: 98% (04-17-22 @ 07:47) (96% - 98%)  Wt(kg): --  I&O's Summary    16 Apr 2022 07:01  -  17 Apr 2022 07:00  --------------------------------------------------------  IN: 1050 mL / OUT: 1950 mL / NET: -900 mL        Appearance: Normal	  HEENT:   Normal oral mucosa, PERRL, EOMI	  Lymphatic: No lymphadenopathy  Cardiovascular: Normal S1 S2, No JVD, No murmurs, No edema  Respiratory: Lungs clear to auscultation	  Psychiatry: A & O x 3, Mood & affect appropriate  Gastrointestinal:  Soft, Non-tender, + BS	  Skin: No rashes, No ecchymoses, No cyanosis	  Neurologic: Non-focal  Extremities: Normal range of motion, No clubbing, cyanosis or edema  Vascular: Peripheral pulses palpable 2+ bilaterally    MEDICATIONS  (STANDING):  apixaban 2.5 milliGRAM(s) Oral two times a day  chlorhexidine 2% Cloths 1 Application(s) Topical <User Schedule>  finasteride 5 milliGRAM(s) Oral daily  polyethylene glycol 3350 17 Gram(s) Oral at bedtime  senna 2 Tablet(s) Oral at bedtime  simvastatin 20 milliGRAM(s) Oral at bedtime      TELEMETRY: nsr,pac's,pvc's	    	  	  LABS:	 	                        12.9   7.16  )-----------( 220      ( 17 Apr 2022 07:29 )             39.7     04-17    141  |  106  |  34<H>  ----------------------------<  105<H>  4.4   |  26  |  1.61<H>    Ca    9.2      17 Apr 2022 07:29  Phos  3.1     04-17  Mg     1.8     04-17    TPro  6.0  /  Alb  2.6<L>  /  TBili  0.5  /  DBili  x   /  AST  21  /  ALT  35  /  AlkPhos  73  04-16    proBNP:   Lipid Profile:   HgA1c:   TSH:

## 2022-04-17 NOTE — PROGRESS NOTE ADULT - ATTENDING COMMENTS
Patient was seen and examined at bedside. Denies any complains. Daughter at bedside     Vitals noted  ICU Vital Signs Last 24 Hrs  T(C): 36.8 (17 Apr 2022 16:15), Max: 37.1 (16 Apr 2022 23:00)  T(F): 98.2 (17 Apr 2022 16:15), Max: 98.7 (16 Apr 2022 23:00)  HR: 71 (17 Apr 2022 16:15) (67 - 87)  BP: 150/57 (17 Apr 2022 16:15) (100/57 - 160/72)  BP(mean): 89 (16 Apr 2022 20:00) (65 - 89)  ABP: --  ABP(mean): --  RR: 18 (17 Apr 2022 16:15) (15 - 21)  SpO2: 98% (17 Apr 2022 16:15) (96% - 98%)    P/E:  Elderly gentleman, NAD  AAOx2, no focal deficit   CTABL  S1S2 WNL, regular  Abd soft non tender, BS present   BL LE no edema   Chronic Chinchilla draining clear urine     Labs noted     A/P:  Bradycardia resolved   Afib with bifascicular block   Chronic systolic heart failure (EF 42%)  CKD  UTI due to pseudomonas- treated  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

## 2022-04-17 NOTE — PROGRESS NOTE ADULT - SUBJECTIVE AND OBJECTIVE BOX
PGY-1 Progress Note discussed with attending    PAGER #: [[phone]] TILL 5:00 PM  PLEASE CONTACT ON CALL TEAM:  - On Call Team (Please refer to Angel) FROM 5:00 PM - 8:30PM  - Nightfloat Team FROM 8:30 -7:30 AM    CHIEF COMPLAINT & BRIEF HOSPITAL COURSE:  Patient is a 89 year old male with PMHx of Atrial Fibrillation on Eliquis, HLD, CKD, BPH with chronic Chinchilla, recent pseudomonas bacteruria, who presented with one day of general malaise with noted hypotension and bradycardia at home. Normal -110, noted it was 90s to low 100s, with low HR. History assisted by patient's daughter, Della at bedside. patient has been at his usual state of health at home. ER visit 6 days ago for Chinchilla exchange, noted to have pseudomonas in urine culture, was dced on cipro. At time of evaluation, patient awake and alert, feels improved compared to earlier. No chest pain, palpitations, SOb. No fever, chills. No hematuria.    In the ED, suspected bradycardia from polypharmacy. Dig levels were 0.9, received digoxin binding ab. Patient was given glucagon with reported improvement in hemodynamics. Cardiology was consulted recommended ICU for close monitoring.     ICU course:   Pt was started on glucagon drip. HR improved and glucagon drip was discontinued on 4/14. Patient will need a micro pacemaker as per cards. Eliquis was resumed for afib.  HR and blood pressure stable off of glucagon. Currently HR controlled off BB.     INTERVAL HPI/OVERNIGHT EVENTS:   Patient seen and examined at bedside. No acute events overnight, Tele showing freq PVCs. Patient interviewed with [place]  (ID: [id number]). Patient with no acute complaints this AM    REVIEW OF SYSTEMS:  CONSTITUTIONAL: No fever, night sweats, weight loss, or fatigue  RESPIRATORY: No cough, wheezing, or hemoptysis; No shortness of breath  CARDIOVASCULAR: No chest pain, palpitations, dizziness, or leg swelling  GASTROINTESTINAL: No abdominal pain. No nausea, vomiting, or hematemesis; No diarrhea or constipation. No melena or hematochezia.  GENITOURINARY: No dysuria or hematuria, no urinary frequency  NEUROLOGICAL: No headaches, memory loss, loss of strength, numbness, or tremors  SKIN: No itching, burning, rashes, or lesions     Vital Signs Last 24 Hrs  T(C): 36.3 (17 Apr 2022 07:47), Max: 37.1 (16 Apr 2022 23:00)  T(F): 97.3 (17 Apr 2022 07:47), Max: 98.7 (16 Apr 2022 23:00)  HR: 67 (17 Apr 2022 07:47) (67 - 81)  BP: 137/55 (17 Apr 2022 07:47) (100/57 - 164/62)  BP(mean): 89 (16 Apr 2022 20:00) (65 - 89)  RR: 19 (17 Apr 2022 07:47) (13 - 22)  SpO2: 98% (17 Apr 2022 07:47) (96% - 98%)    PHYSICAL EXAMINATION:  GENERAL: Elderly Man, NAD, well built  HEAD:  Atraumatic, Normocephalic  EYES:  conjunctiva and sclera clear  NECK: Supple, No JVD, thyroid not examined   CHEST/LUNG: Clear to auscultation. No wheezing, rales, rubs or rhonchi  HEART: Regular rate and rhythm; Clear S1 and S2, No murmurs, rubs, or gallops  ABDOMEN: Soft, Nontender, Nondistended; Bowel sounds present  NERVOUS SYSTEM:  Alert & Oriented X3, CNII-XII intact, no focal neurological deficits   EXTREMITIES:  2+ Peripheral Pulses, No clubbing, cyanosis, or edema  SKIN: warm dry, no lesions noted                           12.9   7.16  )-----------( 220      ( 17 Apr 2022 07:29 )             39.7     04-17    141  |  106  |  34<H>  ----------------------------<  105<H>  4.4   |  26  |  1.61<H>    Ca    9.2      17 Apr 2022 07:29  Phos  3.1     04-17  Mg     1.8     04-17    TPro  6.0  /  Alb  2.6<L>  /  TBili  0.5  /  DBili  x   /  AST  21  /  ALT  35  /  AlkPhos  73  04-16    LIVER FUNCTIONS - ( 16 Apr 2022 04:21 )  Alb: 2.6 g/dL / Pro: 6.0 g/dL / ALK PHOS: 73 U/L / ALT: 35 U/L DA / AST: 21 U/L / GGT: x             CAPILLARY BLOOD GLUCOSE    MEDICATIONS  (STANDING):  apixaban 2.5 milliGRAM(s) Oral two times a day  chlorhexidine 2% Cloths 1 Application(s) Topical <User Schedule>  finasteride 5 milliGRAM(s) Oral daily  polyethylene glycol 3350 17 Gram(s) Oral at bedtime  senna 2 Tablet(s) Oral at bedtime  simvastatin 20 milliGRAM(s) Oral at bedtime    MEDICATIONS  (PRN):      RADIOLOGY & ADDITIONAL TESTS:      ACC: [id number] EXAM:  XR CHEST PORTABLE URGENT 1V                          PROCEDURE DATE:  04/12/2022          INTERPRETATION:  CLINICAL INDICATION: 89 years  Male with weak.    COMPARISON: 3/13/2022    AP view of the chest demonstrates the lungs to be clear. There is no   pleural effusion. There is no pneumothorax.    The heart is enlarged. The mediastinum and roland cannot be assessed..    The pulmonary vasculature is normal.    Mild thoracic degenerative changes are present.    IMPRESSION:    No acute infiltrate. Cardiomegaly.    --- End of Report ---      OLI ALCARAZ MD; Attending Radiologist  This document has been electronically signed. Apr 13 2022 12:37PM

## 2022-04-17 NOTE — PROGRESS NOTE ADULT - ASSESSMENT
Patient is a 89 year old male with PMHx of Atrial Fibrillation on Eliquis, HLD, CKD, BPH with chronic Salmon, recent pseudomonas bacteruria, who presented with malaise, found with hypotension and bradycardia. Suspected to be due to medication toxicity from digoxin/metoprolol. Admitted to the ICU for glucagon infusion for BB toxicity and close monitoring.    Assessment:  # Bradycardia  # betablocker toxicity  # atrial fibrillation  # acute on chronic kidney injury  # Pseudomonas UTI  # BPH with chronic salmon      Plan:  Neuro:  # no acute issues    CV:  # bradycardia 2/2 betablocker toxicity vs digoxin  - reportedly improved with glucagon push, started with glucagon gtt  - Cardio, Dr. ceron, continue to monitor  - bedside pacemaker, hold epinephrine gtt  - digoxin levels 0.9, s/p dig binding ab  - Glucagon drip discontinued 4/15    #Atrial Fibrillation  - Afib on ECG  - resume eliquis  - hold betablocker given bradycardia and micro placement     Pulm:  # No acute issues    ID:  - hx of Pseudomonas UTI  - as demonstrated on UCx 1 week ago  - reportedly completed course of cipro  - hold abx for now    Nephro:  # Acute on chronic kidney injury  - possibly in the setting of UTI, prerenal from hypotension  - keep normotensive, repeat BMP  - urine lytes    #BPH with chronic salmon  - no acute issues    Prophy:  eliquis    Dispo:  - monitor in the ICU  Patient is a 89 year old male with PMHx of Atrial Fibrillation on Eliquis, HLD, CKD, BPH with chronic Chinchilla, recent pseudomonas bacteruria, who presented with malaise, found with hypotension and bradycardia. Suspected to be due to medication toxicity from digoxin/metoprolol. Admitted to the ICU for glucagon infusion for BB toxicity and close monitoring.

## 2022-04-17 NOTE — PROGRESS NOTE ADULT - ASSESSMENT
89 year old male with PMHx of Parox Atrial Fibrillation on Eliquis, HLD, CKD, BPH with chronic Chinchilla, recent pseudomonas bacteruria, who presented with one day of general malaise with noted hypotension and bradycardia at home,hyperkalemia,UTI.  1.Tele.  2.Bradycardia multifactorial-hyperkalemia +/_ b blocker, dig level .9-therapeutic. EP eval noted, PPM if family and patient agree  3.UTI-s/p ABX.  4.PAF-eliquis,  low dose b blocker.  5.PPI.

## 2022-04-18 LAB
ANION GAP SERPL CALC-SCNC: 8 MMOL/L — SIGNIFICANT CHANGE UP (ref 5–17)
BLD GP AB SCN SERPL QL: SIGNIFICANT CHANGE UP
BUN SERPL-MCNC: 32 MG/DL — HIGH (ref 7–18)
CALCIUM SERPL-MCNC: 9.3 MG/DL — SIGNIFICANT CHANGE UP (ref 8.4–10.5)
CHLORIDE SERPL-SCNC: 108 MMOL/L — SIGNIFICANT CHANGE UP (ref 96–108)
CO2 SERPL-SCNC: 24 MMOL/L — SIGNIFICANT CHANGE UP (ref 22–31)
CREAT SERPL-MCNC: 1.6 MG/DL — HIGH (ref 0.5–1.3)
CULTURE RESULTS: SIGNIFICANT CHANGE UP
CULTURE RESULTS: SIGNIFICANT CHANGE UP
EGFR: 40 ML/MIN/1.73M2 — LOW
GLUCOSE SERPL-MCNC: 98 MG/DL — SIGNIFICANT CHANGE UP (ref 70–99)
HCT VFR BLD CALC: 38.5 % — LOW (ref 39–50)
HGB BLD-MCNC: 12.7 G/DL — LOW (ref 13–17)
MAGNESIUM SERPL-MCNC: 1.9 MG/DL — SIGNIFICANT CHANGE UP (ref 1.6–2.6)
MCHC RBC-ENTMCNC: 28.5 PG — SIGNIFICANT CHANGE UP (ref 27–34)
MCHC RBC-ENTMCNC: 33 GM/DL — SIGNIFICANT CHANGE UP (ref 32–36)
MCV RBC AUTO: 86.5 FL — SIGNIFICANT CHANGE UP (ref 80–100)
NRBC # BLD: 0 /100 WBCS — SIGNIFICANT CHANGE UP (ref 0–0)
PHOSPHATE SERPL-MCNC: 3 MG/DL — SIGNIFICANT CHANGE UP (ref 2.5–4.5)
PLATELET # BLD AUTO: 209 K/UL — SIGNIFICANT CHANGE UP (ref 150–400)
POTASSIUM SERPL-MCNC: 4.3 MMOL/L — SIGNIFICANT CHANGE UP (ref 3.5–5.3)
POTASSIUM SERPL-SCNC: 4.3 MMOL/L — SIGNIFICANT CHANGE UP (ref 3.5–5.3)
RBC # BLD: 4.45 M/UL — SIGNIFICANT CHANGE UP (ref 4.2–5.8)
RBC # FLD: 12.6 % — SIGNIFICANT CHANGE UP (ref 10.3–14.5)
SARS-COV-2 RNA SPEC QL NAA+PROBE: SIGNIFICANT CHANGE UP
SODIUM SERPL-SCNC: 140 MMOL/L — SIGNIFICANT CHANGE UP (ref 135–145)
SPECIMEN SOURCE: SIGNIFICANT CHANGE UP
SPECIMEN SOURCE: SIGNIFICANT CHANGE UP
WBC # BLD: 6.98 K/UL — SIGNIFICANT CHANGE UP (ref 3.8–10.5)
WBC # FLD AUTO: 6.98 K/UL — SIGNIFICANT CHANGE UP (ref 3.8–10.5)

## 2022-04-18 PROCEDURE — 99233 SBSQ HOSP IP/OBS HIGH 50: CPT | Mod: GC

## 2022-04-18 RX ORDER — HEPARIN SODIUM 5000 [USP'U]/ML
5000 INJECTION INTRAVENOUS; SUBCUTANEOUS ONCE
Refills: 0 | Status: COMPLETED | OUTPATIENT
Start: 2022-04-18 | End: 2022-04-18

## 2022-04-18 RX ORDER — LANOLIN ALCOHOL/MO/W.PET/CERES
1 CREAM (GRAM) TOPICAL AT BEDTIME
Refills: 0 | Status: DISCONTINUED | OUTPATIENT
Start: 2022-04-18 | End: 2022-04-19

## 2022-04-18 RX ORDER — HEPARIN SODIUM 5000 [USP'U]/ML
5000 INJECTION INTRAVENOUS; SUBCUTANEOUS EVERY 12 HOURS
Refills: 0 | Status: DISCONTINUED | OUTPATIENT
Start: 2022-04-18 | End: 2022-04-18

## 2022-04-18 RX ADMIN — Medication 12.5 MILLIGRAM(S): at 17:21

## 2022-04-18 RX ADMIN — FINASTERIDE 5 MILLIGRAM(S): 5 TABLET, FILM COATED ORAL at 11:20

## 2022-04-18 RX ADMIN — Medication 12.5 MILLIGRAM(S): at 05:57

## 2022-04-18 RX ADMIN — APIXABAN 2.5 MILLIGRAM(S): 2.5 TABLET, FILM COATED ORAL at 05:57

## 2022-04-18 RX ADMIN — CHLORHEXIDINE GLUCONATE 1 APPLICATION(S): 213 SOLUTION TOPICAL at 05:58

## 2022-04-18 RX ADMIN — Medication 1 MILLIGRAM(S): at 21:32

## 2022-04-18 RX ADMIN — HEPARIN SODIUM 5000 UNIT(S): 5000 INJECTION INTRAVENOUS; SUBCUTANEOUS at 17:21

## 2022-04-18 RX ADMIN — SIMVASTATIN 20 MILLIGRAM(S): 20 TABLET, FILM COATED ORAL at 21:33

## 2022-04-18 NOTE — PROGRESS NOTE ADULT - ATTENDING COMMENTS
Patient was seen and examined at bedside. Denies any complains. 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 Patient was seen and examined at bedside. Denies any complains. 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

## 2022-04-18 NOTE — PROGRESS NOTE ADULT - PROBLEM SELECTOR PLAN 3
Afib on ECG  - resume eliquis  - hold betablocker given bradycardia and micro placement   Cardio [place] Afib on ECG  - resume eliquis  - On low dose metoprolol   Cardio [place]ab[place]

## 2022-04-18 NOTE — PROGRESS NOTE ADULT - ASSESSMENT
89 year old male with PMHx of Parox Atrial Fibrillation on Eliquis, HLD, CKD, BPH with chronic Chinchilla, recent pseudomonas bacteruria, who presented with one day of general malaise with noted hypotension and bradycardia at home,hyperkalemia,UTI.  1.Tele.  2.Bradycardia multifactorial-hyperkalemia +/_ b blocker, dig level .9-therapeutic. EP eval noted, PPM - family and patient agree but request transfer to [facility].  3.CRI-f/u lytes.  4.PAF-eliquis,  low dose b blocker.  5.PPI. 89 year old male with PMHx of Parox Atrial Fibrillation on Eliquis, HLD, CKD, BPH with chronic Chinchilla, recent pseudomonas bacteruria, who presented with one day of general malaise with noted hypotension and bradycardia at home,hyperkalemia,UTI.  1.Tele.  2.Bradycardia multifactorial-hyperkalemia +/_ b blocker, dig level .9-therapeutic. EP eval noted, PPM - family and patient agree but request transfer to [facility].  3.CRI-f/u lytes.  4.PAF-eliquis on hold, cont low dose b blocker.  5.PPI.

## 2022-04-18 NOTE — PROGRESS NOTE ADULT - SUBJECTIVE AND OBJECTIVE BOX
PGY-1 Progress Note discussed with attending    PAGER #: [[phone]] TILL 5:00 PM  PLEASE CONTACT ON CALL TEAM:  - On Call Team (Please refer to Angel) FROM 5:00 PM - 8:30PM  - Nightfloat Team FROM 8:30 -7:30 AM    HPI:  Patient is a 89y old  Male who presents with a chief complaint of     HPI:   Patient is a 89 year old male with PMHx of Atrial Fibrillation on Eliquis, HLD, CKD, BPH with chronic Salmon, recent pseudomonas bacteruria, who presented with one day of general malaise with noted hypotension and bradycardia at home. Normal -110, noted it was 90s to low 100s, with low HR. History assisted by patient's daughter, Della at bedside. patient has been at his usual state of health at home. ER visit 6 days ago for Salmon exchange, noted to have pseudomonas in urine culture, was dced on cipro. At time of evaluation, patient awake and alert, feels improved compared to earlier. No chest pain, palpitations, SOb. No fever, chills. No hematuria.    In the ED, suspected bradycardia from polypharmacy. Dig levels were 0.9, received digoxin binding ab. Patient was given glucagon with reported improvement in hemodynamics. Cardiology was consulted recommended ICU for close monitoring    ICU Vital Signs Last 24 Hrs  T(C): 36.8 (12 Apr 2022 20:23), Max: 36.8 (12 Apr 2022 20:23)  T(F): 98.2 (12 Apr 2022 20:23), Max: 98.2 (12 Apr 2022 20:23)  HR: 74 (12 Apr 2022 20:32) (34 - 74)  BP: 132/57 (12 Apr 2022 20:32) (78/43 - 132/57)  BP(mean): --  ABP: --  ABP(mean): --  RR: 18 (12 Apr 2022 20:32) (18 - 18)  SpO2: 97% (12 Apr 2022 20:32) (97% - 98%)    I&O's Summary        LABS:                        11.5   17.11 )-----------( 247      ( 12 Apr 2022 18:13 )             35.6     04-12    129<L>  |  101  |  40<H>  ----------------------------<  169<H>  5.9<H>   |  23  |  2.77<H>    Ca    8.8      12 Apr 2022 18:13  Mg     2.1     04-12    TPro  6.5  /  Alb  3.1<L>  /  TBili  0.2  /  DBili  x   /  AST  18  /  ALT  28  /  AlkPhos  69  04-12    PT/INR - ( 12 Apr 2022 18:13 )   PT: 15.1 sec;   INR: 1.27 ratio         PTT - ( 12 Apr 2022 18:13 )  PTT:32.6 sec    CAPILLARY BLOOD GLUCOSE            RADIOLOGY & ADDITIONAL TESTS:    Consultant(s) Notes Reviewed:  [x ] YES  [ ] NO    MEDICATIONS  (STANDING):  digoxin immune MELY [DigiFab] Infusion 160 milliGRAM(s) IV Infusion once  glucagon Infusion 0.05 mG/kG/Hr (39.4 mL/Hr) IV Continuous <Continuous>    MEDICATIONS  (PRN):      PHYSICAL EXAM:  GENERAL: well developed, elderly, awake and alert  HEAD:  Atraumatic, Normocephalic, left nose abrasion  EYES: EOMI, PERRLA, conjunctiva and sclera clear  ENT: No tonsillar erythema, exudates, or enlargement; Moist mucous membranes, Good dentition, No lesions  NECK: Supple, No JVD, Normal thyroid, no enlarged nodes  NERVOUS SYSTEM:  Alert & Oriented X3, no focal deficits  CHEST/LUNG: B/L good air entry; No rales, rhonchi, or wheezing  HEART: bradycardic  ABDOMEN: Soft, Nontender, Nondistended; Bowel sounds present  EXTREMITIES:  2+ Peripheral Pulses, No clubbing, cyanosis, or edema  LYMPH: No lymphadenopathy noted  : chronic salmon draining clear yellow urine  SKIN: left nasal abrasion    Care Discussed with Consultants/Other Providers [ x] YES  [ ] NO (12 Apr 2022 20:42)      OVERNIGHT EVENTS:   -     REVIEW OF SYSTEMS:  CONSTITUTIONAL: No fever, weight loss, or fatigue  RESPIRATORY: No cough, wheezing, chills or hemoptysis; No shortness of breath  CARDIOVASCULAR: No chest pain, palpitations, dizziness, or leg swelling  GASTROINTESTINAL: No abdominal pain. No nausea, vomiting, or hematemesis; No diarrhea or constipation. No melena or hematochezia.  GENITOURINARY: No dysuria or hematuria, urinary frequency  NEUROLOGICAL: No headaches, memory loss, loss of strength, numbness, or tremors  SKIN: No itching, burning, rashes, or lesions     MEDICATIONS  (STANDING):  bisacodyl 5 milliGRAM(s) Oral at bedtime  chlorhexidine 2% Cloths 1 Application(s) Topical <User Schedule>  finasteride 5 milliGRAM(s) Oral daily  heparin   Injectable 5000 Unit(s) SubCutaneous every 12 hours  metoprolol tartrate 12.5 milliGRAM(s) Oral two times a day  polyethylene glycol 3350 17 Gram(s) Oral at bedtime  senna 2 Tablet(s) Oral at bedtime  simvastatin 20 milliGRAM(s) Oral at bedtime    MEDICATIONS  (PRN):      Vital Signs Last 24 Hrs  T(C): 36.9 (18 Apr 2022 10:53), Max: 37 (17 Apr 2022 23:20)  T(F): 98.5 (18 Apr 2022 10:53), Max: 98.6 (17 Apr 2022 23:20)  HR: 70 (18 Apr 2022 10:53) (66 - 73)  BP: 143/57 (18 Apr 2022 10:53) (126/72 - 173/66)  BP(mean): --  RR: 18 (18 Apr 2022 10:53) (16 - 18)  SpO2: 100% (18 Apr 2022 10:53) (95% - 100%)    PHYSICAL EXAMINATION:  GENERAL: NAD, AAOx  HEAD: AT/NC  EYES: conjunctiva and sclera clear  NECK: supple, No JVD noted, Normal thyroid  CHEST/LUNG: CTABL; no rales, rhonchi, wheezing, or rubs  HEART: regular rate and rhythm; no murmurs, rubs, or gallops  ABDOMEN: soft, nontender, nondistended; Bowel sounds present  EXTREMITIES:  2+ Peripheral Pulses, No clubbing, cyanosis, or edema  SKIN: warm dry                          12.7   6.98  )-----------( 209      ( 18 Apr 2022 08:10 )             38.5     04-18    140  |  108  |  32<H>  ----------------------------<  98  4.3   |  24  |  1.60<H>    Ca    9.3      18 Apr 2022 08:10  Phos  3.0     04-18  Mg     1.9     04-18              COVID-19 PCR: NotDetec (12 Apr 2022 18:28)  COVID-19 PCR: NotDetec (18 Mar 2022 06:32)  COVID-19 PCR: NotDetec (13 Mar 2022 07:35)      CAPILLARY BLOOD GLUCOSE          RADIOLOGY & ADDITIONAL TESTS:                   PGY-1 Progress Note discussed with attending    PAGER #: [[phone]] TILL 5:00 PM  PLEASE CONTACT ON CALL TEAM:  - On Call Team (Please refer to Angel) FROM 5:00 PM - 8:30PM  - Nightfloat Team FROM 8:30 -7:30 AM    HPI:   Patient is a 89 year old male with PMHx of Atrial Fibrillation on Eliquis, HLD, CKD, BPH with chronic Chinchilla, recent pseudomonas bacteruria, who presented with one day of general malaise with noted hypotension and bradycardia at home. Normal -110, noted it was 90s to low 100s, with low HR. History assisted by patient's daughter, Della at bedside. patient has been at his usual state of health at home. ER visit 6 days ago for Chinchilla exchange, noted to have pseudomonas in urine culture, was dced on cipro. At time of evaluation, patient awake and alert, feels improved compared to earlier. No chest pain, palpitations, SOb. No fever, chills. No hematuria.    In the ED, suspected bradycardia from polypharmacy. Dig levels were 0.9, received digoxin binding ab. Patient was given glucagon with reported improvement in hemodynamics. Cardiology was consulted recommended ICU for close monitoring      OVERNIGHT EVENTS:   - No acute events.    REVIEW OF SYSTEMS:  CONSTITUTIONAL: No fever, weight loss, or fatigue  RESPIRATORY: No cough, wheezing, chills or hemoptysis; No shortness of breath  CARDIOVASCULAR: No chest pain, palpitations, dizziness, or leg swelling  GASTROINTESTINAL: No abdominal pain. No nausea, vomiting, or hematemesis; +constipation, No melena or hematochezia.  GENITOURINARY: No dysuria or hematuria, urinary frequency  NEUROLOGICAL: No headaches, memory loss, loss of strength, numbness, or tremors  SKIN: No itching, burning, rashes, or lesions     MEDICATIONS  (STANDING):  bisacodyl 5 milliGRAM(s) Oral at bedtime  chlorhexidine 2% Cloths 1 Application(s) Topical <User Schedule>  finasteride 5 milliGRAM(s) Oral daily  heparin   Injectable 5000 Unit(s) SubCutaneous every 12 hours  metoprolol tartrate 12.5 milliGRAM(s) Oral two times a day  polyethylene glycol 3350 17 Gram(s) Oral at bedtime  senna 2 Tablet(s) Oral at bedtime  simvastatin 20 milliGRAM(s) Oral at bedtime    MEDICATIONS  (PRN):      Vital Signs Last 24 Hrs  T(C): 36.9 (18 Apr 2022 10:53), Max: 37 (17 Apr 2022 23:20)  T(F): 98.5 (18 Apr 2022 10:53), Max: 98.6 (17 Apr 2022 23:20)  HR: 76 (18 Apr 2022 11:56) (66 - 76)  BP: 146/69 (18 Apr 2022 11:56) (126/72 - 173/66)  BP(mean): 88 (18 Apr 2022 11:56) (82 - 88)  RR: 18 (18 Apr 2022 10:53) (16 - 18)  SpO2: 99% (18 Apr 2022 11:56) (95% - 100%)    PHYSICAL EXAMINATION:  GENERAL: NAD, AAOx3  HEAD: AT/NC  EYES: conjunctiva and sclera clear  NECK: supple, No JVD noted, Normal thyroid  CHEST/LUNG: CTABL; no rales, rhonchi, wheezing, or rubs  HEART: regular rate and rhythm; no murmurs, rubs, or gallops  ABDOMEN: soft, nontender, nondistended; Bowel sounds present  EXTREMITIES:  2+ Peripheral Pulses, No clubbing, cyanosis, or edema  SKIN: warm dry                          12.7   6.98  )-----------( 209      ( 18 Apr 2022 08:10 )             38.5     04-18    140  |  108  |  32<H>  ----------------------------<  98  4.3   |  24  |  1.60<H>    Ca    9.3      18 Apr 2022 08:10  Phos  3.0     04-18  Mg     1.9     04-18              COVID-19 PCR: NotDetec (12 Apr 2022 18:28)  COVID-19 PCR: NotDetec (18 Mar 2022 06:32)  COVID-19 PCR: NotDetec (13 Mar 2022 07:35)      CAPILLARY BLOOD GLUCOSE          RADIOLOGY & ADDITIONAL TESTS:

## 2022-04-18 NOTE — PROGRESS NOTE ADULT - ASSESSMENT
Patient is a 89 year old male with PMHx of Atrial Fibrillation on Eliquis, HLD, CKD, BPH with chronic Chinchilla, recent pseudomonas bacteruria, who presented with malaise, found with hypotension and bradycardia. Suspected to be due to medication toxicity from digoxin/metoprolol. Admitted to the ICU for glucagon infusion for BB toxicity and close monitoring.

## 2022-04-18 NOTE — PROGRESS NOTE ADULT - PROVIDER SPECIALTY LIST ADULT
Cardiology
Cardiology
Critical Care
Critical Care
Cardiology
Critical Care
Critical Care
Internal Medicine
Internal Medicine

## 2022-04-18 NOTE — PROGRESS NOTE ADULT - SUBJECTIVE AND OBJECTIVE BOX
CHIEF COMPLAINT:Patient is a 89y old  Male who presents with a chief complaint of Bradycardia.Pt appears comfortable.    	  REVIEW OF SYSTEMS:  CONSTITUTIONAL: No fever, weight loss, or fatigue  EYES: No eye pain, visual disturbances, or discharge  ENT:  No difficulty hearing, tinnitus, vertigo; No sinus or throat pain  NECK: No pain or stiffness  RESPIRATORY: No cough, wheezing, chills or hemoptysis; No Shortness of Breath  CARDIOVASCULAR: No chest pain, palpitations, passing out, dizziness, or leg swelling  GASTROINTESTINAL: No abdominal or epigastric pain. No nausea, vomiting, or hematemesis; No diarrhea or constipation. No melena or hematochezia.  GENITOURINARY: No dysuria, frequency, hematuria, or incontinence  NEUROLOGICAL: No headaches, memory loss, loss of strength, numbness, or tremors  SKIN: No itching, burning, rashes, or lesions   LYMPH Nodes: No enlarged glands  ENDOCRINE: No heat or cold intolerance; No hair loss  MUSCULOSKELETAL: No joint pain or swelling; No muscle, back, or extremity pain  PSYCHIATRIC: No depression, anxiety, mood swings, or difficulty sleeping  HEME/LYMPH: No easy bruising, or bleeding gums  ALLERGY AND IMMUNOLOGIC: No hives or eczema	        PHYSICAL EXAM:  T(C): 36.5 (04-18-22 @ 07:30), Max: 37 (04-17-22 @ 23:20)  HR: 66 (04-18-22 @ 07:30) (66 - 87)  BP: 139/53 (04-18-22 @ 07:30) (126/72 - 173/66)  RR: 18 (04-18-22 @ 07:30) (16 - 18)  SpO2: 95% (04-18-22 @ 07:30) (95% - 98%)  Wt(kg): --  I&O's Summary    17 Apr 2022 07:01  -  18 Apr 2022 07:00  --------------------------------------------------------  IN: 0 mL / OUT: 1500 mL / NET: -1500 mL        Appearance: Normal	  HEENT:   Normal oral mucosa, PERRL, EOMI	  Lymphatic: No lymphadenopathy  Cardiovascular: Normal S1 S2, No JVD, No murmurs, No edema  Respiratory: Lungs clear to auscultation	  Psychiatry: A & O x 3, Mood & affect appropriate  Gastrointestinal:  Soft, Non-tender, + BS	  Skin: No rashes, No ecchymoses, No cyanosis	  Neurologic: Non-focal  Extremities: Normal range of motion, No clubbing, cyanosis or edema  Vascular: Peripheral pulses palpable 2+ bilaterally    MEDICATIONS  (STANDING):  chlorhexidine 2% Cloths 1 Application(s) Topical <User Schedule>  finasteride 5 milliGRAM(s) Oral daily  metoprolol tartrate 12.5 milliGRAM(s) Oral two times a day  polyethylene glycol 3350 17 Gram(s) Oral at bedtime  senna 2 Tablet(s) Oral at bedtime  simvastatin 20 milliGRAM(s) Oral at bedtime      TELEMETRY: 	 nsr,pac's,pvc's   	  	  LABS:	 	                        12.7   6.98  )-----------( 209      ( 18 Apr 2022 08:10 )             38.5     04-18    140  |  108  |  32<H>  ----------------------------<  98  4.3   |  24  |  1.60<H>    Ca    9.3      18 Apr 2022 08:10  Phos  3.0     04-18  Mg     1.9     04-18

## 2022-04-19 ENCOUNTER — INPATIENT (INPATIENT)
Facility: HOSPITAL | Age: 87
LOS: 0 days | Discharge: HOME HEALTH | End: 2022-04-20
Attending: INTERNAL MEDICINE | Admitting: INTERNAL MEDICINE
Payer: MEDICAID

## 2022-04-19 VITALS
OXYGEN SATURATION: 100 % | DIASTOLIC BLOOD PRESSURE: 70 MMHG | HEART RATE: 65 BPM | SYSTOLIC BLOOD PRESSURE: 151 MMHG | RESPIRATION RATE: 18 BRPM | TEMPERATURE: 100 F

## 2022-04-19 VITALS
TEMPERATURE: 97 F | HEART RATE: 74 BPM | RESPIRATION RATE: 18 BRPM | DIASTOLIC BLOOD PRESSURE: 54 MMHG | OXYGEN SATURATION: 98 % | SYSTOLIC BLOOD PRESSURE: 139 MMHG

## 2022-04-19 DIAGNOSIS — R07.9 CHEST PAIN, UNSPECIFIED: ICD-10-CM

## 2022-04-19 LAB
ANION GAP SERPL CALC-SCNC: 7 MMOL/L — SIGNIFICANT CHANGE UP (ref 5–17)
APTT BLD: 30.6 SEC — SIGNIFICANT CHANGE UP (ref 27.5–35.5)
BLD GP AB SCN SERPL QL: NEGATIVE — SIGNIFICANT CHANGE UP
BUN SERPL-MCNC: 39 MG/DL — HIGH (ref 7–18)
CALCIUM SERPL-MCNC: 9.1 MG/DL — SIGNIFICANT CHANGE UP (ref 8.4–10.5)
CHLORIDE SERPL-SCNC: 107 MMOL/L — SIGNIFICANT CHANGE UP (ref 96–108)
CO2 SERPL-SCNC: 25 MMOL/L — SIGNIFICANT CHANGE UP (ref 22–31)
CREAT SERPL-MCNC: 1.86 MG/DL — HIGH (ref 0.5–1.3)
EGFR: 34 ML/MIN/1.73M2 — LOW
GLUCOSE SERPL-MCNC: 107 MG/DL — HIGH (ref 70–99)
HCT VFR BLD CALC: 39.2 % — SIGNIFICANT CHANGE UP (ref 39–50)
HGB BLD-MCNC: 12.6 G/DL — LOW (ref 13–17)
INR BLD: 1.1 RATIO — SIGNIFICANT CHANGE UP (ref 0.88–1.16)
MCHC RBC-ENTMCNC: 28 PG — SIGNIFICANT CHANGE UP (ref 27–34)
MCHC RBC-ENTMCNC: 32.1 GM/DL — SIGNIFICANT CHANGE UP (ref 32–36)
MCV RBC AUTO: 87.1 FL — SIGNIFICANT CHANGE UP (ref 80–100)
NRBC # BLD: 0 /100 WBCS — SIGNIFICANT CHANGE UP (ref 0–0)
PLATELET # BLD AUTO: 203 K/UL — SIGNIFICANT CHANGE UP (ref 150–400)
POTASSIUM SERPL-MCNC: 4.2 MMOL/L — SIGNIFICANT CHANGE UP (ref 3.5–5.3)
POTASSIUM SERPL-SCNC: 4.2 MMOL/L — SIGNIFICANT CHANGE UP (ref 3.5–5.3)
PROTHROM AB SERPL-ACNC: 13.1 SEC — SIGNIFICANT CHANGE UP (ref 10.5–13.4)
RBC # BLD: 4.5 M/UL — SIGNIFICANT CHANGE UP (ref 4.2–5.8)
RBC # FLD: 12.4 % — SIGNIFICANT CHANGE UP (ref 10.3–14.5)
RH IG SCN BLD-IMP: NEGATIVE — SIGNIFICANT CHANGE UP
RH IG SCN BLD-IMP: NEGATIVE — SIGNIFICANT CHANGE UP
SARS-COV-2 RNA SPEC QL NAA+PROBE: SIGNIFICANT CHANGE UP
SODIUM SERPL-SCNC: 139 MMOL/L — SIGNIFICANT CHANGE UP (ref 135–145)
WBC # BLD: 6.43 K/UL — SIGNIFICANT CHANGE UP (ref 3.8–10.5)
WBC # FLD AUTO: 6.43 K/UL — SIGNIFICANT CHANGE UP (ref 3.8–10.5)

## 2022-04-19 PROCEDURE — 97530 THERAPEUTIC ACTIVITIES: CPT

## 2022-04-19 PROCEDURE — 97116 GAIT TRAINING THERAPY: CPT

## 2022-04-19 PROCEDURE — 80162 ASSAY OF DIGOXIN TOTAL: CPT

## 2022-04-19 PROCEDURE — 85610 PROTHROMBIN TIME: CPT

## 2022-04-19 PROCEDURE — 83735 ASSAY OF MAGNESIUM: CPT

## 2022-04-19 PROCEDURE — 87040 BLOOD CULTURE FOR BACTERIA: CPT

## 2022-04-19 PROCEDURE — 84484 ASSAY OF TROPONIN QUANT: CPT

## 2022-04-19 PROCEDURE — 87640 STAPH A DNA AMP PROBE: CPT

## 2022-04-19 PROCEDURE — 84100 ASSAY OF PHOSPHORUS: CPT

## 2022-04-19 PROCEDURE — 36415 COLL VENOUS BLD VENIPUNCTURE: CPT

## 2022-04-19 PROCEDURE — 86901 BLOOD TYPING SEROLOGIC RH(D): CPT

## 2022-04-19 PROCEDURE — 80048 BASIC METABOLIC PNL TOTAL CA: CPT

## 2022-04-19 PROCEDURE — 94640 AIRWAY INHALATION TREATMENT: CPT

## 2022-04-19 PROCEDURE — 99239 HOSP IP/OBS DSCHRG MGMT >30: CPT | Mod: GC

## 2022-04-19 PROCEDURE — 99291 CRITICAL CARE FIRST HOUR: CPT | Mod: 25

## 2022-04-19 PROCEDURE — 71045 X-RAY EXAM CHEST 1 VIEW: CPT

## 2022-04-19 PROCEDURE — 33274 TCAT INSJ/RPL PERM LDLS PM: CPT

## 2022-04-19 PROCEDURE — 86900 BLOOD TYPING SEROLOGIC ABO: CPT

## 2022-04-19 PROCEDURE — 99292 CRITICAL CARE ADDL 30 MIN: CPT | Mod: 25

## 2022-04-19 PROCEDURE — 93005 ELECTROCARDIOGRAM TRACING: CPT

## 2022-04-19 PROCEDURE — 87641 MR-STAPH DNA AMP PROBE: CPT

## 2022-04-19 PROCEDURE — 83605 ASSAY OF LACTIC ACID: CPT

## 2022-04-19 PROCEDURE — 85027 COMPLETE CBC AUTOMATED: CPT

## 2022-04-19 PROCEDURE — 85730 THROMBOPLASTIN TIME PARTIAL: CPT

## 2022-04-19 PROCEDURE — 80053 COMPREHEN METABOLIC PANEL: CPT

## 2022-04-19 PROCEDURE — 96372 THER/PROPH/DIAG INJ SC/IM: CPT

## 2022-04-19 PROCEDURE — 87635 SARS-COV-2 COVID-19 AMP PRB: CPT

## 2022-04-19 PROCEDURE — 93010 ELECTROCARDIOGRAM REPORT: CPT | Mod: 77

## 2022-04-19 PROCEDURE — 93010 ELECTROCARDIOGRAM REPORT: CPT

## 2022-04-19 PROCEDURE — 86850 RBC ANTIBODY SCREEN: CPT

## 2022-04-19 PROCEDURE — 85025 COMPLETE CBC W/AUTO DIFF WBC: CPT

## 2022-04-19 RX ORDER — METOPROLOL TARTRATE 50 MG
25 TABLET ORAL
Refills: 0 | Status: DISCONTINUED | OUTPATIENT
Start: 2022-04-19 | End: 2022-04-20

## 2022-04-19 RX ORDER — SIMVASTATIN 20 MG/1
20 TABLET, FILM COATED ORAL AT BEDTIME
Refills: 0 | Status: DISCONTINUED | OUTPATIENT
Start: 2022-04-19 | End: 2022-04-20

## 2022-04-19 RX ORDER — APIXABAN 2.5 MG/1
2.5 TABLET, FILM COATED ORAL
Refills: 0 | Status: DISCONTINUED | OUTPATIENT
Start: 2022-04-20 | End: 2022-04-20

## 2022-04-19 RX ORDER — FINASTERIDE 5 MG/1
5 TABLET, FILM COATED ORAL DAILY
Refills: 0 | Status: DISCONTINUED | OUTPATIENT
Start: 2022-04-19 | End: 2022-04-20

## 2022-04-19 RX ORDER — LANOLIN ALCOHOL/MO/W.PET/CERES
3 CREAM (GRAM) TOPICAL AT BEDTIME
Refills: 0 | Status: DISCONTINUED | OUTPATIENT
Start: 2022-04-19 | End: 2022-04-20

## 2022-04-19 RX ORDER — POLYETHYLENE GLYCOL 3350 17 G/17G
17 POWDER, FOR SOLUTION ORAL EVERY 12 HOURS
Refills: 0 | Status: DISCONTINUED | OUTPATIENT
Start: 2022-04-19 | End: 2022-04-20

## 2022-04-19 RX ADMIN — SIMVASTATIN 20 MILLIGRAM(S): 20 TABLET, FILM COATED ORAL at 22:07

## 2022-04-19 RX ADMIN — Medication 3 MILLIGRAM(S): at 22:07

## 2022-04-19 RX ADMIN — FINASTERIDE 5 MILLIGRAM(S): 5 TABLET, FILM COATED ORAL at 18:03

## 2022-04-19 RX ADMIN — CHLORHEXIDINE GLUCONATE 1 APPLICATION(S): 213 SOLUTION TOPICAL at 04:38

## 2022-04-19 RX ADMIN — Medication 12.5 MILLIGRAM(S): at 04:38

## 2022-04-19 RX ADMIN — Medication 25 MILLIGRAM(S): at 18:03

## 2022-04-19 NOTE — ACUTE INTERFACILITY TRANSFER NOTE - HOSPITAL COURSE
Patient is a 89 year old male with PMHx of Atrial Fibrillation on Eliquis, HLD, CKD, BPH with chronic Chinchilla, recent pseudomonas bacteruria, who presented with one day of general malaise with noted hypotension, neck pain and bradycardia at home. In the ED, suspected bradycardia from polypharmacy. Dig levels were 0.9. Patient was transferred to ICU. EP was consulted    s/p glucagon drip  s/p dig binding ab  now for transfer to [facility] for PPM placement

## 2022-04-19 NOTE — CHART NOTE - NSCHARTNOTEFT_GEN_A_CORE
The patient is s/p Micra PPM implant.  As per Dr. Rayo:  - CXR on 4/20/22 in AM  - RFV suture to be removed on 4/20/22 in AM  - no need for antibiotics  - continue meds from [facility].

## 2022-04-19 NOTE — CHART NOTE - NSCHARTNOTEFT_GEN_A_CORE
Patient s/p cardiac cath with Micra PPM placement via right groin. Right groin soft, without erythema, without swelling, without pain. Site remains stable. No e/o hematoma. Dressing is clean/intact/dry. Good Pedal pulse, brisk capillary refill. Will monitor closely.

## 2022-04-19 NOTE — TRANSFER ACCEPTANCE NOTE - ASSESSMENT
The patient was transferred to [facility] today for  Micra PPM implant. Consent is signed by the patient and is in the patient's chart.

## 2022-04-19 NOTE — TRANSFER ACCEPTANCE NOTE - HISTORY OF PRESENT ILLNESS
89 y.o. male was admitted to [facility] on 4/12/22  89 y.o. male was admitted to [facility] on 4/12/22     89 year old, Burkinan speaking male with PMH of Atrial Fibrillation on Eliquis, HLD, CKD, BPH with chronic Chinchilla, s/p recent pseudomonas bacteruria was admitted to [facility] on 4/12/22 c/o general malaise with noted hypotension and bradycardia at home. In the ED, the patient was noted to have bradycardia from polypharmacy. Digoxin level- 0.9. The patient received digoxin binding ab, started on Glucagon drip with improvement in hemodynamics, admitted to ICU for close monitoring.  Glucagon drip was d/c on 4/14.   The patient was transferred to [facility] today for Micra PPM implant.     PS: The patient is s/o ER visit 6 days ago for Chinchilla exchange, noted to have pseudomonas in urine culture, was discharged on cipro.    89 year old, Tunisian speaking male, with PMH of Atrial Fibrillation on Eliquis, HLD, CKD, BPH with chronic Chinchilla, s/p recent pseudomonas bacteruria was admitted to [facility] on 4/12/22 c/o general malaise with noted hypotension and bradycardia at home. In the ED, the patient was noted to have bradycardia from polypharmacy. Digoxin level- 0.9. The patient received digoxin binding ab, started on Glucagon drip with improvement in hemodynamics, admitted to ICU for close monitoring.  Glucagon drip was d/c on 4/14.   The patient was transferred to [facility] today for Micra PPM implant.     PS: The patient is s/p ER visit 2 weeks ago for Chinchilla exchange, noted to have urine culture positive for pseudomonas, was discharged on cipro.    89 year old, Thai speaking male, with PMH of Atrial Fibrillation on Eliquis, HLD, CKD, BPH with chronic Chinchilla, s/p recent pseudomonas bacteruria (finished course of Cipro) was admitted to [facility] on 4/12/22 c/o general malaise with noted hypotension and bradycardia at home. In the ED, the patient was noted to have bradycardia from polypharmacy. Digoxin level- 0.9. The patient received digoxin binding ab, started on Glucagon drip with improvement in hemodynamics, admitted to ICU for close monitoring.  Glucagon drip was d/c on 4/14, b-blockers - on hold.  The patient was transferred to [facility] today for Micra PPM implant.   Last Eliquis dose on 4/18/22 at 5AM.  The patient is s/p ER visit 2 weeks ago for Chinchilla exchange, noted to have urine culture positive for pseudomonas, finished prescribed course of Cipro.  The patient denies chest pain, SOB, palpitations, dizziness, presyncope, syncope,  headache, visual disturbances, CVA, PE, DVT, LORENZO, abdominal pain, N/V/D/C, hematochezia, melena, dysuria, hematuria, fever, chills.      89 year old, Djiboutian speaking male, with PMH of Atrial Fibrillation on Eliquis, HLD, CKD, BPH with chronic Chinchilla, s/p recent pseudomonas bacteruria (finished course of Cipro) was admitted to [facility] on 4/12/22 c/o general malaise with noted hypotension and bradycardia at home. In the ED, the patient was noted to have bradycardia/compete HB from polypharmacy. Digoxin level- 0.9. The patient received digoxin binding ab, started on Glucagon drip with improvement in hemodynamics, admitted to ICU for close monitoring.  Glucagon drip was d/c on 4/14. Digoxin and b-blockers - were stoppled, however the patient was noted to have AF with RVR. Given difficulty to rate control safely without causing heart block, the decision was made to proceed with placement of Micra PPM implant    The patient was transferred to [facility] today for Micra PPM implant.   Last Eliquis dose on 4/18/22 at 5AM.  The patient is s/p ER visit 2 weeks ago for Chinchilla exchange, noted to have urine culture positive for pseudomonas, finished prescribed course of Cipro.  The patient denies chest pain, SOB, palpitations, dizziness, presyncope, syncope,  headache, visual disturbances, CVA, PE, DVT, LORENZO, abdominal pain, N/V/D/C, hematochezia, melena, dysuria, hematuria, fever, chills.

## 2022-04-19 NOTE — PATIENT PROFILE ADULT - FALL HARM RISK - HARM RISK INTERVENTIONS

## 2022-04-19 NOTE — ACUTE INTERFACILITY TRANSFER NOTE - PLAN OF CARE
Bradycardia/ complete heart block multifactorial-hyperkalemia +/_ b blocker, dig level .9-therapeutic. EP eval noted,  PPM - family and patient agree but request transfer to [facility].

## 2022-04-19 NOTE — ACUTE INTERFACILITY TRANSFER NOTE - CARE PROVIDER_API CALL
Janel Rayo)  Cardiovascular Disease; Internal Medicine  [phone] [place] [address]  Phone: [phone]  Fax: [phone]  Follow Up Time:

## 2022-04-19 NOTE — TRANSFER ACCEPTANCE NOTE - PATIENT'S GENDER IDENTITY
I have reviewed discharge instructions with the patient. The patient verbalized understanding. Patient left ED via Discharge Method: ambulatory to Home with self. Opportunity for questions and clarification provided. Patient given 0 scripts. To continue your aftercare when you leave the hospital, you may receive an automated call from our care team to check in on how you are doing. This is a free service and part of our promise to provide the best care and service to meet your aftercare needs.  If you have questions, or wish to unsubscribe from this service please call [phone]. Thank you for Choosing our [address] [place] [address] Emergency Department. Male

## 2022-04-20 ENCOUNTER — TRANSCRIPTION ENCOUNTER (OUTPATIENT)
Age: 87
End: 2022-04-20

## 2022-04-20 VITALS
TEMPERATURE: 98 F | HEART RATE: 68 BPM | SYSTOLIC BLOOD PRESSURE: 104 MMHG | OXYGEN SATURATION: 98 % | DIASTOLIC BLOOD PRESSURE: 58 MMHG | RESPIRATION RATE: 17 BRPM

## 2022-04-20 LAB
ALBUMIN SERPL ELPH-MCNC: 3.1 G/DL — LOW (ref 3.3–5)
ALP SERPL-CCNC: 77 U/L — SIGNIFICANT CHANGE UP (ref 40–120)
ALT FLD-CCNC: 24 U/L — SIGNIFICANT CHANGE UP (ref 4–41)
ANION GAP SERPL CALC-SCNC: 12 MMOL/L — SIGNIFICANT CHANGE UP (ref 7–14)
AST SERPL-CCNC: 25 U/L — SIGNIFICANT CHANGE UP (ref 4–40)
BILIRUB SERPL-MCNC: 0.4 MG/DL — SIGNIFICANT CHANGE UP (ref 0.2–1.2)
BUN SERPL-MCNC: 41 MG/DL — HIGH (ref 7–23)
CALCIUM SERPL-MCNC: 8.7 MG/DL — SIGNIFICANT CHANGE UP (ref 8.4–10.5)
CHLORIDE SERPL-SCNC: 106 MMOL/L — SIGNIFICANT CHANGE UP (ref 98–107)
CO2 SERPL-SCNC: 21 MMOL/L — LOW (ref 22–31)
CREAT SERPL-MCNC: 1.89 MG/DL — HIGH (ref 0.5–1.3)
EGFR: 33 ML/MIN/1.73M2 — LOW
GLUCOSE SERPL-MCNC: 110 MG/DL — HIGH (ref 70–99)
MAGNESIUM SERPL-MCNC: 2.1 MG/DL — SIGNIFICANT CHANGE UP (ref 1.6–2.6)
PHOSPHATE SERPL-MCNC: 3.7 MG/DL — SIGNIFICANT CHANGE UP (ref 2.5–4.5)
POTASSIUM SERPL-MCNC: 5.1 MMOL/L — SIGNIFICANT CHANGE UP (ref 3.5–5.3)
POTASSIUM SERPL-SCNC: 5.1 MMOL/L — SIGNIFICANT CHANGE UP (ref 3.5–5.3)
PROT SERPL-MCNC: 5.5 G/DL — LOW (ref 6–8.3)
SODIUM SERPL-SCNC: 139 MMOL/L — SIGNIFICANT CHANGE UP (ref 135–145)

## 2022-04-20 PROCEDURE — 99232 SBSQ HOSP IP/OBS MODERATE 35: CPT

## 2022-04-20 PROCEDURE — 71046 X-RAY EXAM CHEST 2 VIEWS: CPT | Mod: 26

## 2022-04-20 RX ORDER — ACETAMINOPHEN 500 MG
2 TABLET ORAL
Qty: 0 | Refills: 0 | DISCHARGE
Start: 2022-04-20

## 2022-04-20 RX ORDER — METOPROLOL TARTRATE 50 MG
0.5 TABLET ORAL
Qty: 30 | Refills: 0
Start: 2022-04-20 | End: 2022-05-19

## 2022-04-20 RX ORDER — DOXAZOSIN MESYLATE 4 MG
1 TABLET ORAL
Qty: 0 | Refills: 0 | DISCHARGE

## 2022-04-20 RX ORDER — ACETAMINOPHEN 500 MG
650 TABLET ORAL EVERY 6 HOURS
Refills: 0 | Status: DISCONTINUED | OUTPATIENT
Start: 2022-04-20 | End: 2022-04-20

## 2022-04-20 RX ADMIN — POLYETHYLENE GLYCOL 3350 17 GRAM(S): 17 POWDER, FOR SOLUTION ORAL at 06:11

## 2022-04-20 RX ADMIN — Medication 650 MILLIGRAM(S): at 12:00

## 2022-04-20 RX ADMIN — Medication 25 MILLIGRAM(S): at 06:12

## 2022-04-20 RX ADMIN — FINASTERIDE 5 MILLIGRAM(S): 5 TABLET, FILM COATED ORAL at 11:48

## 2022-04-20 RX ADMIN — APIXABAN 2.5 MILLIGRAM(S): 2.5 TABLET, FILM COATED ORAL at 06:11

## 2022-04-20 RX ADMIN — Medication 650 MILLIGRAM(S): at 11:48

## 2022-04-20 NOTE — PROGRESS NOTE ADULT - ASSESSMENT
89 yo male with past medical history of HTN, HLD, CKD, BPH with chronic Chinchilla, atrial fibrillation, on Eliquis and recent pseudomonas UTI who presented to [facility] with fatigue. Found to be in complete heart block. After holding his metoprolol and digoxin, his conduction improved but then had AFib with RVR. A decision was made to implant a PPM to facilitate the use of his medications. He is now s/p Medtronic Micra PPM. Tolerated the procedure well. No complications. Right groin suture removed. No bleeding, ecchymosis or hematoma. Patient feels well.  Labs reviewed and stable.   Interrogation performed. Device sensing and pacing well.   Post procedure Micra PPM teaching done yesterday with the patient and his daughter.   Written instructions and contact information provided.   Patient given a home monitor with verbal and written instructions.   He has a follow up appointment in the device clinic on 5/9/22 at 1:00pm  4th floor Oncology Building [phone].

## 2022-04-20 NOTE — DISCHARGE NOTE PROVIDER - NSDCFUADDAPPT_GEN_ALL_CORE_FT
Written instructions and contact information provided directly by EP Department    Patient given a home monitor with verbal and written instructions.   He has a follow up appointment in the device clinic on 5/9/22 at 1:00pm  4th floor Oncology Building [phone].

## 2022-04-20 NOTE — PHYSICAL THERAPY INITIAL EVALUATION ADULT - GENERAL OBSERVATIONS, REHAB EVAL
Pt received semi supine in bed , +tele, +salmon , +bed alarm ,Kazakh speaking , able to make basic needs known., PCA is at bedside.

## 2022-04-20 NOTE — DISCHARGE NOTE PROVIDER - NSDCCPCAREPLAN_GEN_ALL_CORE_FT
PRINCIPAL DISCHARGE DIAGNOSIS  Diagnosis: Control of atrial fibrillation with pacemaker  Assessment and Plan of Treatment: You had a Micra Pacemaker placed to help control your heart rate.    Follow  EP staff's instructions      SECONDARY DISCHARGE DIAGNOSES  Diagnosis: Chronic kidney disease, unspecified CKD stage  Assessment and Plan of Treatment: Your BUN/Creatinine levels     PRINCIPAL DISCHARGE DIAGNOSIS  Diagnosis: Control of atrial fibrillation with pacemaker  Assessment and Plan of Treatment: You had a Micra Pacemaker placed to help control your heart rate.    Follow EP  instructions. as directed . Follow up outpatient with EP Dr Rayo for device check as scheduled, and with your Cardiologist/PCP Dr Garcia in 1 week for ongoing medical management.  Follow up with cardiologist within one week of discharge. Call for appointment.  Continue on your Apixaban as directed.  Monitor right groin site of procedure for swelling, redness or bleeding. Please notify your doctor if any of these symptoms are noted. No heavy lifting with procedure wrist greater than 5-10 lbs for one week. No strenuous activity for 3 weeks. You may shower.        SECONDARY DISCHARGE DIAGNOSES  Diagnosis: Chronic kidney disease, unspecified CKD stage  Assessment and Plan of Treatment: Your BUN/Creatinine levels are stable, likely at baseline. Please follow up outpatient with Dr Garcia in 1 week for Metabolic Blood test to monitor your kidney fubction post hospitalization.

## 2022-04-20 NOTE — CONSULT NOTE ADULT - SUBJECTIVE AND OBJECTIVE BOX
[facility] NEPHROLOGY- CONSULTATION NOTE    89y Male with history of below presents with hypotension and bradycardia transferred to [facility] for MICRA. Nephrology consulted for elevated Scr.    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 Patient initially admitted to OSH for hypotension and bradycardia with JAMES which had been resolving. Patient s/p MICRA on 4/19 and for possible discharge today.    REVIEW OF SYSTEMS:  Gen: no changes in weight  HEENT: no rhinorrhea  Neck: no sore throat  Cards: no chest pain  Resp: no dyspnea  GI: no nausea or vomiting or diarrhea  : no dysuria or hematuria  Vascular: no LE edema  Derm: no rashes  Neuro: no numbness/tingling    No Known Allergies      Home Medications Reviewed  Hospital Medications:   MEDICATIONS  (STANDING):  apixaban 2.5 milliGRAM(s) Oral two times a day  finasteride 5 milliGRAM(s) Oral daily  metoprolol tartrate 25 milliGRAM(s) Oral two times a day  polyethylene glycol 3350 17 Gram(s) Oral every 12 hours  simvastatin 20 milliGRAM(s) Oral at bedtime      PAST MEDICAL & SURGICAL HISTORY:  Hypertension    Atrial fibrillation    BPH (benign prostatic hyperplasia)    Chronic kidney disease, unspecified CKD stage    Hyperlipidemia    No significant past surgical history        FAMILY HISTORY:  No pertinent family history in first degree relatives        SOCIAL HISTORY:  Denies toxic substance use     VITALS:  T(F): 98.1 (04-20-22 @ 12:09), Max: 99.7 (04-19-22 @ 15:52)  HR: 68 (04-20-22 @ 12:09)  BP: 104/58 (04-20-22 @ 12:09)  RR: 17 (04-20-22 @ 12:09)  SpO2: 98% (04-20-22 @ 12:09)  Wt(kg): --        PHYSICAL EXAM:  Gen: NAD, calm  HEENT: MMM  Neck: no JVD  Cards: RRR, +S1/S2, no M/G/R  Resp: CTA B/L  GI: soft, NT/ND, NABS  : no CVA tenderness, + salmon  Vascular: no LE edema B/L  Derm: no rashes  Neuro: non-focal    LABS:  04-20    139  |  106  |  41<H>  ----------------------------<  110<H>  5.1   |  21<L>  |  1.89<H>    Ca    8.7      20 Apr 2022 06:58  Phos  3.7     04-20  Mg     2.10     04-20    TPro  5.5<L>  /  Alb  3.1<L>  /  TBili  0.4  /  DBili      /  AST  25  /  ALT  24  /  AlkPhos  77  04-20    Creatinine Trend: 1.89 <--, 1.86 <--, 1.60 <--, 1.61 <--, 1.70 <--, 1.84 <--, 2.31 <--                        12.6   6.43  )-----------( 203      ( 19 Apr 2022 06:45 )             39.2     Urine Studies:        RADIOLOGY & ADDITIONAL STUDIES:    < from: Xray Chest 1 View- PORTABLE-Urgent (04.12.22 @ 19:24) >  IMPRESSION:    No acute infiltrate. Cardiomegaly.    --- End of Report ---    < end of copied text >

## 2022-04-20 NOTE — PROGRESS NOTE ADULT - NS ATTEND AMEND GEN_ALL_CORE FT
89 y/o male with AF with RVR and with CHB when attempted rate control. He is now s/p Micra placement.

## 2022-04-20 NOTE — DISCHARGE NOTE NURSING/CASE MANAGEMENT/SOCIAL WORK - NSDCFUADDAPPT_GEN_ALL_CORE_FT
Written instructions and contact information provided directly by EP Department    Patient given a home monitor with verbal and written instructions.   He has a follow up appointment in the device clinic on 5/9/22 at 1:00pm  4th floor Oncology Building [phone].

## 2022-04-20 NOTE — DISCHARGE NOTE PROVIDER - NSDCMRMEDTOKEN_GEN_ALL_CORE_FT
Digitek 125 mcg (0.125 mg) oral tablet: 1 tab(s) orally every other day  doxazosin 2 mg oral tablet: 1 tab(s) orally once a day  Eliquis 2.5 mg oral tablet: 1 tab(s) orally 2 times a day  finasteride 5 mg oral tablet: 1 tab(s) orally once a day  hydrALAZINE 10 mg oral tablet: 1 tab(s) orally 2 times a day  isosorbide dinitrate 10 mg oral tablet: 1 tab(s) orally 3 times a day  metoprolol tartrate 50 mg oral tablet: 1 tab(s) orally every 8 hours  ocular lubricant ophthalmic solution: 2 drop(s) to each affected eye every 4 hours  polyethylene glycol 3350 oral powder for reconstitution: 17 gram(s) orally every 12 hours  simvastatin 20 mg oral tablet: 1 tab(s) orally once a day (at bedtime)   acetaminophen 325 mg oral tablet: 2 tab(s) orally every 6 hours, As needed, Mild Pain (1 - 3), Moderate Pain (4 - 6), Severe Pain (7 - 10)  Eliquis 2.5 mg oral tablet: 1 tab(s) orally 2 times a day  finasteride 5 mg oral tablet: 1 tab(s) orally once a day  metoprolol tartrate 50 mg oral tablet: 0.5 tab(s) orally 2 times a day   ocular lubricant ophthalmic solution: 2 drop(s) to each affected eye every 4 hours  polyethylene glycol 3350 oral powder for reconstitution: 17 gram(s) orally every 12 hours  simvastatin 20 mg oral tablet: 1 tab(s) orally once a day (at bedtime)

## 2022-04-20 NOTE — DISCHARGE NOTE PROVIDER - PROVIDER TOKENS
PROVIDER:[TOKEN:[[id number]],SCHEDULEDAPPT:[05/09/2022],SCHEDULEDAPPTTIME:[01:00 PM],ESTABLISHEDPATIENT:[T]] PROVIDER:[TOKEN:[[id number]],SCHEDULEDAPPT:[05/09/2022],SCHEDULEDAPPTTIME:[01:00 PM],ESTABLISHEDPATIENT:[T]],PROVIDER:[TOKEN:[[id number]],FOLLOWUP:[1 week],ESTABLISHEDPATIENT:[T]]

## 2022-04-20 NOTE — DISCHARGE NOTE NURSING/CASE MANAGEMENT/SOCIAL WORK - PATIENT PORTAL LINK FT
You can access the FollowMyHealth Patient Portal offered by [facility] by registering at the following website: [email or web address]. By joining [facility]’s FollowMyHealth portal, you will also be able to view your health information using other applications (apps) compatible with our system.

## 2022-04-20 NOTE — DISCHARGE NOTE PROVIDER - HOSPITAL COURSE
89 year old, Estonian speaking male, with PMH of Atrial Fibrillation on Eliquis, HLD, CKD, BPH with chronic Salmon, s/p recent pseudomonas bacteruria with salmon exchange (completed course of oral antibiotics Cipro) was admitted to [facility] on 4/12/22 c/o general malaise with noted hypotension and bradycardia at home. In the ED, the patient was noted to have bradycardia/compete HB from polypharmacy. Digoxin level- 0.9. The patient received digoxin binding ab, started on Glucagon drip with improvement in hemodynamics, admitted to ICU for close monitoring.  Glucagon drip was d/c on 4/14. Digoxin and b-blockers - were stoppled, however the patient was noted to have AF with RVR. Given difficulty to rate control safely without causing heart block, the decision was made to proceed with placement of Micra PPM implant. Last Eliquis dose on 4/18/22 at 5AM. The patient was transferred to [facility] today for Micra PPM implant.   Patient underwent Micra permanent pacemaker implantation via right groin. Right groin soft, without erythema, without swelling, without pain. Site remains stable. No e/o hematoma. Dressing is clean/intact/dry. Good Pedal pulse, brisk capillary refill. Afbrile, VSS. Denies any c/o chest pain, palpitations, shortness of breath, nausea, vomiting , or any other c/o's.    Patient seen and evaluated. Patient case reviewed and discussed with Attending Physician Dr Doll, who agrees the patient is medically stable and cleared for discharge 4/20, with appropriate outpatient follow up. All medications were reviewed and prescriptions were sent to mutually agreed upon pharmacy. Patient understands and agrees with plan of care.       89 year old, Ukrainian speaking male, with PMH of Atrial Fibrillation on Eliquis, HLD, CKD, BPH with chronic Salmon, s/p recent pseudomonas bacteruria with salmon exchange (completed course of oral antibiotics Cipro) was admitted to [facility] on 4/12/22 c/o general malaise with noted hypotension and bradycardia at home. In the ED, the patient was noted to have bradycardia/compete HB from polypharmacy. Digoxin level- 0.9. The patient received digoxin binding ab, started on Glucagon drip with improvement in hemodynamics, admitted to ICU for close monitoring.  Glucagon drip was d/c on 4/14. Digoxin and b-blockers - were stoppled, however the patient was noted to have AF with RVR. Given difficulty to rate control safely without causing heart block, the decision was made to proceed with placement of Micra PPM implant. Last Eliquis dose on 4/18/22 at 5AM. The patient was transferred to [facility] today for Micra PPM implant.   Patient underwent Medtronic Micra permanent pacemaker implantation via right groin.  Right groin suture removed. No bleeding, ecchymosis or hematoma. Dressing is clean/intact/dry. Good Pedal pulse, brisk capillary refill. Afbrile, VSS. Denies any c/o chest pain, palpitations, shortness of breath, nausea, vomiting , or any other c/o's. Patient feels well. Labs reviewed and stable.   Interrogation performed. Device sensing and pacing well. Post procedure Micra PPM teaching done with the patient and his daughter by EP staff, with Written instructions and contact information provided.   Patient given a home monitor with verbal and written instructions, with follow up appointment in the device clinic on 5/9/22 at 1:00pm  4th floor Oncology Building [phone].      Patient seen and evaluated. Patient case reviewed and discussed with Attending Physician Dr Doll, who agrees the patient is medically stable and cleared for discharge 4/20, with appropriate outpatient follow up. All medications were reviewed and prescriptions were sent to mutually agreed upon pharmacy. Patient understands and agrees with plan of care.       89 year old, Afghan speaking male, with PMH of Atrial Fibrillation on Eliquis, HLD, CKD, BPH with chronic Salmon, s/p recent pseudomonas bacteruria with salmon exchange (completed course of oral antibiotics Cipro) was admitted to [facility] on 4/12/22 c/o general malaise with noted hypotension and bradycardia at home. In the ED, the patient was noted to have bradycardia/compete HB from polypharmacy. Digoxin level- 0.9. The patient received digoxin binding ab, started on Glucagon drip with improvement in hemodynamics, admitted to ICU for close monitoring.  Glucagon drip was d/c on 4/14. Digoxin and b-blockers - were stoppled, however the patient was noted to have AF with RVR. Given difficulty to rate control safely without causing heart block, the decision was made to proceed with placement of Micra PPM implant. Last Eliquis dose on 4/18/22 at 5AM. The patient was transferred to [facility] today for Micra PPM implant.   Patient underwent Medtronic Micra permanent pacemaker implantation via right groin 4/19.  Right groin suture removed. No bleeding, ecchymosis or hematoma. Dressing is clean/intact/dry. Good Pedal pulse, brisk capillary refill. Afbrile, VSS. Denies any c/o chest pain, palpitations, shortness of breath, nausea, vomiting , or any other c/o's. Patient feels well. Labs reviewed and stable.   Interrogation performed. Device sensing and pacing well. Post procedure Micra PPM teaching done with the patient and his daughter by EP staff, with Written instructions and contact information provided.   Patient given a home monitor with verbal and written instructions, with follow up appointment in the device clinic on 5/9/22 at 1:00pm  4th floor Oncology Building [phone].      Patient seen and evaluated. Patient case reviewed and discussed with Attending Physician Dr Doll, who agrees the patient is medically stable and cleared for discharge 4/20, with appropriate outpatient follow up. All medications were reviewed and prescriptions were sent to mutually agreed upon pharmacy. Patient understands and agrees with plan of care.

## 2022-04-20 NOTE — CONSULT NOTE ADULT - ASSESSMENT
89y Male with history of BPH with chronic salmon presents with hypotension and bradycardia transferred to [facility] for MICRA. Nephrology consulted for elevated Scr.    1) JAMES: likely hemodynamically mediated in setting of hypotension and bradycardia. JAMES overall improved. UA active however due to known infection s/p abx. Defer urine electrolytes and imaging. Change diet to low potassium. Avoid nephrotoxins.    2) CKD-3b: with sandra Scr noted to be 1.6-1.8 as per prior labs. Defer inpatient CKD work up given advanced age. Monitor electrolytes.    3) HTN with CKD: BP low normal. Metoprolol as per EP/cardiology. Monitor BP.    4) Urinary retention: With chronic salmon. Continue with proscar. Unclear why patient is not on flomax.     No renal objection to discharge.       [facility] NEPHROLOGY  Rayo Angulo M.D.  Trino Sanchez D.O.  Barb Temple M.D.  Rosie Gongora, MSN, ANP-C    Telephone: [phone]  Facsimile: [phone]    [address] [place] [address]

## 2022-04-20 NOTE — PHYSICAL THERAPY INITIAL EVALUATION ADULT - PERTINENT HX OF CURRENT PROBLEM, REHAB EVAL
This is a 89y Male presents with hypotension and bradycardia transferred to [facility] for MICRA . Pt is s/p cardiac cath with Micra PPM placement via right groin on 4/19/22.

## 2022-04-20 NOTE — PHYSICAL THERAPY INITIAL EVALUATION ADULT - ASSISTIVE DEVICE FOR TRANSFER: SIT/STAND, REHAB EVAL
Warfarin dosing per pharmacist 
 
Emily Fragoso is a 68 y.o. male. Height: 5' 10\" (177.8 cm)    Weight: 119.7 kg (263 lb 12.8 oz) Indication:  AVR + Afib Goal INR:  2.5-3.5 Home dose:  New start Risk factors or significant drug interactions:  Amiodarone (last dose 2/5), Levofloxacin (added 2/9) Other anticoagulants:  Argatroban Daily Monitoring Date  INR     Warfarin dose HGB              Notes 2/4  1.4  2.5 mg  7.8   
2/5  2.3  2.5 mg  8.3 
2/6  2.5  2.5 mg  8.3 
2/7  2.6  5 mg   --- 
2/8  2.6  5 mg  8 
2/9  3.3  5 mg  8.2 2/10  3.7  HOLD  7.9 Pharmacy has been consulted to manage Mr. Merlin Lacy warfarin during this admission. INR on argatroban + warfarin 6.3 this am. Argatroban stopped at 0502 this am and repeat INR drawn at 0952 (almost 5 hrs after argatroban stopped) = 3.7 (True INR) Pt has received argatroban + warfarin over 5 days and true INR is greater than 2. Therefore argatroban can be dc'd and continue warfarin alone Will hold warfarin x1 dose tonight and plan to restart when INR is in the 2.5-3.5 range at a reduced dose. Suspect INR elevation due to levofloxacin addition 2/9/20. Pharmacy will continue to follow patient and monitor INR daily. Reminders: ? Argatroban falsely elevates the INR (typically INR is roughly double the true INR while on argatroban). ? If using argatroban to bridge to warfarin, the drip must overlap with warfarin therapy for at least 5 days, regardless of the INR value, to prevent recurrent thrombosis, skin necrosis or venous gangrene. ? When the INR while on argatroban and warfarin together reaches ? 4.0, stop the argatroban drip for 4-6 hours and check the true INR. ? If the true INR is > 2.0, the argatroban drip may be stopped and the patient can continue with just warfarin. (If have overlapped ? 5 days.) ?  If the true INR is < 2.0, the argatroban drip should be resumed at the previous rate and will continue until true INR is > 2.0 Thank you, Robert Garcia Clinical Pharmacist 
[phone] rolling walker

## 2022-04-20 NOTE — DISCHARGE NOTE NURSING/CASE MANAGEMENT/SOCIAL WORK - NSDCPEFALRISK_GEN_ALL_CORE
For information on Fall & Injury Prevention, visit: [email or web address] OR  [email or web address] OR  https://www.cdc.gov/steadi/patient.html

## 2022-04-20 NOTE — DISCHARGE NOTE PROVIDER - CARE PROVIDER_API CALL
Janel Rayo)  Cardiovascular Disease; Internal Medicine  [address] [place] [address]  Phone: [phone]  Fax: [phone]  Established Patient  Scheduled Appointment: 05/09/2022 01:00 PM   Janel Rayo)  Cardiovascular Disease; Internal Medicine  [address] [place] [id number]  Phone: [phone]  Fax: [phone]  Established Patient  Scheduled Appointment: 05/09/2022 01:00 PM    Emerald Garcia  INTERNAL MEDICINE  [address] [place] [address]  Phone: [phone]  Fax: [phone]  Established Patient  Follow Up Time: 1 week

## 2022-04-20 NOTE — DISCHARGE NOTE NURSING/CASE MANAGEMENT/SOCIAL WORK - HISTORY OF COVID-19 VACCINATION
Subjective   Tea Estes is a 66 y.o. female who presents to the clinic for a recheck of vit d deficiency, anemia, and cholesterol.  She is taking turns sitting with her mom.  She had a mammogram with Dr. Layton 2015 and pap smear 2015.  She denies any change in her stool.  She denies any blood in her stool or urine.  No vaginal bleeding.  She tried to do the Cologuard but could not get the test done.  She has not changed her mind on a colonoscopy.  Her  had one and she states he has changed mentally since getting it.  She states her blood pressure only goes up when she is here.  Her weight is down about 10 pounds.  She is not eating as much and not eating after certain time of night.  She is also drinking crystal light.  She got a bill for the Prevnar shot over $350 dollars and needs to get that straightened out before she gets the 23.       Anemia   Presents for follow-up visit. There has been no abdominal pain, anorexia, fever, leg swelling, light-headedness, malaise/fatigue, pallor, palpitations, paresthesias or pica. Signs of blood loss that are not present include hematemesis, hematochezia, melena, menorrhagia and vaginal bleeding.   Hyperlipidemia          The following portions of the patient's history were reviewed and updated as appropriate:   She  has a past medical history of Anemia, unspecified; Contact dermatitis due to plants; Contact dermatitis due to poison ivy; Dyslipidemia; Exanthematous disorder; Hypercholesteremia; Hyperkalemia; Hyperlipidemia; Iron deficiency anemia; Itchy skin; Measles; Menopause; Need for prophylactic vaccination or inoculation against diphtheria and tetanus-pertussis, combined; Other and unspecified hyperlipidemia; Pelvic mass; Varicella; and Vitamin D deficiency.  She  does not have any pertinent problems on file.  She  has a past surgical history that includes Elbow fracture surgery (Right, 2015);  section; Injection of Medication; and  Pap Smear.  Her family history includes Coronary artery disease in her father and other; Dementia in her mother; Diabetes in her other and paternal grandfather; Hyperlipidemia in her other.  She  reports that she has never smoked. She has never used smokeless tobacco. She reports that she does not drink alcohol or use illicit drugs.  Current Outpatient Prescriptions   Medication Sig Dispense Refill   • Ergocalciferol (VITAMIN D2 PO) Take 50,000 Units by mouth 1 (One) Time Per Week.     • ferrous sulfate 325 (65 FE) MG tablet Take 1 tablet by mouth Daily With Breakfast. 30 tablet 5   • pravastatin (PRAVACHOL) 40 MG tablet Take 1 tablet by mouth Every Night. 90 tablet 3   • vitamin D (ERGOCALCIFEROL) 92885 UNITS capsule capsule TAKE ONE CAPSULE BY MOUTH ONCE A WEEK 24 capsule 0     No current facility-administered medications for this visit.      Current Outpatient Prescriptions on File Prior to Visit   Medication Sig   • Ergocalciferol (VITAMIN D2 PO) Take 50,000 Units by mouth 1 (One) Time Per Week.   • pravastatin (PRAVACHOL) 40 MG tablet Take 1 tablet by mouth Every Night.   • vitamin D (ERGOCALCIFEROL) 21444 UNITS capsule capsule TAKE ONE CAPSULE BY MOUTH ONCE A WEEK   • [DISCONTINUED] ferrous sulfate 325 (65 FE) MG tablet Take 1 tablet by mouth Daily With Breakfast.     No current facility-administered medications on file prior to visit.      She has No Known Allergies..    Review of Systems   Constitutional: Positive for fatigue. Negative for chills, fever and malaise/fatigue.   HENT: Negative.    Respiratory: Negative.    Cardiovascular: Negative.  Negative for palpitations.   Gastrointestinal: Negative.  Negative for abdominal pain, anorexia, hematemesis, hematochezia and melena.   Genitourinary: Negative.  Negative for menorrhagia and vaginal bleeding.   Musculoskeletal: Positive for arthralgias.   Skin: Negative.  Negative for pallor.   Allergic/Immunologic: Positive for environmental allergies.  "  Neurological: Negative for dizziness, seizures, light-headedness and paresthesias.   Hematological: Negative.    Psychiatric/Behavioral: Negative.        Objective    Vitals:    01/23/18 1522   BP: 138/90   BP Location: Right arm   Cuff Size: Adult   Pulse: 87   SpO2: 97%   Weight: 73.5 kg (162 lb)   Height: 160 cm (62.99\")   PainSc: 0-No pain     Body mass index is 28.7 kg/(m^2).    Physical Exam   Constitutional: She is oriented to person, place, and time. She appears well-developed and well-nourished. No distress.   HENT:   Head: Normocephalic and atraumatic.   Right Ear: External ear normal.   Left Ear: External ear normal.   Nose: Nose normal.   Mouth/Throat: Oropharynx is clear and moist. No oropharyngeal exudate.   Eyes: Conjunctivae and EOM are normal. Pupils are equal, round, and reactive to light. Right eye exhibits no discharge. Left eye exhibits no discharge. No scleral icterus.   Neck: Neck supple.   Cardiovascular: Normal rate, regular rhythm, normal heart sounds and intact distal pulses.  Exam reveals no gallop and no friction rub.    No murmur heard.  Pulmonary/Chest: Effort normal and breath sounds normal. No respiratory distress. She has no wheezes. She has no rales.   Abdominal: Soft. Bowel sounds are normal. She exhibits no distension and no mass. There is no tenderness. There is no rebound and no guarding. No hernia.   Musculoskeletal: Normal range of motion. She exhibits no edema or tenderness.   Lymphadenopathy:     She has no cervical adenopathy.   Neurological: She is alert and oriented to person, place, and time. She has normal reflexes. No cranial nerve deficit. Coordination normal.   Skin: Skin is warm and dry. She is not diaphoretic.   Psychiatric: She has a normal mood and affect. Her behavior is normal. Judgment and thought content normal.   Nursing note and vitals reviewed.      Assessment/Plan   Problems Addressed this Visit        Cardiovascular and Mediastinum    Hyperlipidemia - " Primary       Digestive    Vitamin D deficiency       Hematopoietic and Hemostatic    Iron deficiency anemia    Relevant Medications    ferrous sulfate 325 (65 FE) MG tablet        Labs reviewed with patient.  Recommend pneumonia 23 vaccine but she declines.  She may get it in the future if she can get her bill addressed for last years shot.  Recommend screening colonoscopy but patient declines.  She attempted to do the Cologuard but could never get it completed.  She doesn't want it now either.  Elevated blood pressure better.  Will need to start medication if still elevated.  Diet, exercise, and weight loss continue.  She will go to Dr. Layton for a pap smear and mammogram.    Risk score 3.          Yes

## 2022-04-20 NOTE — PROGRESS NOTE ADULT - SUBJECTIVE AND OBJECTIVE BOX
Patient appears well. Denies chest pain, shortness of breath, palpitations, lightheadedness or groin pain.   Right groin without pain or bleeding. Dressing and suture removed. No evidence of bleeding, hematoma or ecchymosis. No pain at the site.     Vital Signs Last 24 Hrs  T(C): 36.3 (20 Apr 2022 06:05), Max: 37.6 (19 Apr 2022 15:52)  T(F): 97.3 (20 Apr 2022 06:05), Max: 99.7 (19 Apr 2022 15:52)  HR: 65 (20 Apr 2022 06:05) (63 - 69)  BP: 131/59 (20 Apr 2022 06:05) (119/57 - 151/70)  BP(mean): --  RR: 16 (20 Apr 2022 06:05) (16 - 18)  SpO2: 95% (20 Apr 2022 06:05) (95% - 100%)    EKG: NSR 65bpm. PETER 224 ms  QRSd 146ms  QTc 492 ms  Telemetry: Normal sinus rhythm with 1st degree AVB +PVC's. Intermittent ventricular pacing. No NSVT                            MEDICATIONS  (STANDING):  apixaban 2.5 milliGRAM(s) Oral two times a day  finasteride 5 milliGRAM(s) Oral daily  metoprolol tartrate 25 milliGRAM(s) Oral two times a day  polyethylene glycol 3350 17 Gram(s) Oral every 12 hours  simvastatin 20 milliGRAM(s) Oral at bedtime    MEDICATIONS  (PRN):  melatonin 3 milliGRAM(s) Oral at bedtime PRN Insomnia          Physical exam:   Gen- well developed well nourished. NAD.   Resp- clear to auscultation. No wheezing, rales or rhonchi  CV- S1 and S2 RRR. Grade 1/6 systolic murmur. No gallops or rubs.   ABD- soft nontender + bowel sounds. Right groin suture removed. No bleeding, hematoma or ecchymosis.   EXT- no edema no calf tenderness  Neuro- grossly nonfocal                            12.6   6.43  )-----------( 203      ( 19 Apr 2022 06:45 )             39.2     PT/INR - ( 19 Apr 2022 06:45 )   PT: 13.1 sec;   INR: 1.10 ratio         PTT - ( 19 Apr 2022 06:45 )  PTT:30.6 sec  04-20    139  |  106  |  41<H>  ----------------------------<  110<H>  5.1   |  21<L>  |  1.89<H>    Ca    8.7      20 Apr 2022 06:58  Phos  3.7     04-20  Mg     2.10     04-20    TPro  5.5<L>  /  Alb  3.1<L>  /  TBili  0.4  /  DBili  x   /  AST  25  /  ALT  24  /  AlkPhos  77  04-20      < from: Transthoracic Echocardiogram (03.14.22 @ 12:08) >  Patient name: VENKAT PEMBERTON  YOB: 1930   Age: 89 (M)   MR#: [id number]  Study Date: 3/14/2022  Location: [address] [place], [place] [address]onographer: Shy Pappas [facility]  Study quality: Technically difficult  Referring Physician:  SUJATA CEBALLOS MD  Blood Pressure: 121/65 mmHg  Height: 152 cm  Weight: 77 kg  BSA: 1.7 m2  ------------------------------------------------------------------------    PROCEDURE: Transthoracic echocardiogram with 2-D, M-Mode  and complete spectral and color flow Doppler.  INDICATION: Abnormal electrocardiogram [ECG] [EKG] (R94.31)  HISTORY:  ------------------------------------------------------------------------  DIMENSIONS:  Dimensions:     Normal Values:  LA:     4.9 cm    2.0 - 4.0 cm  Ao:     3.7 cm    2.0 - 3.8 cm  SEPTUM: 1.2 cm    0.6 - 1.2 cm  PWT:    1.1 cm    0.6 - 1.1 cm  LVIDd:  4.4 cm    3.0 - 5.6 cm  LVIDs:  3.4 cm    1.8 - 4.0 cm      Derived Variables:  LVMI: 103 g/m2  RWT: 0.50  Ejection Fraction Knutson: 42 %    ------------------------------------------------------------------------  OBSERVATIONS:  Mitral Valve: Normal mitral valve. Trace mitral  regurgitation.  Aortic Root: Aortic Root: 3.7 cm. Ascending aorta not well  seen.  Aortic Valve: Aortic valve not well visualized; probably  trileaflet. No aortic stenosis. Mild aortic regurgitation.  Left Atrium: Mildly dilated left atrium.  LA volume index =  37 cc/m2.  Left Ventricle: Mildly decreased left ventricular systolic  function (LVEF42% by biplane). Paradoxical septal motion is  seen, consistent with conduction delay. Mild concentric  < from: Transthoracic Echocardiogram (03.14.22 @ 12:08) >  left ventricular hypertrophy. Unable to adequately assess  diastolic function due to presence of arrythmia.  Right Heart: Severe right atrial enlargement. Right  ventricular enlargement with grossly normal RV systolic  function (TAPSE 1.9). Tricuspid valve not well visualized,  probably normal. Mild-moderate tricuspid regurgitation.  Pulmonic valve not well seen.  Pericardium/PleuraSmall pericardial effusion. No  echocardiographic evidence of tamponade physiology.  Hemodynamic: RA Pressure is 8 mm Hg. RV systolic pressure  is mildly increased at  37 mm Hg. An atrial septal aneurysm  is noted.  ------------------------------------------------------------------------  CONCLUSIONS:  1. Normal mitral valve. Trace mitral regurgitation.  2. Aortic valve not well visualized; probably trileaflet.  No aortic stenosis. Mild aortic regurgitation.  3. Mildly dilated left atrium.  LA volume index = 37 cc/m2.  4. Mild concentric left ventricular hypertrophy.  5. Mildly decreased left ventricular systolic function  (LVEF42% by biplane). Paradoxical septal motion is seen,  consistent with conduction delay.  6. Unable to adequately assess diastolic function due to  presence of arrythmia.  7. Severe right atrial enlargement.  8. Right ventricular enlargement with grossly normal RV  systolic function (TAPSE 1.9).  9. RA Pressure is 8 mm Hg.  10. RV systolic pressure is mildly increased at  37 mm Hg.  11. Tricuspid valve not well visualized, probably normal.  Mild-moderate tricuspid regurgitation.  12. Pulmonic valve not well seen.  13. An atrial septal aneurysm is noted.  < from: Transthoracic Echocardiogram (03.14.22 @ 12:08) >  14. Small pericardial effusion. No echocardiographic  evidence of tamponade physiology.    *** No previous Echo exam.  ------------------------------------------------------------------------  Confirmed on  3/14/2022 - 12:13:21 by Darline Lyons MD  ------------------------------------------------------------------------    < end of copied text >    < end of copied text >    < end of copied text >

## 2022-05-09 ENCOUNTER — APPOINTMENT (OUTPATIENT)
Dept: ELECTROPHYSIOLOGY | Facility: CLINIC | Age: 87
End: 2022-05-09
Payer: MEDICAID

## 2022-05-09 DIAGNOSIS — I48.0 PAROXYSMAL ATRIAL FIBRILLATION: ICD-10-CM

## 2022-05-09 DIAGNOSIS — I49.5 SICK SINUS SYNDROME: ICD-10-CM

## 2022-05-09 DIAGNOSIS — I44.2 ATRIOVENTRICULAR BLOCK, COMPLETE: ICD-10-CM

## 2022-05-09 PROBLEM — Z00.00 ENCOUNTER FOR PREVENTIVE HEALTH EXAMINATION: Status: ACTIVE | Noted: 2022-05-09

## 2022-05-09 PROCEDURE — 99024 POSTOP FOLLOW-UP VISIT: CPT

## 2022-05-11 PROBLEM — I44.2 CHB (COMPLETE HEART BLOCK): Status: ACTIVE | Noted: 2022-05-11

## 2022-05-11 PROBLEM — I48.0 PAF (PAROXYSMAL ATRIAL FIBRILLATION): Status: ACTIVE | Noted: 2022-05-11

## 2022-05-11 PROBLEM — I49.5 TACHYCARDIA-BRADYCARDIA SYNDROME: Status: ACTIVE | Noted: 2022-05-11

## 2022-05-16 RX ORDER — POLYETHYLENE GLYCOL 3350 17 G/17G
17 POWDER, FOR SOLUTION ORAL
Refills: 0 | Status: ACTIVE | COMMUNITY

## 2022-05-16 RX ORDER — METOPROLOL TARTRATE 50 MG/1
50 TABLET, FILM COATED ORAL TWICE DAILY
Refills: 0 | Status: ACTIVE | COMMUNITY

## 2022-05-16 RX ORDER — APIXABAN 2.5 MG/1
2.5 TABLET, FILM COATED ORAL TWICE DAILY
Refills: 0 | Status: ACTIVE | COMMUNITY

## 2022-05-16 RX ORDER — SIMVASTATIN 20 MG/1
20 TABLET, FILM COATED ORAL
Refills: 0 | Status: ACTIVE | COMMUNITY

## 2022-05-16 RX ORDER — FINASTERIDE 5 MG/1
5 TABLET, FILM COATED ORAL
Refills: 0 | Status: ACTIVE | COMMUNITY

## 2022-07-08 NOTE — ED ADULT TRIAGE NOTE - TEMPERATURE IN FAHRENHEIT (DEGREES F)
----- Message from Castro Fajardo sent at 7/8/2022  3:36 PM CDT -----  Contact: Self  Type:  Test Results    Who Called:  Patient  Name of Test (Lab/Mammo/Etc):  Lab  Date of Test:  7/8  Ordering Provider:  Riana  Where the test was performed:  [facility]  Best Call Back Number:  [phone]   Additional Information:           [id number]

## 2022-09-01 ENCOUNTER — EMERGENCY (EMERGENCY)
Facility: HOSPITAL | Age: 87
LOS: 1 days | Discharge: HOME | End: 2022-09-01
Attending: EMERGENCY MEDICINE
Payer: MEDICAID

## 2022-09-01 VITALS
HEART RATE: 71 BPM | DIASTOLIC BLOOD PRESSURE: 70 MMHG | RESPIRATION RATE: 16 BRPM | TEMPERATURE: 98 F | HEIGHT: 67 IN | WEIGHT: 169.98 LBS | SYSTOLIC BLOOD PRESSURE: 193 MMHG | OXYGEN SATURATION: 98 %

## 2022-09-01 VITALS
RESPIRATION RATE: 18 BRPM | SYSTOLIC BLOOD PRESSURE: 161 MMHG | DIASTOLIC BLOOD PRESSURE: 65 MMHG | HEART RATE: 78 BPM | OXYGEN SATURATION: 98 %

## 2022-09-01 PROCEDURE — 99283 EMERGENCY DEPT VISIT LOW MDM: CPT

## 2022-09-01 PROCEDURE — 93005 ELECTROCARDIOGRAM TRACING: CPT

## 2022-09-01 PROCEDURE — 99284 EMERGENCY DEPT VISIT MOD MDM: CPT

## 2022-09-01 RX ORDER — AMLODIPINE BESYLATE 2.5 MG/1
5 TABLET ORAL ONCE
Refills: 0 | Status: COMPLETED | OUTPATIENT
Start: 2022-09-01 | End: 2022-09-01

## 2022-09-01 RX ORDER — AMLODIPINE BESYLATE 2.5 MG/1
1 TABLET ORAL
Qty: 5 | Refills: 0
Start: 2022-09-01 | End: 2022-09-05

## 2022-09-01 RX ADMIN — AMLODIPINE BESYLATE 5 MILLIGRAM(S): 2.5 TABLET ORAL at 23:20

## 2022-09-01 NOTE — ED PROVIDER NOTE - ATTENDING CONTRIBUTION TO CARE
htn, had mild ha now completely gone. neuro nl. notably on ac for atrial fibrillation. review of chart, recent admit was stopped from mult anti-htn med.  neuro exam nl  pt has appt jorge sunday. will do amlodipine 5 till then.

## 2022-09-01 NOTE — ED PROVIDER NOTE - CLINICAL SUMMARY MEDICAL DECISION MAKING FREE TEXT BOX
The pt is a 89 yo m with a pmhx of a fib on eliquis and metoprolol presenting with a systolic BP of 193 from home. He denies any headache, focal neuro deficits, abdominal or back pain, vision changes, or any other symptoms since the HTN began. There are no pertinent positives on physical exam and neuro exam is normal. Given his history and normal PE he is stable in the ED at the moment and he should be closely watched for any organ damage.

## 2022-09-01 NOTE — ED PROVIDER NOTE - OBJECTIVE STATEMENT
The pt is a 89 yo man with a PMH of Atrial Fibrillation on Eliquis, HLD, CKD, BPH with chronic Chinchilla presents to the ED for Hypertension at 190/70 measured at home. The patient states that his BP is normally 21001 but it has been increasing the last 3 days. The systolic beth to 156 yesterday and today jumped to 193. He is brought in by his daughter who states she is concerned bc of the HTN and his age. The patient denies any headache, nausea vomiting, focal neuro deficits, vision changes, or difficulty walking. He has a pacemaker. He states that he take metoprolol, eloquis, a statin, and finasteride. The pt is a 89 yo man with a PMH of Atrial Fibrillation on Eliquis, HLD, CKD, BPH with chronic Chinchilla presents to the ED for Hypertension at 190/70 measured at home. The patient states that his BP is normally 120/70 but it has been increasing the last 3 days. The systolic beth to 156 yesterday and today jumped to 193. He is brought in by his daughter who states she is concerned bc of the HTN and his age. The patient denies any headache, nausea vomiting, focal neuro deficits, vision changes, or difficulty walking. He has a pacemaker. He states that he take metoprolol, eloquis, a statin, and finasteride.

## 2022-09-01 NOTE — ED PROVIDER NOTE - PATIENT PORTAL LINK FT
You can access the FollowMyHealth Patient Portal offered by [facility] by registering at the following website: [email or web address]. By joining [facility]’s FollowMyHealth portal, you will also be able to view your health information using other applications (apps) compatible with our system.

## 2022-09-01 NOTE — ED PROVIDER NOTE - NSFOLLOWUPINSTRUCTIONS_ED_ALL_ED_FT
IMPORTANT INSTRUCTIONS FROM Dr. CARO:    Please follow up with your Cardiologist Sunday.   Bring results from today to your visit.    If you were advised to take any medications - be sure to review the package insert.    If your symptoms change, get worse or if you have any new symptoms, come to the ER right away.  If you have any questions, call the ER at the phone number on this page.

## 2022-09-01 NOTE — ED PROVIDER NOTE - PHYSICAL EXAMINATION
PHYSICAL EXAM  GENERAL: NAD, lying comfortably in bed   HEAD:  Atraumatic, Normocephalic  EYES: EOMI b/l, PERRLA b/l, conjunctiva and sclera clear  NECK: Supple, No JVD, No LAD   CHEST/LUNG: Clear to auscultation bilaterally; No wheeze or ronchi  HEART: Regular rate and rhythm; S1 and S2 present, No murmurs, rubs, or gallops  ABDOMEN: Soft, Nontender, Nondistended; Bowel sounds present  EXTREMITIES:  2+ Peripheral Pulses, No clubbing, cyanosis, or edema  NEURO: AAOx3, non-focal   SKIN: No rashes or lesions

## 2022-09-01 NOTE — ED PROVIDER NOTE - LANGUAGE ASSISTANCE NEEDED
Pt daughter was at bedside and he refused a translating service/No-Patient/Caregiver offered and refused free interpretation services.

## 2022-09-02 NOTE — ED ADULT NURSE NOTE - CCCP TRG CHIEF CMPLNT
Chief Complaint   Patient presents with     Diabetes     prediabetes        Initial BP (!) 144/96 (BP Location: Left arm, Patient Position: Chair, Cuff Size: Adult Large)   Pulse 80   Temp 98.1  F (36.7  C) (Tympanic)   Wt 93 kg (205 lb)   SpO2 96%   BMI 27.80 kg/m   Estimated body mass index is 27.8 kg/m  as calculated from the following:    Height as of 3/8/22: 1.829 m (6').    Weight as of this encounter: 93 kg (205 lb).  Medication Reconciliation: complete  EDOUARD BUSTILLOS LPN  
high blood pressure

## 2022-09-26 ENCOUNTER — NON-APPOINTMENT (OUTPATIENT)
Age: 87
End: 2022-09-26

## 2022-09-30 ENCOUNTER — NON-APPOINTMENT (OUTPATIENT)
Age: 87
End: 2022-09-30

## 2022-10-02 ENCOUNTER — NON-APPOINTMENT (OUTPATIENT)
Age: 87
End: 2022-10-02

## 2022-10-08 ENCOUNTER — NON-APPOINTMENT (OUTPATIENT)
Age: 87
End: 2022-10-08

## 2022-11-08 ENCOUNTER — NON-APPOINTMENT (OUTPATIENT)
Age: 87
End: 2022-11-08

## 2022-12-10 ENCOUNTER — NON-APPOINTMENT (OUTPATIENT)
Age: 87
End: 2022-12-10

## 2023-01-12 NOTE — ED PROVIDER NOTE - NS ED MD DISPO ISOLATION TYPES
None Erivedge Pregnancy And Lactation Text: This medication is Pregnancy Category X and is absolutely contraindicated during pregnancy. It is unknown if it is excreted in breast milk.

## 2023-04-02 NOTE — DISCHARGE NOTE NURSING/CASE MANAGEMENT/SOCIAL WORK - NSDCPETBCESMAN_GEN_ALL_CORE
If you are a smoker, it is important for your health to stop smoking. Please be aware that second hand smoke is also harmful. normal for race

## 2023-04-11 NOTE — PROGRESS NOTE ADULT - PROBLEM SELECTOR PLAN 1
REVIEW OF CARDIOVASCULAR SYSTEMS:  Cardiovascular problem(s): Rapid Heartbeat, Shortness of Breath on Exertion and Swelling in Legs, Ankles, Abdomen    Patient does not smoke.    Patient does not take aspirin.  Reason patient does not take aspirin: not advised to      bradycardia 2/2 betablocker toxicity vs digoxin  digoxin levels 0.9, s/p dig binding ab  s/p glucagon gtt and ICU monitoring  f/u cardio on when to restart anti-arrythmics  Cardio Radha

## 2023-05-02 NOTE — CONSULT NOTE ADULT - NS ATTEND AMEND GEN_ALL_CORE FT
89 year old male with PMHx of Atrial Fibrillation on Eliquis, HLD, CKD, BPH with chronic Chinchilla, recent pseudomonas bacteruria, who presented with one day of general malaise with noted hypotension, neck pain and bradycardia at home. Found to be in complete heart block in the setting of BB and Dig on board. Given history of difficult AF to rate control. Would benefit from Micra placement. [[id number]]

## 2023-05-13 ENCOUNTER — NON-APPOINTMENT (OUTPATIENT)
Age: 88
End: 2023-05-13

## 2023-06-17 ENCOUNTER — EMERGENCY (EMERGENCY)
Facility: HOSPITAL | Age: 88
LOS: 1 days | Discharge: HOME | End: 2023-06-17
Attending: STUDENT IN AN ORGANIZED HEALTH CARE EDUCATION/TRAINING PROGRAM
Payer: MEDICAID

## 2023-06-17 VITALS
OXYGEN SATURATION: 94 % | SYSTOLIC BLOOD PRESSURE: 180 MMHG | RESPIRATION RATE: 17 BRPM | WEIGHT: 193.12 LBS | HEART RATE: 75 BPM | HEIGHT: 65 IN | TEMPERATURE: 98 F | DIASTOLIC BLOOD PRESSURE: 101 MMHG

## 2023-06-17 PROCEDURE — 99283 EMERGENCY DEPT VISIT LOW MDM: CPT

## 2023-06-17 PROCEDURE — 51702 INSERT TEMP BLADDER CATH: CPT

## 2023-06-17 PROCEDURE — 99283 EMERGENCY DEPT VISIT LOW MDM: CPT | Mod: 25

## 2023-06-17 NOTE — ED ADULT NURSE NOTE - NSFALLHARMRISKINTERV_ED_ALL_ED

## 2023-06-17 NOTE — ED ADULT NURSE NOTE - PAIN: BODY LOCATION
Called unable to leave message due to mailbox is full  I did email her regarding her cpap supplies  She is able to get pillows, tubing and filters   lower abd pain

## 2023-06-17 NOTE — ED ADULT NURSE NOTE - NSFALLASSESSNEED_ED_ALL_ED
Detail Level: Generalized
General Sunscreen Counseling: I recommended a broad spectrum sunscreen with a SPF of 30+, and emphasized that sun protective clothing is also helpful. Recommended using a wide brim hats as well.
no

## 2023-06-17 NOTE — ED PROVIDER NOTE - OBJECTIVE STATEMENT
89 y.o presenting salmon dislodgement. occurred 3-4 hrs pta. patient uses salmon catheter chronically. endorses lower abd fullness. denies other complaints or symptoms.

## 2023-06-17 NOTE — ED PROVIDER NOTE - CLINICAL SUMMARY MEDICAL DECISION MAKING FREE TEXT BOX
Patient presenting for salmon dislodgement. salmon replaced. patient endorses feeling better. clinically well. given return precaution and instructed to fu pmd

## 2023-06-17 NOTE — ED ADULT NURSE NOTE - OBJECTIVE STATEMENT
axox3 ,nad , as per pt Amor cath came out at 0700 am today - lower abd pain at present time , pt walking with a waker

## 2023-06-17 NOTE — ED ADULT NURSE NOTE - LANGUAGE ASSISTANCE NEEDED
primary nurse [place] speaking/No-Patient/Caregiver offered and refused free interpretation services.

## 2023-06-17 NOTE — ED ADULT TRIAGE NOTE - CHIEF COMPLAINT QUOTE
floey catheter accidently came out, chronic salmon 6 1/2 years, had appointment raulito changed tomorrow

## 2023-06-17 NOTE — ED PROVIDER NOTE - NSFOLLOWUPINSTRUCTIONS_ED_ALL_ED_FT
Indwelling Urinary Catheter Care, Adult  An indwelling urinary catheter is a thin tube that is put into your bladder. The tube helps to drain pee (urine) out of your body. The tube goes in through your urethra. Your urethra is where pee comes out of your body. Your pee will come out through the catheter, then it will go into a bag (drainage bag).    Take good care of your catheter so it will work well.    What are the risks?  Germs may get into your bladder and cause an infection.  The tube can become blocked.  Tissue near the catheter may become irritated and may bleed.  How to wear your catheter and drainage bag  Supplies needed    Sticky tape (adhesive tape) or a leg strap.  Alcohol wipe or soap and water (if you use tape).  A clean towel (if you use tape).  Large overnight bag.  Smaller bag (leg bag).  Wearing your catheter    Attach your catheter to your leg with tape or a leg strap.  Make sure the catheter is not pulled tight.  If a leg strap gets wet, take it off and put on a dry strap.  If you use tape to hold the bag on your leg:  Use an alcohol wipe or soap and water to wash your skin where the tape made it sticky before.  Use a clean towel to pat-dry that skin.  Use new tape to make the bag stay on your leg.  Wearing your bags    You should have been given a large overnight bag.  You may wear the overnight bag in the day or night.  Always have the overnight bag lower than your bladder. Do not let the bag touch the floor.  Before you go to sleep, put a clean plastic bag in a wastebasket. Then, hang the overnight bag inside the wastebasket.  You should also have a smaller leg bag that fits under your clothes.  Wear the leg bag as told by the product maker. This may be above or below the knee, depending on the length of the tubing.  Make sure that the leg bag is below the bladder.  Make sure that the tubing does not have loops or too much tension.  Do not wear your leg bag at night.  How to care for your skin and catheter  Supplies needed    A clean washcloth.  Water and mild soap.  A clean towel.  Caring for your skin and catheter    Female anatomy showing the labia, urethra, and an indwelling urinary catheter in the bladder.  Male anatomy showing the penis, urethra, and an indwelling urinary catheter in the bladder.  Clean the skin around your catheter every day.  Wash your hands with soap and water.  Wet a clean washcloth in warm water and mild soap.  Clean the skin around your urethra.  If you are female:  Gently spread the folds of skin around your vagina (labia).  With the washcloth in your other hand, wipe the inner side of your labia on each side. Wipe from front to back.  If you are male:  Pull back any skin that covers the end of your penis (foreskin).  With the washcloth in your other hand, wipe your penis in small circles. Start wiping at the tip of your penis, then move away from the catheter.  Move the foreskin back in place, if needed.  With your free hand, hold the catheter close to where it goes into your body.  Keep holding the catheter during cleaning so it does not get pulled out.  With the washcloth in your other hand, clean the catheter.  Only wipe downward on the catheter, toward the drainage bag.  Do not wipe upward toward your body. Doing this may push germs into your urethra and cause infection.  Use a clean towel to pat-dry the catheter and the skin around it. Make sure to wipe off all soap.  Wash your hands with soap and water.  Shower every day. Do not take baths.  Do not use cream, ointment, or lotion on the area where the catheter goes into your body, unless your doctor tells you to.  Do not use powders, sprays, or lotions on your genital area.  Check your skin around the catheter every day for signs of infection. Check for:  Redness, swelling, or pain.  Fluid or blood.  Warmth.  Pus or a bad smell.  How to empty the bag  Supplies needed    Rubbing alcohol.  Gauze pad or cotton ball.  Tape or a leg strap.  Emptying the bag    Pour the pee out of your bag when it is ?–½ full, or at least 2–3 times a day. Do this for your overnight bag and your leg bag.  Wash your hands with soap and water.  Separate (detach) the bag from your leg.  Hold the bag over the toilet or a clean pail. Keep the bag lower than your hips and bladder. This is so the pee (urine) does not go back into the tube.  Open the pour spout. It is at the bottom of the bag.  Empty the pee into the toilet or pail. Do not let the pour spout touch any surface.  Put rubbing alcohol on a gauze pad or cotton ball.  Use the gauze pad or cotton ball to clean the pour spout.  Close the pour spout.  Attach the bag to your leg with tape or a leg strap.  Wash your hands with soap and water.  Follow instructions for cleaning the drainage bag. Instructions can come from:  The product maker.  Your doctor.  How to change the bag  Changing the bag    Replace your bag when it starts to leak, smell bad, or look dirty.  Wash your hands with soap and water.  Separate the dirty bag from your leg.  Pinch the catheter with your fingers so that pee does not spill out.  Separate the catheter tube from the bag tube where these tubes connect (at the connection valve). Do not let the tubes touch any surface.  Clean the end of the catheter tube with an alcohol wipe. Use a different alcohol wipe to clean the end of the bag tube.  Connect the catheter tube to the tube of the clean bag.  Attach the clean bag to your leg with tape or a leg strap. Do not make the bag tight on your leg.  Wash your hands with soap and water.  General instructions  A person washing hands with soap and water.  Never pull on your catheter. Never try to take it out. Doing that can hurt you.  Always wash your hands before and after you touch your catheter or bag. Use a mild, fragrance-free soap. If you do not have soap and water, use hand .  Always make sure there are no twists, bends, or kinks in the catheter tube.  Always make sure there are no leaks in the catheter or bag.  Drink enough fluid to keep your pee pale yellow.  Do not take baths, swim, or use a hot tub.  If you are female, wipe from front to back after you poop (have a bowel movement).  Contact a doctor if:  Your catheter gets clogged.  Your catheter leaks.  You have signs of infection at the catheter site, such as:  Redness, swelling, or pain where the catheter goes into your body.  Fluid, blood, pus, or a bad smell coming from the area where the catheter goes into your body.  Skin feels warm where the catheter goes into your body.  You have signs of a bladder infection, such as:  Fever.  Chills.  Pee smells worse than usual.  Cloudy pee.  Pain in your belly, legs, lower back, or bladder.  Vomiting or feel like vomiting.  Get help right away if:  You see blood in the catheter.  Your pee is pink or red.  Your bladder feels full.  Your pee is not draining into the bag.  Your catheter gets pulled out.  Summary  An indwelling urinary catheter is a thin tube that is placed into the bladder to help drain pee (urine) out of the body.  The catheter is placed into the part of the body that drains pee from the bladder (urethra).  Taking good care of your catheter will keep it working well.  Always wash your hands before and after touching your catheter or bag.  Never pull on your catheter or try to take it out.  This information is not intended to replace advice given to you by your health care provider. Make sure you discuss any questions you have with your health care provider.

## 2023-06-17 NOTE — ED PROVIDER NOTE - LANGUAGE ASSISTANCE NEEDED
primary nurse Moldovan speaking/No-Patient/Caregiver offered and refused free interpretation services.

## 2023-07-12 ENCOUNTER — INPATIENT (INPATIENT)
Facility: HOSPITAL | Age: 88
LOS: 4 days | Discharge: HOME | DRG: 291 | End: 2023-07-17
Attending: INTERNAL MEDICINE | Admitting: INTERNAL MEDICINE
Payer: MEDICAID

## 2023-07-12 VITALS
HEIGHT: 65 IN | OXYGEN SATURATION: 93 % | HEART RATE: 74 BPM | WEIGHT: 169.98 LBS | TEMPERATURE: 99 F | SYSTOLIC BLOOD PRESSURE: 141 MMHG | DIASTOLIC BLOOD PRESSURE: 60 MMHG | RESPIRATION RATE: 16 BRPM

## 2023-07-12 PROCEDURE — 99285 EMERGENCY DEPT VISIT HI MDM: CPT

## 2023-07-12 NOTE — ED ADULT TRIAGE NOTE - CHIEF COMPLAINT QUOTE
as per daughter " His right leg looks like infected and they are both swollen, I don't know how long "  daughter interpreting " No pain "

## 2023-07-12 NOTE — ED ADULT TRIAGE NOTE - NS_BHTRGSOURCE_ED_A_ED_FT
----- Message from Ayush Hatch MD sent at 4/26/2019  9:54 AM CDT -----  Please notify the pt of biopsy results from R forehead and L cheek from 4/23. The R forehead is a precancerous lesion called actinic keratosis. Please schedule for f/u with me in few weeks for cryotherapy of lesion. The L cheek was a nodular BCC which I would like to consult Dr. Walker or Dr. Lopez for a mohs excision. Please help schedule. F/u with me in 6 months.   kayla lee

## 2023-07-13 DIAGNOSIS — Z29.9 ENCOUNTER FOR PROPHYLACTIC MEASURES, UNSPECIFIED: ICD-10-CM

## 2023-07-13 DIAGNOSIS — N40.0 BENIGN PROSTATIC HYPERPLASIA WITHOUT LOWER URINARY TRACT SYMPTOMS: ICD-10-CM

## 2023-07-13 DIAGNOSIS — I10 ESSENTIAL (PRIMARY) HYPERTENSION: ICD-10-CM

## 2023-07-13 DIAGNOSIS — I48.91 UNSPECIFIED ATRIAL FIBRILLATION: ICD-10-CM

## 2023-07-13 DIAGNOSIS — I50.9 HEART FAILURE, UNSPECIFIED: ICD-10-CM

## 2023-07-13 DIAGNOSIS — E78.5 HYPERLIPIDEMIA, UNSPECIFIED: ICD-10-CM

## 2023-07-13 DIAGNOSIS — J90 PLEURAL EFFUSION, NOT ELSEWHERE CLASSIFIED: ICD-10-CM

## 2023-07-13 DIAGNOSIS — Z95.0 PRESENCE OF CARDIAC PACEMAKER: Chronic | ICD-10-CM

## 2023-07-13 DIAGNOSIS — N17.9 ACUTE KIDNEY FAILURE, UNSPECIFIED: ICD-10-CM

## 2023-07-13 LAB
ALBUMIN SERPL ELPH-MCNC: 3.1 G/DL — LOW (ref 3.5–5)
ALBUMIN SERPL ELPH-MCNC: 3.3 G/DL — LOW (ref 3.5–5)
ALP SERPL-CCNC: 79 U/L — SIGNIFICANT CHANGE UP (ref 40–120)
ALP SERPL-CCNC: 84 U/L — SIGNIFICANT CHANGE UP (ref 40–120)
ALT FLD-CCNC: 26 U/L DA — SIGNIFICANT CHANGE UP (ref 10–60)
ALT FLD-CCNC: 26 U/L DA — SIGNIFICANT CHANGE UP (ref 10–60)
ANION GAP SERPL CALC-SCNC: 7 MMOL/L — SIGNIFICANT CHANGE UP (ref 5–17)
ANION GAP SERPL CALC-SCNC: 8 MMOL/L — SIGNIFICANT CHANGE UP (ref 5–17)
AST SERPL-CCNC: 14 U/L — SIGNIFICANT CHANGE UP (ref 10–40)
AST SERPL-CCNC: 15 U/L — SIGNIFICANT CHANGE UP (ref 10–40)
BASOPHILS # BLD AUTO: 0.04 K/UL — SIGNIFICANT CHANGE UP (ref 0–0.2)
BASOPHILS # BLD AUTO: 0.04 K/UL — SIGNIFICANT CHANGE UP (ref 0–0.2)
BASOPHILS NFR BLD AUTO: 0.5 % — SIGNIFICANT CHANGE UP (ref 0–2)
BASOPHILS NFR BLD AUTO: 0.5 % — SIGNIFICANT CHANGE UP (ref 0–2)
BILIRUB SERPL-MCNC: 0.4 MG/DL — SIGNIFICANT CHANGE UP (ref 0.2–1.2)
BILIRUB SERPL-MCNC: 0.4 MG/DL — SIGNIFICANT CHANGE UP (ref 0.2–1.2)
BUN SERPL-MCNC: 49 MG/DL — HIGH (ref 7–18)
BUN SERPL-MCNC: 51 MG/DL — HIGH (ref 7–18)
CALCIUM SERPL-MCNC: 8.3 MG/DL — LOW (ref 8.4–10.5)
CALCIUM SERPL-MCNC: 8.7 MG/DL — SIGNIFICANT CHANGE UP (ref 8.4–10.5)
CHLORIDE SERPL-SCNC: 106 MMOL/L — SIGNIFICANT CHANGE UP (ref 96–108)
CHLORIDE SERPL-SCNC: 106 MMOL/L — SIGNIFICANT CHANGE UP (ref 96–108)
CO2 SERPL-SCNC: 25 MMOL/L — SIGNIFICANT CHANGE UP (ref 22–31)
CO2 SERPL-SCNC: 28 MMOL/L — SIGNIFICANT CHANGE UP (ref 22–31)
CREAT SERPL-MCNC: 2.03 MG/DL — HIGH (ref 0.5–1.3)
CREAT SERPL-MCNC: 2.1 MG/DL — HIGH (ref 0.5–1.3)
EGFR: 29 ML/MIN/1.73M2 — LOW
EGFR: 30 ML/MIN/1.73M2 — LOW
EOSINOPHIL # BLD AUTO: 0.2 K/UL — SIGNIFICANT CHANGE UP (ref 0–0.5)
EOSINOPHIL # BLD AUTO: 0.22 K/UL — SIGNIFICANT CHANGE UP (ref 0–0.5)
EOSINOPHIL NFR BLD AUTO: 2.6 % — SIGNIFICANT CHANGE UP (ref 0–6)
EOSINOPHIL NFR BLD AUTO: 3 % — SIGNIFICANT CHANGE UP (ref 0–6)
GLUCOSE SERPL-MCNC: 104 MG/DL — HIGH (ref 70–99)
GLUCOSE SERPL-MCNC: 111 MG/DL — HIGH (ref 70–99)
HCT VFR BLD CALC: 31.9 % — LOW (ref 39–50)
HCT VFR BLD CALC: 33.3 % — LOW (ref 39–50)
HGB BLD-MCNC: 10.4 G/DL — LOW (ref 13–17)
HGB BLD-MCNC: 9.9 G/DL — LOW (ref 13–17)
IMM GRANULOCYTES NFR BLD AUTO: 0.3 % — SIGNIFICANT CHANGE UP (ref 0–0.9)
IMM GRANULOCYTES NFR BLD AUTO: 0.8 % — SIGNIFICANT CHANGE UP (ref 0–0.9)
LYMPHOCYTES # BLD AUTO: 1.24 K/UL — SIGNIFICANT CHANGE UP (ref 1–3.3)
LYMPHOCYTES # BLD AUTO: 1.32 K/UL — SIGNIFICANT CHANGE UP (ref 1–3.3)
LYMPHOCYTES # BLD AUTO: 16.4 % — SIGNIFICANT CHANGE UP (ref 13–44)
LYMPHOCYTES # BLD AUTO: 17.8 % — SIGNIFICANT CHANGE UP (ref 13–44)
MAGNESIUM SERPL-MCNC: 2.8 MG/DL — HIGH (ref 1.6–2.6)
MCHC RBC-ENTMCNC: 28.1 PG — SIGNIFICANT CHANGE UP (ref 27–34)
MCHC RBC-ENTMCNC: 28.4 PG — SIGNIFICANT CHANGE UP (ref 27–34)
MCHC RBC-ENTMCNC: 31 GM/DL — LOW (ref 32–36)
MCHC RBC-ENTMCNC: 31.2 GM/DL — LOW (ref 32–36)
MCV RBC AUTO: 90 FL — SIGNIFICANT CHANGE UP (ref 80–100)
MCV RBC AUTO: 91.4 FL — SIGNIFICANT CHANGE UP (ref 80–100)
MONOCYTES # BLD AUTO: 0.88 K/UL — SIGNIFICANT CHANGE UP (ref 0–0.9)
MONOCYTES # BLD AUTO: 0.93 K/UL — HIGH (ref 0–0.9)
MONOCYTES NFR BLD AUTO: 11.8 % — SIGNIFICANT CHANGE UP (ref 2–14)
MONOCYTES NFR BLD AUTO: 12.3 % — SIGNIFICANT CHANGE UP (ref 2–14)
NEUTROPHILS # BLD AUTO: 4.91 K/UL — SIGNIFICANT CHANGE UP (ref 1.8–7.4)
NEUTROPHILS # BLD AUTO: 5.15 K/UL — SIGNIFICANT CHANGE UP (ref 1.8–7.4)
NEUTROPHILS NFR BLD AUTO: 66.1 % — SIGNIFICANT CHANGE UP (ref 43–77)
NEUTROPHILS NFR BLD AUTO: 67.9 % — SIGNIFICANT CHANGE UP (ref 43–77)
NRBC # BLD: 0 /100 WBCS — SIGNIFICANT CHANGE UP (ref 0–0)
NRBC # BLD: 0 /100 WBCS — SIGNIFICANT CHANGE UP (ref 0–0)
NT-PROBNP SERPL-SCNC: 4056 PG/ML — HIGH (ref 0–450)
PHOSPHATE SERPL-MCNC: 3.7 MG/DL — SIGNIFICANT CHANGE UP (ref 2.5–4.5)
PLATELET # BLD AUTO: 177 K/UL — SIGNIFICANT CHANGE UP (ref 150–400)
PLATELET # BLD AUTO: 178 K/UL — SIGNIFICANT CHANGE UP (ref 150–400)
POTASSIUM SERPL-MCNC: 4.4 MMOL/L — SIGNIFICANT CHANGE UP (ref 3.5–5.3)
POTASSIUM SERPL-MCNC: 5.1 MMOL/L — SIGNIFICANT CHANGE UP (ref 3.5–5.3)
POTASSIUM SERPL-SCNC: 4.4 MMOL/L — SIGNIFICANT CHANGE UP (ref 3.5–5.3)
POTASSIUM SERPL-SCNC: 5.1 MMOL/L — SIGNIFICANT CHANGE UP (ref 3.5–5.3)
PROT SERPL-MCNC: 6.3 G/DL — SIGNIFICANT CHANGE UP (ref 6–8.3)
PROT SERPL-MCNC: 6.8 G/DL — SIGNIFICANT CHANGE UP (ref 6–8.3)
RBC # BLD: 3.49 M/UL — LOW (ref 4.2–5.8)
RBC # BLD: 3.7 M/UL — LOW (ref 4.2–5.8)
RBC # FLD: 13.3 % — SIGNIFICANT CHANGE UP (ref 10.3–14.5)
RBC # FLD: 13.4 % — SIGNIFICANT CHANGE UP (ref 10.3–14.5)
SODIUM SERPL-SCNC: 139 MMOL/L — SIGNIFICANT CHANGE UP (ref 135–145)
SODIUM SERPL-SCNC: 141 MMOL/L — SIGNIFICANT CHANGE UP (ref 135–145)
TROPONIN I, HIGH SENSITIVITY RESULT: 54.3 NG/L — SIGNIFICANT CHANGE UP
WBC # BLD: 7.43 K/UL — SIGNIFICANT CHANGE UP (ref 3.8–10.5)
WBC # BLD: 7.58 K/UL — SIGNIFICANT CHANGE UP (ref 3.8–10.5)
WBC # FLD AUTO: 7.43 K/UL — SIGNIFICANT CHANGE UP (ref 3.8–10.5)
WBC # FLD AUTO: 7.58 K/UL — SIGNIFICANT CHANGE UP (ref 3.8–10.5)

## 2023-07-13 PROCEDURE — 99223 1ST HOSP IP/OBS HIGH 75: CPT

## 2023-07-13 PROCEDURE — 71045 X-RAY EXAM CHEST 1 VIEW: CPT | Mod: 26

## 2023-07-13 PROCEDURE — 93010 ELECTROCARDIOGRAM REPORT: CPT

## 2023-07-13 RX ORDER — FUROSEMIDE 40 MG
40 TABLET ORAL
Refills: 0 | Status: DISCONTINUED | OUTPATIENT
Start: 2023-07-13 | End: 2023-07-16

## 2023-07-13 RX ORDER — CYCLOPENTOLATE HYDROCHLORIDE 10 MG/ML
1 SOLUTION/ DROPS OPHTHALMIC
Refills: 0 | Status: DISCONTINUED | OUTPATIENT
Start: 2023-07-13 | End: 2023-07-17

## 2023-07-13 RX ORDER — ALPRAZOLAM 0.25 MG
0.25 TABLET ORAL DAILY
Refills: 0 | Status: DISCONTINUED | OUTPATIENT
Start: 2023-07-13 | End: 2023-07-14

## 2023-07-13 RX ORDER — ALLOPURINOL 300 MG
100 TABLET ORAL DAILY
Refills: 0 | Status: DISCONTINUED | OUTPATIENT
Start: 2023-07-13 | End: 2023-07-17

## 2023-07-13 RX ORDER — APIXABAN 2.5 MG/1
2.5 TABLET, FILM COATED ORAL
Refills: 0 | Status: DISCONTINUED | OUTPATIENT
Start: 2023-07-13 | End: 2023-07-17

## 2023-07-13 RX ORDER — FINASTERIDE 5 MG/1
5 TABLET, FILM COATED ORAL DAILY
Refills: 0 | Status: DISCONTINUED | OUTPATIENT
Start: 2023-07-13 | End: 2023-07-17

## 2023-07-13 RX ORDER — METOPROLOL TARTRATE 50 MG
12.5 TABLET ORAL
Refills: 0 | Status: DISCONTINUED | OUTPATIENT
Start: 2023-07-13 | End: 2023-07-17

## 2023-07-13 RX ORDER — FUROSEMIDE 40 MG
40 TABLET ORAL ONCE
Refills: 0 | Status: COMPLETED | OUTPATIENT
Start: 2023-07-13 | End: 2023-07-13

## 2023-07-13 RX ORDER — SIMVASTATIN 20 MG/1
20 TABLET, FILM COATED ORAL AT BEDTIME
Refills: 0 | Status: DISCONTINUED | OUTPATIENT
Start: 2023-07-13 | End: 2023-07-17

## 2023-07-13 RX ORDER — RISPERIDONE 4 MG/1
1 TABLET ORAL DAILY
Refills: 0 | Status: DISCONTINUED | OUTPATIENT
Start: 2023-07-13 | End: 2023-07-14

## 2023-07-13 RX ADMIN — FINASTERIDE 5 MILLIGRAM(S): 5 TABLET, FILM COATED ORAL at 12:46

## 2023-07-13 RX ADMIN — Medication 12.5 MILLIGRAM(S): at 18:08

## 2023-07-13 RX ADMIN — Medication 40 MILLIGRAM(S): at 05:04

## 2023-07-13 RX ADMIN — Medication 100 MILLIGRAM(S): at 12:45

## 2023-07-13 RX ADMIN — CYCLOPENTOLATE HYDROCHLORIDE 1 DROP(S): 10 SOLUTION/ DROPS OPHTHALMIC at 21:44

## 2023-07-13 RX ADMIN — Medication 40 MILLIGRAM(S): at 06:20

## 2023-07-13 RX ADMIN — APIXABAN 2.5 MILLIGRAM(S): 2.5 TABLET, FILM COATED ORAL at 18:09

## 2023-07-13 RX ADMIN — Medication 0.25 MILLIGRAM(S): at 12:45

## 2023-07-13 RX ADMIN — RISPERIDONE 1 MILLIGRAM(S): 4 TABLET ORAL at 12:46

## 2023-07-13 NOTE — CONSULT NOTE ADULT - SUBJECTIVE AND OBJECTIVE BOX
Chief complain/HPI  H&P  History of Present Illness:   A 89 year old male, from home, ambulating w/ assistance at baseline, w/ PMHx HFmrEF, Atrial Fibrillation s/p Micra PPM and on Eliquis, HLD, CKD, and BPH with chronic Salmon, not good historian, was brought into the ED due to worsening dyspnea at rest and exertion, orthopnea, bendopnea, palpitations, abdominal distention and leg swelling. Denies chest pain, lightheadedness dizziness, nausea, vomiting, chills, or diarrhea. Patient mentioned that these symptoms have been slowly progressing for the past month, but they became more noticeable over the last 2 days. Patient is compliant w/ medications. Unclear water and salt intake since he is alone in the house most of the day. No recent travel or sick contact. Unclear when was the last Cardio visit. He does not have any other concerns.     XR< from: Xray Chest 1 View- PORTABLE-Urgent (07.13.23 @ 01:49) >  INTERPRETATION:  AP erect chest on July 13, 2023 at 1:32 AM. Patient has   chest pain.    Heart magnified by technique. Micra pacemaker again noted.    Present film shows a moderate right base effusion and interstitial CHF.   Congestive findings are new since April 20, 2022.    IMPRESSION: Congestion with right effusion are new since April 2022.    --- End of Report ---      < end of copied text >    Allergies and Intolerances:        Allergies:  	No Known Allergies:     Home Medications:   * Patient Currently Takes Medications as of 13-Jul-2023 06:10 documented in Structured Notes  · 	metoprolol tartrate 50 mg oral tablet: Last Dose Taken:  , 0.5 tab(s) orally 2 times a day   · 	finasteride 5 mg oral tablet: Last Dose Taken:  , 1 tab(s) orally once a day  · 	hydrALAZINE 50 mg oral tablet: Last Dose Taken:  , 1 tab(s) orally 3 times a day  · 	allopurinol 100 mg oral tablet: Last Dose Taken:  , 1 tab(s) orally once a day  · 	Xanax 0.25 mg oral tablet: Last Dose Taken:  , 1 tab(s) orally once a day  · 	simvastatin 20 mg oral tablet: Last Dose Taken:  , 1 tab(s) orally once a day (at bedtime)  · 	risperiDONE 1 mg oral tablet: Last Dose Taken:  , 1 tab(s) orally once a day  · 	torsemide 5 mg oral tablet: Last Dose Taken:  , 1 tab(s) orally every other day  · 	Eliquis 2.5 mg oral tablet: Last Dose Taken:  , 1 tab(s) orally 2 times a day      PAST MEDICAL & SURGICAL HISTORY:  Hypertension      Atrial fibrillation      BPH (benign prostatic hyperplasia)      Chronic kidney disease, unspecified CKD stage 3 b, EGFR 37 ML /MIN., Creatinine, Serum: 1.89 mg/dL (04.20.22 @ 06:58) Creatinine: 2.10 mg/dL (07.13.23 @ 00:55)   Hyperlipidemia  S/P placement of cardiac pacemaker          Home Medications Reviewed    Hospital Medications:   MEDICATIONS  (STANDING):  allopurinol 100 milliGRAM(s) Oral daily  ALPRAZolam 0.25 milliGRAM(s) Oral daily  apixaban 2.5 milliGRAM(s) Oral two times a day  cyclopentolate 1% Solution 1 Drop(s) Both EYES <User Schedule>  finasteride 5 milliGRAM(s) Oral daily  furosemide   Injectable 40 milliGRAM(s) IV Push two times a day  metoprolol tartrate 12.5 milliGRAM(s) Oral two times a day  risperiDONE   Tablet 1 milliGRAM(s) Oral daily  simvastatin 20 milliGRAM(s) Oral at bedtime    MEDICATIONS  (PRN):      Allergies    No Known Allergies    Intolerances                              10.4   7.43  )-----------( 177      ( 13 Jul 2023 08:07 )             33.3     07-13    141  |  106  |  49<H>  ----------------------------<  104<H>  4.4   |  28  |  2.03<H>    Ca    8.7      13 Jul 2023 08:07  Phos  3.7     07-13  Mg     2.8     07-13    TPro  6.8  /  Alb  3.3<L>  /  TBili  0.4  /  DBili  x   /  AST  15  /  ALT  26  /  AlkPhos  84  07-13              RADIOLOGY & ADDITIONAL STUDIES:    SOCIAL HISTORY: Denies ETOh,Smoking,     FAMILY HISTORY:  No pertinent family history in first degree relatives        REVIEW OF SYSTEMS:  CONSTITUTIONAL: No malaise, No fatigue, No fevers or chills, well developed, no diaphoresis  EYES/ENT: No visual changes;  No vertigo or throat pain   NECK: No pain or stiffness  RESPIRATORY: No cough, wheezing, hemoptysis; No shortness of breath  CARDIOVASCULAR: No chest pain or palpitations. No edema  GASTROINTESTINAL: No abdominal or epigastric pain. No nausea, vomiting, or hematemesis; No diarrhea or constipation. No melena or hematochezia.  GENITOURINARY: No dysuria, frequency, foamy urine, urinary urgency, incontinence or hematuria  NEUROLOGICAL: No numbness or weakness, No tremor  SKIN: No itching, burning, rashes, or lesions   VASCULAR: No claudication  Musculoskeletal: no arthralgia, no myalgia  All other review of systems is negative unless indicated above.    VITALS:  Vital Signs Last 24 Hrs  T(C): 36.5 (13 Jul 2023 11:20), Max: 37 (12 Jul 2023 22:09)  T(F): 97.7 (13 Jul 2023 11:20), Max: 98.6 (12 Jul 2023 22:09)  HR: 65 (13 Jul 2023 11:20) (65 - 74)  BP: 121/60 (13 Jul 2023 11:20) (121/60 - 164/56)  BP(mean): --  RR: 17 (13 Jul 2023 11:20) (16 - 17)  SpO2: 95% (13 Jul 2023 11:20) (93% - 97%)    Parameters below as of 13 Jul 2023 11:20  Patient On (Oxygen Delivery Method): room air        Height (cm): 165.1 (07-12 @ 22:09)  Weight (kg): 77.1 (07-12 @ 22:09)  BMI (kg/m2): 28.3 (07-12 @ 22:09)  BSA (m2): 1.85 (07-12 @ 22:09)    PHYSICAL EXAM:  Constitutional: NAD  HEENT: anicteric sclera, oropharynx clear, MMM  Neck: No JVD  Respiratory: good air entrance B/L, no wheezes, rales or rhonchi  Cardiovascular: S1, S2, RRR, no pericardial rub, no murmur  Gastrointestinal: BS+, soft, no tenderness, no distension, no bruit  Pelvis: bladder non-distended, no CVA tenderness  Extremities: No cyanosis or clubbing. No peripheral edema  Neurological: A/O x 3, no focal deficits  Psychiatric: Normal mood, normal affect  : No CVA tenderness. No salmon.   Skin: No rashes  Vascular: all pulses present  Access:                     Chief complain/HPI  H&P  History of Present Illness:   A 89 year old male, from home, ambulating w/ assistance at baseline, w/ PMHx HFmrEF, Atrial Fibrillation s/p Micra PPM and on Eliquis, HLD, CKD, and BPH with chronic Chinchilla, not good historian, was brought into the ED due to worsening dyspnea at rest and exertion, orthopnea, bendopnea, palpitations, abdominal distention and leg swelling. Denies chest pain, lightheadedness dizziness, nausea, vomiting, chills, or diarrhea. Patient mentioned that these symptoms have been slowly progressing for the past month, but they became more noticeable over the last 2 days. Patient is compliant w/ medications. Unclear water and salt intake since he is alone in the house most of the day. No recent travel or sick contact. Unclear when was the last Cardio visit. He does not have any other concerns.     XR< from: Xray Chest 1 View- PORTABLE-Urgent (07.13.23 @ 01:49) >  INTERPRETATION:  AP erect chest on July 13, 2023 at 1:32 AM. Patient has   chest pain.    Heart magnified by technique. Micra pacemaker again noted.    Present film shows a moderate right base effusion and interstitial CHF.   Congestive findings are new since April 20, 2022.    IMPRESSION: Congestion with right effusion are new since April 2022.    --- End of Report ---      < end of copied text >    Allergies and Intolerances:        Allergies:  	No Known Allergies:     Home Medications:   * Patient Currently Takes Medications as of 13-Jul-2023 06:10 documented in Structured Notes  · 	metoprolol tartrate 50 mg oral tablet: Last Dose Taken:  , 0.5 tab(s) orally 2 times a day   · 	finasteride 5 mg oral tablet: Last Dose Taken:  , 1 tab(s) orally once a day  · 	hydrALAZINE 50 mg oral tablet: Last Dose Taken:  , 1 tab(s) orally 3 times a day  · 	allopurinol 100 mg oral tablet: Last Dose Taken:  , 1 tab(s) orally once a day  · 	Xanax 0.25 mg oral tablet: Last Dose Taken:  , 1 tab(s) orally once a day  · 	simvastatin 20 mg oral tablet: Last Dose Taken:  , 1 tab(s) orally once a day (at bedtime)  · 	risperiDONE 1 mg oral tablet: Last Dose Taken:  , 1 tab(s) orally once a day  · 	torsemide 5 mg oral tablet: Last Dose Taken:  , 1 tab(s) orally every other day  · 	Eliquis 2.5 mg oral tablet: Last Dose Taken:  , 1 tab(s) orally 2 times a day      PAST MEDICAL & SURGICAL HISTORY:  Hypertension      Atrial fibrillation      BPH (benign prostatic hyperplasia)      Chronic kidney disease, unspecified CKD stage 3 b, EGFR 37 ML /MIN., Creatinine, Serum: 1.89 mg/dL (04.20.22 @ 06:58) Creatinine: 2.10 mg/dL (07.13.23 @ 00:55)   Hyperlipidemia  S/P placement of cardiac pacemaker          Home Medications Reviewed    Hospital Medications:   MEDICATIONS  (STANDING):  allopurinol 100 milliGRAM(s) Oral daily  ALPRAZolam 0.25 milliGRAM(s) Oral daily  apixaban 2.5 milliGRAM(s) Oral two times a day  cyclopentolate 1% Solution 1 Drop(s) Both EYES <User Schedule>  finasteride 5 milliGRAM(s) Oral daily  furosemide   Injectable 40 milliGRAM(s) IV Push two times a day  metoprolol tartrate 12.5 milliGRAM(s) Oral two times a day  risperiDONE   Tablet 1 milliGRAM(s) Oral daily  simvastatin 20 milliGRAM(s) Oral at bedtime    MEDICATIONS  (PRN):      Allergies    No Known Allergies    Intolerances                              10.4   7.43  )-----------( 177      ( 13 Jul 2023 08:07 )             33.3     07-13    141  |  106  |  49<H>  ----------------------------<  104<H>  4.4   |  28  |  2.03<H>    Ca    8.7      13 Jul 2023 08:07  Phos  3.7     07-13  Mg     2.8     07-13    TPro  6.8  /  Alb  3.3<L>  /  TBili  0.4  /  DBili  x   /  AST  15  /  ALT  26  /  AlkPhos  84  07-13              RADIOLOGY & ADDITIONAL STUDIES:  < from: US Kidney and Bladder (03.15.22 @ 16:21) >  INTERPRETATION:  CLINICAL INFORMATION: Hematuria    COMPARISON: CT scan 3/13/2022.    TECHNIQUE: Sonography of the kidneys and bladder.    FINDINGS: There is mild bilateral hydronephrosis.    Right kidney: 9.0 cm. No renal mass or calculi. Mid pole cyst measures   2.9 x 2.7 x 2.4 cm.    Left kidney: 12.3 cm. No renal mass or calculi. Cyst is seen measuring   4.5 x 4.1 x 3.2 cm in the upper pole.    Urinary bladder: Nondistended in the presence of a Chinchilla catheter.    IMPRESSION:  Mild bilateral hydronephrosis. Bilateral cysts.    --- End of Report ---    < end of copied text >    SOCIAL HISTORY: Denies ETOh,Smoking,     FAMILY HISTORY:  No pertinent family history in first degree relativesAll other review of systems is negative unless indicated above.  ROS  Confused and non cooperative.   VITALS:  Vital Signs Last 24 Hrs  T(C): 36.5 (13 Jul 2023 11:20), Max: 37 (12 Jul 2023 22:09)  T(F): 97.7 (13 Jul 2023 11:20), Max: 98.6 (12 Jul 2023 22:09)  HR: 65 (13 Jul 2023 11:20) (65 - 74)  BP: 121/60 (13 Jul 2023 11:20) (121/60 - 164/56)  BP(mean): --  RR: 17 (13 Jul 2023 11:20) (16 - 17)  SpO2: 95% (13 Jul 2023 11:20) (93% - 97%)    Parameters below as of 13 Jul 2023 11:20  Patient On (Oxygen Delivery Method): room air        Height (cm): 165.1 (07-12 @ 22:09)  Weight (kg): 77.1 (07-12 @ 22:09)  BMI (kg/m2): 28.3 (07-12 @ 22:09)  BSA (m2): 1.85 (07-12 @ 22:09)    PHYSICAL EXAM:  Constitutional: NAD  HEENT: anicteric sclera, oropharynx clear, MMM  Neck: No JVD  Respiratory: air entrance B/L, no wheezes, rales or rhonchi  Cardiovascular: irregular, irregular, no pericardial rub, no murmur  Gastrointestinal: BS+, soft, no tenderness, no distension, no bruit  Pelvis: bladder non-distended, no CVA tenderness  Extremities: edema , bellow knees bilateral plus 2  Neurological: A/O x 3, no focal deficits

## 2023-07-13 NOTE — CHART NOTE - NSCHARTNOTEFT_GEN_A_CORE
NP Note discussed with primary attending.      Patient is a 89y old  Male who presents with a chief complaint of Decompensated Heart Failure (13 Jul 2023 06:14)      INTERVAL HPI/OVERNIGHT EVENTS:     MEDICATIONS  (STANDING):  allopurinol 100 milliGRAM(s) Oral daily  ALPRAZolam 0.25 milliGRAM(s) Oral daily  apixaban 2.5 milliGRAM(s) Oral two times a day  cyclopentolate 1% Solution 1 Drop(s) Both EYES <User Schedule>  finasteride 5 milliGRAM(s) Oral daily  furosemide   Injectable 40 milliGRAM(s) IV Push two times a day  metoprolol tartrate 12.5 milliGRAM(s) Oral two times a day  risperiDONE   Tablet 1 milliGRAM(s) Oral daily  simvastatin 20 milliGRAM(s) Oral at bedtime    MEDICATIONS  (PRN):      __________________________________________________  REVIEW OF SYSTEMS:    CONSTITUTIONAL: No fever,   EYES: no acute visual disturbances  NECK: No pain or stiffness  RESPIRATORY: No cough; No shortness of breath  CARDIOVASCULAR: No chest pain, no palpitations  GASTROINTESTINAL: No pain. No nausea or vomiting; No diarrhea   NEUROLOGICAL: No headache or numbness, no tremors  MUSCULOSKELETAL: No joint pain, no muscle pain  GENITOURINARY: no dysuria, no frequency, no hesitancy  PSYCHIATRY: no depression , no anxiety  ALL OTHER  ROS negative        Vital Signs Last 24 Hrs  T(C): 36.5 (13 Jul 2023 07:40), Max: 37 (12 Jul 2023 22:09)  T(F): 97.7 (13 Jul 2023 07:40), Max: 98.6 (12 Jul 2023 22:09)  HR: 66 (13 Jul 2023 07:40) (66 - 74)  BP: 140/53 (13 Jul 2023 07:40) (140/53 - 164/56)  BP(mean): --  RR: 16 (13 Jul 2023 07:40) (16 - 16)  SpO2: 94% (13 Jul 2023 07:40) (93% - 97%)    Parameters below as of 13 Jul 2023 07:40  Patient On (Oxygen Delivery Method): room air        ________________________________________________  PHYSICAL EXAM:  GENERAL: NAD  HEENT: Normocephalic;  conjunctivae and sclerae clear; moist mucous membranes;   NECK : supple  CHEST/LUNG: Clear to auscultation bilaterally with good air entry   HEART: S1 S2  regular; no murmurs, gallops or rubs  ABDOMEN: Soft, Nontender, Nondistended; Bowel sounds present  EXTREMITIES: no cyanosis; no edema; no calf tenderness  SKIN: warm and dry; no rash  NERVOUS SYSTEM:  Awake and alert; no new deficits    _________________________________________________  LABS:                        10.4   7.43  )-----------( 177      ( 13 Jul 2023 08:07 )             33.3     07-13    141  |  106  |  49<H>  ----------------------------<  104<H>  4.4   |  28  |  2.03<H>    Ca    8.7      13 Jul 2023 08:07  Phos  3.7     07-13  Mg     2.8     07-13    TPro  6.8  /  Alb  3.3<L>  /  TBili  0.4  /  DBili  x   /  AST  15  /  ALT  26  /  AlkPhos  84  07-13      Urinalysis Basic - ( 13 Jul 2023 08:07 )    Color: x / Appearance: x / SG: x / pH: x  Gluc: 104 mg/dL / Ketone: x  / Bili: x / Urobili: x   Blood: x / Protein: x / Nitrite: x   Leuk Esterase: x / RBC: x / WBC x   Sq Epi: x / Non Sq Epi: x / Bacteria: x      CAPILLARY BLOOD GLUCOSE            RADIOLOGY & ADDITIONAL TESTS:        Consultant(s) Notes Reviewed:   YES/ No    Care Discussed with Consultants :     Plan of care was discussed with patient and /or primary care giver; all questions and concerns were addressed and care was aligned with patient's wishes. NP Note discussed with primary attending.      Patient is a 89y old  Male who presents with a chief complaint of Decompensated Heart Failure (13 Jul 2023 06:14)      INTERVAL HPI/OVERNIGHT EVENTS: No acute events overnight.   Pt seen and examined this morning in [facility]. [place]  # [id number] translated.   Pt AAOx3, endorses feeling a little better. Tolerating po. NAD.   Contacted daughter Della who endorses pt follows with Dr. Radha grimes. Daughter endorses pt recently started on eye drops for eye infection. Verified with pt's local pharmacy.     MEDICATIONS  (STANDING):  allopurinol 100 milliGRAM(s) Oral daily  ALPRAZolam 0.25 milliGRAM(s) Oral daily  apixaban 2.5 milliGRAM(s) Oral two times a day  cyclopentolate 1% Solution 1 Drop(s) Both EYES <User Schedule>  finasteride 5 milliGRAM(s) Oral daily  furosemide   Injectable 40 milliGRAM(s) IV Push two times a day  metoprolol tartrate 12.5 milliGRAM(s) Oral two times a day  risperiDONE   Tablet 1 milliGRAM(s) Oral daily  simvastatin 20 milliGRAM(s) Oral at bedtime    MEDICATIONS  (PRN):      __________________________________________________  REVIEW OF SYSTEMS:    CONSTITUTIONAL: No fever,   EYES: no acute visual disturbances  NECK: No pain or stiffness  RESPIRATORY: No cough; less shortness of breath  CARDIOVASCULAR: No chest pain, no palpitations  GASTROINTESTINAL: No pain. No nausea or vomiting; No diarrhea   NEUROLOGICAL: No headache or numbness, no tremors  MUSCULOSKELETAL: No joint pain, no muscle pain  GENITOURINARY: (+) chronic samlon. no dysuria, no frequency, no hesitancy  PSYCHIATRY: no depression , no anxiety  ALL OTHER  ROS negative        Vital Signs Last 24 Hrs  T(C): 36.5 (13 Jul 2023 07:40), Max: 37 (12 Jul 2023 22:09)  T(F): 97.7 (13 Jul 2023 07:40), Max: 98.6 (12 Jul 2023 22:09)  HR: 66 (13 Jul 2023 07:40) (66 - 74)  BP: 140/53 (13 Jul 2023 07:40) (140/53 - 164/56)  BP(mean): --  RR: 16 (13 Jul 2023 07:40) (16 - 16)  SpO2: 94% (13 Jul 2023 07:40) (93% - 97%)    Parameters below as of 13 Jul 2023 07:40  Patient On (Oxygen Delivery Method): room air        ________________________________________________  PHYSICAL EXAM:  GENERAL: NAD  HEENT: Normocephalic;  conjunctivae and sclerae clear. Eyelids reddened. No drainage. ; moist mucous membranes  NECK : supple  CHEST/LUNG: Clear to auscultation bilaterally with good air entry   HEART: S1 S2  regular; no murmurs, gallops or rubs  ABDOMEN: Soft, Nontender, Nondistended; Bowel sounds present   : indwelling salmon  EXTREMITIES: no cyanosis; no edema; no calf tenderness  SKIN: warm and dry; n  NERVOUS SYSTEM:  Awake and alert; no new deficits    _________________________________________________  LABS:                        10.4   7.43  )-----------( 177      ( 13 Jul 2023 08:07 )             33.3     07-13    141  |  106  |  49<H>  ----------------------------<  104<H>  4.4   |  28  |  2.03<H>    Ca    8.7      13 Jul 2023 08:07  Phos  3.7     07-13  Mg     2.8     07-13    TPro  6.8  /  Alb  3.3<L>  /  TBili  0.4  /  DBili  x   /  AST  15  /  ALT  26  /  AlkPhos  84  07-13      Urinalysis Basic - ( 13 Jul 2023 08:07 )    Color: x / Appearance: x / SG: x / pH: x  Gluc: 104 mg/dL / Ketone: x  / Bili: x / Urobili: x   Blood: x / Protein: x / Nitrite: x   Leuk Esterase: x / RBC: x / WBC x   Sq Epi: x / Non Sq Epi: x / Bacteria: x      CAPILLARY BLOOD GLUCOSE      RADIOLOGY & ADDITIONAL TESTS:  < from: Xray Chest 1 View- PORTABLE-Urgent (07.13.23 @ 01:49) >    IMPRESSION: Congestion with right effusion are new since April 2022.    < end of copied text >    -----------------  ASSESSMENT/PLAN    A 89 year old male, from home, ambulating w/ assistance at baseline, w/ PMHx HFmrEF, Atrial Fibrillation s/p Micra PPM and on Eliquis, HLD, CKD, and BPH with chronic Salmon, not good historian, was brought into the ED due to worsening dyspnea at rest and exertion, orthopnea, bendopnea, palpitations, abdominal distention and leg swelling. Noted to have BP of 140/60 mmHg w/ HR of 74 and saturating around 97% on RA. Not tachypneic. Physical exam as above. BNP of 4056 w/ normal troponin level and EKG showing Ventricular-paced rhythm with occasional Premature ventricular complexes. CXR (wet read) w/ pleural effusion on the right lower lobe and infiltrates bilaterally. BUN/Cr 51/2.10. Admitted to telemetry for decompensated Heart Failure.    -Continue diuresis with lasix.   -Tele   -Cardiology Dr. Garcia.   -Resume eye drop bacterial infection .  Rest of plan per H&P.           Plan of care was discussed with patient and /or primary care giver; all questions and concerns were addressed and care was aligned with patient's wishes.

## 2023-07-13 NOTE — PATIENT PROFILE ADULT - FALL HARM RISK - HARM RISK INTERVENTIONS

## 2023-07-13 NOTE — ED ADULT NURSE NOTE - NSFALLCONCLUSION_ED_ALL_ED
How Severe Is This Condition?: mild
Additional History: Pain scale 0/10.
Universal Safety Interventions

## 2023-07-13 NOTE — ED PROVIDER NOTE - CLINICAL SUMMARY MEDICAL DECISION MAKING FREE TEXT BOX
Patient with history of cardiac disease presenting with worsening peripheral edema.  He was mildly hypoxic on triage vital signs but no respiratory distress with clear lungs.  Will evaluate for evidence of pulmonary edema with labs, troponin, proBNP and chest x-ray.  Exam seems less consistent with a cellulitis or infectious cause of his swelling.  May require admission for diuresis

## 2023-07-13 NOTE — H&P ADULT - PROBLEM SELECTOR PLAN 2
[id number] Score 5 points  Stroke risk was 7.2% per year in >90,000 patients (the [place] Atrial Fibrillation Cohort Study) and 10.0% risk of stroke/TIA/systemic embolism.  Will continue Eliquis 2.5 mg PO BID  Goal HR <110  Rate control w/ Metoprolol   Vitals q4hr   Cardio Dr Garcia   BP control   Will order Echo  Remote tele  DASH/TLC diet

## 2023-07-13 NOTE — ED PROVIDER NOTE - PROGRESS NOTE DETAILS
Significant R sided pleural effusion noted on CXR.  Given worsening peripheral edema will start IV lasix and admit for diuresis.

## 2023-07-13 NOTE — PATIENT PROFILE ADULT - FUNCTIONAL ASSESSMENT - BASIC MOBILITY 6.
3-calculated by average/Not able to assess (calculate score using [facility] averaging method)

## 2023-07-13 NOTE — H&P ADULT - PROBLEM SELECTOR PLAN 1
Lasix 40 mg IV q12hr  Echo from March 2022 w/ EF of 42%  NYHA class III-IV   BP control    BNP 4056  F/U CXR  TTE   Daily Weights  I & Os   Vitals q4hr   DASH/TLC diet    1500 mL Fluid restriction    Remote Tele    Monitor electrolytes    Cardio Dr Garcia consulted    Avoid NSAIDs or thiazolidinediones  Dietitian consult

## 2023-07-13 NOTE — ED ADULT NURSE NOTE - NSICDXFAMILYHX_GEN_ALL_CORE_FT
LOV 11/9/2021  Scheduled 3/9/2022  
FAMILY HISTORY:  No pertinent family history in first degree relatives

## 2023-07-13 NOTE — H&P ADULT - ASSESSMENT
A 89 year old male, from home, ambulating w/ assistance at baseline, w/ PMHx HFmrEF, Atrial Fibrillation s/p Micra PPM and on Eliquis, HLD, CKD, and BPH with chronic Chinchilla, not good historian, was brought into the ED due to worsening dyspnea at rest and exertion, orthopnea, bendopnea, palpitations, abdominal distention and leg swelling. Noted to have BP of 140/60 mmHg w/ HR of 74 and saturating around 97% on RA. Not tachypneic. Physical exam as above. BNP of 4056 w/ normal troponin level and EKG showing Ventricular-paced rhythm with occasional Premature ventricular complexes. CXR (wet read) w/ pleural effusion on the right lower lobe and infiltrates bilaterally. BUN/Cr 51/2.10. Admitted to telemetry for decompensated Heart Failure.

## 2023-07-13 NOTE — H&P ADULT - PROBLEM SELECTOR PLAN 4
BUN/Cr 51/2.10  Baseline Cr seems to be 1.6 to 1.8   Likely in the setting of decompensated HF  Avoid Nephrotoxic Meds/ Agents such as (NSAIDs, IV contrast, Aminoglycosides such as gentamicin, Gadolinium contrast, Phosphate containing enemas, etc..)  Adjust Medications according to eGFR  Rest as above

## 2023-07-13 NOTE — ED ADULT NURSE REASSESSMENT NOTE - NS ED NURSE REASSESS COMMENT FT1
pt has a chronic salmon cath, urine output present
Patient received from night KARON Nunes. Patient A&OX2. Patient denies pain. No acute distress noted.

## 2023-07-13 NOTE — ED PROVIDER NOTE - PHYSICAL EXAMINATION
Exam:  General: Patient well appearing  HEENT: airway patent with moist mucous membranes  Cardiac: RRR S1/S2 with strong peripheral pulses  Respiratory: lungs clear without respiratory distress  GI: abdomen soft, non tender, non distended  MSK: bilateral pitting edema R>L with some skin color change consistent with venous conjestion, no warmth  Skin: warm, well perfused  Psych: normal mood and affect

## 2023-07-13 NOTE — H&P ADULT - NSHPPHYSICALEXAM_GEN_ALL_CORE
PHYSICAL EXAMINATION:  GENERAL: NAD, sitting in bed   HEAD:  Atraumatic, Normocephalic  EYES:  Conjunctiva and sclera clear, pupils are equal, round, and reactive to light and accommodation.  NECK: Supple, No JVD, trachea is midline, no evidence of thyroid enlargement, no lymphadenopathy or tenderness.  CHEST/LUNG: Crackles noted on the lower lung bilaterally, no rhonchi, wheezing, or rubs, not using accessory muscles   HEART: Regular rate and rhythm; grade II/VI systolic murmur noted on the left middle and lower sternal border and grade II/VI holosystolic murmur on the left midclavicular line at the level of the 5th intercostal space, no rubs, or gallops  ABDOMEN: Soft, Nontender, distended; Bowel sounds present  NERVOUS SYSTEM:  Alert & Oriented X3; No focal sensory or motor deficits are noted; Recent and remote memory is good; Appropriate mood and affect.  EXTREMITIES:  2+ Peripheral Pulses, No clubbing, no cyanosis, pitting edema +3 on lower ext bilaterally   SKIN: Warm, dry, and well perfused; Good turgor; erythema noted on the lower leg bilaterally w/ some scarring and shiny skin    Vital Signs Last 24 Hrs  T(C): 36.3 (13 Jul 2023 05:24), Max: 37 (12 Jul 2023 22:09)  T(F): 97.4 (13 Jul 2023 05:24), Max: 98.6 (12 Jul 2023 22:09)  HR: 71 (13 Jul 2023 05:24) (71 - 74)  BP: 164/56 (13 Jul 2023 05:24) (141/60 - 164/56)  BP(mean): --  RR: 16 (13 Jul 2023 05:24) (16 - 16)  SpO2: 97% (13 Jul 2023 05:24) (93% - 97%)    Parameters below as of 13 Jul 2023 05:24  Patient On (Oxygen Delivery Method): room air

## 2023-07-13 NOTE — CONSULT NOTE ADULT - SUBJECTIVE AND OBJECTIVE BOX
CHIEF COMPLAINT:Patient is a 89y old  Male who presents with a chief complaint of Decompensated Heart Failure.      HPI:  A 89 year old male,well known to me, from home, ambulating w/ assistance at baseline, w/ PMHx HFmrEF, Atrial Fibrillation s/p Micra PPM and on Eliquis, HLD, CKD, and BPH with chronic Chinchilla, not good historian, was brought into the ED due to worsening dyspnea at rest and exertion, orthopnea, bendopnea, palpitations, abdominal distention and leg swelling. Denies chest pain, lightheadedness dizziness, nausea, vomiting, chills, or diarrhea. Patient mentioned that these symptoms have been slowly progressing for the past month, but they became more noticeable over the last 2 days. Patient is compliant w/ medications. Unclear water and salt intake since he is alone in the house most of the day. No recent travel or sick contact. Unclear when was the last Cardio visit. He does not have any other concerns.  (13 Jul 2023 06:14)      PAST MEDICAL & SURGICAL HISTORY:  Hypertension      Atrial fibrillation      BPH (benign prostatic hyperplasia)      Chronic kidney disease, unspecified CKD stage      Hyperlipidemia      S/P placement of cardiac pacemaker          MEDICATIONS  (STANDING):  allopurinol 100 milliGRAM(s) Oral daily  ALPRAZolam 0.25 milliGRAM(s) Oral daily  apixaban 2.5 milliGRAM(s) Oral two times a day  cyclopentolate 1% Solution 1 Drop(s) Both EYES <User Schedule>  finasteride 5 milliGRAM(s) Oral daily  furosemide   Injectable 40 milliGRAM(s) IV Push two times a day  metoprolol tartrate 12.5 milliGRAM(s) Oral two times a day  risperiDONE   Tablet 1 milliGRAM(s) Oral daily  simvastatin 20 milliGRAM(s) Oral at bedtime    MEDICATIONS  (PRN):      FAMILY HISTORY:  No pertinent family history in first degree relatives        SOCIAL HISTORY:    [x ] Non-smoker    [x ] Alcohol-denies    Allergies    No Known Allergies    Intolerances    	    REVIEW OF SYSTEMS:  CONSTITUTIONAL: No fever, weight loss, or fatigue  EYES: No eye pain, visual disturbances, or discharge  ENT:  No difficulty hearing, tinnitus, vertigo; No sinus or throat pain  NECK: No pain or stiffness  RESPIRATORY: No cough, wheezing, chills or hemoptysis; + Shortness of Breath  CARDIOVASCULAR: No chest pain, palpitations, passing out, dizziness, + leg swelling  GASTROINTESTINAL: No abdominal or epigastric pain. No nausea, vomiting, or hematemesis; No diarrhea or constipation. No melena or hematochezia.  GENITOURINARY: No dysuria, frequency, hematuria, or incontinence  NEUROLOGICAL: No headaches, memory loss, loss of strength, numbness, or tremors  SKIN: No itching, burning, rashes, or lesions   LYMPH Nodes: No enlarged glands  ENDOCRINE: No heat or cold intolerance; No hair loss  MUSCULOSKELETAL: No joint pain or swelling; No muscle, back, or extremity pain  PSYCHIATRIC: No depression, anxiety, mood swings, or difficulty sleeping  HEME/LYMPH: No easy bruising, or bleeding gums  ALLERGY AND IMMUNOLOGIC: No hives or eczema	      PHYSICAL EXAM:  T(C): 36.5 (07-13-23 @ 11:20), Max: 37 (07-12-23 @ 22:09)  HR: 65 (07-13-23 @ 11:20) (65 - 74)  BP: 121/60 (07-13-23 @ 11:20) (121/60 - 164/56)  RR: 17 (07-13-23 @ 11:20) (16 - 17)  SpO2: 95% (07-13-23 @ 11:20) (93% - 97%)  Wt(kg): --  I&O's Summary      Appearance: Normal	  HEENT:   Normal oral mucosa, PERRL, EOMI	  Lymphatic: No lymphadenopathy  Cardiovascular: Normal S1 S2, No JVD, No murmurs, +2 edema  Respiratory: Lungs clear to auscultation	  Gastrointestinal:  Soft, Non-tender, + BS	  Skin: No rashes, No ecchymoses, No cyanosis	  Neurologic: Non-focal  Extremities: Normal range of motion, No clubbing, cyanosis +2 edema  Vascular: Peripheral pulses palpable 2+ bilaterally    	    ECG:  afib v paced,pvc	    LABS:	 	      Troponin I, High Sensitivity Result: 54.3 ng/L (07-13-23 @ 00:55)                          10.4   7.43  )-----------( 177      ( 13 Jul 2023 08:07 )             33.3     07-13    141  |  106  |  49<H>  ----------------------------<  104<H>  4.4   |  28  |  2.03<H>    Ca    8.7      13 Jul 2023 08:07  Phos  3.7     07-13  Mg     2.8     07-13    TPro  6.8  /  Alb  3.3<L>  /  TBili  0.4  /  DBili  x   /  AST  15  /  ALT  26  /  AlkPhos  84  07-13    < from: Xray Chest 1 View- PORTABLE-Urgent (07.13.23 @ 01:49) >  ACC: [id number] EXAM:  XR CHEST PORTABLE URGENT 1V   ORDERED BY: HANNA GERARD     PROCEDURE DATE:  07/13/2023          INTERPRETATION:  AP erect chest on July 13, 2023 at 1:32 AM. Patient has   chest pain.    Heart magnified by technique. Micra pacemaker again noted.    Present film shows a moderate right base effusion and interstitial CHF.   Congestive findings are new since April 20, 2022.    IMPRESSION: Congestion with right effusion are new since April 2022.        < from: Transthoracic Echocardiogram (03.14.22 @ 12:08) >  OBSERVATIONS:  Mitral Valve: Normal mitral valve. Trace mitral  regurgitation.  Aortic Root: Aortic Root: 3.7 cm. Ascending aorta not well  seen.  Aortic Valve: Aortic valve not well visualized; probably  trileaflet. No aortic stenosis. Mild aortic regurgitation.  Left Atrium: Mildly dilated left atrium.  LA volume index =  37 cc/m2.  Left Ventricle: Mildly decreased left ventricular systolic  function (LVEF42% by biplane). Paradoxical septal motion is  seen, consistent with conduction delay. Mild concentric  left ventricular hypertrophy. Unable to adequately assess  diastolic function due to presence of arrythmia.  Right Heart: Severe right atrial enlargement. Right  ventricular enlargement with grossly normal RV systolic  function (TAPSE 1.9). Tricuspid valve not well visualized,  probably normal. Mild-moderate tricuspid regurgitation.  Pulmonic valve not well seen.  Pericardium/PleuraSmall pericardial effusion. No  echocardiographic evidence of tamponade physiology.  Hemodynamic: RA Pressure is 8 mm Hg. RV systolic pressure  is mildly increased at  37 mm Hg. An atrial septal aneurysm  is noted.           CHIEF COMPLAINT:Patient is a 89y old  Male who presents with a chief complaint of Decompensated Heart Failure.      HPI:  A 89 year old male,well known to me, from home, ambulating w/ assistance at baseline, w/ PMHx HFmrEF, Atrial Fibrillation s/p Micra PPM and on Eliquis, HLD, CKD, and BPH with chronic Chinchilla, not good historian, was brought into the ED due to worsening dyspnea at rest and exertion, orthopnea, bendopnea, palpitations, abdominal distention and leg swelling. Denies chest pain, lightheadedness dizziness, nausea, vomiting, chills, or diarrhea. Patient mentioned that these symptoms have been slowly progressing for the past month, but they became more noticeable over the last 2 days. Patient is compliant w/ medications. Unclear water and salt intake since he is alone in the house most of the day. No recent travel or sick contact. Unclear when was the last Cardio visit. He does not have any other concerns.  (13 Jul 2023 06:14)      PAST MEDICAL & SURGICAL HISTORY:  Hypertension      Atrial fibrillation      BPH (benign prostatic hyperplasia)      Chronic kidney disease, unspecified CKD stage      Hyperlipidemia      S/P placement of cardiac pacemaker          MEDICATIONS  (STANDING):  allopurinol 100 milliGRAM(s) Oral daily  ALPRAZolam 0.25 milliGRAM(s) Oral daily  apixaban 2.5 milliGRAM(s) Oral two times a day  cyclopentolate 1% Solution 1 Drop(s) Both EYES <User Schedule>  finasteride 5 milliGRAM(s) Oral daily  furosemide   Injectable 40 milliGRAM(s) IV Push two times a day  metoprolol tartrate 12.5 milliGRAM(s) Oral two times a day  risperiDONE   Tablet 1 milliGRAM(s) Oral daily  simvastatin 20 milliGRAM(s) Oral at bedtime    MEDICATIONS  (PRN):      FAMILY HISTORY:  No pertinent family history in first degree relatives        SOCIAL HISTORY:    [x ] Non-smoker    [x ] Alcohol-denies    Allergies    No Known Allergies    Intolerances    	    REVIEW OF SYSTEMS:  CONSTITUTIONAL: No fever, weight loss, or fatigue  EYES: No eye pain, visual disturbances, or discharge  ENT:  No difficulty hearing, tinnitus, vertigo; No sinus or throat pain  NECK: No pain or stiffness  RESPIRATORY: No cough, wheezing, chills or hemoptysis; + Shortness of Breath  CARDIOVASCULAR: No chest pain, palpitations, passing out, dizziness, + leg swelling  GASTROINTESTINAL: No abdominal or epigastric pain. No nausea, vomiting, or hematemesis; No diarrhea or constipation. No melena or hematochezia.  GENITOURINARY: No dysuria, frequency, hematuria, or incontinence  NEUROLOGICAL: No headaches, memory loss, loss of strength, numbness, or tremors  SKIN: No itching, burning, rashes, or lesions   LYMPH Nodes: No enlarged glands  ENDOCRINE: No heat or cold intolerance; No hair loss  MUSCULOSKELETAL: No joint pain or swelling; No muscle, back, or extremity pain  PSYCHIATRIC: No depression, anxiety, mood swings, or difficulty sleeping  HEME/LYMPH: No easy bruising, or bleeding gums  ALLERGY AND IMMUNOLOGIC: No hives or eczema	      PHYSICAL EXAM:  T(C): 36.5 (07-13-23 @ 11:20), Max: 37 (07-12-23 @ 22:09)  HR: 65 (07-13-23 @ 11:20) (65 - 74)  BP: 121/60 (07-13-23 @ 11:20) (121/60 - 164/56)  RR: 17 (07-13-23 @ 11:20) (16 - 17)  SpO2: 95% (07-13-23 @ 11:20) (93% - 97%)  Wt(kg): --  I&O's Summary      Appearance: Normal	  HEENT:   Normal oral mucosa, PERRL, EOMI	  Lymphatic: No lymphadenopathy  Cardiovascular: Normal S1 S2, No JVD, No murmurs, +2 edema  Respiratory: Lungs clear to auscultation	  Gastrointestinal:  Soft, Non-tender, + BS	  Skin: No rashes, No ecchymoses, No cyanosis	  Neurologic: Non-focal  Extremities: Normal range of motion, No clubbing, cyanosis +2 edema  Vascular: Peripheral pulses palpable 2+ bilaterally    	    ECG:   v paced,pvc	    LABS:	 	      Troponin I, High Sensitivity Result: 54.3 ng/L (07-13-23 @ 00:55)                          10.4   7.43  )-----------( 177      ( 13 Jul 2023 08:07 )             33.3     07-13    141  |  106  |  49<H>  ----------------------------<  104<H>  4.4   |  28  |  2.03<H>    Ca    8.7      13 Jul 2023 08:07  Phos  3.7     07-13  Mg     2.8     07-13    TPro  6.8  /  Alb  3.3<L>  /  TBili  0.4  /  DBili  x   /  AST  15  /  ALT  26  /  AlkPhos  84  07-13    < from: Xray Chest 1 View- PORTABLE-Urgent (07.13.23 @ 01:49) >  ACC: [id number] EXAM:  XR CHEST PORTABLE URGENT 1V   ORDERED BY: HANNA GERARD     PROCEDURE DATE:  07/13/2023          INTERPRETATION:  AP erect chest on July 13, 2023 at 1:32 AM. Patient has   chest pain.    Heart magnified by technique. Micra pacemaker again noted.    Present film shows a moderate right base effusion and interstitial CHF.   Congestive findings are new since April 20, 2022.    IMPRESSION: Congestion with right effusion are new since April 2022.        < from: Transthoracic Echocardiogram (03.14.22 @ 12:08) >  OBSERVATIONS:  Mitral Valve: Normal mitral valve. Trace mitral  regurgitation.  Aortic Root: Aortic Root: 3.7 cm. Ascending aorta not well  seen.  Aortic Valve: Aortic valve not well visualized; probably  trileaflet. No aortic stenosis. Mild aortic regurgitation.  Left Atrium: Mildly dilated left atrium.  LA volume index =  37 cc/m2.  Left Ventricle: Mildly decreased left ventricular systolic  function (LVEF42% by biplane). Paradoxical septal motion is  seen, consistent with conduction delay. Mild concentric  left ventricular hypertrophy. Unable to adequately assess  diastolic function due to presence of arrythmia.  Right Heart: Severe right atrial enlargement. Right  ventricular enlargement with grossly normal RV systolic  function (TAPSE 1.9). Tricuspid valve not well visualized,  probably normal. Mild-moderate tricuspid regurgitation.  Pulmonic valve not well seen.  Pericardium/PleuraSmall pericardial effusion. No  echocardiographic evidence of tamponade physiology.  Hemodynamic: RA Pressure is 8 mm Hg. RV systolic pressure  is mildly increased at  37 mm Hg. An atrial septal aneurysm  is noted.

## 2023-07-13 NOTE — ED ADULT NURSE NOTE - OBJECTIVE STATEMENT
Pt a&ox3, in ED for b/l lower leg swelling, redness and skin blistered that popped as per pt, possibly cellulitis, no SOB, unlabored breathing, equal rise & fall, able to speak in full sentences, no N/V/D, chronic salmon in place for over 7 years, PMH of HTN on meds & PM.

## 2023-07-13 NOTE — H&P ADULT - HISTORY OF PRESENT ILLNESS
A 89 year old male, from home, ambulating w/ assistance at baseline, w/ PMHx HFmrEF, Atrial Fibrillation s/p Micra PPM and on Eliquis, HLD, CKD, and BPH with chronic Chinchilla, not good historian, was brought into the ED due to worsening dyspnea at rest and exertion, orthopnea, bendopnea, palpitations, abdominal distention and leg swelling. Denies chest pain, lightheadedness dizziness, nausea, vomiting, chills, or diarrhea. Patient mentioned that these symptoms have been slowly progressing for the past month, but they became more noticeable over the last 2 days. Patient is compliant w/ medications. Unclear water and salt intake since he is alone in the house most of the day. No recent travel or sick contact. Unclear when was the last Cardio visit. He does not have any other concerns.

## 2023-07-13 NOTE — H&P ADULT - PROBLEM SELECTOR PLAN 5
Patient being treated w/ Lasix for HF  Monitor BP for now and add medications as tolerated   BP target <130/90 mmHg  DASH/TLC diet

## 2023-07-13 NOTE — CONSULT NOTE ADULT - PROBLEM SELECTOR RECOMMENDATION 2
likely secondary to CHF  lasix IV  follow up CXR  if no improvement or worsening dyspnea will need Tap

## 2023-07-13 NOTE — H&P ADULT - ATTENDING COMMENTS
89 year old man with PMH CHF, A fib on OAC, CKD, HLD, bph with chronic Chinchilla catheterization here with worsening SOB  and increased LE swelling. He has been compliant on his meds.     Vital Signs Last 24 Hrs  T(C): 36.5 (13 Jul 2023 07:40), Max: 37 (12 Jul 2023 22:09)  T(F): 97.7 (13 Jul 2023 07:40), Max: 98.6 (12 Jul 2023 22:09)  HR: 66 (13 Jul 2023 07:40) (66 - 74)  BP: 140/53 (13 Jul 2023 07:40) (140/53 - 164/56)  RR: 16 (13 Jul 2023 07:40) (16 - 16)  SpO2: 94% (13 Jul 2023 07:40) (93% - 97%)  Parameters below as of 13 Jul 2023 07:40  Patient On (Oxygen Delivery Method): room air    Labs   hgb - 9.9 ( down from 12)  51/2.1   Pro BNP - 4056    EKG - paced rhythm    CXR - increased pulmonary infiltrates and Right sided pleural effusion     Impression   - Acute CHF exacerbation  - Atrial fibrillation   - CKD - mild increase in renal indices but same GFR stage  - HTN     - Plan   Admit   Aggressive diuresis with IV lasix 40mg q 12  Check daily weight; I/O if feasible   CHF diet with nutrition consult for outpatient help with diet  restrict oral fluid intake  Med reconciliation   resume anticoagulation   ECHO  Cardiology consult     Other plans as above

## 2023-07-13 NOTE — CONSULT NOTE ADULT - SUBJECTIVE AND OBJECTIVE BOX
PULMONARY CONSULT NOTE      VENKAT PEMBERTON  MRN-[id number]    History of Present Illness:  Reason for Admission: Decompensated Heart Failure  History of Present Illness:   A 89 year old male, from home, ambulating w/ assistance at baseline, w/ PMHx HFmrEF, Atrial Fibrillation s/p Micra PPM and on Eliquis, HLD, CKD, and BPH with chronic Chinchilla, not good historian, was brought into the ED due to worsening dyspnea at rest and exertion, orthopnea, bendopnea, palpitations, abdominal distention and leg swelling. Denies chest pain, lightheadedness dizziness, nausea, vomiting, chills, or diarrhea. Patient mentioned that these symptoms have been slowly progressing for the past month, but they became more noticeable over the last 2 days. Patient is compliant w/ medications. Unclear water and salt intake since he is alone in the house most of the day. No recent travel or sick contact. Unclear when was the last Cardio visit. He does not have any other concerns.         HISTORY OF PRESENT ILLNESS: As Above. Awake, alert, laying in bed in NAD    MEDICATIONS  (STANDING):  allopurinol 100 milliGRAM(s) Oral daily  ALPRAZolam 0.25 milliGRAM(s) Oral daily  apixaban 2.5 milliGRAM(s) Oral two times a day  cyclopentolate 1% Solution 1 Drop(s) Both EYES <User Schedule>  finasteride 5 milliGRAM(s) Oral daily  furosemide   Injectable 40 milliGRAM(s) IV Push two times a day  metoprolol tartrate 12.5 milliGRAM(s) Oral two times a day  risperiDONE   Tablet 1 milliGRAM(s) Oral daily  simvastatin 20 milliGRAM(s) Oral at bedtime      MEDICATIONS  (PRN):      Allergies    No Known Allergies    Intolerances        PAST MEDICAL & SURGICAL HISTORY:  Hypertension      Atrial fibrillation      BPH (benign prostatic hyperplasia)      Chronic kidney disease, unspecified CKD stage      Hyperlipidemia      S/P placement of cardiac pacemaker          FAMILY HISTORY:  No pertinent family history in first degree relatives        SOCIAL HISTORY  Smoking History:     REVIEW OF SYSTEMS:    CONSTITUTIONAL:  No fevers, chills, sweats    HEENT:  Eyes:  No diplopia or blurred vision. ENT:  No earache, sore throat or runny nose.    CARDIOVASCULAR:  No pressure, squeezing, tightness, or heaviness about the chest; no palpitations.    RESPIRATORY:  Per HPI    GASTROINTESTINAL:  No abdominal pain, nausea, vomiting or diarrhea.    GENITOURINARY:  No dysuria, frequency or urgency.    NEUROLOGIC:  No paresthesias, fasciculations, seizures or weakness.    PSYCHIATRIC:  No disorder of thought or mood.    Vital Signs Last 24 Hrs  T(C): 36.5 (13 Jul 2023 07:40), Max: 37 (12 Jul 2023 22:09)  T(F): 97.7 (13 Jul 2023 07:40), Max: 98.6 (12 Jul 2023 22:09)  HR: 66 (13 Jul 2023 07:40) (66 - 74)  BP: 140/53 (13 Jul 2023 07:40) (140/53 - 164/56)  BP(mean): --  RR: 16 (13 Jul 2023 07:40) (16 - 16)  SpO2: 94% (13 Jul 2023 07:40) (93% - 97%)    Parameters below as of 13 Jul 2023 07:40  Patient On (Oxygen Delivery Method): room air      I&O's Detail      PHYSICAL EXAMINATION:    GENERAL: The patient is a well-developed, well-nourished _____in no apparent distress.     HEENT: Head is normocephalic and atraumatic. Extraocular muscles are intact. Mucous membranes are moist.     NECK: Supple.     LUNGS: Clear to auscultation without wheezing, rales, or rhonchi. Respirations unlabored    HEART: Regular rate and rhythm without murmur.    ABDOMEN: Soft, nontender, and nondistended.  No hepatosplenomegaly is noted.    EXTREMITIES: Without any cyanosis, clubbing, rash, lesions or edema.    NEUROLOGIC: Grossly intact.      LABS:                        10.4   7.43  )-----------( 177      ( 13 Jul 2023 08:07 )             33.3     07-13    141  |  106  |  49<H>  ----------------------------<  104<H>  4.4   |  28  |  2.03<H>    Ca    8.7      13 Jul 2023 08:07  Phos  3.7     07-13  Mg     2.8     07-13    TPro  6.8  /  Alb  3.3<L>  /  TBili  0.4  /  DBili  x   /  AST  15  /  ALT  26  /  AlkPhos  84  07-13      Urinalysis Basic - ( 13 Jul 2023 08:07 )    Color: x / Appearance: x / SG: x / pH: x  Gluc: 104 mg/dL / Ketone: x  / Bili: x / Urobili: x   Blood: x / Protein: x / Nitrite: x   Leuk Esterase: x / RBC: x / WBC x   Sq Epi: x / Non Sq Epi: x / Bacteria: x                      MICROBIOLOGY:    RADIOLOGY & ADDITIONAL STUDIES:    CXR:    Ct scan chest;    ekg;    echo: PULMONARY CONSULT NOTE      VENKAT PEMBERTON  MRN-[id number]    History of Present Illness:  Reason for Admission: Decompensated Heart Failure  History of Present Illness:   A 89 year old male, from home, ambulating w/ assistance at baseline, w/ PMHx HFmrEF, Atrial Fibrillation s/p Micra PPM and on Eliquis, HLD, CKD, and BPH with chronic Chinchilla, not good historian, was brought into the ED due to worsening dyspnea at rest and exertion, orthopnea, bendopnea, palpitations, abdominal distention and leg swelling. Denies chest pain, lightheadedness dizziness, nausea, vomiting, chills, or diarrhea. Patient mentioned that these symptoms have been slowly progressing for the past month, but they became more noticeable over the last 2 days. Patient is compliant w/ medications. Unclear water and salt intake since he is alone in the house most of the day. No recent travel or sick contact. Unclear when was the last Cardio visit. He does not have any other concerns.         HISTORY OF PRESENT ILLNESS: As Above. Awake, alert, laying in bed in NAD    MEDICATIONS  (STANDING):  allopurinol 100 milliGRAM(s) Oral daily  ALPRAZolam 0.25 milliGRAM(s) Oral daily  apixaban 2.5 milliGRAM(s) Oral two times a day  cyclopentolate 1% Solution 1 Drop(s) Both EYES <User Schedule>  finasteride 5 milliGRAM(s) Oral daily  furosemide   Injectable 40 milliGRAM(s) IV Push two times a day  metoprolol tartrate 12.5 milliGRAM(s) Oral two times a day  risperiDONE   Tablet 1 milliGRAM(s) Oral daily  simvastatin 20 milliGRAM(s) Oral at bedtime      MEDICATIONS  (PRN):      Allergies    No Known Allergies    Intolerances        PAST MEDICAL & SURGICAL HISTORY:  Hypertension      Atrial fibrillation      BPH (benign prostatic hyperplasia)      Chronic kidney disease, unspecified CKD stage      Hyperlipidemia      S/P placement of cardiac pacemaker          FAMILY HISTORY:  No pertinent family history in first degree relatives        SOCIAL HISTORY  Smoking History:     REVIEW OF SYSTEMS:    CONSTITUTIONAL:  No fevers, chills, sweats    HEENT:  Eyes:  No diplopia or blurred vision. ENT:  No earache, sore throat or runny nose.    CARDIOVASCULAR:  No pressure, squeezing, tightness, or heaviness about the chest; no palpitations.    RESPIRATORY:  Per HPI    GASTROINTESTINAL:  No abdominal pain, nausea, vomiting or diarrhea.    GENITOURINARY:  No dysuria, frequency or urgency.    NEUROLOGIC:  No paresthesias, fasciculations, seizures or weakness.    PSYCHIATRIC:  No disorder of thought or mood.    Vital Signs Last 24 Hrs  T(C): 36.5 (13 Jul 2023 07:40), Max: 37 (12 Jul 2023 22:09)  T(F): 97.7 (13 Jul 2023 07:40), Max: 98.6 (12 Jul 2023 22:09)  HR: 66 (13 Jul 2023 07:40) (66 - 74)  BP: 140/53 (13 Jul 2023 07:40) (140/53 - 164/56)  BP(mean): --  RR: 16 (13 Jul 2023 07:40) (16 - 16)  SpO2: 94% (13 Jul 2023 07:40) (93% - 97%)    Parameters below as of 13 Jul 2023 07:40  Patient On (Oxygen Delivery Method): room air      I&O's Detail      PHYSICAL EXAMINATION:    GENERAL: The patient is a well-developed, well-nourished _____in no apparent distress.     HEENT: Head is normocephalic and atraumatic. Extraocular muscles are intact. Mucous membranes are moist.     NECK: Supple.     LUNGS: Clear to auscultation without wheezing, rales, or rhonchi. Respirations unlabored    HEART: Regular rate and rhythm without murmur.    ABDOMEN: Soft, nontender, and nondistended.  No hepatosplenomegaly is noted.    EXTREMITIES: Without any cyanosis, clubbing, rash, lesions + edema.    NEUROLOGIC: Grossly intact.      LABS:                        10.4   7.43  )-----------( 177      ( 13 Jul 2023 08:07 )             33.3     07-13    141  |  106  |  49<H>  ----------------------------<  104<H>  4.4   |  28  |  2.03<H>    Ca    8.7      13 Jul 2023 08:07  Phos  3.7     07-13  Mg     2.8     07-13    TPro  6.8  /  Alb  3.3<L>  /  TBili  0.4  /  DBili  x   /  AST  15  /  ALT  26  /  AlkPhos  84  07-13      Urinalysis Basic - ( 13 Jul 2023 08:07 )    Color: x / Appearance: x / SG: x / pH: x  Gluc: 104 mg/dL / Ketone: x  / Bili: x / Urobili: x   Blood: x / Protein: x / Nitrite: x   Leuk Esterase: x / RBC: x / WBC x   Sq Epi: x / Non Sq Epi: x / Bacteria: x                      MICROBIOLOGY:    RADIOLOGY & ADDITIONAL STUDIES:    CXR:  < from: Xray Chest 1 View- PORTABLE-Urgent (07.13.23 @ 01:49) >  IMPRESSION: Congestion with right effusion are new since April 2022.    < end of copied text >    Ct scan chest;    ekg;    echo:

## 2023-07-13 NOTE — ED PROVIDER NOTE - NSTIMEPROVIDERCAREINITIATE_GEN_ER
Maternal Fetal Medicine Ultrasound     Ms. Muñoz is a 34 year old, , who reports an estimated date of delivery of 10/13/2018, consistent with an estimated gestational age of 19w3d on the date of evaluation.  She presents today for obstetric imaging.    Vitals:    18 1514   BP: 98/59   Pulse: 65     Impression:   1).  Gutierrez intrauterine pregnancy with biometric measurements consistent with dates.  2).  Advanced maternal age.  3).  IVF pregnancy.  4).  Normal anatomic survey.     Recommendations:   Suggest follow up ultrasound as clinically indicated.    Comments:  I discussed the ultrasound findings with the patient and her partner.  I attempted to reassure them that there are no structural anatomic defects noted on today's examination.  I encouraged the patient to call our office with any further questions or concerns regarding today's visit.    Thank you again, Dr. Pinto, for allowing us to participate in the care of this pleasant young lady.  As always, if we may be of any further assistance, please do not hesitate to contact us.       13-Jul-2023 00:45

## 2023-07-13 NOTE — ED PROVIDER NOTE - OBJECTIVE STATEMENT
89-year-old man with a past medical history of A-fib, hypertension, CKD and possible CHF (on torsemide) presenting with about 1 week of swelling of bilateral legs right greater than left.  There is no associated pain.  There is no associated fevers or chills.  There is no associated chest pains or shortness of breath.  There is no sudden worsening of symptoms, daughters bring him to the emergency room because she found out about the swelling tonight.  He is reportedly compliant with his medications and making a normal amount of urine.

## 2023-07-13 NOTE — H&P ADULT - PROBLEM SELECTOR PROBLEM 2
-- DO NOT REPLY / DO NOT REPLY ALL --  -- Message is from [facility] [employer] ([facility]) --    Provider paged via [employer] Documentation - The below message was copied and pasted from a [place] page:      [phone] PATIENT NUMBER -------------------------------- ACC NURSE LINE (IF QUESTIONS ONLY - [phone]) URGENT FROM: CHRISTIE REQUESTED DR:SIDDHARTHA LYN PATIENT MOTHER CHRISTIE DECLINED ED DISPOSITION FOR HIGH FEVER .0. PATIENT MOTHER CHRISTIE IS REQUESTING TO SPEAK WITH DR. ORNELAS OR ON-CALL DOCTOR TO ADVISE FURTHER. PATIENT'S MOTHER CHRISTIE CAN BE REACHED AT THE ABOVE PHONE NUMBER. [facility] RADHA CRAIG RN    Route encounter to 'Provider On-Call' clinical support pool that was paged. 'Sign and Close Workspace'   Atrial fibrillation

## 2023-07-14 DIAGNOSIS — I50.23 ACUTE ON CHRONIC SYSTOLIC (CONGESTIVE) HEART FAILURE: ICD-10-CM

## 2023-07-14 DIAGNOSIS — Z02.9 ENCOUNTER FOR ADMINISTRATIVE EXAMINATIONS, UNSPECIFIED: ICD-10-CM

## 2023-07-14 DIAGNOSIS — Z97.8 PRESENCE OF OTHER SPECIFIED DEVICES: ICD-10-CM

## 2023-07-14 LAB
% ALBUMIN: 56.4 % — SIGNIFICANT CHANGE UP
% ALPHA 1: 5.1 % — SIGNIFICANT CHANGE UP
% ALPHA 2: 12 % — SIGNIFICANT CHANGE UP
% BETA: 12.5 % — SIGNIFICANT CHANGE UP
% GAMMA: 14 % — SIGNIFICANT CHANGE UP
ALBUMIN SERPL ELPH-MCNC: 3.4 G/DL — LOW (ref 3.6–5.5)
ALBUMIN/GLOB SERPL ELPH: 1.3 RATIO — SIGNIFICANT CHANGE UP
ALPHA1 GLOB SERPL ELPH-MCNC: 0.3 G/DL — SIGNIFICANT CHANGE UP (ref 0.1–0.4)
ALPHA2 GLOB SERPL ELPH-MCNC: 0.7 G/DL — SIGNIFICANT CHANGE UP (ref 0.5–1)
ANION GAP SERPL CALC-SCNC: 6 MMOL/L — SIGNIFICANT CHANGE UP (ref 5–17)
B-GLOBULIN SERPL ELPH-MCNC: 0.8 G/DL — SIGNIFICANT CHANGE UP (ref 0.5–1)
BUN SERPL-MCNC: 50 MG/DL — HIGH (ref 7–18)
CALCIUM SERPL-MCNC: 8.7 MG/DL — SIGNIFICANT CHANGE UP (ref 8.4–10.5)
CHLORIDE SERPL-SCNC: 107 MMOL/L — SIGNIFICANT CHANGE UP (ref 96–108)
CO2 SERPL-SCNC: 30 MMOL/L — SIGNIFICANT CHANGE UP (ref 22–31)
CREAT SERPL-MCNC: 1.92 MG/DL — HIGH (ref 0.5–1.3)
EGFR: 32 ML/MIN/1.73M2 — LOW
FERRITIN SERPL-MCNC: 602 NG/ML — HIGH (ref 30–400)
GAMMA GLOBULIN: 0.8 G/DL — SIGNIFICANT CHANGE UP (ref 0.6–1.6)
GLUCOSE SERPL-MCNC: 115 MG/DL — HIGH (ref 70–99)
INTERPRETATION SERPL IFE-IMP: SIGNIFICANT CHANGE UP
IRON SATN MFR SERPL: 16 % — LOW (ref 20–55)
IRON SATN MFR SERPL: 35 UG/DL — LOW (ref 65–170)
MAGNESIUM SERPL-MCNC: 2.5 MG/DL — SIGNIFICANT CHANGE UP (ref 1.6–2.6)
POTASSIUM SERPL-MCNC: 4.3 MMOL/L — SIGNIFICANT CHANGE UP (ref 3.5–5.3)
POTASSIUM SERPL-SCNC: 4.3 MMOL/L — SIGNIFICANT CHANGE UP (ref 3.5–5.3)
PROT PATTERN SERPL ELPH-IMP: SIGNIFICANT CHANGE UP
PROT SERPL-MCNC: 6 G/DL — SIGNIFICANT CHANGE UP (ref 6–8.3)
PROT SERPL-MCNC: 6 G/DL — SIGNIFICANT CHANGE UP (ref 6–8.3)
SODIUM SERPL-SCNC: 143 MMOL/L — SIGNIFICANT CHANGE UP (ref 135–145)
TIBC SERPL-MCNC: 213 UG/DL — LOW (ref 250–450)
TSH SERPL-MCNC: 1.9 UU/ML — SIGNIFICANT CHANGE UP (ref 0.34–4.82)
UIBC SERPL-MCNC: 178 UG/DL — SIGNIFICANT CHANGE UP (ref 110–370)
VIT B12 SERPL-MCNC: 401 PG/ML — SIGNIFICANT CHANGE UP (ref 232–1245)

## 2023-07-14 RX ORDER — POLYETHYLENE GLYCOL 3350 17 G/17G
17 POWDER, FOR SOLUTION ORAL AT BEDTIME
Refills: 0 | Status: DISCONTINUED | OUTPATIENT
Start: 2023-07-14 | End: 2023-07-17

## 2023-07-14 RX ORDER — RISPERIDONE 4 MG/1
1 TABLET ORAL AT BEDTIME
Refills: 0 | Status: DISCONTINUED | OUTPATIENT
Start: 2023-07-14 | End: 2023-07-17

## 2023-07-14 RX ORDER — ALPRAZOLAM 0.25 MG
0.25 TABLET ORAL AT BEDTIME
Refills: 0 | Status: DISCONTINUED | OUTPATIENT
Start: 2023-07-14 | End: 2023-07-17

## 2023-07-14 RX ADMIN — Medication 12.5 MILLIGRAM(S): at 05:54

## 2023-07-14 RX ADMIN — CYCLOPENTOLATE HYDROCHLORIDE 1 DROP(S): 10 SOLUTION/ DROPS OPHTHALMIC at 21:17

## 2023-07-14 RX ADMIN — Medication 40 MILLIGRAM(S): at 15:26

## 2023-07-14 RX ADMIN — Medication 0.25 MILLIGRAM(S): at 21:16

## 2023-07-14 RX ADMIN — FINASTERIDE 5 MILLIGRAM(S): 5 TABLET, FILM COATED ORAL at 12:53

## 2023-07-14 RX ADMIN — Medication 12.5 MILLIGRAM(S): at 18:26

## 2023-07-14 RX ADMIN — APIXABAN 2.5 MILLIGRAM(S): 2.5 TABLET, FILM COATED ORAL at 18:26

## 2023-07-14 RX ADMIN — POLYETHYLENE GLYCOL 3350 17 GRAM(S): 17 POWDER, FOR SOLUTION ORAL at 21:19

## 2023-07-14 RX ADMIN — Medication 100 MILLIGRAM(S): at 12:53

## 2023-07-14 RX ADMIN — RISPERIDONE 1 MILLIGRAM(S): 4 TABLET ORAL at 21:17

## 2023-07-14 RX ADMIN — APIXABAN 2.5 MILLIGRAM(S): 2.5 TABLET, FILM COATED ORAL at 05:57

## 2023-07-14 RX ADMIN — Medication 40 MILLIGRAM(S): at 05:54

## 2023-07-14 RX ADMIN — SIMVASTATIN 20 MILLIGRAM(S): 20 TABLET, FILM COATED ORAL at 21:17

## 2023-07-14 NOTE — CHART NOTE - NSCHARTNOTEFT_GEN_A_CORE
Spoke to pt's daughter Della at bedside today, updated on clinical status, treatment plan/options and discharge plan/options, all questions answered

## 2023-07-14 NOTE — PROGRESS NOTE ADULT - PROBLEM SELECTOR PLAN 4
-CKD with a history of obstructive uropathy   -JAMES on baseline CKD likely in setting of decompensated heart failure  -continue chronic salmon   -renal function improving   -avoid Nephrotoxins   -Nephro Dr. Amor

## 2023-07-14 NOTE — PROGRESS NOTE ADULT - ASSESSMENT
89 year old male,well known to me, from home, ambulating w/ assistance at baseline, w/ PMHx HFmrEF, Atrial Fibrillation s/p Micra PPM and on Eliquis, HLD, CKD, and BPH with chronic Chinchilla, not good historian, was brought into the ED due to worsening dyspnea at rest and exertion, orthopnea, bendopnea, palpitations, abdominal distention and leg swelling.  1.Acute on chronic systolic HF-IV lasix.  2.Echocardiogram.  3.CRI-Renal eval noted.SPEP_p.  4.Afib-eliquis,lopressor.  5.Lipid d/o-statin.  6.Gout-allopurinol.  7.BPF-flomax,proscar.  8.Pleural effusion-Pulm eval noted.  9.PPI.   89 year old male,well known to me, from home, ambulating w/ assistance at baseline, w/ PMHx HFmrEF, Atrial Fibrillation s/p Micra PPM and on Eliquis, HLD, CKD, and BPH with chronic Chinchilla, not good historian, was brought into the ED due to worsening dyspnea at rest and exertion, orthopnea, bendopnea, palpitations, abdominal distention and leg swelling.  1.Acute on chronic diastolic HF-IV lasix.  2.Echocardiogram.  3.CRI-Renal eval noted.SPEP_p.  4.Afib-eliquis,lopressor.  5.Lipid d/o-statin.  6.Gout-allopurinol.  7.BPF-flomax,proscar.  8.Pleural effusion-Pulm eval noted.  9.PPI.

## 2023-07-14 NOTE — PROGRESS NOTE ADULT - SUBJECTIVE AND OBJECTIVE BOX
Patient is a 89y old  Male who presents with a chief complaint of Decompensated Heart Failure (14 Jul 2023 10:51)  Seen and examined using Ethiopian  Ilan ID # [id number]     OVERNIGHT EVENTS: no acute events overnight, reports improvement in sob     REVIEW OF SYSTEMS:  CONSTITUTIONAL: No fever, chills  RESPIRATORY: No cough, SOB  CARDIOVASCULAR: No chest pain, palpitations  GASTROINTESTINAL: No abdominal pain.  GENITOURINARY: No dysuria  NEUROLOGICAL: No HA  MUSCULOSKELETAL: b/l legs swelling       T(C): 36.6 (07-14-23 @ 11:28), Max: 36.9 (07-13-23 @ 19:50)  HR: 67 (07-14-23 @ 11:28) (61 - 67)  BP: 127/62 (07-14-23 @ 11:28) (101/58 - 155/63)  RR: 18 (07-14-23 @ 11:28) (17 - 18)  SpO2: 95% (07-14-23 @ 11:28) (94% - 99%)  Wt(kg): --Vital Signs Last 24 Hrs  T(C): 36.6 (14 Jul 2023 11:28), Max: 36.9 (13 Jul 2023 19:50)  T(F): 97.8 (14 Jul 2023 11:28), Max: 98.4 (13 Jul 2023 19:50)  HR: 67 (14 Jul 2023 11:28) (61 - 67)  BP: 127/62 (14 Jul 2023 11:28) (101/58 - 155/63)  BP(mean): 79 (13 Jul 2023 16:00) (79 - 79)  RR: 18 (14 Jul 2023 11:28) (17 - 18)  SpO2: 95% (14 Jul 2023 11:28) (94% - 99%)    Parameters below as of 14 Jul 2023 11:28  Patient On (Oxygen Delivery Method): room air    MEDICATIONS  (STANDING):  allopurinol 100 milliGRAM(s) Oral daily  ALPRAZolam 0.25 milliGRAM(s) Oral daily  apixaban 2.5 milliGRAM(s) Oral two times a day  cyclopentolate 1% Solution 1 Drop(s) Both EYES <User Schedule>  finasteride 5 milliGRAM(s) Oral daily  furosemide   Injectable 40 milliGRAM(s) IV Push two times a day  metoprolol tartrate 12.5 milliGRAM(s) Oral two times a day  risperiDONE   Tablet 1 milliGRAM(s) Oral daily  simvastatin 20 milliGRAM(s) Oral at bedtime    MEDICATIONS  (PRN):    PHYSICAL EXAM:  GENERAL: NAD  EYES: clear conjunctiva  ENMT: Moist mucous membranes  NECK: Supple, No JVD  CHEST/LUNG: Clear to diminished bilaterally; No rales, rhonchi, wheezing, or rubs  HEART: S1, S2, Regular rate and rhythm  ABDOMEN: Soft, Nontender, Nondistended; Bowel sounds present  NEURO: Alert & Oriented X3  EXTREMITIES: 2-3+ edema LE edema, no calf tenderness  SKIN: chronic LE skin changes   : salmon with clear yellow urine     Consultant(s) Notes Reviewed:  [x ] YES  [ ] NO  Care Discussed with Consultants/Other Providers [ x] YES  [ ] NO    LABS:                        10.4   7.43  )-----------( 177      ( 13 Jul 2023 08:07 )             33.3     07-14    143  |  107  |  50<H>  ----------------------------<  115<H>  4.3   |  30  |  1.92<H>    Ca    8.7      14 Jul 2023 08:40  Phos  3.7     07-13  Mg     2.5     07-14    TPro  6.8  /  Alb  3.3<L>  /  TBili  0.4  /  DBili  x   /  AST  15  /  ALT  26  /  AlkPhos  84  07-13    CAPILLARY BLOOD GLUCOSE  Urinalysis Basic - ( 14 Jul 2023 08:40 )    Color: x / Appearance: x / SG: x / pH: x  Gluc: 115 mg/dL / Ketone: x  / Bili: x / Urobili: x   Blood: x / Protein: x / Nitrite: x   Leuk Esterase: x / RBC: x / WBC x   Sq Epi: x / Non Sq Epi: x / Bacteria: x    RADIOLOGY & ADDITIONAL TESTS:  < from: Xray Chest 1 View- PORTABLE-Urgent (07.13.23 @ 01:49) >    ACC: [id number] EXAM:  XR CHEST PORTABLE URGENT 1V   ORDERED BY: HANNA GERARD     PROCEDURE DATE:  07/13/2023          INTERPRETATION:  AP erect chest on July 13, 2023 at 1:32 AM. Patient has   chest pain.    Heart magnified by technique. Micra pacemaker again noted.    Present film shows a moderate right base effusion and interstitial CHF.   Congestive findings are new since April 20, 2022.    IMPRESSION: Congestion with right effusion are new since April 2022.      < end of copied text >

## 2023-07-14 NOTE — PROGRESS NOTE ADULT - SUBJECTIVE AND OBJECTIVE BOX
Patient is a 89y old  Male who presents with a chief complaint of Decompensated Heart Failure (14 Jul 2023 10:44)  Awake, alert, comfortable in bed in NAD. currently on RA    INTERVAL HPI/OVERNIGHT EVENTS:      VITAL SIGNS:  T(F): 97.3 (07-14-23 @ 07:46)  HR: 64 (07-14-23 @ 07:46)  BP: 126/57 (07-14-23 @ 07:46)  RR: 18 (07-14-23 @ 07:46)  SpO2: 94% (07-14-23 @ 07:46)  Wt(kg): --  I&O's Detail    13 Jul 2023 07:01  -  14 Jul 2023 07:00  --------------------------------------------------------  IN:    Oral Fluid: 200 mL  Total IN: 200 mL    OUT:    Indwelling Catheter - Urethral (mL): 2650 mL  Total OUT: 2650 mL    Total NET: -2450 mL      14 Jul 2023 07:01  -  14 Jul 2023 10:52  --------------------------------------------------------  IN:  Total IN: 0 mL    OUT:    Indwelling Catheter - Urethral (mL): 2500 mL  Total OUT: 2500 mL    Total NET: -2500 mL              REVIEW OF SYSTEMS:    CONSTITUTIONAL:  No fevers, chills, sweats    HEENT:  Eyes:  No diplopia or blurred vision. ENT:  No earache, sore throat or runny nose.    CARDIOVASCULAR:  No pressure, squeezing, tightness, or heaviness about the chest; no palpitations.    RESPIRATORY:  Per HPI    GASTROINTESTINAL:  No abdominal pain, nausea, vomiting or diarrhea.    GENITOURINARY:  No dysuria, frequency or urgency.    NEUROLOGIC:  No paresthesias, fasciculations, seizures or weakness.    PSYCHIATRIC:  No disorder of thought or mood.      PHYSICAL EXAM:    Constitutional: Well developed and nourished  Eyes:Perrla  ENMT: normal  Neck:supple  Respiratory: good air entry  Cardiovascular: S1 S2 regular  Gastrointestinal: Soft, Non tender  Extremities: + edema  Vascular:normal  Neurological:Awake, alert,Ox3  Musculoskeletal:Normal      MEDICATIONS  (STANDING):  allopurinol 100 milliGRAM(s) Oral daily  ALPRAZolam 0.25 milliGRAM(s) Oral daily  apixaban 2.5 milliGRAM(s) Oral two times a day  cyclopentolate 1% Solution 1 Drop(s) Both EYES <User Schedule>  finasteride 5 milliGRAM(s) Oral daily  furosemide   Injectable 40 milliGRAM(s) IV Push two times a day  metoprolol tartrate 12.5 milliGRAM(s) Oral two times a day  risperiDONE   Tablet 1 milliGRAM(s) Oral daily  simvastatin 20 milliGRAM(s) Oral at bedtime    MEDICATIONS  (PRN):      Allergies    No Known Allergies    Intolerances        LABS:                        10.4   7.43  )-----------( 177      ( 13 Jul 2023 08:07 )             33.3     07-14    143  |  107  |  50<H>  ----------------------------<  115<H>  4.3   |  30  |  1.92<H>    Ca    8.7      14 Jul 2023 08:40  Phos  3.7     07-13  Mg     2.5     07-14    TPro  6.8  /  Alb  3.3<L>  /  TBili  0.4  /  DBili  x   /  AST  15  /  ALT  26  /  AlkPhos  84  07-13      Urinalysis Basic - ( 14 Jul 2023 08:40 )    Color: x / Appearance: x / SG: x / pH: x  Gluc: 115 mg/dL / Ketone: x  / Bili: x / Urobili: x   Blood: x / Protein: x / Nitrite: x   Leuk Esterase: x / RBC: x / WBC x   Sq Epi: x / Non Sq Epi: x / Bacteria: x            CAPILLARY BLOOD GLUCOSE            RADIOLOGY & ADDITIONAL TESTS:    CXR:  < from: Xray Chest 1 View- PORTABLE-Urgent (07.13.23 @ 01:49) >  IMPRESSION: Congestion with right effusion are new since April 2022.    < end of copied text >    Ct scan chest:    ekg;    echo:

## 2023-07-14 NOTE — PROGRESS NOTE ADULT - SUBJECTIVE AND OBJECTIVE BOX
Problem List:  CKD stage, 2022 baseline creatinine was 1.6  CHF on admission      PAST MEDICAL & SURGICAL HISTORY:  Hypertension      Atrial fibrillation      BPH (benign prostatic hyperplasia)      Chronic kidney disease, unspecified CKD stage      Hyperlipidemia      S/P placement of cardiac pacemaker          No Known Allergies      MEDICATIONS  (STANDING):  allopurinol 100 milliGRAM(s) Oral daily  ALPRAZolam 0.25 milliGRAM(s) Oral at bedtime  apixaban 2.5 milliGRAM(s) Oral two times a day  cyclopentolate 1% Solution 1 Drop(s) Both EYES <User Schedule>  finasteride 5 milliGRAM(s) Oral daily  furosemide   Injectable 40 milliGRAM(s) IV Push two times a day  metoprolol tartrate 12.5 milliGRAM(s) Oral two times a day  polyethylene glycol 3350 17 Gram(s) Oral at bedtime  risperiDONE   Tablet 1 milliGRAM(s) Oral at bedtime  simvastatin 20 milliGRAM(s) Oral at bedtime    MEDICATIONS  (PRN):    Creatinine: 2.10 mg/dL (07.13.23 @ 00:55)                         10.4   7.43  )-----------( 177      ( 13 Jul 2023 08:07 )             33.3     07-14    143  |  107  |  50<H>  ----------------------------<  115<H>  4.3   |  30  |  1.92<H>    Ca    8.7      14 Jul 2023 08:40  Phos  3.7     07-13  Mg     2.5     07-14    TPro  6.0  /  Alb  x   /  TBili  x   /  DBili  x   /  AST  x   /  ALT  x   /  AlkPhos  x   07-14            REVIEW OF SYSTEMS:  General: no fever no chills, no weight loss.    RESPIRATORY: No cough, wheezing, hemoptysis; No shortness of breath  CARDIOVASCULAR: No chest pain or palpitations. No Edema  GASTROINTESTINAL: No abdominal or epigastric pain. No nausea, vomiting. No diarrhea or constipation. No melena.  GENITOURINARY: No dysuria, with salmon catheter.  NEUROLOGICAL: No numbness or weakness, no tremor , no dizziness.   Muscle skeletal : no joint pain and no swelling of joints and limbs.  SKIN: No itching, burning, rashes.        VITALS:  T(F): 98.4 (07-14-23 @ 15:50), Max: 98.4 (07-13-23 @ 19:50)  HR: 78 (07-14-23 @ 15:50)  BP: 142/61 (07-14-23 @ 15:50)  RR: 18 (07-14-23 @ 15:50)  SpO2: 96% (07-14-23 @ 15:50)  Wt(kg): --    07-13 @ 07:01  -  07-14 @ 07:00  --------------------------------------------------------  IN: 200 mL / OUT: 2650 mL / NET: -2450 mL    07-14 @ 07:01  -  07-14 @ 17:56  --------------------------------------------------------  IN: 0 mL / OUT: 2500 mL / NET: -2500 mL        PHYSICAL EXAM:  Constitutional: well developed, no diaphoresis, no distress.  Neck: No JVD, no carotid bruit, supple, no adenopathy  Respiratory:  air entrance B/L, no wheezes, rales or rhonchi  Cardiovascular: S1, S2, RRR, no pericardial rub, no murmur  Abdomen: BS+, soft, no tenderness, no bruit  Pelvis: bladder nondistended  Extremities: edema plus 1 bilateral bellow knees.

## 2023-07-14 NOTE — PROGRESS NOTE ADULT - SUBJECTIVE AND OBJECTIVE BOX
CARDIOLOGY/MEDICAL ATTENDING    CHIEF COMPLAINT:Patient is a 89y old  Male who presents with a chief complaint of Decompensated Heart Failure.Pt appears comfortable.    	  REVIEW OF SYSTEMS:  CONSTITUTIONAL: No fever, weight loss, or fatigue  EYES: No eye pain, visual disturbances, or discharge  ENT:  No difficulty hearing, tinnitus, vertigo; No sinus or throat pain  NECK: No pain or stiffness  RESPIRATORY: No cough, wheezing, chills or hemoptysis; No Shortness of Breath  CARDIOVASCULAR: No chest pain, palpitations, passing out, dizziness, or leg swelling  GASTROINTESTINAL: No abdominal or epigastric pain. No nausea, vomiting, or hematemesis; No diarrhea or constipation. No melena or hematochezia.  GENITOURINARY: No dysuria, frequency, hematuria, or incontinence  NEUROLOGICAL: No headaches, memory loss, loss of strength, numbness, or tremors  SKIN: No itching, burning, rashes, or lesions   LYMPH Nodes: No enlarged glands  ENDOCRINE: No heat or cold intolerance; No hair loss  MUSCULOSKELETAL: No joint pain or swelling; No muscle, back, or extremity pain  PSYCHIATRIC: No depression, anxiety, mood swings, or difficulty sleeping  HEME/LYMPH: No easy bruising, or bleeding gums  ALLERGY AND IMMUNOLOGIC: No hives or eczema	      PHYSICAL EXAM:  T(C): 36.3 (07-14-23 @ 07:46), Max: 36.9 (07-13-23 @ 19:50)  HR: 64 (07-14-23 @ 07:46) (61 - 67)  BP: 126/57 (07-14-23 @ 07:46) (101/58 - 155/63)  RR: 18 (07-14-23 @ 07:46) (17 - 18)  SpO2: 94% (07-14-23 @ 07:46) (94% - 99%)  Wt(kg): --  I&O's Summary    13 Jul 2023 07:01  -  14 Jul 2023 07:00  --------------------------------------------------------  IN: 200 mL / OUT: 2650 mL / NET: -2450 mL    14 Jul 2023 07:01  -  14 Jul 2023 10:46  --------------------------------------------------------  IN: 0 mL / OUT: 2500 mL / NET: -2500 mL        Appearance: Normal	  HEENT:   Normal oral mucosa, PERRL, EOMI	  Lymphatic: No lymphadenopathy  Cardiovascular: Normal S1 S2, No JVD, No murmurs, +2 edema  Respiratory: Dec BS at bases  Psychiatry: A & O x 3, Mood & affect appropriate  Gastrointestinal:  Soft, Non-tender, + BS	  Skin: No rashes, No ecchymoses, No cyanosis	  Neurologic: Non-focal  Extremities: Normal range of motion, No clubbing, cyanosis +2 edema  Vascular: Peripheral pulses palpable 2+ bilaterally    MEDICATIONS  (STANDING):  allopurinol 100 milliGRAM(s) Oral daily  ALPRAZolam 0.25 milliGRAM(s) Oral daily  apixaban 2.5 milliGRAM(s) Oral two times a day  cyclopentolate 1% Solution 1 Drop(s) Both EYES <User Schedule>  finasteride 5 milliGRAM(s) Oral daily  furosemide   Injectable 40 milliGRAM(s) IV Push two times a day  metoprolol tartrate 12.5 milliGRAM(s) Oral two times a day  risperiDONE   Tablet 1 milliGRAM(s) Oral daily  simvastatin 20 milliGRAM(s) Oral at bedtime      TELEMETRY: nsr, PAF v paced	      LABS:	 	      Troponin I, High Sensitivity Result: 54.3 ng/L (07-13 @ 00:55)                            10.4   7.43  )-----------( 177      ( 13 Jul 2023 08:07 )             33.3     07-14    143  |  107  |  50<H>  ----------------------------<  115<H>  4.3   |  30  |  1.92<H>    Ca    8.7      14 Jul 2023 08:40  Phos  3.7     07-13  Mg     2.5     07-14    TPro  6.8  /  Alb  3.3<L>  /  TBili  0.4  /  DBili  x   /  AST  15  /  ALT  26  /  AlkPhos  84  07-13      TSH: Thyroid Stimulating Hormone, Serum: 1.90 uU/mL (07-14 @ 08:40)      	      iIron with Total Binding Capacity in AM (07.14.23 @ 08:40)   Iron - Total Binding Capacity.: 213 ug/dL  % Saturation, Iron: 16 %  Iron Total: 35 ug/dL  Unsaturated Iron Binding Capacity: 178 ug/dL

## 2023-07-14 NOTE — PROGRESS NOTE ADULT - ASSESSMENT
89 year old male, from home, ambulating w/ assistance at baseline, w/ PMHx HFmrEF, Atrial Fibrillation s/p Micra PPM and on Eliquis, HLD, CKD, and BPH with chronic Chinchilla, not good historian, was brought into the ED due to worsening dyspnea at rest and exertion, orthopnea, palpitations, abdominal distention and leg swelling. Admitted to telemetry for decompensated Heart Failure, CXR consistent with congestive changes, started on IV Lasix. Also noted with CKD, Nephro Dr. Amor on board.

## 2023-07-14 NOTE — PROGRESS NOTE ADULT - ASSESSMENT
CKD with a history of Obstructive uropathy, admitted with CHF.  HFrEF 42%, LVH, right vent enlarged.    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 Permanent Chinchilla catheter.  CHF due to CKD and HF.    Continue diuretics, follow  echocardiogram.  Monitor renal function daily.  Avoid radiocontrast and Gadolinium. Avoid NSAID, ACE I and ARB.

## 2023-07-15 LAB
ANION GAP SERPL CALC-SCNC: 6 MMOL/L — SIGNIFICANT CHANGE UP (ref 5–17)
BUN SERPL-MCNC: 46 MG/DL — HIGH (ref 7–18)
CALCIUM SERPL-MCNC: 9.2 MG/DL — SIGNIFICANT CHANGE UP (ref 8.4–10.5)
CHLORIDE SERPL-SCNC: 104 MMOL/L — SIGNIFICANT CHANGE UP (ref 96–108)
CO2 SERPL-SCNC: 31 MMOL/L — SIGNIFICANT CHANGE UP (ref 22–31)
CREAT SERPL-MCNC: 1.84 MG/DL — HIGH (ref 0.5–1.3)
CULTURE RESULTS: SIGNIFICANT CHANGE UP
EGFR: 34 ML/MIN/1.73M2 — LOW
GLUCOSE SERPL-MCNC: 107 MG/DL — HIGH (ref 70–99)
POTASSIUM SERPL-MCNC: 3.9 MMOL/L — SIGNIFICANT CHANGE UP (ref 3.5–5.3)
POTASSIUM SERPL-SCNC: 3.9 MMOL/L — SIGNIFICANT CHANGE UP (ref 3.5–5.3)
SODIUM SERPL-SCNC: 141 MMOL/L — SIGNIFICANT CHANGE UP (ref 135–145)
SPECIMEN SOURCE: SIGNIFICANT CHANGE UP

## 2023-07-15 PROCEDURE — 71045 X-RAY EXAM CHEST 1 VIEW: CPT | Mod: 26

## 2023-07-15 RX ADMIN — Medication 12.5 MILLIGRAM(S): at 06:05

## 2023-07-15 RX ADMIN — Medication 40 MILLIGRAM(S): at 15:12

## 2023-07-15 RX ADMIN — Medication 12.5 MILLIGRAM(S): at 18:47

## 2023-07-15 RX ADMIN — SIMVASTATIN 20 MILLIGRAM(S): 20 TABLET, FILM COATED ORAL at 21:03

## 2023-07-15 RX ADMIN — POLYETHYLENE GLYCOL 3350 17 GRAM(S): 17 POWDER, FOR SOLUTION ORAL at 21:04

## 2023-07-15 RX ADMIN — RISPERIDONE 1 MILLIGRAM(S): 4 TABLET ORAL at 21:03

## 2023-07-15 RX ADMIN — Medication 100 MILLIGRAM(S): at 11:51

## 2023-07-15 RX ADMIN — Medication 40 MILLIGRAM(S): at 06:04

## 2023-07-15 RX ADMIN — CYCLOPENTOLATE HYDROCHLORIDE 1 DROP(S): 10 SOLUTION/ DROPS OPHTHALMIC at 21:04

## 2023-07-15 RX ADMIN — Medication 0.25 MILLIGRAM(S): at 21:03

## 2023-07-15 RX ADMIN — APIXABAN 2.5 MILLIGRAM(S): 2.5 TABLET, FILM COATED ORAL at 06:05

## 2023-07-15 RX ADMIN — APIXABAN 2.5 MILLIGRAM(S): 2.5 TABLET, FILM COATED ORAL at 18:47

## 2023-07-15 RX ADMIN — FINASTERIDE 5 MILLIGRAM(S): 5 TABLET, FILM COATED ORAL at 11:51

## 2023-07-15 NOTE — DIETITIAN INITIAL EVALUATION ADULT - PROBLEM SELECTOR PLAN 2
[id number] Score 5 points  Stroke risk was 7.2% per year in >90,000 patients (the [place] Atrial Fibrillation Cohort Study) and 10.0% risk of stroke/TIA/systemic embolism.  Will continue Eliquis 2.5 mg PO BID  Goal HR <110  Rate control w/ Metoprolol   Vitals q4hr   Cardio Dr Garcia   BP control   Will order Echo  Remote tele  DASH/TLC diet

## 2023-07-15 NOTE — DIETITIAN INITIAL EVALUATION ADULT - ORAL INTAKE PTA/DIET HISTORY
interviewed patient with assistance of language line solutions in [place]ian ( ID#[id number])did not answer question about intake PTA

## 2023-07-15 NOTE — PROGRESS NOTE ADULT - SUBJECTIVE AND OBJECTIVE BOX
Problem List:  CKD ,   JAMES cardirenal syndrome, CHF on admission      PAST MEDICAL & SURGICAL HISTORY:  Hypertension      Atrial fibrillation      BPH (benign prostatic hyperplasia)      Chronic kidney disease, unspecified CKD stage      Hyperlipidemia      S/P placement of cardiac pacemaker          No Known Allergies      MEDICATIONS  (STANDING):  allopurinol 100 milliGRAM(s) Oral daily  ALPRAZolam 0.25 milliGRAM(s) Oral at bedtime  apixaban 2.5 milliGRAM(s) Oral two times a day  cyclopentolate 1% Solution 1 Drop(s) Both EYES <User Schedule>  finasteride 5 milliGRAM(s) Oral daily  furosemide   Injectable 40 milliGRAM(s) IV Push two times a day  metoprolol tartrate 12.5 milliGRAM(s) Oral two times a day  polyethylene glycol 3350 17 Gram(s) Oral at bedtime  risperiDONE   Tablet 1 milliGRAM(s) Oral at bedtime  simvastatin 20 milliGRAM(s) Oral at bedtime    MEDICATIONS  (PRN):        07-15    141  |  104  |  46<H>  ----------------------------<  107<H>  3.9   |  31  |  1.84<H>    Ca    9.2      15 Jul 2023 06:29  Mg     2.5     07-14    TPro  6.0  /  Alb  3.4<L>  /  TBili  x   /  DBili  x   /  AST  x   /  ALT  x   /  AlkPhos  x   07-14            REVIEW OF SYSTEMS:    RESPIRATORY: No cough, wheezing, hemoptysis; No shortness of breath  CARDIOVASCULAR: No chest pain or palpitations. No Edema  GASTROINTESTINAL: No abdominal or epigastric pain. No nausea, vomiting. No diarrhea or constipation. No melena.  GENITOURINARY: No dysuria, frequency, foamy urine, urinary urgency, incontinence or hematuria  NEUROLOGICAL: No numbness or weakness, no tremor , no dizziness.   Muscle skeletal : no joint pain and no swelling of joints and limbs.  SKIN: No itching, burning, rashes.        VITALS:  T(F): 97.5 (07-15-23 @ 15:40), Max: 98.4 (07-15-23 @ 04:33)  HR: 68 (07-15-23 @ 15:40)  BP: 152/60 (07-15-23 @ 15:40)  RR: 18 (07-15-23 @ 15:40)  SpO2: 95% (07-15-23 @ 15:40)  Wt(kg): --    07-14 @ 07:01  -  07-15 @ 07:00  --------------------------------------------------------  IN: 0 mL / OUT: 6300 mL / NET: -6300 mL    07-15 @ 07:01  -  07-15 @ 15:51  --------------------------------------------------------  IN: 236 mL / OUT: 1200 mL / NET: -964 mL        PHYSICAL EXAM:  Constitutional: well developed, no diaphoresis, no distress.  Neck: No JVD, no carotid bruit, supple, no adenopathy  Respiratory: Good air entrance B/L, no wheezes, rales or rhonchi  Cardiovascular: S1, S2, RRR, no pericardial rub, no murmur  Abdomen: BS+, soft, no tenderness, no bruit  Pelvis: bladder nondistended  Extremities: right ankle edema plus 1

## 2023-07-15 NOTE — PROGRESS NOTE ADULT - ASSESSMENT
CKD with a history of Obstructive uropathy, admitted with CHF.  HFrEF 42%, LVH, right vent enlarged.      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 Permanent Chinchilla catheter. Renal function improving. Baseline creatinine 1.6.  CHF due to CKD and HF.    Continue diuretics, follow  echocardiogram.  Monitor renal function daily.  Avoid radiocontrast and Gadolinium. Avoid NSAID, ACE I and ARB.

## 2023-07-15 NOTE — PROGRESS NOTE ADULT - SUBJECTIVE AND OBJECTIVE BOX
CARDIOLOGY/MEDICAL ATTENDING    CHIEF COMPLAINT:Patient is a 89y old  Male who presents with a chief complaint of Decompensated Heart Failure .Pt appears comfortable.    	  REVIEW OF SYSTEMS:  CONSTITUTIONAL: No fever, weight loss, or fatigue  EYES: No eye pain, visual disturbances, or discharge  ENT:  No difficulty hearing, tinnitus, vertigo; No sinus or throat pain  NECK: No pain or stiffness  RESPIRATORY: No cough, wheezing, chills or hemoptysis; No Shortness of Breath  CARDIOVASCULAR: No chest pain, palpitations, passing out, dizziness, or leg swelling  GASTROINTESTINAL: No abdominal or epigastric pain. No nausea, vomiting, or hematemesis; No diarrhea or constipation. No melena or hematochezia.  GENITOURINARY: No dysuria, frequency, hematuria, or incontinence  NEUROLOGICAL: No headaches, memory loss, loss of strength, numbness, or tremors  SKIN: No itching, burning, rashes, or lesions   LYMPH Nodes: No enlarged glands  ENDOCRINE: No heat or cold intolerance; No hair loss  MUSCULOSKELETAL: No joint pain or swelling; No muscle, back, or extremity pain  PSYCHIATRIC: No depression, anxiety, mood swings, or difficulty sleeping  HEME/LYMPH: No easy bruising, or bleeding gums  ALLERGY AND IMMUNOLOGIC: No hives or eczema	        PHYSICAL EXAM:  T(C): 36.3 (07-15-23 @ 11:07), Max: 36.9 (07-14-23 @ 15:50)  HR: 72 (07-15-23 @ 11:07) (66 - 78)  BP: 146/56 (07-15-23 @ 11:07) (127/62 - 150/62)  RR: 18 (07-15-23 @ 11:07) (18 - 19)  SpO2: 94% (07-15-23 @ 11:07) (94% - 98%)  Wt(kg): --  I&O's Summary    14 Jul 2023 07:01  -  15 Jul 2023 07:00  --------------------------------------------------------  IN: 0 mL / OUT: 6300 mL / NET: -6300 mL    15 Jul 2023 07:01  -  15 Jul 2023 11:10  --------------------------------------------------------  IN: 118 mL / OUT: 0 mL / NET: 118 mL        Appearance: Normal	  HEENT:   Normal oral mucosa, PERRL, EOMI	  Lymphatic: No lymphadenopathy  Cardiovascular: Normal S1 S2, No JVD, No murmurs, +1 edema  Respiratory: Lungs clear to auscultation	  Psychiatry: A & O x 3, Mood & affect appropriate  Gastrointestinal:  Soft, Non-tender, + BS	  Skin: No rashes, No ecchymoses, No cyanosis	  Neurologic: Non-focal  Extremities: Normal range of motion, No clubbing, cyanosis +1 edema  Vascular: Peripheral pulses palpable 2+ bilaterally    MEDICATIONS  (STANDING):  allopurinol 100 milliGRAM(s) Oral daily  ALPRAZolam 0.25 milliGRAM(s) Oral at bedtime  apixaban 2.5 milliGRAM(s) Oral two times a day  cyclopentolate 1% Solution 1 Drop(s) Both EYES <User Schedule>  finasteride 5 milliGRAM(s) Oral daily  furosemide   Injectable 40 milliGRAM(s) IV Push two times a day  metoprolol tartrate 12.5 milliGRAM(s) Oral two times a day  polyethylene glycol 3350 17 Gram(s) Oral at bedtime  risperiDONE   Tablet 1 milliGRAM(s) Oral at bedtime  simvastatin 20 milliGRAM(s) Oral at bedtime      TELEMETRY: 	nsr,intermittent v paced    	  	  	  LABS:	 	    Troponin I, High Sensitivity Result: 54.3 ng/L (07-13 @ 00:55)        07-15    141  |  104  |  46<H>  ----------------------------<  107<H>  3.9   |  31  |  1.84<H>    Ca    9.2      15 Jul 2023 06:29  Mg     2.5     07-14    TPro  6.0  /  Alb  3.4<L>  /  TBili  x   /  DBili  x   /  AST  x   /  ALT  x   /  AlkPhos  x   07-14    SPEP-    TSH: Thyroid Stimulating Hormone, Serum: 1.90 uU/mL (07-14 @ 08:40)

## 2023-07-15 NOTE — DIETITIAN INITIAL EVALUATION ADULT - PERTINENT MEDS FT
MEDICATIONS  (STANDING):  allopurinol 100 milliGRAM(s) Oral daily  ALPRAZolam 0.25 milliGRAM(s) Oral at bedtime  apixaban 2.5 milliGRAM(s) Oral two times a day  cyclopentolate 1% Solution 1 Drop(s) Both EYES <User Schedule>  finasteride 5 milliGRAM(s) Oral daily  furosemide   Injectable 40 milliGRAM(s) IV Push two times a day  metoprolol tartrate 12.5 milliGRAM(s) Oral two times a day  polyethylene glycol 3350 17 Gram(s) Oral at bedtime  risperiDONE   Tablet 1 milliGRAM(s) Oral at bedtime  simvastatin 20 milliGRAM(s) Oral at bedtime    MEDICATIONS  (PRN):

## 2023-07-15 NOTE — PROGRESS NOTE ADULT - ASSESSMENT
89 year old male,well known to me, from home, ambulating w/ assistance at baseline, w/ PMHx HFmrEF, Atrial Fibrillation s/p Micra PPM and on Eliquis, HLD, CKD, and BPH with chronic Chinchilla, not good historian, was brought into the ED due to worsening dyspnea at rest and exertion, orthopnea, bendopnea, palpitations, abdominal distention and leg swelling.  1.Acute on chronic systolic HF-IV lasix.  2.Echocardiogram.  3.CRI-Renal f/u.  4.Afib-eliquis,lopressor.  5.Lipid d/o-statin.  6.Gout-allopurinol.  7.BPF-flomax,proscar.  8.Pleural effusion-Pulm f/u.Repeat CXR.  9.PPI.  10.Above plan d/w pt's daughter.   89 year old male,well known to me, from home, ambulating w/ assistance at baseline, w/ PMHx HFmrEF, Atrial Fibrillation s/p Micra PPM and on Eliquis, HLD, CKD, and BPH with chronic Chinchilla, not good historian, was brought into the ED due to worsening dyspnea at rest and exertion, orthopnea, bendopnea, palpitations, abdominal distention and leg swelling.  1.Acute on chronic diastolic HF-IV lasix.  2.Echocardiogram.  3.CRI-Renal f/u.  4.Afib-eliquis,lopressor.  5.Lipid d/o-statin.  6.Gout-allopurinol.  7.BPF-flomax,proscar.  8.Pleural effusion-Pulm f/u.Repeat CXR.  9.PPI.  10.Above plan d/w pt's daughter.

## 2023-07-15 NOTE — DIETITIAN INITIAL EVALUATION ADULT - PERTINENT LABORATORY DATA
07-15    141  |  104  |  46<H>  ----------------------------<  107<H>  3.9   |  31  |  1.84<H>    Ca    9.2      15 Jul 2023 06:29  Mg     2.5     07-14    TPro  6.0  /  Alb  3.4<L>  /  TBili  x   /  DBili  x   /  AST  x   /  ALT  x   /  AlkPhos  x   07-14

## 2023-07-15 NOTE — PROGRESS NOTE ADULT - SUBJECTIVE AND OBJECTIVE BOX
Patient is a 89y old  Male who presents with a chief complaint of Decompensated Heart Failure (14 Jul 2023 17:55)  Awake, alert, comfortable in bed in NAD. Having Echo today. Doing well on RA    INTERVAL HPI/OVERNIGHT EVENTS:      VITAL SIGNS:  T(F): 97.8 (07-15-23 @ 07:10)  HR: 68 (07-15-23 @ 07:10)  BP: 145/64 (07-15-23 @ 07:10)  RR: 19 (07-15-23 @ 07:10)  SpO2: 95% (07-15-23 @ 07:10)  Wt(kg): --  I&O's Detail    14 Jul 2023 07:01  -  15 Jul 2023 07:00  --------------------------------------------------------  IN:  Total IN: 0 mL    OUT:    Indwelling Catheter - Urethral (mL): 6300 mL  Total OUT: 6300 mL    Total NET: -6300 mL      15 Jul 2023 07:01  -  15 Jul 2023 11:06  --------------------------------------------------------  IN:    Oral Fluid: 118 mL  Total IN: 118 mL    OUT:  Total OUT: 0 mL    Total NET: 118 mL              REVIEW OF SYSTEMS:    CONSTITUTIONAL:  No fevers, chills, sweats    HEENT:  Eyes:  No diplopia or blurred vision. ENT:  No earache, sore throat or runny nose.    CARDIOVASCULAR:  No pressure, squeezing, tightness, or heaviness about the chest; no palpitations.    RESPIRATORY:  Per HPI    GASTROINTESTINAL:  No abdominal pain, nausea, vomiting or diarrhea.    GENITOURINARY:  No dysuria, frequency or urgency.    NEUROLOGIC:  No paresthesias, fasciculations, seizures or weakness.    PSYCHIATRIC:  No disorder of thought or mood.      PHYSICAL EXAM:    Constitutional: Well developed and nourished  Eyes:Perrla  ENMT: normal  Neck:supple  Respiratory: good air entry  Cardiovascular: S1 S2 regular  Gastrointestinal: Soft, Non tender  Extremities: + edema  Vascular:normal  Neurological:Awake, alert,Ox3  Musculoskeletal:Normal      MEDICATIONS  (STANDING):  allopurinol 100 milliGRAM(s) Oral daily  ALPRAZolam 0.25 milliGRAM(s) Oral at bedtime  apixaban 2.5 milliGRAM(s) Oral two times a day  cyclopentolate 1% Solution 1 Drop(s) Both EYES <User Schedule>  finasteride 5 milliGRAM(s) Oral daily  furosemide   Injectable 40 milliGRAM(s) IV Push two times a day  metoprolol tartrate 12.5 milliGRAM(s) Oral two times a day  polyethylene glycol 3350 17 Gram(s) Oral at bedtime  risperiDONE   Tablet 1 milliGRAM(s) Oral at bedtime  simvastatin 20 milliGRAM(s) Oral at bedtime    MEDICATIONS  (PRN):      Allergies    No Known Allergies    Intolerances        LABS:    07-15    141  |  104  |  46<H>  ----------------------------<  107<H>  3.9   |  31  |  1.84<H>    Ca    9.2      15 Jul 2023 06:29  Mg     2.5     07-14    TPro  6.0  /  Alb  3.4<L>  /  TBili  x   /  DBili  x   /  AST  x   /  ALT  x   /  AlkPhos  x   07-14      Urinalysis Basic - ( 15 Jul 2023 06:29 )    Color: x / Appearance: x / SG: x / pH: x  Gluc: 107 mg/dL / Ketone: x  / Bili: x / Urobili: x   Blood: x / Protein: x / Nitrite: x   Leuk Esterase: x / RBC: x / WBC x   Sq Epi: x / Non Sq Epi: x / Bacteria: x            CAPILLARY BLOOD GLUCOSE            RADIOLOGY & ADDITIONAL TESTS:    CXR:    Ct scan chest:    ekg;    echo:

## 2023-07-15 NOTE — PROGRESS NOTE ADULT - PROBLEM SELECTOR PROBLEM 7
Pt seen 12/19/21 for cystitis.  Final urine culture with >100,000 E.Coli.  Pt was treated with Cipro BID x3days.  Which is sensitive.    Pt to complete entire course of antibiotics and push fluids.  To f/u with PCP if symptoms persist.  Please contact pt to relay results and recommendations.      HTN (hypertension)

## 2023-07-16 ENCOUNTER — TRANSCRIPTION ENCOUNTER (OUTPATIENT)
Age: 88
End: 2023-07-16

## 2023-07-16 DIAGNOSIS — I27.20 PULMONARY HYPERTENSION, UNSPECIFIED: ICD-10-CM

## 2023-07-16 LAB
ANION GAP SERPL CALC-SCNC: 7 MMOL/L — SIGNIFICANT CHANGE UP (ref 5–17)
BUN SERPL-MCNC: 51 MG/DL — HIGH (ref 7–18)
CALCIUM SERPL-MCNC: 9 MG/DL — SIGNIFICANT CHANGE UP (ref 8.4–10.5)
CHLORIDE SERPL-SCNC: 101 MMOL/L — SIGNIFICANT CHANGE UP (ref 96–108)
CO2 SERPL-SCNC: 33 MMOL/L — HIGH (ref 22–31)
CREAT SERPL-MCNC: 2.03 MG/DL — HIGH (ref 0.5–1.3)
EGFR: 30 ML/MIN/1.73M2 — LOW
GLUCOSE SERPL-MCNC: 111 MG/DL — HIGH (ref 70–99)
HCT VFR BLD CALC: 35.7 % — LOW (ref 39–50)
HGB BLD-MCNC: 11.2 G/DL — LOW (ref 13–17)
MCHC RBC-ENTMCNC: 28.2 PG — SIGNIFICANT CHANGE UP (ref 27–34)
MCHC RBC-ENTMCNC: 31.4 GM/DL — LOW (ref 32–36)
MCV RBC AUTO: 89.9 FL — SIGNIFICANT CHANGE UP (ref 80–100)
NRBC # BLD: 0 /100 WBCS — SIGNIFICANT CHANGE UP (ref 0–0)
PLATELET # BLD AUTO: 192 K/UL — SIGNIFICANT CHANGE UP (ref 150–400)
POTASSIUM SERPL-MCNC: 4 MMOL/L — SIGNIFICANT CHANGE UP (ref 3.5–5.3)
POTASSIUM SERPL-SCNC: 4 MMOL/L — SIGNIFICANT CHANGE UP (ref 3.5–5.3)
RBC # BLD: 3.97 M/UL — LOW (ref 4.2–5.8)
RBC # FLD: 13.1 % — SIGNIFICANT CHANGE UP (ref 10.3–14.5)
SODIUM SERPL-SCNC: 141 MMOL/L — SIGNIFICANT CHANGE UP (ref 135–145)
WBC # BLD: 8.25 K/UL — SIGNIFICANT CHANGE UP (ref 3.8–10.5)
WBC # FLD AUTO: 8.25 K/UL — SIGNIFICANT CHANGE UP (ref 3.8–10.5)

## 2023-07-16 RX ORDER — POLYETHYLENE GLYCOL 3350 17 G/17G
17 POWDER, FOR SOLUTION ORAL
Qty: 0 | Refills: 0 | DISCHARGE
Start: 2023-07-16

## 2023-07-16 RX ORDER — FUROSEMIDE 40 MG
40 TABLET ORAL DAILY
Refills: 0 | Status: DISCONTINUED | OUTPATIENT
Start: 2023-07-16 | End: 2023-07-17

## 2023-07-16 RX ORDER — ISOSORBIDE MONONITRATE 60 MG/1
30 TABLET, EXTENDED RELEASE ORAL DAILY
Refills: 0 | Status: DISCONTINUED | OUTPATIENT
Start: 2023-07-16 | End: 2023-07-17

## 2023-07-16 RX ADMIN — Medication 12.5 MILLIGRAM(S): at 05:08

## 2023-07-16 RX ADMIN — FINASTERIDE 5 MILLIGRAM(S): 5 TABLET, FILM COATED ORAL at 12:50

## 2023-07-16 RX ADMIN — ISOSORBIDE MONONITRATE 30 MILLIGRAM(S): 60 TABLET, EXTENDED RELEASE ORAL at 12:50

## 2023-07-16 RX ADMIN — Medication 12.5 MILLIGRAM(S): at 18:05

## 2023-07-16 RX ADMIN — APIXABAN 2.5 MILLIGRAM(S): 2.5 TABLET, FILM COATED ORAL at 05:08

## 2023-07-16 RX ADMIN — POLYETHYLENE GLYCOL 3350 17 GRAM(S): 17 POWDER, FOR SOLUTION ORAL at 22:46

## 2023-07-16 RX ADMIN — Medication 40 MILLIGRAM(S): at 05:08

## 2023-07-16 RX ADMIN — SIMVASTATIN 20 MILLIGRAM(S): 20 TABLET, FILM COATED ORAL at 22:04

## 2023-07-16 RX ADMIN — Medication 100 MILLIGRAM(S): at 12:51

## 2023-07-16 RX ADMIN — RISPERIDONE 1 MILLIGRAM(S): 4 TABLET ORAL at 22:04

## 2023-07-16 RX ADMIN — Medication 0.25 MILLIGRAM(S): at 22:04

## 2023-07-16 RX ADMIN — CYCLOPENTOLATE HYDROCHLORIDE 1 DROP(S): 10 SOLUTION/ DROPS OPHTHALMIC at 22:04

## 2023-07-16 RX ADMIN — APIXABAN 2.5 MILLIGRAM(S): 2.5 TABLET, FILM COATED ORAL at 18:05

## 2023-07-16 NOTE — PROGRESS NOTE ADULT - SUBJECTIVE AND OBJECTIVE BOX
Problem List:  CKD   CHF     PAST MEDICAL & SURGICAL HISTORY:  Hypertension      Atrial fibrillation      BPH (benign prostatic hyperplasia)      Chronic kidney disease, unspecified CKD stage      Hyperlipidemia      S/P placement of cardiac pacemaker          No Known Allergies      MEDICATIONS  (STANDING):  allopurinol 100 milliGRAM(s) Oral daily  ALPRAZolam 0.25 milliGRAM(s) Oral at bedtime  apixaban 2.5 milliGRAM(s) Oral two times a day  cyclopentolate 1% Solution 1 Drop(s) Both EYES <User Schedule>  finasteride 5 milliGRAM(s) Oral daily  furosemide   Injectable 40 milliGRAM(s) IV Push daily  metoprolol tartrate 12.5 milliGRAM(s) Oral two times a day  polyethylene glycol 3350 17 Gram(s) Oral at bedtime  risperiDONE   Tablet 1 milliGRAM(s) Oral at bedtime  simvastatin 20 milliGRAM(s) Oral at bedtime    MEDICATIONS  (PRN):                            11.2   8.25  )-----------( 192      ( 16 Jul 2023 07:15 )             35.7     07-16    141  |  101  |  51<H>  ----------------------------<  111<H>  4.0   |  33<H>  |  2.03<H>    Ca    9.0      16 Jul 2023 07:15              REVIEW OF SYSTEMS:  General: no fever no chills, no weight loss.    RESPIRATORY: No cough, wheezing, hemoptysis; No shortness of breath  CARDIOVASCULAR: No chest pain or palpitations. No Edema  GASTROINTESTINAL: No abdominal or epigastric pain. No nausea, vomiting. No diarrhea or constipation. No melena.  GENITOURINARY: No dysuria, frequency, foamy urine, urinary urgency, incontinence or hematuria  NEUROLOGICAL: No numbness or weakness, no tremor , no dizziness.   Muscle skeletal : no joint pain and no swelling of joints and limbs.  SKIN: No itching, burning, rashes.        VITALS:  T(F): 97.5 (07-16-23 @ 07:15), Max: 98.2 (07-15-23 @ 20:45)  HR: 66 (07-16-23 @ 07:15)  BP: 132/62 (07-16-23 @ 07:15)  RR: 19 (07-16-23 @ 07:15)  SpO2: 96% (07-16-23 @ 07:15)  Wt(kg): --    07-15 @ 07:01  -  07-16 @ 07:00  --------------------------------------------------------  IN: 236 mL / OUT: 3500 mL / NET: -3264 mL    07-16 @ 07:01  -  07-16 @ 10:16  --------------------------------------------------------  IN: 177 mL / OUT: 0 mL / NET: 177 mL        PHYSICAL EXAM:  Constitutional: well developed, no diaphoresis, no distress.  Neck: No JVD, no carotid bruit, supple, no adenopathy  Respiratory:  air entrance B/L, no wheezes, rales or rhonchi  Cardiovascular: S1, S2, RRR, no pericardial rub, no murmur  Abdomen: BS+, soft, no tenderness, no bruit    Extremities: No cyanosis or clubbing. No peripheral edema.

## 2023-07-16 NOTE — DISCHARGE NOTE PROVIDER - NSDCMRMEDTOKEN_GEN_ALL_CORE_FT
allopurinol 100 mg oral tablet: 1 tab(s) orally once a day  cyclopentolate 1% ophthalmic solution: 1 drop(s) in each eye once a day (at bedtime)  Eliquis 2.5 mg oral tablet: 1 tab(s) orally 2 times a day  finasteride 5 mg oral tablet: 1 tab(s) orally once a day  hydrALAZINE 50 mg oral tablet: 1 tab(s) orally 3 times a day  ketorolac 0.5% ophthalmic solution: 1 drop(s) in the right eye once a day 2 days prior to surgery  metoprolol tartrate 50 mg oral tablet: 0.5 tab(s) orally 2 times a day   Ocuflox 0.3% ophthalmic solution: 1 drop(s) in the right eye 4 times a day 2 days before surgery  polyethylene glycol 3350 oral powder for reconstitution: 17 gram(s) orally once a day (at bedtime)  Prednisol 1% ophthalmic solution: 1 drop(s) in the right eye 4 times a day after surgery  risperiDONE 1 mg oral tablet: 1 tab(s) orally once a day  simvastatin 20 mg oral tablet: 1 tab(s) orally once a day (at bedtime)  torsemide 5 mg oral tablet: 1 tab(s) orally every other day  Xanax 0.25 mg oral tablet: 1 tab(s) orally once a day   allopurinol 100 mg oral tablet: 1 tab(s) orally once a day  cyclopentolate 1% ophthalmic solution: 1 drop(s) in each eye once a day (at bedtime)  Eliquis 2.5 mg oral tablet: 1 tab(s) orally 2 times a day  finasteride 5 mg oral tablet: 1 tab(s) orally once a day  hydrALAZINE 50 mg oral tablet: 1 tab(s) orally 3 times a day  isosorbide mononitrate 30 mg oral tablet, extended release: 1 tab(s) orally once a day  ketorolac 0.5% ophthalmic solution: 1 drop(s) in the right eye once a day 2 days prior to surgery  metoprolol tartrate 50 mg oral tablet: 0.5 tab(s) orally 2 times a day   Ocuflox 0.3% ophthalmic solution: 1 drop(s) in the right eye 4 times a day 2 days before surgery  polyethylene glycol 3350 oral powder for reconstitution: 17 gram(s) orally once a day (at bedtime)  Prednisol 1% ophthalmic solution: 1 drop(s) in the right eye 4 times a day after surgery  risperiDONE 1 mg oral tablet: 1 tab(s) orally once a day  simvastatin 20 mg oral tablet: 1 tab(s) orally once a day (at bedtime)  torsemide 5 mg oral tablet: 1 tab(s) orally every other day  Xanax 0.25 mg oral tablet: 1 tab(s) orally once a day   allopurinol 100 mg oral tablet: 1 tab(s) orally once a day  cyclopentolate 1% ophthalmic solution: 1 drop(s) in each eye once a day (at bedtime)  Eliquis 2.5 mg oral tablet: 1 tab(s) orally 2 times a day  finasteride 5 mg oral tablet: 1 tab(s) orally once a day  hydrALAZINE 50 mg oral tablet: 1 tab(s) orally 3 times a day  isosorbide mononitrate 30 mg oral tablet, extended release: 1 tab(s) orally once a day  ketorolac 0.5% ophthalmic solution: 1 drop(s) in the right eye once a day 2 days prior to surgery  metoprolol tartrate 50 mg oral tablet: 0.5 tab(s) orally 2 times a day   Ocuflox 0.3% ophthalmic solution: 1 drop(s) in the right eye 4 times a day 2 days before surgery  polyethylene glycol 3350 oral powder for reconstitution: 17 gram(s) orally once a day (at bedtime)  Prednisol 1% ophthalmic solution: 1 drop(s) in the right eye 4 times a day after surgery  risperiDONE 1 mg oral tablet: 1 tab(s) orally once a day  simvastatin 20 mg oral tablet: 1 tab(s) orally once a day (at bedtime)  torsemide 5 mg oral tablet: 1 tab(s) orally 2 times a day  Xanax 0.25 mg oral tablet: 1 tab(s) orally once a day

## 2023-07-16 NOTE — DISCHARGE NOTE PROVIDER - CARE PROVIDER_API CALL
Emerald Garcia  Cardiology  [address] [place] [address]  Phone: [phone]  Fax: [phone]  Follow Up Time: 1 week

## 2023-07-16 NOTE — DISCHARGE NOTE PROVIDER - NSDCCPCAREPLAN_GEN_ALL_CORE_FT
PRINCIPAL DISCHARGE DIAGNOSIS  Diagnosis: Acute on chronic systolic congestive heart failure  Assessment and Plan of Treatment: you presented to hospital with shortness of breath, you were admitted to hospital for the congestive heart failure flare up, you were started on diuretics and supplemental oxygen. you were evaluated by Cardiologist, your symptoms improved and remained stable   Weigh yourself daily.  Follow up with PCP or Cardiologist within 1 week   If you gain 3lbs in 3 days, or 5lbs in a week call your Health Care Provider.  Do not eat or drink foods containing more than 2000mg of salt (sodium) in your diet every day.  Call your Health Care Provider if you have any swelling or increased swelling in your feet, ankles, and/or stomach.  Take all of your medication as directed.  If you become dizzy call your Health Care Provider.        SECONDARY DISCHARGE DIAGNOSES  Diagnosis: HTN (hypertension)  Assessment and Plan of Treatment: Take all medication as prescribed.  Follow up with your medical doctor for routine blood pressure monitoring at your next visit.  Notify your doctor if you have any of the following symptoms:   Dizziness, Lightheadedness, Blurry vision, Headache, Chest pain, Shortness of breath      Diagnosis: BPH (benign prostatic hyperplasia)  Assessment and Plan of Treatment: you have BPH   Continue Chinchilla catheter and follow up with Urology    Diagnosis: Acute kidney injury superimposed on CKD  Assessment and Plan of Treatment: your kidney function was elevated more than your baseline likley due to CHF  your kidney function improved and remained stable at your baseline      Diagnosis: Chronic indwelling Chinchilla catheter  Assessment and Plan of Treatment: Continue Chinchilla catheter and follow up with Urology    Diagnosis: Atrial fibrillation  Assessment and Plan of Treatment: you have irregular heart rhythm, continue taking Eliquis as prescribed

## 2023-07-16 NOTE — PROGRESS NOTE ADULT - SUBJECTIVE AND OBJECTIVE BOX
Patient is a 89y old  Male who presents with a chief complaint of Decompensated Heart Failure (16 Jul 2023 12:01)    Awake, alert, comfortable in bed in NAD on RA  INTERVAL HPI/OVERNIGHT EVENTS:      VITAL SIGNS:  T(F): 97.1 (07-16-23 @ 11:10)  HR: 79 (07-16-23 @ 11:10)  BP: 150/74 (07-16-23 @ 11:10)  RR: 19 (07-16-23 @ 11:10)  SpO2: 97% (07-16-23 @ 11:10)  Wt(kg): --  I&O's Detail    15 Jul 2023 07:01  -  16 Jul 2023 07:00  --------------------------------------------------------  IN:    Oral Fluid: 236 mL  Total IN: 236 mL    OUT:    Indwelling Catheter - Urethral (mL): 3500 mL  Total OUT: 3500 mL    Total NET: -3264 mL      16 Jul 2023 07:01  -  16 Jul 2023 12:10  --------------------------------------------------------  IN:    Oral Fluid: 295 mL  Total IN: 295 mL    OUT:  Total OUT: 0 mL    Total NET: 295 mL              REVIEW OF SYSTEMS:    CONSTITUTIONAL:  No fevers, chills, sweats    HEENT:  Eyes:  No diplopia or blurred vision. ENT:  No earache, sore throat or runny nose.    CARDIOVASCULAR:  No pressure, squeezing, tightness, or heaviness about the chest but tachycardiac    RESPIRATORY:  Per HPI    GASTROINTESTINAL:  No abdominal pain, nausea, vomiting or diarrhea.    GENITOURINARY:  No dysuria, frequency or urgency.    NEUROLOGIC:  No paresthesias, fasciculations, seizures or weakness.    PSYCHIATRIC:  No disorder of thought or mood.      PHYSICAL EXAM:    Constitutional: Well developed and nourished  Eyes:Perrla  ENMT: normal  Neck:supple  Respiratory: good air entry  Cardiovascular: S1 S2 regular  Gastrointestinal: Soft, Non tender  Extremities: presence of edema  Vascular:normal  Neurological:Awake, alert,Ox3  Musculoskeletal:Normal      MEDICATIONS  (STANDING):  allopurinol 100 milliGRAM(s) Oral daily  ALPRAZolam 0.25 milliGRAM(s) Oral at bedtime  apixaban 2.5 milliGRAM(s) Oral two times a day  cyclopentolate 1% Solution 1 Drop(s) Both EYES <User Schedule>  finasteride 5 milliGRAM(s) Oral daily  furosemide   Injectable 40 milliGRAM(s) IV Push daily  isosorbide   mononitrate ER Tablet (IMDUR) 30 milliGRAM(s) Oral daily  metoprolol tartrate 12.5 milliGRAM(s) Oral two times a day  polyethylene glycol 3350 17 Gram(s) Oral at bedtime  risperiDONE   Tablet 1 milliGRAM(s) Oral at bedtime  simvastatin 20 milliGRAM(s) Oral at bedtime    MEDICATIONS  (PRN):      Allergies    No Known Allergies    Intolerances        LABS:                        11.2   8.25  )-----------( 192      ( 16 Jul 2023 07:15 )             35.7     07-16    141  |  101  |  51<H>  ----------------------------<  111<H>  4.0   |  33<H>  |  2.03<H>    Ca    9.0      16 Jul 2023 07:15        Urinalysis Basic - ( 16 Jul 2023 07:15 )    Color: x / Appearance: x / SG: x / pH: x  Gluc: 111 mg/dL / Ketone: x  / Bili: x / Urobili: x   Blood: x / Protein: x / Nitrite: x   Leuk Esterase: x / RBC: x / WBC x   Sq Epi: x / Non Sq Epi: x / Bacteria: x            CAPILLARY BLOOD GLUCOSE            RADIOLOGY & ADDITIONAL TESTS:    CXR:< from: Xray Chest 1 View- PORTABLE-Urgent (07.13.23 @ 01:49) >  IMPRESSION: Congestion with right effusion are new since April 2022.    --- End of Report ---      < end of copied text >      Ct scan chest:    ekg;    echo:< from: Transthoracic Echocardiogram (07.15.23 @ 07:05) >  CONCLUSIONS:  1. Mitral annular calcification. Mild mitral regurgitation.    2. Calcified trileaflet aortic valve with normal opening.  Mild aortic regurgitation.  3. Aortic Root: 3.3 cm.    4. Moderately dilated left atrium.  LA volume index = 44  cc/m2.  5. Mild concentric left ventricular hypertrophy.  6. Normal Left Ventricular Systolic Function,  (EF =  50-55%)  7. Grade II diastolic dysfunction (moderate).  8. Moderate right atrial enlargement.  9. Normal right ventricular size and systolic function  (TAPSE  2.7cm).  10. RV systolic pressure is moderately increased at  54 mm  Hg.  11. There is moderate-severe tricuspid regurgitation.  12. There is trace pulmonic regurgitation.  13. Moderate pericardial effusion, greatest diameter 1.7cm.  14. Right pleural effusion.    < end of copied text >   Patient is a 89y old  Male who presents with a chief complaint of Decompensated Heart Failure (16 Jul 2023 12:01)    Awake, alert, comfortable in bed in NAD on RA. Remains with salmon cath  INTERVAL HPI/OVERNIGHT EVENTS:      VITAL SIGNS:  T(F): 97.1 (07-16-23 @ 11:10)  HR: 79 (07-16-23 @ 11:10)  BP: 150/74 (07-16-23 @ 11:10)  RR: 19 (07-16-23 @ 11:10)  SpO2: 97% (07-16-23 @ 11:10)  Wt(kg): --  I&O's Detail    15 Jul 2023 07:01  -  16 Jul 2023 07:00  --------------------------------------------------------  IN:    Oral Fluid: 236 mL  Total IN: 236 mL    OUT:    Indwelling Catheter - Urethral (mL): 3500 mL  Total OUT: 3500 mL    Total NET: -3264 mL      16 Jul 2023 07:01  -  16 Jul 2023 12:10  --------------------------------------------------------  IN:    Oral Fluid: 295 mL  Total IN: 295 mL    OUT:  Total OUT: 0 mL    Total NET: 295 mL              REVIEW OF SYSTEMS:    CONSTITUTIONAL:  No fevers, chills, sweats    HEENT:  Eyes:  No diplopia or blurred vision. ENT:  No earache, sore throat or runny nose.    CARDIOVASCULAR:  No pressure, squeezing, tightness, or heaviness about the chest but tachycardiac    RESPIRATORY:  Per HPI    GASTROINTESTINAL:  No abdominal pain, nausea, vomiting or diarrhea.    GENITOURINARY:  No dysuria, frequency or urgency.    NEUROLOGIC:  No paresthesias, fasciculations, seizures or weakness.    PSYCHIATRIC:  No disorder of thought or mood.      PHYSICAL EXAM:    Constitutional: Well developed and nourished  Eyes:Perrla  ENMT: normal  Neck:supple  Respiratory: good air entry  Cardiovascular: S1 S2 regular  Gastrointestinal: Soft, Non tender  Extremities: presence of edema  Vascular:normal  Neurological:Awake, alert,Ox3  Musculoskeletal:Normal      MEDICATIONS  (STANDING):  allopurinol 100 milliGRAM(s) Oral daily  ALPRAZolam 0.25 milliGRAM(s) Oral at bedtime  apixaban 2.5 milliGRAM(s) Oral two times a day  cyclopentolate 1% Solution 1 Drop(s) Both EYES <User Schedule>  finasteride 5 milliGRAM(s) Oral daily  furosemide   Injectable 40 milliGRAM(s) IV Push daily  isosorbide   mononitrate ER Tablet (IMDUR) 30 milliGRAM(s) Oral daily  metoprolol tartrate 12.5 milliGRAM(s) Oral two times a day  polyethylene glycol 3350 17 Gram(s) Oral at bedtime  risperiDONE   Tablet 1 milliGRAM(s) Oral at bedtime  simvastatin 20 milliGRAM(s) Oral at bedtime    MEDICATIONS  (PRN):      Allergies    No Known Allergies    Intolerances        LABS:                        11.2   8.25  )-----------( 192      ( 16 Jul 2023 07:15 )             35.7     07-16    141  |  101  |  51<H>  ----------------------------<  111<H>  4.0   |  33<H>  |  2.03<H>    Ca    9.0      16 Jul 2023 07:15        Urinalysis Basic - ( 16 Jul 2023 07:15 )    Color: x / Appearance: x / SG: x / pH: x  Gluc: 111 mg/dL / Ketone: x  / Bili: x / Urobili: x   Blood: x / Protein: x / Nitrite: x   Leuk Esterase: x / RBC: x / WBC x   Sq Epi: x / Non Sq Epi: x / Bacteria: x            CAPILLARY BLOOD GLUCOSE            RADIOLOGY & ADDITIONAL TESTS:    CXR:< from: Xray Chest 1 View- PORTABLE-Urgent (07.13.23 @ 01:49) >  IMPRESSION: Congestion with right effusion are new since April 2022.    --- End of Report ---      < end of copied text >      Ct scan chest:    ekg;    echo:< from: Transthoracic Echocardiogram (07.15.23 @ 07:05) >  CONCLUSIONS:  1. Mitral annular calcification. Mild mitral regurgitation.    2. Calcified trileaflet aortic valve with normal opening.  Mild aortic regurgitation.  3. Aortic Root: 3.3 cm.    4. Moderately dilated left atrium.  LA volume index = 44  cc/m2.  5. Mild concentric left ventricular hypertrophy.  6. Normal Left Ventricular Systolic Function,  (EF =  50-55%)  7. Grade II diastolic dysfunction (moderate).  8. Moderate right atrial enlargement.  9. Normal right ventricular size and systolic function  (TAPSE  2.7cm).  10. RV systolic pressure is moderately increased at  54 mm  Hg.  11. There is moderate-severe tricuspid regurgitation.  12. There is trace pulmonic regurgitation.  13. Moderate pericardial effusion, greatest diameter 1.7cm.  14. Right pleural effusion.    < end of copied text >

## 2023-07-16 NOTE — DISCHARGE NOTE PROVIDER - HOSPITAL COURSE
89 year old male, from home, ambulating w/ assistance at baseline, w/ PMHx HFmrEF, Atrial Fibrillation s/p Micra PPM and on Eliquis, HLD, CKD, and BPH with chronic Chinchilla, not good historian, was brought into the ED due to worsening dyspnea at rest and exertion, orthopnea, palpitations, abdominal distention and leg swelling. Admitted to telemetry for decompensated Heart Failure, CXR consistent with congestive changes, started on IV Lasix. Also noted with CKD, Nephro Dr. Amor on board.   Echo with preserved EF and GD2DD, lasix tapered and changed to PO   Clinically improved, optimized for discharge with outpt follow up   Please note that this a brief summary of hospital course please refer to daily progress notes and consult notes for full course and events 89 year old male, from home, ambulating w/ assistance at baseline, w/ PMHx HFmrEF, Atrial Fibrillation s/p Micra PPM and on Eliquis, HLD, CKD, and BPH with chronic Chinchilla, not good historian, was brought into the ED due to worsening dyspnea at rest and exertion, orthopnea, palpitations, abdominal distention and leg swelling. Admitted to telemetry for decompensated Heart Failure, CXR consistent with congestive changes, started on IV Lasix. Also noted with CKD, Nephro Dr. Amor on board.   Echo with preserved EF and GD2DD, lasix tapered and changed to PO   Clinically improved, optimized for discharge with outpt follow up   Please note that this a brief summary of hospital course please refer to daily progress notes and consult notes for full course and events

## 2023-07-16 NOTE — PROGRESS NOTE ADULT - PROBLEM SELECTOR PLAN 2
lasix IV  follow up CXR  v/q scan  if increased and more dyspneic the may need tap lasix IV  follow up CXR  if increased and more dyspneic the may need tap

## 2023-07-16 NOTE — PROGRESS NOTE ADULT - ASSESSMENT
89 year old male,well known to me, from home, ambulating w/ assistance at baseline, w/ PMHx HFmrEF, Atrial Fibrillation s/p Micra PPM and on Eliquis, HLD, CKD, and BPH with chronic Chinchilla, not good historian, was brought into the ED due to worsening dyspnea at rest and exertion, orthopnea, bendopnea, palpitations, abdominal distention and leg swelling.  1.Acute on chronic diastolic HF-IV lasix.  2.Echocardiogram.  3.CRI-Renal f/u.  4.Afib-eliquis,lopressor.  5.Lipid d/o-statin.  6.Gout-allopurinol.  7.BPF-flomax,proscar.  8.Pleural effusion-Pulm fu.  9.PPI.

## 2023-07-16 NOTE — PROGRESS NOTE ADULT - ASSESSMENT
CKD with a history of Obstructive uropathy, admitted with CHF.  HFrEF 42%, LVH, right vent enlarged.  07/16 increased creatinine and Carbon dioxide, increased urine output, CHF resolved.  Plan reduce IV diuretics to daily dose , follow up with cardiology PO dose.  Follow up Echocardiogram result with cardiology.,   Grade 2 diastolic HF, EF 55%, mild AR , CORINE, moderate to severe TR, , moderate pericardial effusion and Right pleural effusion small.    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 Permanent Chinchilla catheter.

## 2023-07-16 NOTE — PROGRESS NOTE ADULT - SUBJECTIVE AND OBJECTIVE BOX
CHIEF COMPLAINT:Patient is a 89y old  Male who presents with a chief complaint of Decompensated Heart Failure .Pt appears comfortable.    	  REVIEW OF SYSTEMS:  CONSTITUTIONAL: No fever, weight loss, or fatigue  EYES: No eye pain, visual disturbances, or discharge  ENT:  No difficulty hearing, tinnitus, vertigo; No sinus or throat pain  NECK: No pain or stiffness  RESPIRATORY: No cough, wheezing, chills or hemoptysis; No Shortness of Breath  CARDIOVASCULAR: No chest pain, palpitations, passing out, dizziness, or leg swelling  GASTROINTESTINAL: No abdominal or epigastric pain. No nausea, vomiting, or hematemesis; No diarrhea or constipation. No melena or hematochezia.  GENITOURINARY: No dysuria, frequency, hematuria, or incontinence  NEUROLOGICAL: No headaches, memory loss, loss of strength, numbness, or tremors  SKIN: No itching, burning, rashes, or lesions   LYMPH Nodes: No enlarged glands  ENDOCRINE: No heat or cold intolerance; No hair loss  MUSCULOSKELETAL: No joint pain or swelling; No muscle, back, or extremity pain  PSYCHIATRIC: No depression, anxiety, mood swings, or difficulty sleeping  HEME/LYMPH: No easy bruising, or bleeding gums  ALLERGY AND IMMUNOLOGIC: No hives or eczema	        PHYSICAL EXAM:  T(C): 36.2 (07-16-23 @ 11:10), Max: 36.8 (07-15-23 @ 20:45)  HR: 79 (07-16-23 @ 11:10) (66 - 79)  BP: 150/74 (07-16-23 @ 11:10) (126/60 - 162/66)  RR: 19 (07-16-23 @ 11:10) (18 - 19)  SpO2: 97% (07-16-23 @ 11:10) (95% - 97%)  Wt(kg): --  I&O's Summary    15 Jul 2023 07:01  -  16 Jul 2023 07:00  --------------------------------------------------------  IN: 236 mL / OUT: 3500 mL / NET: -3264 mL    16 Jul 2023 07:01  -  16 Jul 2023 12:01  --------------------------------------------------------  IN: 295 mL / OUT: 0 mL / NET: 295 mL        Appearance: Normal	  HEENT:   Normal oral mucosa, PERRL, EOMI	  Lymphatic: No lymphadenopathy  Cardiovascular: Normal S1 S2, No JVD, No murmurs, No edema  Respiratory: Lungs clear to auscultation	  Psychiatry: A & O x 3, Mood & affect appropriate  Gastrointestinal:  Soft, Non-tender, + BS	  Skin: No rashes, No ecchymoses, No cyanosis	  Neurologic: Non-focal  Extremities: Normal range of motion, No clubbing, cyanosis or edema  Vascular: Peripheral pulses palpable 2+ bilaterally    MEDICATIONS  (STANDING):  allopurinol 100 milliGRAM(s) Oral daily  ALPRAZolam 0.25 milliGRAM(s) Oral at bedtime  apixaban 2.5 milliGRAM(s) Oral two times a day  cyclopentolate 1% Solution 1 Drop(s) Both EYES <User Schedule>  finasteride 5 milliGRAM(s) Oral daily  furosemide   Injectable 40 milliGRAM(s) IV Push daily  metoprolol tartrate 12.5 milliGRAM(s) Oral two times a day  polyethylene glycol 3350 17 Gram(s) Oral at bedtime  risperiDONE   Tablet 1 milliGRAM(s) Oral at bedtime  simvastatin 20 milliGRAM(s) Oral at bedtime      TELEMETRY: 	afib,intermittent v paced    	  	  LABS:	 	                        11.2   8.25  )-----------( 192      ( 16 Jul 2023 07:15 )             35.7     07-16    141  |  101  |  51<H>  ----------------------------<  111<H>  4.0   |  33<H>  |  2.03<H>    Ca    9.0      16 Jul 2023 07:15        TSH: Thyroid Stimulating Hormone, Serum: 1.90 uU/mL (07-14 @ 08:40)      	      < from: Transthoracic Echocardiogram (07.15.23 @ 07:05) >  OBSERVATIONS:  Mitral Valve: Mitral annular calcification. Mild mitral  regurgitation.  Aortic Root: Aortic Root: 3.3 cm.    Aortic Valve: Calcified trileaflet aortic valve with normal  opening. Mild aortic regurgitation.  Left Atrium: Moderately dilated left atrium.  LA volume  index = 44 cc/m2.  Left Ventricle: Normal Left Ventricular Systolic Function,  (EF = 50-55%) Mild concentric left ventricular hypertrophy.  Grade II diastolic dysfunction (moderate).  Right Heart: Moderate right atrial enlargement. Normal  right ventricular size and systolic function (TAPSE  2.7cm). There is moderate-severe tricuspid regurgitation.  There is trace pulmonic regurgitation.  Pericardium/PleuraModerate pericardial effusion, greatest  diameter 1.7cm. Right pleural effusion.  Hemodynamic: RV systolic pressure is moderately increased  at  54 mm Hg.    < end of copied text >

## 2023-07-17 ENCOUNTER — TRANSCRIPTION ENCOUNTER (OUTPATIENT)
Age: 88
End: 2023-07-17

## 2023-07-17 VITALS
RESPIRATION RATE: 18 BRPM | HEART RATE: 72 BPM | DIASTOLIC BLOOD PRESSURE: 65 MMHG | SYSTOLIC BLOOD PRESSURE: 126 MMHG | TEMPERATURE: 98 F | OXYGEN SATURATION: 97 %

## 2023-07-17 LAB
ANION GAP SERPL CALC-SCNC: 7 MMOL/L — SIGNIFICANT CHANGE UP (ref 5–17)
BUN SERPL-MCNC: 60 MG/DL — HIGH (ref 7–18)
CALCIUM SERPL-MCNC: 8.9 MG/DL — SIGNIFICANT CHANGE UP (ref 8.4–10.5)
CHLORIDE SERPL-SCNC: 100 MMOL/L — SIGNIFICANT CHANGE UP (ref 96–108)
CO2 SERPL-SCNC: 31 MMOL/L — SIGNIFICANT CHANGE UP (ref 22–31)
CREAT SERPL-MCNC: 2.16 MG/DL — HIGH (ref 0.5–1.3)
EGFR: 28 ML/MIN/1.73M2 — LOW
GLUCOSE SERPL-MCNC: 112 MG/DL — HIGH (ref 70–99)
POTASSIUM SERPL-MCNC: 4 MMOL/L — SIGNIFICANT CHANGE UP (ref 3.5–5.3)
POTASSIUM SERPL-SCNC: 4 MMOL/L — SIGNIFICANT CHANGE UP (ref 3.5–5.3)
SODIUM SERPL-SCNC: 138 MMOL/L — SIGNIFICANT CHANGE UP (ref 135–145)

## 2023-07-17 PROCEDURE — 85027 COMPLETE CBC AUTOMATED: CPT

## 2023-07-17 PROCEDURE — 82607 VITAMIN B-12: CPT

## 2023-07-17 PROCEDURE — 80053 COMPREHEN METABOLIC PANEL: CPT

## 2023-07-17 PROCEDURE — 84165 PROTEIN E-PHORESIS SERUM: CPT

## 2023-07-17 PROCEDURE — 84100 ASSAY OF PHOSPHORUS: CPT

## 2023-07-17 PROCEDURE — 87086 URINE CULTURE/COLONY COUNT: CPT

## 2023-07-17 PROCEDURE — 80048 BASIC METABOLIC PNL TOTAL CA: CPT

## 2023-07-17 PROCEDURE — 99285 EMERGENCY DEPT VISIT HI MDM: CPT | Mod: 25

## 2023-07-17 PROCEDURE — 84484 ASSAY OF TROPONIN QUANT: CPT

## 2023-07-17 PROCEDURE — 93005 ELECTROCARDIOGRAM TRACING: CPT

## 2023-07-17 PROCEDURE — 82728 ASSAY OF FERRITIN: CPT

## 2023-07-17 PROCEDURE — 83540 ASSAY OF IRON: CPT

## 2023-07-17 PROCEDURE — 83880 ASSAY OF NATRIURETIC PEPTIDE: CPT

## 2023-07-17 PROCEDURE — 83550 IRON BINDING TEST: CPT

## 2023-07-17 PROCEDURE — 93306 TTE W/DOPPLER COMPLETE: CPT

## 2023-07-17 PROCEDURE — 84443 ASSAY THYROID STIM HORMONE: CPT

## 2023-07-17 PROCEDURE — 36415 COLL VENOUS BLD VENIPUNCTURE: CPT

## 2023-07-17 PROCEDURE — 71045 X-RAY EXAM CHEST 1 VIEW: CPT

## 2023-07-17 PROCEDURE — 85025 COMPLETE CBC W/AUTO DIFF WBC: CPT

## 2023-07-17 PROCEDURE — 83735 ASSAY OF MAGNESIUM: CPT

## 2023-07-17 PROCEDURE — 84155 ASSAY OF PROTEIN SERUM: CPT

## 2023-07-17 PROCEDURE — 86334 IMMUNOFIX E-PHORESIS SERUM: CPT

## 2023-07-17 RX ORDER — ISOSORBIDE MONONITRATE 60 MG/1
1 TABLET, EXTENDED RELEASE ORAL
Qty: 30 | Refills: 0
Start: 2023-07-17 | End: 2023-08-15

## 2023-07-17 RX ADMIN — APIXABAN 2.5 MILLIGRAM(S): 2.5 TABLET, FILM COATED ORAL at 05:24

## 2023-07-17 RX ADMIN — Medication 40 MILLIGRAM(S): at 05:23

## 2023-07-17 RX ADMIN — FINASTERIDE 5 MILLIGRAM(S): 5 TABLET, FILM COATED ORAL at 11:47

## 2023-07-17 RX ADMIN — ISOSORBIDE MONONITRATE 30 MILLIGRAM(S): 60 TABLET, EXTENDED RELEASE ORAL at 11:47

## 2023-07-17 RX ADMIN — Medication 100 MILLIGRAM(S): at 11:47

## 2023-07-17 NOTE — PROGRESS NOTE ADULT - REASON FOR ADMISSION
Decompensated Heart Failure

## 2023-07-17 NOTE — PROGRESS NOTE ADULT - ASSESSMENT
89 year old male,well known to me, from home, ambulating w/ assistance at baseline, w/ PMHx HFmrEF, Atrial Fibrillation s/p Micra PPM and on Eliquis, HLD, CKD, and BPH with chronic Chinchilla, not good historian, was brought into the ED due to worsening dyspnea at rest and exertion, orthopnea, bendopnea, palpitations, abdominal distention and leg swelling.  1.Acute on chronic diastolic HF-change IV lasix to demadex 5mg bid.  2.Echocardiogram.  3.CRI-Renal f/u.  4.Afib-eliquis,lopressor.  5.Lipid d/o-statin.  6.Gout-allopurinol.  7.BPF-flomax,proscar.  8.Pleural effusion-Pulm fu.  9.PPI.  10.D/C home and f/u as outpatient.

## 2023-07-17 NOTE — DISCHARGE NOTE NURSING/CASE MANAGEMENT/SOCIAL WORK - NSDCPEFALRISK_GEN_ALL_CORE
For information on Fall & Injury Prevention, visit: [email or web address] OR  [email or web address] OR  https://www.cdc.gov/steadi/patient.html

## 2023-07-17 NOTE — PROGRESS NOTE ADULT - PROBLEM SELECTOR PROBLEM 2
Pleural effusion, right
Atrial fibrillation
Pleural effusion, right

## 2023-07-17 NOTE — PROGRESS NOTE ADULT - SUBJECTIVE AND OBJECTIVE BOX
Problem List:  CKD stage 3 B, admitted for CHF  HFpEF , grade 2 diastolic HF. RV increased systolic pressure  TR moderate to severe        PAST MEDICAL & SURGICAL HISTORY:  Hypertension      Atrial fibrillation      BPH (benign prostatic hyperplasia)      Chronic kidney disease, unspecified CKD stage      Hyperlipidemia      S/P placement of cardiac pacemaker          No Known Allergies      MEDICATIONS  (STANDING):  allopurinol 100 milliGRAM(s) Oral daily  ALPRAZolam 0.25 milliGRAM(s) Oral at bedtime  apixaban 2.5 milliGRAM(s) Oral two times a day  cyclopentolate 1% Solution 1 Drop(s) Both EYES <User Schedule>  finasteride 5 milliGRAM(s) Oral daily  furosemide   Injectable 40 milliGRAM(s) IV Push daily  isosorbide   mononitrate ER Tablet (IMDUR) 30 milliGRAM(s) Oral daily  metoprolol tartrate 12.5 milliGRAM(s) Oral two times a day  polyethylene glycol 3350 17 Gram(s) Oral at bedtime  risperiDONE   Tablet 1 milliGRAM(s) Oral at bedtime  simvastatin 20 milliGRAM(s) Oral at bedtime    MEDICATIONS  (PRN):                            11.2   8.25  )-----------( 192      ( 16 Jul 2023 07:15 )             35.7     07-17    138  |  100  |  60<H>  ----------------------------<  112<H>  4.0   |  31  |  2.16<H>    Ca    8.9      17 Jul 2023 06:05              REVIEW OF SYSTEMS:  General: no fever no chills, no weight loss.    RESPIRATORY: No cough, wheezing, hemoptysis; No shortness of breath  CARDIOVASCULAR: No chest pain or palpitations. No Edema  GASTROINTESTINAL: No abdominal or epigastric pain. No nausea, vomiting. No diarrhea or constipation. No melena.  GENITOURINARY: No dysuria, frequency, foamy urine, urinary urgency, incontinence or hematuria  NEUROLOGICAL: No numbness or weakness, no tremor , no dizziness.   Muscle skeletal : no joint pain and no swelling of joints and limbs.  SKIN: No itching, burning, rashes.        VITALS:  T(F): 97.5 (07-17-23 @ 15:15), Max: 98.7 (07-16-23 @ 20:05)  HR: 72 (07-17-23 @ 15:15)  BP: 126/65 (07-17-23 @ 15:15)  RR: 18 (07-17-23 @ 15:15)  SpO2: 97% (07-17-23 @ 15:15)  Wt(kg): --    07-16 @ 07:01  -  07-17 @ 07:00  --------------------------------------------------------  IN: 295 mL / OUT: 2000 mL / NET: -1705 mL    07-17 @ 07:01  -  07-17 @ 17:17  --------------------------------------------------------  IN: 350 mL / OUT: 750 mL / NET: -400 mL        PHYSICAL EXAM:  Constitutional: well developed, no diaphoresis, no distress.  Neck: No JVD, no carotid bruit, supple, no adenopathy  Respiratory: Good air entrance B/L, no wheezes, rales or rhonchi  Cardiovascular: S1, S2, RRR, no pericardial rub, no murmur  Abdomen: BS+, soft, no tenderness, no bruit  Pelvis: bladder nondistended  Extremities: No cyanosis or clubbing. No peripheral edema.

## 2023-07-17 NOTE — PROGRESS NOTE ADULT - SUBJECTIVE AND OBJECTIVE BOX
CHIEF COMPLAINT:Patient is a 89y old  Male who presents with a chief complaint of Decompensated Heart Failure.Pt appears comfortable.    	  REVIEW OF SYSTEMS:  CONSTITUTIONAL: No fever, weight loss, or fatigue  EYES: No eye pain, visual disturbances, or discharge  ENT:  No difficulty hearing, tinnitus, vertigo; No sinus or throat pain  NECK: No pain or stiffness  RESPIRATORY: No cough, wheezing, chills or hemoptysis; No Shortness of Breath  CARDIOVASCULAR: No chest pain, palpitations, passing out, dizziness, or leg swelling  GASTROINTESTINAL: No abdominal or epigastric pain. No nausea, vomiting, or hematemesis; No diarrhea or constipation. No melena or hematochezia.  GENITOURINARY: No dysuria, frequency, hematuria, or incontinence  NEUROLOGICAL: No headaches, memory loss, loss of strength, numbness, or tremors  SKIN: No itching, burning, rashes, or lesions   LYMPH Nodes: No enlarged glands  ENDOCRINE: No heat or cold intolerance; No hair loss  MUSCULOSKELETAL: No joint pain or swelling; No muscle, back, or extremity pain  PSYCHIATRIC: No depression, anxiety, mood swings, or difficulty sleeping  HEME/LYMPH: No easy bruising, or bleeding gums  ALLERGY AND IMMUNOLOGIC: No hives or eczema	      PHYSICAL EXAM:  T(C): 36.4 (07-17-23 @ 07:21), Max: 37.1 (07-16-23 @ 20:05)  HR: 68 (07-17-23 @ 07:21) (68 - 79)  BP: 116/55 (07-17-23 @ 07:21) (103/82 - 150/74)  RR: 19 (07-17-23 @ 07:21) (18 - 20)  SpO2: 97% (07-17-23 @ 07:21) (96% - 98%)  Wt(kg): --  I&O's Summary    16 Jul 2023 07:01  -  17 Jul 2023 07:00  --------------------------------------------------------  IN: 295 mL / OUT: 2000 mL / NET: -1705 mL    17 Jul 2023 07:01  -  17 Jul 2023 11:07  --------------------------------------------------------  IN: 50 mL / OUT: 0 mL / NET: 50 mL        Appearance: Normal	  HEENT:   Normal oral mucosa, PERRL, EOMI	  Lymphatic: No lymphadenopathy  Cardiovascular: Normal S1 S2, No JVD, No murmurs, No edema  Respiratory: Lungs clear to auscultation	  Psychiatry: A & O x 3, Mood & affect appropriate  Gastrointestinal:  Soft, Non-tender, + BS	  Skin: No rashes, No ecchymoses, No cyanosis	  Neurologic: Non-focal  Extremities: Normal range of motion, No clubbing, cyanosis or edema  Vascular: Peripheral pulses palpable 2+ bilaterally    MEDICATIONS  (STANDING):  allopurinol 100 milliGRAM(s) Oral daily  ALPRAZolam 0.25 milliGRAM(s) Oral at bedtime  apixaban 2.5 milliGRAM(s) Oral two times a day  cyclopentolate 1% Solution 1 Drop(s) Both EYES <User Schedule>  finasteride 5 milliGRAM(s) Oral daily  furosemide   Injectable 40 milliGRAM(s) IV Push daily  isosorbide   mononitrate ER Tablet (IMDUR) 30 milliGRAM(s) Oral daily  metoprolol tartrate 12.5 milliGRAM(s) Oral two times a day  polyethylene glycol 3350 17 Gram(s) Oral at bedtime  risperiDONE   Tablet 1 milliGRAM(s) Oral at bedtime  simvastatin 20 milliGRAM(s) Oral at bedtime       	  	  LABS:	 	                       11.2   8.25  )-----------( 192      ( 16 Jul 2023 07:15 )             35.7     07-17    138  |  100  |  60<H>  ----------------------------<  112<H>  4.0   |  31  |  2.16<H>    Ca    8.9      17 Jul 2023 06:05         TSH: Thyroid Stimulating Hormone, Serum: 1.90 uU/mL (07-14 @ 08:40)      	      < from: Xray Chest 1 View- PORTABLE-Routine (Xray Chest 1 View- PORTABLE-Routine .) (07.15.23 @ 12:14) >    ACC: [id number] EXAM:  XR CHEST PORTABLE ROUTINE 1V   ORDERED BY:  ALYSHA MAYORGA     PROCEDURE DATE:  07/15/2023          INTERPRETATION:  Chest one view    HISTORY: CHF    COMPARISON STUDY: 7/13/2023    Frontal expiratory view of the chest shows the heart to be similar in   size. Micra pacemaker is again noted.    The lungs show less pulmonary congestion with slight progression of right   effusion. Small left effusion is present and there is no evidence of   pneumothorax.    IMPRESSION:  Progression of effusions.    < end of copied text >

## 2023-07-17 NOTE — PHYSICAL THERAPY INITIAL EVALUATION ADULT - LEVEL OF INDEPENDENCE: SIT/STAND, REHAB EVAL
minimum assist (75% patients effort) AMA (saw a physician/midlevel provider and clinician was able to provide reasons for staying for treatment & form is signed)

## 2023-07-17 NOTE — PROGRESS NOTE ADULT - PROBLEM SELECTOR PLAN 1
lasix prn  Echo  oxygen supp  Cardio follow up  ACS protocol  Tele monitoring
lasix prn  Echo noted  oxygen supp  Cardio follow up  ACS protocol  Tele monitoring
-p/w worsening sob and LE edema   -Echo from March 2022 with reduced EF of 42%   -CXR as above   -cont IV Lasix  -cont BB and Statin   -daily weights and I&Os  -tele monitoring  -f/u Echo
lasix prn  Echo noted  oxygen supp  Cardio follow up  ACS protocol  Tele monitoring
lasix prn  Echo  oxygen supp  Cardio follow up  ACS protocol  Tele monitoring

## 2023-07-17 NOTE — DISCHARGE NOTE NURSING/CASE MANAGEMENT/SOCIAL WORK - NSDCFUADDAPPT_GEN_ALL_CORE_FT
Follow up with PCP or Cardiologist within 1 week   If you gain 3lbs in 3 days, or 5lbs in a week call your Health Care Provider.

## 2023-07-17 NOTE — DISCHARGE NOTE NURSING/CASE MANAGEMENT/SOCIAL WORK - PATIENT PORTAL LINK FT
You can access the FollowMyHealth Patient Portal offered by [facility] by registering at the following website: [email or web address]. By joining [employer]’s FollowMyHealth portal, you will also be able to view your health information using other applications (apps) compatible with our system.

## 2023-07-17 NOTE — PHYSICAL THERAPY INITIAL EVALUATION ADULT - GENERAL OBSERVATIONS, REHAB EVAL
Patient received sitting up in bed, AxOX2-3 Liberian speaking (assisted w/  # [id number]), (+) Catheter, Monitor and IV line.  Pt. denies any pain at this time.

## 2023-07-17 NOTE — DISCHARGE NOTE NURSING/CASE MANAGEMENT/SOCIAL WORK - NSDPLANG ASIS_GEN_ALL_CORE
[facility], [place]    Procedure: IR Procedure Note    Date/Time: 8/26/2020 12:35 PM  Performed by: Lidia Galeas PA-C  Authorized by: Lidia Galeas PA-C     UNIVERSAL PROTOCOL   Site Marked: NA  Prior Images Obtained and Reviewed:  Yes  Required items: Required blood products, implants, devices and special equipment available    Patient identity confirmed:  Verbally with patient, arm band, provided demographic data and hospital-assigned identification number  Patient was reevaluated immediately before administering moderate or deep sedation or anesthesia  Confirmation Checklist:  Patient's identity using two indicators, relevant allergies, procedure was appropriate and matched the consent or emergent situation and correct equipment/implants were available  Time out: Immediately prior to the procedure a time out was called    Universal Protocol: the Joint Commission Universal Protocol was followed    Preparation: Patient was prepped and draped in usual sterile fashion           ANESTHESIA    Anesthesia: Local infiltration  Local Anesthetic:  Lidocaine 1% without epinephrine      SEDATION    Patient Sedated: Yes    Vital signs: Vital signs monitored during sedation    See dictated procedure note for full details.  Findings: Per anesthesia    Specimens: none    Complications: None    Condition: Stable    PROCEDURE   Patient Tolerance:  Patient tolerated the procedure well with no immediate complications  Describe Procedure: Completed fluoroscopic lumbar puncture at L3/L4. Opening pressure 24. A total of 6 mL sent to the lab.  Length of time physician/provider present for 1:1 monitoring during sedation: 0       Yes

## 2023-07-17 NOTE — PROGRESS NOTE ADULT - SUBJECTIVE AND OBJECTIVE BOX
Patient is a 89y old  Male who presents with a chief complaint of Decompensated Heart Failure (17 Jul 2023 11:07)    Awake, alert, comfortable in bed in NAD on RA.  INTERVAL HPI/OVERNIGHT EVENTS:      VITAL SIGNS:  T(F): 97.7 (07-17-23 @ 11:16)  HR: 70 (07-17-23 @ 11:16)  BP: 119/59 (07-17-23 @ 11:16)  RR: 19 (07-17-23 @ 11:16)  SpO2: 98% (07-17-23 @ 11:16)  Wt(kg): --  I&O's Detail    16 Jul 2023 07:01  -  17 Jul 2023 07:00  --------------------------------------------------------  IN:    Oral Fluid: 295 mL  Total IN: 295 mL    OUT:    Indwelling Catheter - Urethral (mL): 2000 mL  Total OUT: 2000 mL    Total NET: -1705 mL      17 Jul 2023 07:01  -  17 Jul 2023 12:42  --------------------------------------------------------  IN:    Oral Fluid: 50 mL  Total IN: 50 mL    OUT:  Total OUT: 0 mL    Total NET: 50 mL              REVIEW OF SYSTEMS:    CONSTITUTIONAL:  No fevers, chills, sweats    HEENT:  Eyes:  No diplopia or blurred vision. ENT:  No earache, sore throat or runny nose.    CARDIOVASCULAR:  No pressure, squeezing, tightness, or heaviness about the chest; no palpitations.    RESPIRATORY:  Per HPI    GASTROINTESTINAL:  No abdominal pain, nausea, vomiting or diarrhea.    GENITOURINARY:  No dysuria, frequency or urgency.    NEUROLOGIC:  No paresthesias, fasciculations, seizures or weakness.    PSYCHIATRIC:  No disorder of thought or mood.      PHYSICAL EXAM:    Constitutional: Well developed and nourished  Eyes:Perrla  ENMT: normal  Neck:supple  Respiratory: good air entry  Cardiovascular: S1 S2 regular  Gastrointestinal: Soft, Non tender  Extremities: No edema  Vascular:normal  Neurological:Awake, alert,Ox3  Musculoskeletal:Normal      MEDICATIONS  (STANDING):  allopurinol 100 milliGRAM(s) Oral daily  ALPRAZolam 0.25 milliGRAM(s) Oral at bedtime  apixaban 2.5 milliGRAM(s) Oral two times a day  cyclopentolate 1% Solution 1 Drop(s) Both EYES <User Schedule>  finasteride 5 milliGRAM(s) Oral daily  furosemide   Injectable 40 milliGRAM(s) IV Push daily  isosorbide   mononitrate ER Tablet (IMDUR) 30 milliGRAM(s) Oral daily  metoprolol tartrate 12.5 milliGRAM(s) Oral two times a day  polyethylene glycol 3350 17 Gram(s) Oral at bedtime  risperiDONE   Tablet 1 milliGRAM(s) Oral at bedtime  simvastatin 20 milliGRAM(s) Oral at bedtime    MEDICATIONS  (PRN):      Allergies    No Known Allergies    Intolerances        LABS:                        11.2   8.25  )-----------( 192      ( 16 Jul 2023 07:15 )             35.7     07-17    138  |  100  |  60<H>  ----------------------------<  112<H>  4.0   |  31  |  2.16<H>    Ca    8.9      17 Jul 2023 06:05        Urinalysis Basic - ( 17 Jul 2023 06:05 )    Color: x / Appearance: x / SG: x / pH: x  Gluc: 112 mg/dL / Ketone: x  / Bili: x / Urobili: x   Blood: x / Protein: x / Nitrite: x   Leuk Esterase: x / RBC: x / WBC x   Sq Epi: x / Non Sq Epi: x / Bacteria: x            CAPILLARY BLOOD GLUCOSE            RADIOLOGY & ADDITIONAL TESTS:    CXR:   < from: Xray Chest 1 View- PORTABLE-Routine (Xray Chest 1 View- PORTABLE-Routine .) (07.15.23 @ 12:14) >  IMPRESSION:  Progression of effusions.      < end of copied text >      Ct scan chest:    ekg;  < from: 12 Lead ECG (07.13.23 @ 01:49) >  Diagnosis Line Ventricular-paced rhythm with occasional Premature ventricular complexes  Abnormal ECG      < end of copied text >      echo:  < from: Transthoracic Echocardiogram (07.15.23 @ 07:05) >  CONCLUSIONS:  1. Mitral annular calcification. Mild mitral regurgitation.    2. Calcified trileaflet aortic valve with normal opening.  Mild aortic regurgitation.  3. Aortic Root: 3.3 cm.    4. Moderately dilated left atrium.  LA volume index = 44  cc/m2.  5. Mild concentric left ventricular hypertrophy.  6. Normal Left Ventricular Systolic Function,  (EF =  50-55%)  7. Grade II diastolic dysfunction (moderate).  8. Moderate right atrial enlargement.  9. Normal right ventricular size and systolic function  (TAPSE  2.7cm).  10. RV systolic pressure is moderately increased at  54 mm  Hg.  11. There is moderate-severe tricuspid regurgitation.  12. There is trace pulmonic regurgitation.  13. Moderate pericardial effusion, greatest diameter 1.7cm.  14. Right pleural effusion.    ------------------------------------------------------------------------  Confirmed on  7/16/2023 - 09:42:08 by Emerald Garcia MD  ------------------------------------------------------------------------    < end of copied text >   Patient is a 89y old  Male who presents with a chief complaint of Decompensated Heart Failure (17 Jul 2023 11:07)    Awake, alert, comfortable in bed in NAD. Doing well  on RA.  INTERVAL HPI/OVERNIGHT EVENTS:      VITAL SIGNS:  T(F): 97.7 (07-17-23 @ 11:16)  HR: 70 (07-17-23 @ 11:16)  BP: 119/59 (07-17-23 @ 11:16)  RR: 19 (07-17-23 @ 11:16)  SpO2: 98% (07-17-23 @ 11:16)  Wt(kg): --  I&O's Detail    16 Jul 2023 07:01  -  17 Jul 2023 07:00  --------------------------------------------------------  IN:    Oral Fluid: 295 mL  Total IN: 295 mL    OUT:    Indwelling Catheter - Urethral (mL): 2000 mL  Total OUT: 2000 mL    Total NET: -1705 mL      17 Jul 2023 07:01  -  17 Jul 2023 12:42  --------------------------------------------------------  IN:    Oral Fluid: 50 mL  Total IN: 50 mL    OUT:  Total OUT: 0 mL    Total NET: 50 mL              REVIEW OF SYSTEMS:    CONSTITUTIONAL:  No fevers, chills, sweats    HEENT:  Eyes:  No diplopia or blurred vision. ENT:  No earache, sore throat or runny nose.    CARDIOVASCULAR:  No pressure, squeezing, tightness, or heaviness about the chest; no palpitations.    RESPIRATORY:  Per HPI    GASTROINTESTINAL:  No abdominal pain, nausea, vomiting or diarrhea.    GENITOURINARY:  No dysuria, frequency or urgency.    NEUROLOGIC:  No paresthesias, fasciculations, seizures or weakness.    PSYCHIATRIC:  No disorder of thought or mood.      PHYSICAL EXAM:    Constitutional: Well developed and nourished  Eyes:Perrla  ENMT: normal  Neck:supple  Respiratory: good air entry  Cardiovascular: S1 S2 regular  Gastrointestinal: Soft, Non tender  Extremities: + edema  Vascular:normal  Neurological:Awake, alert,Ox3  Musculoskeletal:Normal      MEDICATIONS  (STANDING):  allopurinol 100 milliGRAM(s) Oral daily  ALPRAZolam 0.25 milliGRAM(s) Oral at bedtime  apixaban 2.5 milliGRAM(s) Oral two times a day  cyclopentolate 1% Solution 1 Drop(s) Both EYES <User Schedule>  finasteride 5 milliGRAM(s) Oral daily  furosemide   Injectable 40 milliGRAM(s) IV Push daily  isosorbide   mononitrate ER Tablet (IMDUR) 30 milliGRAM(s) Oral daily  metoprolol tartrate 12.5 milliGRAM(s) Oral two times a day  polyethylene glycol 3350 17 Gram(s) Oral at bedtime  risperiDONE   Tablet 1 milliGRAM(s) Oral at bedtime  simvastatin 20 milliGRAM(s) Oral at bedtime    MEDICATIONS  (PRN):      Allergies    No Known Allergies    Intolerances        LABS:                        11.2   8.25  )-----------( 192      ( 16 Jul 2023 07:15 )             35.7     07-17    138  |  100  |  60<H>  ----------------------------<  112<H>  4.0   |  31  |  2.16<H>    Ca    8.9      17 Jul 2023 06:05        Urinalysis Basic - ( 17 Jul 2023 06:05 )    Color: x / Appearance: x / SG: x / pH: x  Gluc: 112 mg/dL / Ketone: x  / Bili: x / Urobili: x   Blood: x / Protein: x / Nitrite: x   Leuk Esterase: x / RBC: x / WBC x   Sq Epi: x / Non Sq Epi: x / Bacteria: x            CAPILLARY BLOOD GLUCOSE            RADIOLOGY & ADDITIONAL TESTS:    CXR:   < from: Xray Chest 1 View- PORTABLE-Routine (Xray Chest 1 View- PORTABLE-Routine .) (07.15.23 @ 12:14) >  IMPRESSION:  Progression of effusions.      < end of copied text >      Ct scan chest:    ekg;  < from: 12 Lead ECG (07.13.23 @ 01:49) >  Diagnosis Line Ventricular-paced rhythm with occasional Premature ventricular complexes  Abnormal ECG      < end of copied text >      echo:  < from: Transthoracic Echocardiogram (07.15.23 @ 07:05) >  CONCLUSIONS:  1. Mitral annular calcification. Mild mitral regurgitation.    2. Calcified trileaflet aortic valve with normal opening.  Mild aortic regurgitation.  3. Aortic Root: 3.3 cm.    4. Moderately dilated left atrium.  LA volume index = 44  cc/m2.  5. Mild concentric left ventricular hypertrophy.  6. Normal Left Ventricular Systolic Function,  (EF =  50-55%)  7. Grade II diastolic dysfunction (moderate).  8. Moderate right atrial enlargement.  9. Normal right ventricular size and systolic function  (TAPSE  2.7cm).  10. RV systolic pressure is moderately increased at  54 mm  Hg.  11. There is moderate-severe tricuspid regurgitation.  12. There is trace pulmonic regurgitation.  13. Moderate pericardial effusion, greatest diameter 1.7cm.  14. Right pleural effusion.    ------------------------------------------------------------------------  Confirmed on  7/16/2023 - 09:42:08 by Emerald Garcia MD  ------------------------------------------------------------------------    < end of copied text >

## 2023-07-17 NOTE — PROGRESS NOTE ADULT - PROVIDER SPECIALTY LIST ADULT
Nephrology
Internal Medicine
Nephrology
Pulmonology
Internal Medicine

## 2023-07-17 NOTE — PROGRESS NOTE ADULT - ASSESSMENT
CKD STAGE 3B with a history of Obstructive uropathy, admitted with CHF.  Obstructive uropathy , permanente salmon catheter  HFrEF 42%, LVH, right vent enlarged. TR moderate to severe, diastolic dysfunction grade 2.   JAMES due to diuretics, 07/16 increased creatinine and Carbon dioxide, increased urine output, CHF improved.  Reduce IV diuretics to adaily dose , follow up with cardiology PO dose.  Follow up Echocardiogram result with cardiology.,

## 2023-07-17 NOTE — PROGRESS NOTE ADULT - PROBLEM SELECTOR PROBLEM 1
Decompensated heart failure
Acute on chronic systolic congestive heart failure
Decompensated heart failure

## 2023-08-16 ENCOUNTER — INPATIENT (INPATIENT)
Facility: HOSPITAL | Age: 88
LOS: 11 days | Discharge: NURSING, REHAB OR LONG-TERM CARE | DRG: 871 | End: 2023-08-28
Attending: INTERNAL MEDICINE | Admitting: INTERNAL MEDICINE
Payer: MEDICAID

## 2023-08-16 VITALS
TEMPERATURE: 101 F | HEIGHT: 65 IN | RESPIRATION RATE: 18 BRPM | SYSTOLIC BLOOD PRESSURE: 150 MMHG | WEIGHT: 169.98 LBS | HEART RATE: 81 BPM | DIASTOLIC BLOOD PRESSURE: 53 MMHG | OXYGEN SATURATION: 93 %

## 2023-08-16 DIAGNOSIS — J18.9 PNEUMONIA, UNSPECIFIED ORGANISM: ICD-10-CM

## 2023-08-16 DIAGNOSIS — I48.20 CHRONIC ATRIAL FIBRILLATION, UNSPECIFIED: ICD-10-CM

## 2023-08-16 DIAGNOSIS — N18.9 CHRONIC KIDNEY DISEASE, UNSPECIFIED: ICD-10-CM

## 2023-08-16 DIAGNOSIS — Z95.0 PRESENCE OF CARDIAC PACEMAKER: Chronic | ICD-10-CM

## 2023-08-16 DIAGNOSIS — I50.33 ACUTE ON CHRONIC DIASTOLIC (CONGESTIVE) HEART FAILURE: ICD-10-CM

## 2023-08-16 DIAGNOSIS — I50.9 HEART FAILURE, UNSPECIFIED: ICD-10-CM

## 2023-08-16 DIAGNOSIS — R77.8 OTHER SPECIFIED ABNORMALITIES OF PLASMA PROTEINS: ICD-10-CM

## 2023-08-16 DIAGNOSIS — N40.0 BENIGN PROSTATIC HYPERPLASIA WITHOUT LOWER URINARY TRACT SYMPTOMS: ICD-10-CM

## 2023-08-16 DIAGNOSIS — Z29.9 ENCOUNTER FOR PROPHYLACTIC MEASURES, UNSPECIFIED: ICD-10-CM

## 2023-08-16 LAB
ALBUMIN SERPL ELPH-MCNC: 3.8 G/DL — SIGNIFICANT CHANGE UP (ref 3.5–5)
ALP SERPL-CCNC: 85 U/L — SIGNIFICANT CHANGE UP (ref 40–120)
ALT FLD-CCNC: 31 U/L DA — SIGNIFICANT CHANGE UP (ref 10–60)
ANION GAP SERPL CALC-SCNC: 10 MMOL/L — SIGNIFICANT CHANGE UP (ref 5–17)
APTT BLD: 30.5 SEC — SIGNIFICANT CHANGE UP (ref 24.5–35.6)
AST SERPL-CCNC: 25 U/L — SIGNIFICANT CHANGE UP (ref 10–40)
BASOPHILS # BLD AUTO: 0.04 K/UL — SIGNIFICANT CHANGE UP (ref 0–0.2)
BASOPHILS NFR BLD AUTO: 0.2 % — SIGNIFICANT CHANGE UP (ref 0–2)
BILIRUB SERPL-MCNC: 0.5 MG/DL — SIGNIFICANT CHANGE UP (ref 0.2–1.2)
BUN SERPL-MCNC: 63 MG/DL — HIGH (ref 7–18)
CALCIUM SERPL-MCNC: 9.1 MG/DL — SIGNIFICANT CHANGE UP (ref 8.4–10.5)
CHLORIDE SERPL-SCNC: 104 MMOL/L — SIGNIFICANT CHANGE UP (ref 96–108)
CO2 SERPL-SCNC: 21 MMOL/L — LOW (ref 22–31)
CREAT SERPL-MCNC: 2.96 MG/DL — HIGH (ref 0.5–1.3)
EGFR: 19 ML/MIN/1.73M2 — LOW
EOSINOPHIL # BLD AUTO: 0 K/UL — SIGNIFICANT CHANGE UP (ref 0–0.5)
EOSINOPHIL NFR BLD AUTO: 0 % — SIGNIFICANT CHANGE UP (ref 0–6)
FLUAV AG NPH QL: SIGNIFICANT CHANGE UP
FLUBV AG NPH QL: SIGNIFICANT CHANGE UP
GLUCOSE SERPL-MCNC: 168 MG/DL — HIGH (ref 70–99)
HCT VFR BLD CALC: 36.1 % — LOW (ref 39–50)
HGB BLD-MCNC: 11.5 G/DL — LOW (ref 13–17)
HIV 1 & 2 AB SERPL IA.RAPID: SIGNIFICANT CHANGE UP
IMM GRANULOCYTES NFR BLD AUTO: 0.6 % — SIGNIFICANT CHANGE UP (ref 0–0.9)
INR BLD: 1.38 RATIO — HIGH (ref 0.85–1.18)
LACTATE SERPL-SCNC: 1.3 MMOL/L — SIGNIFICANT CHANGE UP (ref 0.7–2)
LACTATE SERPL-SCNC: 2.9 MMOL/L — HIGH (ref 0.7–2)
LYMPHOCYTES # BLD AUTO: 1.72 K/UL — SIGNIFICANT CHANGE UP (ref 1–3.3)
LYMPHOCYTES # BLD AUTO: 8.1 % — LOW (ref 13–44)
MCHC RBC-ENTMCNC: 28.5 PG — SIGNIFICANT CHANGE UP (ref 27–34)
MCHC RBC-ENTMCNC: 31.9 GM/DL — LOW (ref 32–36)
MCV RBC AUTO: 89.4 FL — SIGNIFICANT CHANGE UP (ref 80–100)
MONOCYTES # BLD AUTO: 1.45 K/UL — HIGH (ref 0–0.9)
MONOCYTES NFR BLD AUTO: 6.8 % — SIGNIFICANT CHANGE UP (ref 2–14)
NEUTROPHILS # BLD AUTO: 17.91 K/UL — HIGH (ref 1.8–7.4)
NEUTROPHILS NFR BLD AUTO: 84.3 % — HIGH (ref 43–77)
NRBC # BLD: 0 /100 WBCS — SIGNIFICANT CHANGE UP (ref 0–0)
NT-PROBNP SERPL-SCNC: HIGH PG/ML (ref 0–450)
PLATELET # BLD AUTO: 189 K/UL — SIGNIFICANT CHANGE UP (ref 150–400)
POTASSIUM SERPL-MCNC: 4.9 MMOL/L — SIGNIFICANT CHANGE UP (ref 3.5–5.3)
POTASSIUM SERPL-SCNC: 4.9 MMOL/L — SIGNIFICANT CHANGE UP (ref 3.5–5.3)
PROT SERPL-MCNC: 7.8 G/DL — SIGNIFICANT CHANGE UP (ref 6–8.3)
PROTHROM AB SERPL-ACNC: 15.6 SEC — HIGH (ref 9.5–13)
RBC # BLD: 4.04 M/UL — LOW (ref 4.2–5.8)
RBC # FLD: 13.6 % — SIGNIFICANT CHANGE UP (ref 10.3–14.5)
SARS-COV-2 RNA SPEC QL NAA+PROBE: SIGNIFICANT CHANGE UP
SODIUM SERPL-SCNC: 135 MMOL/L — SIGNIFICANT CHANGE UP (ref 135–145)
TROPONIN I, HIGH SENSITIVITY RESULT: 130.8 NG/L — HIGH
TROPONIN I, HIGH SENSITIVITY RESULT: 314.1 NG/L — HIGH
WBC # BLD: 21.25 K/UL — HIGH (ref 3.8–10.5)
WBC # FLD AUTO: 21.25 K/UL — HIGH (ref 3.8–10.5)

## 2023-08-16 PROCEDURE — 99285 EMERGENCY DEPT VISIT HI MDM: CPT

## 2023-08-16 PROCEDURE — 71045 X-RAY EXAM CHEST 1 VIEW: CPT | Mod: 26

## 2023-08-16 RX ORDER — KETOROLAC TROMETHAMINE 0.5 %
1 DROPS OPHTHALMIC (EYE) DAILY
Refills: 0 | Status: DISCONTINUED | OUTPATIENT
Start: 2023-08-16 | End: 2023-08-16

## 2023-08-16 RX ORDER — ACETAMINOPHEN 500 MG
650 TABLET ORAL EVERY 6 HOURS
Refills: 0 | Status: DISCONTINUED | OUTPATIENT
Start: 2023-08-16 | End: 2023-08-28

## 2023-08-16 RX ORDER — PREDNISOLONE SODIUM PHOSPHATE 1 %
1 DROPS OPHTHALMIC (EYE) DAILY
Refills: 0 | Status: DISCONTINUED | OUTPATIENT
Start: 2023-08-16 | End: 2023-08-16

## 2023-08-16 RX ORDER — APIXABAN 2.5 MG/1
2.5 TABLET, FILM COATED ORAL EVERY 12 HOURS
Refills: 0 | Status: DISCONTINUED | OUTPATIENT
Start: 2023-08-17 | End: 2023-08-23

## 2023-08-16 RX ORDER — ACETAMINOPHEN 500 MG
1000 TABLET ORAL ONCE
Refills: 0 | Status: COMPLETED | OUTPATIENT
Start: 2023-08-16 | End: 2023-08-16

## 2023-08-16 RX ORDER — SIMVASTATIN 20 MG/1
20 TABLET, FILM COATED ORAL AT BEDTIME
Refills: 0 | Status: DISCONTINUED | OUTPATIENT
Start: 2023-08-16 | End: 2023-08-28

## 2023-08-16 RX ORDER — METOPROLOL TARTRATE 50 MG
25 TABLET ORAL EVERY 12 HOURS
Refills: 0 | Status: DISCONTINUED | OUTPATIENT
Start: 2023-08-16 | End: 2023-08-28

## 2023-08-16 RX ORDER — ALLOPURINOL 300 MG
100 TABLET ORAL DAILY
Refills: 0 | Status: DISCONTINUED | OUTPATIENT
Start: 2023-08-16 | End: 2023-08-28

## 2023-08-16 RX ORDER — CEFEPIME 1 G/1
INJECTION, POWDER, FOR SOLUTION INTRAMUSCULAR; INTRAVENOUS
Refills: 0 | Status: DISCONTINUED | OUTPATIENT
Start: 2023-08-16 | End: 2023-08-17

## 2023-08-16 RX ORDER — KETOROLAC TROMETHAMINE 30 MG/ML
15 SYRINGE (ML) INJECTION ONCE
Refills: 0 | Status: DISCONTINUED | OUTPATIENT
Start: 2023-08-16 | End: 2023-08-16

## 2023-08-16 RX ORDER — ISOSORBIDE MONONITRATE 60 MG/1
30 TABLET, EXTENDED RELEASE ORAL DAILY
Refills: 0 | Status: DISCONTINUED | OUTPATIENT
Start: 2023-08-16 | End: 2023-08-28

## 2023-08-16 RX ORDER — VANCOMYCIN HCL 1 G
1000 VIAL (EA) INTRAVENOUS ONCE
Refills: 0 | Status: COMPLETED | OUTPATIENT
Start: 2023-08-16 | End: 2023-08-16

## 2023-08-16 RX ORDER — ALPRAZOLAM 0.25 MG
0.25 TABLET ORAL DAILY
Refills: 0 | Status: DISCONTINUED | OUTPATIENT
Start: 2023-08-16 | End: 2023-08-18

## 2023-08-16 RX ORDER — POLYETHYLENE GLYCOL 3350 17 G/17G
17 POWDER, FOR SOLUTION ORAL AT BEDTIME
Refills: 0 | Status: DISCONTINUED | OUTPATIENT
Start: 2023-08-16 | End: 2023-08-28

## 2023-08-16 RX ORDER — RISPERIDONE 4 MG/1
1 TABLET ORAL DAILY
Refills: 0 | Status: DISCONTINUED | OUTPATIENT
Start: 2023-08-16 | End: 2023-08-18

## 2023-08-16 RX ORDER — AZITHROMYCIN 500 MG/1
500 TABLET, FILM COATED ORAL EVERY 24 HOURS
Refills: 0 | Status: DISCONTINUED | OUTPATIENT
Start: 2023-08-16 | End: 2023-08-19

## 2023-08-16 RX ORDER — ONDANSETRON 8 MG/1
4 TABLET, FILM COATED ORAL ONCE
Refills: 0 | Status: COMPLETED | OUTPATIENT
Start: 2023-08-16 | End: 2023-08-16

## 2023-08-16 RX ORDER — OFLOXACIN 0.3 %
1 DROPS OPHTHALMIC (EYE)
Refills: 0 | Status: DISCONTINUED | OUTPATIENT
Start: 2023-08-16 | End: 2023-08-16

## 2023-08-16 RX ORDER — HYDRALAZINE HCL 50 MG
50 TABLET ORAL THREE TIMES A DAY
Refills: 0 | Status: DISCONTINUED | OUTPATIENT
Start: 2023-08-16 | End: 2023-08-28

## 2023-08-16 RX ORDER — CEFTRIAXONE 500 MG/1
1000 INJECTION, POWDER, FOR SOLUTION INTRAMUSCULAR; INTRAVENOUS EVERY 24 HOURS
Refills: 0 | Status: DISCONTINUED | OUTPATIENT
Start: 2023-08-16 | End: 2023-08-17

## 2023-08-16 RX ORDER — CEFEPIME 1 G/1
2000 INJECTION, POWDER, FOR SOLUTION INTRAMUSCULAR; INTRAVENOUS ONCE
Refills: 0 | Status: COMPLETED | OUTPATIENT
Start: 2023-08-16 | End: 2023-08-16

## 2023-08-16 RX ADMIN — Medication 15 MILLIGRAM(S): at 18:59

## 2023-08-16 RX ADMIN — CEFEPIME 100 MILLIGRAM(S): 1 INJECTION, POWDER, FOR SOLUTION INTRAMUSCULAR; INTRAVENOUS at 18:58

## 2023-08-16 RX ADMIN — Medication 15 MILLIGRAM(S): at 19:01

## 2023-08-16 RX ADMIN — Medication 400 MILLIGRAM(S): at 18:59

## 2023-08-16 RX ADMIN — Medication 1000 MILLIGRAM(S): at 19:28

## 2023-08-16 RX ADMIN — ONDANSETRON 4 MILLIGRAM(S): 8 TABLET, FILM COATED ORAL at 18:59

## 2023-08-16 RX ADMIN — Medication 250 MILLIGRAM(S): at 20:05

## 2023-08-16 RX ADMIN — CEFEPIME 2000 MILLIGRAM(S): 1 INJECTION, POWDER, FOR SOLUTION INTRAMUSCULAR; INTRAVENOUS at 19:46

## 2023-08-16 NOTE — H&P ADULT - NSHPREVIEWOFSYSTEMS_GEN_ALL_CORE
REVIEW OF SYSTEMS:    CONSTITUTIONAL: No weakness, + fevers no chills  EYES/ENT: No visual changes;  No vertigo or throat pain   NECK: No pain or stiffness  RESPIRATORY: No cough, wheezing, hemoptysis; No shortness of breath  CARDIOVASCULAR: No chest pain or palpitations  GASTROINTESTINAL: No abdominal or epigastric pain. No nausea, vomiting, or hematemesis; No diarrhea or constipation. No melena or hematochezia.  GENITOURINARY: No dysuria, frequency or hematuria  NEUROLOGICAL: No numbness or weakness  SKIN: No itching, rashes

## 2023-08-16 NOTE — H&P ADULT - ATTENDING COMMENTS
89 yrs old from home, ambulating independently, pmhx of HFrEFm Afib on Eliquis, s/p Micra PPM, CKD, HLD, BPH on chronic Salmon, presented with fever and generalized weakness,pneumonia,chronic diastolic HF.  1.Fever-pan cx.  2.Pneumonia and possible UTI-abx as per ID.  3.Chronic diastolic HF-IV lasix.  4.CRI-f/u lytes.  5.Afib-NOAC.lopressor.  6.BPH with chronic salmon-R/O UTI.  7.PPI.

## 2023-08-16 NOTE — ED PROVIDER NOTE - OBJECTIVE STATEMENT
89 year old male PMH CHF< afib, PPM, HLD, CKD, BPH with salmon coming in with fever and weakness starting today with associated rhinorrhea, cough, nausea.

## 2023-08-16 NOTE — H&P ADULT - NSHPLABSRESULTS_GEN_ALL_CORE
ACC: [id number] EXAM:  XR CHEST PORTABLE URGENT 1V   ORDERED BY: HENRI CHOU     PROCEDURE DATE:  08/16/2023          INTERPRETATION:  Portable chest radiograph    CLINICAL INFORMATION: Dyspnea, shortness of breath.    TECHNIQUE:  Portable  AP chest radiograph.    COMPARISON: 7/15/2023 chest x-ray .    FINDINGS:  CATHETERS AND TUBES: None    PULMONARY: Moderate RIGHT effusion and/or basilar airspace consolidation   obscures diaphragm contour. A RIGHT upper zones and LEFT lung parenchyma   grossly clear.    HEART/VASCULAR: The heart is mildly enlarged in transverse diameter.  .    BONES: Visualized osseous thorax intact.    IMPRESSION:   Moderate RIGHT effusion and/or basilar airspace   consolidation.    --- End of Report ---      IDANIA ESPINO MD; Attending Radiologist  This document has been electronically signed. Aug 16 2023  7:11PM

## 2023-08-16 NOTE — ED PROVIDER NOTE - NS ED MD TWO NIGHTS YN
[facility]    PATIENT'S NAME: Gabriela Jaelyn   ATTENDING PHYSICIAN: Amandeep Freitas MD   OPERATING PHYSICIAN: Amandeep Freitas MD   PATIENT ACCOUNT#:   [de-identified]    LOCATION:  [place] [address]  MEDICAL RECORD #:   [id number] well.  It was explained to the patient that we would attempt a meniscal repair only if her meniscal tear is repairable (red-red zone) and if there was no significant chondromalacia/arthritis.   Numerous questions were asked, all of which were answered to th changes in the medial compartment. As enumerated above, the anterior cruciate ligament was found to be intact.   In the lateral compartment, there was degenerative tearing/fraying of the posterior and middle horns with chondromalacia changes, grade 3, pred Yes

## 2023-08-16 NOTE — H&P ADULT - ASSESSMENT
89 yrs old from home, ambulating independently, pmhx of HFrEFm Afib on Eliquis, s/p Micra PPM, CKD, HLD, BPH on chronic Chinchilla, presented with fever and generalized weakness. Pt is admitted for possible pneumonia and pleural effusion.

## 2023-08-16 NOTE — ED PROVIDER NOTE - IV ALTEPLASE EXCL ABS HIDDEN
I phoned the patient and was able to speak with her   I asked if he could remind the patient to have her labs completed either today or tomorrow in preparation for her appointment with Jarrell Lawrence on Monday 4/5  The patient's  expressed that he would relay the message 
show

## 2023-08-16 NOTE — ED ADULT NURSE NOTE - SUICIDE SCREENING QUESTION 2
Wyman Hashimoto is a 67 y.o. female evaluated via telephone on 1/28/2022. Consent:  She and/or health care decision maker is aware that that she may receive a bill for this telephone service, which includes applicable co-pays, depending on her insurance coverage, and has provided verbal consent to proceed. Documentation:  I communicated with the patient and/or health care decision maker. Details of this discussion including any medical advice provided:       Pt presents for hypertension, DM follow up and med refills. Not checking home BP. Well controlled previously. Tolerating meds well: ropinirole, Synthroid, Crestor, metformin, losartan, chlorthalidone, Toprol. Hemoglobin A1C (%)   Date Value   07/26/2021 6.4 (H)   01/25/2021 6.3 (H)   07/27/2020 6.3 (H)   01/03/2020 6.3 (H)   06/17/2019 6.4 (H)     Yet to see nephrology doctor for CKD. Cough productive of dry cough. No CP, no SOB or wheezing. No fever or chills. No sore throat. No diarrhea. I affirm this is a Patient Initiated Episode with a Patient who has not had a related appointment within my department in the past 7 days or scheduled within the next 24 hours. Patient identification was verified at the start of the visit: Yes    Total Time: minutes: 11-20 minutes    Wyman Hashimoto was evaluated through a synchronous (real-time) audio encounter. The patient was located at home in a state where the provider was licensed to provide care.     Note: not billable if this call serves to triage the patient into an appointment for the relevant concern      Jax Hernandez MD No

## 2023-08-16 NOTE — H&P ADULT - CONVERSATION DETAILS
PT's dx and prognosis was discussed with the family. Goals of care was reviewed with the daughter, however she stated that she would like to discuss this matter at  a later time with her father as he is usually scared of the hospital and decisions related to his health.   Pt would remain FULL CODE for now. Will endorse to primary team to follow up with the patient and family for GOC. Pt would benefit from palliative care consult if discussion regarding this matter with the patient is difficult for family.

## 2023-08-16 NOTE — H&P ADULT - PROBLEM SELECTOR PLAN 2
pmhx of HFrEF   p/w ?Rt sided effusion and LE edema  pro- BNP 10K [baseline 4K]  Trop 130  EKG afib pmhx of HFrEF   p/w ?Rt sided effusion and LE edema  pro- BNP 10K [baseline 4K]  Trop 130 > 350 > 375  s/p  mg   f/u repeat trop  EKG afib  repeat EKG: concern for Peaked T waves - repeat CMP neg for hyperkalemia  c/w tele and vitals q4

## 2023-08-16 NOTE — H&P ADULT - PROBLEM SELECTOR PLAN 4
p/w fever and generalized weakness, denied chest pain or dyspnea  in ED: temp 101.2, HR 81, /53, Sat 93% on RA  Trop 130  EKG afib  Echo done on 7/2023: normal EF and Grade II Diastolic dysfunction  f/u repeat trop   likely demand ischemia in the setting of acute infection and acute on chronic CHF   c/w tele and vitals q4

## 2023-08-16 NOTE — ED ADULT NURSE NOTE - NSFALLHARMRISKINTERV_ED_ALL_ED
Assistance OOB with selected safe patient handling equipment if applicable/Assistance with ambulation/Communicate risk of Fall with Harm to all staff, patient, and family/Encourage patient to sit up slowly, dangle for a short time, stand at bedside before walking/Monitor gait and stability/Orthostatic vital signs/Provide visual cue: red socks, yellow wristband, yellow gown, etc/Reinforce activity limits and safety measures with patient and family/Bed in lowest position, wheels locked, appropriate side rails in place/Call bell, personal items and telephone in reach/Instruct patient to call for assistance before getting out of bed/chair/stretcher/Non-slip footwear applied when patient is off stretcher/[place] to call system/Physically safe environment - no spills, clutter or unnecessary equipment/Purposeful Proactive Rounding/Room/bathroom lighting operational, light cord in reach

## 2023-08-16 NOTE — H&P ADULT - PROBLEM SELECTOR PLAN 5
pmhx of CKD with BUN and SCr of 63 and 2.96   baseline Scr of 2.16, GFR 19 compared to baseline of 28-30  likely JAMES on CKD in the setting of acute infection vs progression of CKD and new baseline   monitor CMP

## 2023-08-16 NOTE — H&P ADULT - NSHPSOCIALHISTORY_GEN_ALL_CORE
from home, ambulating independently,   denied alcohol consumption, smoking, use of illicit drugs including marijuana

## 2023-08-16 NOTE — ED PROVIDER NOTE - PROGRESS NOTE DETAILS
labs with elevated WBC. cxr with +pna and effusion. fluids held 2/2 CHF and to avoid fluid overload. given abx. will admit.

## 2023-08-16 NOTE — H&P ADULT - PROBLEM SELECTOR PLAN 3
pmhx of chronic afib on Eliquis 2.5 mg BID and Metoprolol 50 mg BID   will continue with Eliquis and Metoprolol 25 mg BID for now and then can titrated up accordingly  c/w tele and vitals q4

## 2023-08-16 NOTE — H&P ADULT - PROBLEM SELECTOR PLAN 1
p/w fever and generalized weakness  CXR:  Moderate RIGHT effusion and/or basilar airspace consolidation.  in ED: temp 101.2, HR 81, /53, Sat 93% on RA  leukocytosis of 21.25 with left shift  Lactate 2.9  s/p cefepime by ED  f/u u/a, urine and blood cultures  f/u repeat lactate   f/u Chest CT non-con   f/u ESR, CRP, Procal, legionella, strep pneumo and sputum culture  c/w Miriam Garcia consult in AM p/w fever and generalized weakness  CXR:  Moderate RIGHT effusion and/or basilar airspace consolidation.  in ED: temp 101.2, HR 81, /53, Sat 93% on RA  leukocytosis of 21.25 with left shift  Lactate 2.9  s/p cefepime by ED  f/u u/a, urine and blood cultures  f/u repeat lactate   f/u Chest CT non-con   f/u ESR, CRP, Procal, legionella, strep pneumo and sputum culture  c/w Miriam Garcia consulted p/w fever and generalized weakness  CXR:  Moderate RIGHT effusion and/or basilar airspace consolidation.  in ED: temp 101.2, HR 81, /53, Sat 93% on RA  leukocytosis of 21.25 with left shift  Lactate 2.9  s/p cefepime by ED  f/u u/a, urine and blood cultures  f/u repeat lactate trended down to 1.5  f/u Chest CT non-con   f/u ESR, CRP, Procal, legionella, strep pneumo and sputum culture  c/w Miriam   ID Dr. Garcia consulted

## 2023-08-16 NOTE — H&P ADULT - HISTORY OF PRESENT ILLNESS
89 yrs old from home, ambulating independently, pmhx of HFrEFm Afib on Eliquis, s/p Micra PPM, CKD, HLD, BPH on chronic Chinchilla, presented with fever and generalized weakness. Collateral information obtained from pt's daughter present at bedside [Ms. Hull] who stated that when she was at work today, his father called him and reported of generalized weakness and inability to ambulate due to lethargy. When she got home, found that his father is having a fever, measured at 102.7 with some runny nose. Pt denied any symptoms including cough, chills, dyspnea, chest pain, palpitation, abdominal pain, N/V/D, decreased sensation or unilateral weakness, headache, dizziness, visual changes. Pt's daughter confirmed that non of these symptoms were present as well.  Pt was admitted on July 2023, for dyspnea on exertion, palpitations and LE swelling. As per the daughter the size of LE has been unchanged and pt has been complaint on the meds he was discharged from the hospital.   Pt denied alcohol consumption, smoking, use of illicit drugs including marijuana

## 2023-08-16 NOTE — H&P ADULT - PROBLEM SELECTOR PLAN 6
1.89 pmhx of BPH on chronic salmon  Salmon was 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 to obtain new sample pmhx of BPH on chronic Chinchilla  Chinchilla was 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 to obtain new sample

## 2023-08-16 NOTE — H&P ADULT - NSHPPHYSICALEXAM_GEN_ALL_CORE
GENERAL: NAD, lying in bed comfortably, O2 sat of >93% on RA   HEAD:  Atraumatic, Normocephalic  EYES: EOMI, PERRLA, conjunctiva and sclera clear  ENT: Moist mucous membranes, Dentures noted   NECK: Supple, No JVD  CHEST/LUNG:  + decreased air entry on the lower lobe, + mild crackles  No wheezing, or rubs. Unlabored respirations  HEART: Regular rate and rhythm; + ?murmurs in Lt lower sternal border, rubs, or gallops  ABDOMEN: Bowel sounds present; Soft, Nontender, Nondistended.   EXTREMITIES:  2+ Peripheral Pulses, brisk capillary refill. No clubbing, cyanosis, + 3 bilateral LE pitting edema up to the knee level, + skin changes 2/2 venous insufficiency in the stocks pattern noted bilaterally   NERVOUS SYSTEM:  Alert & Oriented X3, speech clear. no sensory or motor deficit in the UE/LE,   SKIN: No rashes

## 2023-08-17 DIAGNOSIS — N39.0 URINARY TRACT INFECTION, SITE NOT SPECIFIED: ICD-10-CM

## 2023-08-17 LAB
ALBUMIN SERPL ELPH-MCNC: 2.7 G/DL — LOW (ref 3.5–5)
ALBUMIN SERPL ELPH-MCNC: 3 G/DL — LOW (ref 3.5–5)
ALP SERPL-CCNC: 66 U/L — SIGNIFICANT CHANGE UP (ref 40–120)
ALP SERPL-CCNC: 70 U/L — SIGNIFICANT CHANGE UP (ref 40–120)
ALT FLD-CCNC: 38 U/L DA — SIGNIFICANT CHANGE UP (ref 10–60)
ALT FLD-CCNC: 51 U/L DA — SIGNIFICANT CHANGE UP (ref 10–60)
ANION GAP SERPL CALC-SCNC: 6 MMOL/L — SIGNIFICANT CHANGE UP (ref 5–17)
ANION GAP SERPL CALC-SCNC: 8 MMOL/L — SIGNIFICANT CHANGE UP (ref 5–17)
APPEARANCE UR: ABNORMAL
AST SERPL-CCNC: 45 U/L — HIGH (ref 10–40)
AST SERPL-CCNC: 62 U/L — HIGH (ref 10–40)
BASOPHILS # BLD AUTO: 0.03 K/UL — SIGNIFICANT CHANGE UP (ref 0–0.2)
BASOPHILS NFR BLD AUTO: 0.2 % — SIGNIFICANT CHANGE UP (ref 0–2)
BILIRUB SERPL-MCNC: 0.4 MG/DL — SIGNIFICANT CHANGE UP (ref 0.2–1.2)
BILIRUB SERPL-MCNC: 0.4 MG/DL — SIGNIFICANT CHANGE UP (ref 0.2–1.2)
BILIRUB UR-MCNC: NEGATIVE — SIGNIFICANT CHANGE UP
BUN SERPL-MCNC: 71 MG/DL — HIGH (ref 7–18)
BUN SERPL-MCNC: 74 MG/DL — HIGH (ref 7–18)
CALCIUM SERPL-MCNC: 8.6 MG/DL — SIGNIFICANT CHANGE UP (ref 8.4–10.5)
CALCIUM SERPL-MCNC: 8.8 MG/DL — SIGNIFICANT CHANGE UP (ref 8.4–10.5)
CHLORIDE SERPL-SCNC: 106 MMOL/L — SIGNIFICANT CHANGE UP (ref 96–108)
CHLORIDE SERPL-SCNC: 106 MMOL/L — SIGNIFICANT CHANGE UP (ref 96–108)
CO2 SERPL-SCNC: 23 MMOL/L — SIGNIFICANT CHANGE UP (ref 22–31)
CO2 SERPL-SCNC: 23 MMOL/L — SIGNIFICANT CHANGE UP (ref 22–31)
COLOR SPEC: YELLOW — SIGNIFICANT CHANGE UP
CREAT SERPL-MCNC: 2.87 MG/DL — HIGH (ref 0.5–1.3)
CREAT SERPL-MCNC: 3.06 MG/DL — HIGH (ref 0.5–1.3)
CRP SERPL-MCNC: 60 MG/L — HIGH
DIFF PNL FLD: ABNORMAL
EGFR: 18 ML/MIN/1.73M2 — LOW
EGFR: 20 ML/MIN/1.73M2 — LOW
EOSINOPHIL # BLD AUTO: 0 K/UL — SIGNIFICANT CHANGE UP (ref 0–0.5)
EOSINOPHIL NFR BLD AUTO: 0 % — SIGNIFICANT CHANGE UP (ref 0–6)
GLUCOSE SERPL-MCNC: 140 MG/DL — HIGH (ref 70–99)
GLUCOSE SERPL-MCNC: 191 MG/DL — HIGH (ref 70–99)
GLUCOSE UR QL: NEGATIVE MG/DL — SIGNIFICANT CHANGE UP
GRAM STN FLD: SIGNIFICANT CHANGE UP
HCT VFR BLD CALC: 29.6 % — LOW (ref 39–50)
HGB BLD-MCNC: 9.4 G/DL — LOW (ref 13–17)
IMM GRANULOCYTES NFR BLD AUTO: 0.6 % — SIGNIFICANT CHANGE UP (ref 0–0.9)
KETONES UR-MCNC: ABNORMAL MG/DL
LEGIONELLA AG UR QL: NEGATIVE — SIGNIFICANT CHANGE UP
LEUKOCYTE ESTERASE UR-ACNC: ABNORMAL
LYMPHOCYTES # BLD AUTO: 1.15 K/UL — SIGNIFICANT CHANGE UP (ref 1–3.3)
LYMPHOCYTES # BLD AUTO: 6.9 % — LOW (ref 13–44)
MAGNESIUM SERPL-MCNC: 2.8 MG/DL — HIGH (ref 1.6–2.6)
MCHC RBC-ENTMCNC: 28.3 PG — SIGNIFICANT CHANGE UP (ref 27–34)
MCHC RBC-ENTMCNC: 31.8 GM/DL — LOW (ref 32–36)
MCV RBC AUTO: 89.2 FL — SIGNIFICANT CHANGE UP (ref 80–100)
METHOD TYPE: SIGNIFICANT CHANGE UP
MONOCYTES # BLD AUTO: 1.06 K/UL — HIGH (ref 0–0.9)
MONOCYTES NFR BLD AUTO: 6.3 % — SIGNIFICANT CHANGE UP (ref 2–14)
NEUTROPHILS # BLD AUTO: 14.37 K/UL — HIGH (ref 1.8–7.4)
NEUTROPHILS NFR BLD AUTO: 86 % — HIGH (ref 43–77)
NITRITE UR-MCNC: NEGATIVE — SIGNIFICANT CHANGE UP
NRBC # BLD: 0 /100 WBCS — SIGNIFICANT CHANGE UP (ref 0–0)
PH UR: 5.5 — SIGNIFICANT CHANGE UP (ref 5–8)
PHOSPHATE SERPL-MCNC: 5.1 MG/DL — HIGH (ref 2.5–4.5)
PLATELET # BLD AUTO: 162 K/UL — SIGNIFICANT CHANGE UP (ref 150–400)
POTASSIUM SERPL-MCNC: 4.5 MMOL/L — SIGNIFICANT CHANGE UP (ref 3.5–5.3)
POTASSIUM SERPL-MCNC: 4.5 MMOL/L — SIGNIFICANT CHANGE UP (ref 3.5–5.3)
POTASSIUM SERPL-SCNC: 4.5 MMOL/L — SIGNIFICANT CHANGE UP (ref 3.5–5.3)
POTASSIUM SERPL-SCNC: 4.5 MMOL/L — SIGNIFICANT CHANGE UP (ref 3.5–5.3)
PROCALCITONIN SERPL-MCNC: 4.99 NG/ML — HIGH (ref 0.02–0.1)
PROT SERPL-MCNC: 6.1 G/DL — SIGNIFICANT CHANGE UP (ref 6–8.3)
PROT SERPL-MCNC: 6.4 G/DL — SIGNIFICANT CHANGE UP (ref 6–8.3)
PROT UR-MCNC: 300 MG/DL
RBC # BLD: 3.32 M/UL — LOW (ref 4.2–5.8)
RBC # FLD: 13.9 % — SIGNIFICANT CHANGE UP (ref 10.3–14.5)
S PNEUM AG UR QL: NEGATIVE — SIGNIFICANT CHANGE UP
S PYO DNA BLD POS QL NAA+NON-PROBE: SIGNIFICANT CHANGE UP
SODIUM SERPL-SCNC: 135 MMOL/L — SIGNIFICANT CHANGE UP (ref 135–145)
SODIUM SERPL-SCNC: 137 MMOL/L — SIGNIFICANT CHANGE UP (ref 135–145)
SP GR SPEC: 1.01 — SIGNIFICANT CHANGE UP (ref 1–1.03)
SPECIMEN SOURCE: SIGNIFICANT CHANGE UP
SPECIMEN SOURCE: SIGNIFICANT CHANGE UP
TROPONIN I, HIGH SENSITIVITY RESULT: 375.4 NG/L — HIGH
UROBILINOGEN FLD QL: 1 MG/DL — SIGNIFICANT CHANGE UP (ref 0.2–1)
WBC # BLD: 16.71 K/UL — HIGH (ref 3.8–10.5)
WBC # FLD AUTO: 16.71 K/UL — HIGH (ref 3.8–10.5)

## 2023-08-17 PROCEDURE — 71250 CT THORAX DX C-: CPT | Mod: 26

## 2023-08-17 PROCEDURE — 93010 ELECTROCARDIOGRAM REPORT: CPT

## 2023-08-17 RX ORDER — ASPIRIN/CALCIUM CARB/MAGNESIUM 324 MG
324 TABLET ORAL ONCE
Refills: 0 | Status: COMPLETED | OUTPATIENT
Start: 2023-08-17 | End: 2023-08-17

## 2023-08-17 RX ORDER — FUROSEMIDE 40 MG
40 TABLET ORAL DAILY
Refills: 0 | Status: DISCONTINUED | OUTPATIENT
Start: 2023-08-17 | End: 2023-08-25

## 2023-08-17 RX ORDER — CEFTRIAXONE 500 MG/1
2000 INJECTION, POWDER, FOR SOLUTION INTRAMUSCULAR; INTRAVENOUS EVERY 24 HOURS
Refills: 0 | Status: COMPLETED | OUTPATIENT
Start: 2023-08-17 | End: 2023-08-25

## 2023-08-17 RX ADMIN — Medication 40 MILLIGRAM(S): at 11:27

## 2023-08-17 RX ADMIN — SIMVASTATIN 20 MILLIGRAM(S): 20 TABLET, FILM COATED ORAL at 22:12

## 2023-08-17 RX ADMIN — AZITHROMYCIN 255 MILLIGRAM(S): 500 TABLET, FILM COATED ORAL at 04:47

## 2023-08-17 RX ADMIN — Medication 50 MILLIGRAM(S): at 13:28

## 2023-08-17 RX ADMIN — Medication 100 MILLIGRAM(S): at 11:26

## 2023-08-17 RX ADMIN — Medication 50 MILLIGRAM(S): at 04:54

## 2023-08-17 RX ADMIN — CEFTRIAXONE 100 MILLIGRAM(S): 500 INJECTION, POWDER, FOR SOLUTION INTRAMUSCULAR; INTRAVENOUS at 04:47

## 2023-08-17 RX ADMIN — POLYETHYLENE GLYCOL 3350 17 GRAM(S): 17 POWDER, FOR SOLUTION ORAL at 22:12

## 2023-08-17 RX ADMIN — APIXABAN 2.5 MILLIGRAM(S): 2.5 TABLET, FILM COATED ORAL at 17:20

## 2023-08-17 RX ADMIN — Medication 650 MILLIGRAM(S): at 19:49

## 2023-08-17 RX ADMIN — APIXABAN 2.5 MILLIGRAM(S): 2.5 TABLET, FILM COATED ORAL at 04:54

## 2023-08-17 RX ADMIN — Medication 324 MILLIGRAM(S): at 03:46

## 2023-08-17 RX ADMIN — Medication 650 MILLIGRAM(S): at 18:45

## 2023-08-17 RX ADMIN — Medication 0.25 MILLIGRAM(S): at 11:27

## 2023-08-17 RX ADMIN — RISPERIDONE 1 MILLIGRAM(S): 4 TABLET ORAL at 11:26

## 2023-08-17 RX ADMIN — Medication 25 MILLIGRAM(S): at 05:00

## 2023-08-17 RX ADMIN — ISOSORBIDE MONONITRATE 30 MILLIGRAM(S): 60 TABLET, EXTENDED RELEASE ORAL at 11:26

## 2023-08-17 RX ADMIN — CEFTRIAXONE 100 MILLIGRAM(S): 500 INJECTION, POWDER, FOR SOLUTION INTRAMUSCULAR; INTRAVENOUS at 17:20

## 2023-08-17 NOTE — PATIENT PROFILE ADULT - FALL HARM RISK - FACTORS NURSING JUDGEMENT
Physician Notification of Discharge    Patient name: Lester Bay     : 2019     MRN: [id number]    Discharge Date/Time: 2019  2:26 PM    Discharge Disposition: Discharged to home/self care (01)    Discharge DX: There are no discharge diagnoses documented for the most recent discharge.    Discharge Meds:      Medication List      You have not been prescribed any medications.       Attending Provider: No att. providers found    [facility] Pediatrics Department    PCP: Orlando Mcginnis D.O.    To speak with a member of the patients care team, please contact the [facility] Pediatric department -at [phone].   Thank you for allowing us to participate in the care of your patient.   
Yes

## 2023-08-17 NOTE — PROGRESS NOTE ADULT - SUBJECTIVE AND OBJECTIVE BOX
Date of Service 08-17-23 @ 10:03    CHIEF COMPLAINT:Patient is a 89y old  Male who presents with a chief complaint of PNA and ?effusion.Pt appears comfortable    	  REVIEW OF SYSTEMS:  CONSTITUTIONAL: No fever, weight loss, or fatigue  EYES: No eye pain, visual disturbances, or discharge  ENT:  No difficulty hearing, tinnitus, vertigo; No sinus or throat pain  NECK: No pain or stiffness  RESPIRATORY: No cough, wheezing, chills or hemoptysis;+ Shortness of Breath  CARDIOVASCULAR: No chest pain, palpitations, passing out, dizziness, or leg swelling  GASTROINTESTINAL: No abdominal or epigastric pain. No nausea, vomiting, or hematemesis; No diarrhea or constipation. No melena or hematochezia.  GENITOURINARY: No dysuria, frequency, hematuria, or incontinence  NEUROLOGICAL: No headaches, memory loss, loss of strength, numbness, or tremors  SKIN: No itching, burning, rashes, or lesions   LYMPH Nodes: No enlarged glands  ENDOCRINE: No heat or cold intolerance; No hair loss  MUSCULOSKELETAL: No joint pain or swelling; No muscle, back, or extremity pain  PSYCHIATRIC: No depression, anxiety, mood swings, or difficulty sleeping  HEME/LYMPH: No easy bruising, or bleeding gums  ALLERGY AND IMMUNOLOGIC: No hives or eczema	      PHYSICAL EXAM:  T(C): 36.2 (08-17-23 @ 07:28), Max: 38.4 (08-16-23 @ 17:36)  HR: 63 (08-17-23 @ 07:28) (63 - 110)  BP: 100/65 (08-17-23 @ 07:28) (100/55 - 157/56)  RR: 18 (08-17-23 @ 07:28) (18 - 18)  SpO2: 99% (08-17-23 @ 07:28) (92% - 100%)  Wt(kg): --  I&O's Summary      Appearance: Normal	  HEENT:   Normal oral mucosa, PERRL, EOMI	  Lymphatic: No lymphadenopathy  Cardiovascular: Normal S1 S2, No JVD, No murmurs, No edema  Respiratory: Dec BS at bases	  Psychiatry: A & O x 3, Mood & affect appropriate  Gastrointestinal:  Soft, Non-tender, + BS	  Skin: No rashes, No ecchymoses, No cyanosis	  Neurologic: Non-focal  Extremities: Normal range of motion, No clubbing, cyanosis or edema  Vascular: Peripheral pulses palpable 2+ bilaterally    MEDICATIONS  (STANDING):  allopurinol 100 milliGRAM(s) Oral daily  ALPRAZolam 0.25 milliGRAM(s) Oral daily  apixaban 2.5 milliGRAM(s) Oral every 12 hours  azithromycin  IVPB 500 milliGRAM(s) IV Intermittent every 24 hours  cefTRIAXone   IVPB 1000 milliGRAM(s) IV Intermittent every 24 hours  hydrALAZINE 50 milliGRAM(s) Oral three times a day  isosorbide   mononitrate ER Tablet (IMDUR) 30 milliGRAM(s) Oral daily  metoprolol tartrate 25 milliGRAM(s) Oral every 12 hours  polyethylene glycol 3350 17 Gram(s) Oral at bedtime  risperiDONE   Tablet 1 milliGRAM(s) Oral daily  simvastatin 20 milliGRAM(s) Oral at bedtime        LABS:	 	    Troponin I, High Sensitivity Result: 375.4 ng/L (08-17 @ 03:10)  Troponin I, High Sensitivity Result: 314.1 ng/L (08-16 @ 22:40)  Troponin I, High Sensitivity Result: 130.8 ng/L (08-16 @ 18:30)                            9.4    16.71 )-----------( 162      ( 17 Aug 2023 07:14 )             29.6     08-17    135  |  106  |  74<H>  ----------------------------<  140<H>  4.5   |  23  |  2.87<H>    Ca    8.6      17 Aug 2023 07:14  Phos  5.1     08-17  Mg     2.8     08-17    TPro  6.1  /  Alb  2.7<L>  /  TBili  0.4  /  DBili  x   /  AST  62<H>  /  ALT  51  /  AlkPhos  66  08-17    < from: CT Chest No Cont (08.17.23 @ 01:47) >  ACC: [id number] EXAM:  CT CHEST   ORDERED BY: LUCILA WINN     PROCEDURE DATE:  08/17/2023          INTERPRETATION:  CLINICAL INDICATION: Shortness of breath, pna vs effusion    PROCEDURE: CT of the chest was performed without intravenous contrast.   Coronal and sagittal reconstruction images were obtained.    CONTRAST/COMPLICATIONS:  IV Contrast: NONE  Oral Contrast: NONE  Complications: None reported at time of study completion    COMPARISON: 3/13/2022.    FINDINGS: Evaluation of the thoracic organs/vasculature is limited   without intravenous contrast. The patient's respiratory motion degrades   images.    LUNGS AND AIRWAYS: Patent central airways. Compressive atelectasis of the   right > left lung. Groundglass opacity and interlobular septal thickening   at the right lung.  PLEURA: Increased, moderate to large right and small left pleural   effusion.  HEART: Cardiomegaly. Small pericardial effusion. Aortic valve, mitral   annular and coronary artery calcification. Leadless pacemaker.  VESSELS: Atherosclerosis. Normal caliber of the thoracic aorta. Main   pulmonary artery enlargement (3.3 cm) suggesting pulmonary arterial   hypertension.  MEDIASTINUM AND OMA: Enlarged lymph nodes, for example, 1.3 x 1.8 cm   right paratracheal lymph node (3:60).  CHEST WALL AND LOWER NECK: Mildly heterogeneous thyroid gland, degraded   by artifact. Bilateral gynecomastia. Suggest anasarca.  UPPER ABDOMEN: Again noted, hepatic cysts. Persistent left adrenal   thickening. Bilateral perinephric stranding. Again noted, bilateral   hydronephrosis and prominent urothelium. Bilateral renal cysts. Small   right renal hyperdensity, suggesting proteinaceous/hemorrhagic cyst.   Small ascites.  BONES: Degenerative changes of the spine. Mild endplate depression of the   spine, suggesting chronic.    IMPRESSION:    Moderate to large right and small left pleural effusion with atelectasis,   increased since 3/13/2022. Nonspecific ground glass opacity and   interlobular septal thickening at the right lung. Recommend clinical   correlation to assess underlying pneumonia.    Nonspecific mediastinal lymphadenopathy.    Additional findings as described.    --- End of Report ---            YOSI TAO MD; Attending Radiologist  This document has been electronically signed. Aug 17 2023  2:56AM    < end of copied text >

## 2023-08-17 NOTE — CONSULT NOTE ADULT - ASSESSMENT
# Sepsis ( fever + tachycardia + Leukocytosis)  # UTI-  WBC >50 /HPF   # Pneumonia  # Streptococcus pyogenes Bacteremia- 8/16- source most likely Lung    would recommend:    1. Please adjust Ceftriaxone doses to 2 g daily  2. Repeat Blood cultures X 2 in AM to document clearing the blood stream  3. Follow up final blood cultures from 8/16 for sensitivities  4. TTE followed by [facility]  IF WITHIN GOAL OF CARE    will follow the patient with you and make further recommendation based on the clinical course and Lab results  Thank you for the opportunity to participate in Mr. PEMBERTON's care      Attending Attestation:    Spent more than 65 minutes on total encounter, more than 50 % of the visit was spent counseling and/or coordinating care by the Attending physician.     Patient is a 89y old  Male from home, ambulating independently, pmhx of HFrEFm Afib on Eliquis, s/p Micra PPM, CKD, HLD, BPH on chronic Chinchilla, now presents to the ER for evaluation of fever and generalized weakness. Pt was admitted on July 2023, for dyspnea on exertion, palpitations and LE swelling. As per the daughter the size of LE has been unchanged and pt has been complaint on the meds he was discharged from the hospital. On admission, he found to have fever, tachycardia, Leukocytosis, Positive UTI and Moderate RIGHT effusion and/or basilar airspace consolidation on CXR and CT chest. He has started on Ceftriaxone and Azithromycin, and the ID consult requested to assist with further evaluation and antibiotic management.    # Sepsis ( Fever + tachycardia + Leukocytosis)  # UTI-  WBC >50 /HPF   # Pneumonia with right pleural effusion  # Streptococcus pyogenes Bacteremia- 8/16- source most likely Lung    would recommend:    1. Obtain IR evaluation for Right thoracentesis  2. Please adjust Ceftriaxone doses to 2 g daily to cover Bacteremia  3. Repeat Blood cultures X 2 in AM to document clearing the blood stream  4. Follow up final blood cultures from 8/16 for sensitivities  5. TTE followed by AIDEN  IF WITHIN GOAL OF CARE    d/w Covering NPAyala     will follow the patient with you and make further recommendation based on the clinical course and Lab results  Thank you for the opportunity to participate in Mr. PEMBERTON's care      Attending Attestation:    Spent more than 65 minutes on total encounter, more than 50 % of the visit was spent counseling and/or coordinating care by the Attending physician.

## 2023-08-17 NOTE — PROGRESS NOTE ADULT - PROBLEM SELECTOR PLAN 2
c/w Ceftriaxone and Zithromax   ID Dr Garcia consulted   follow up urine culture and blood culture   Salmon in place (chronic salmon

## 2023-08-17 NOTE — PATIENT PROFILE ADULT - FALL HARM RISK - HARM RISK INTERVENTIONS

## 2023-08-17 NOTE — PROGRESS NOTE ADULT - PROBLEM SELECTOR PLAN 1
continue Ceftiaxone and Zithromax   f/u  Procal, legionella, strep pneumo and sputum culture  supplemental oxygen as needed   ID Dr. Garcia consulted

## 2023-08-17 NOTE — PROGRESS NOTE ADULT - ASSESSMENT
89 yrs old from home, ambulating independently, pmhx of HFrEFm Afib on Eliquis, s/p Micra PPM, CKD, HLD, BPH on chronic Salmon, presented with fever and generalized weakness  In ED found to be febrile, tachycardic,  CXR:  Moderate RIGHT effusion and/or basilar airspace consolidation.  in ED: temp 101.2, HR 81, /53, Sat 93% on RA  leukocytosis of 21.25 with left shift, Lactate 2.9 Urinalysis positve   Chronic salmon in place   s/p dose of cefepime in ED   PT was admitted for sepsis likely due to UTI and PNA   Started on Ceftriaxone and Zithromax  lactate trended down to 1.5  CT Chest No Cont Moderate to large right and small left pleural effusion with atelectasis,   increased since 3/13/2022. Nonspecific ground glass opacity and   interlobular septal thickening at the right lung.     Pt was seen at bedside, NAD, saturating >95% on RA, WBC trending down, no fever               f/u ESR, CRP, Procal, legionella, strep pneumo and sputum culture  c/w Eh and Klaus   ID Dr. Garcia consulted.  < from:

## 2023-08-17 NOTE — PROGRESS NOTE ADULT - ASSESSMENT
89 yrs old from home, ambulating independently, pmhx of HFrEFm Afib on Eliquis, s/p Micra PPM, CKD, HLD, BPH on chronic Salmon, presented with fever and generalized weakness,pneumonia,chronic diastolic HF.  1.Fever-pan cx.  2.Pneumonia and possible UTI-abx as per ID.Pulm eval.  3.Chronic diastolic HF-IV lasix.  4.CRI-f/u lytes.  5.Afib-NOAC.lopressor.  6.BPH with chronic salmon-R/O UTI.  7.PPI.  8.Will speak with IR for possible Rt side thoracenthesis.

## 2023-08-17 NOTE — CONSULT NOTE ADULT - SUBJECTIVE AND OBJECTIVE BOX
Patient is a 89y old  Male from home, ambulating independently, pmhx of HFrEFm Afib on Eliquis, s/p Micra PPM, CKD, HLD, BPH on chronic Chinchilla, presents to the ER for evaluation of fever and generalized weakness. Collateral information obtained from pt's daughter present at bedside [Ms. Hull] who stated that when she was at work today, his father called him and reported of generalized weakness and inability to ambulate due to lethargy. When she got home, found that his father is having a fever, measured at 102.7 with some runny nose. Pt denied any symptoms including cough, chills, dyspnea, chest pain, palpitation, abdominal pain, N/V/D, decreased sensation or unilateral weakness, headache, dizziness, visual changes. Pt's daughter confirmed that non of these symptoms were present as well.  Pt was admitted on 2023, for dyspnea on exertion, palpitations and LE swelling. As per the daughter the size of LE has been unchanged and pt has been complaint on the meds he was discharged from the hospital.           REVIEW OF SYSTEMS: Total of twelve systems have been reviewed with patient and found to be negative unless mentioned in HPI        PAST MEDICAL & SURGICAL HISTORY:  Hypertension  Atrial fibrillation  BPH (benign prostatic hyperplasia)  Chronic kidney disease, unspecified CKD stage  Hyperlipidemia  S/P placement of cardiac pacemaker        SOCIAL HISTORY  Alcohol: Does not drink  Tobacco: Does not smoke  Illicit substance use: None      FAMILY HISTORY: Non contributory to the present illness        ALLERGIES: No Known Allergies        Vital Signs Last 24 Hrs  T(C): 36.7 (17 Aug 2023 11:10), Max: 38.4 (16 Aug 2023 17:36)  T(F): 98 (17 Aug 2023 11:10), Max: 101.2 (16 Aug 2023 17:36)  HR: 63 (17 Aug 2023 13:19) (60 - 110)  BP: 126/78 (17 Aug 2023 13:19) (100/55 - 157/56)  BP(mean): 83 (17 Aug 2023 03:08) (83 - 83)  RR: 18 (17 Aug 2023 11:10) (18 - 18)  SpO2: 98% (17 Aug 2023 11:10) (92% - 100%)    Parameters below as of 17 Aug 2023 11:10  Patient On (Oxygen Delivery Method): room air        PHYSICAL EXAM:  GENERAL: Not in distress   CHEST/LUNG:  Aire ntry bilaterally  HEART: s1 and s2 present  ABDOMEN:  Nontender and  Nondistended  EXTREMITIES: No pedal  edema  CNS: Awake and Alert      LABS:                        9.4    16.71 )-----------( 162      ( 17 Aug 2023 07:14 )             29.6       08-17    135  |  106  |  74<H>  ----------------------------<  140<H>  4.5   |  23  |  2.87<H>    Ca    8.6      17 Aug 2023 07:14  Phos  5.1     -  Mg     2.8     -    TPro  6.1  /  Alb  2.7<L>  /  TBili  0.4  /  DBili  x   /  AST  62<H>  /  ALT  51  /  AlkPhos  66  -17    PT/INR - ( 16 Aug 2023 18:30 )   PT: 15.6 sec;   INR: 1.38 ratio      PTT - ( 16 Aug 2023 18:30 )  PTT:30.5 sec      CAPILLARY BLOOD GLUCOSE  POCT Blood Glucose.: 153 mg/dL (16 Aug 2023 18:17)        Urinalysis Basic - ( 17 Aug 2023 08:04 )  Color: Yellow / Appearance: Turbid / S.014 / pH: x  Gluc: x / Ketone: Trace mg/dL  / Bili: Negative / Urobili: 1.0 mg/dL   Blood: x / Protein: 300 mg/dL / Nitrite: Negative   Leuk Esterase: Large / RBC: >50 /HPF / WBC >50 /HPF   Sq Epi: x / Non Sq Epi: x / Bacteria: Many /HPF        MEDICATIONS  (STANDING):  allopurinol 100 milliGRAM(s) Oral daily  ALPRAZolam 0.25 milliGRAM(s) Oral daily  apixaban 2.5 milliGRAM(s) Oral every 12 hours  azithromycin  IVPB 500 milliGRAM(s) IV Intermittent every 24 hours  cefTRIAXone   IVPB 1000 milliGRAM(s) IV Intermittent every 24 hours  furosemide   Injectable 40 milliGRAM(s) IV Push daily  hydrALAZINE 50 milliGRAM(s) Oral three times a day  isosorbide   mononitrate ER Tablet (IMDUR) 30 milliGRAM(s) Oral daily  metoprolol tartrate 25 milliGRAM(s) Oral every 12 hours  polyethylene glycol 3350 17 Gram(s) Oral at bedtime  risperiDONE   Tablet 1 milliGRAM(s) Oral daily  simvastatin 20 milliGRAM(s) Oral at bedtime    MEDICATIONS  (PRN):  acetaminophen     Tablet .. 650 milliGRAM(s) Oral every 6 hours PRN Temp greater or equal to 38C (100.4F), Mild Pain (1 - 3)        RADIOLOGY & ADDITIONAL TESTS:    < from: CT Chest No Cont (23 @ 01:47) >  Moderate to large right and small left pleural effusion with atelectasis,   increased since 3/13/2022. Nonspecific ground glass opacity and   interlobular septal thickening at the right lung. Recommend clinical   correlation to assess underlying pneumonia.    Nonspecific mediastinal lymphadenopathy.    < end of copied text >  < from: Xray Chest 1 View- PORTABLE-Urgent (23 @ 18:48) >  IMPRESSION:   Moderate RIGHT effusion and/or basilar airspace   consolidation.        MICROBIOLOGY DATA:    Legionella pneumophila Antigen, Urine (23 @ 22:40)   Legionella Antigen, Urine: Negative  Culture - Blood (23 @ 18:35)   Gram Stain:   Growth in aerobic bottle: Gram Positive Cocci in Pairs and Chains   Growth in anaerobic bottle: Gram Positive Cocci in Pairs and Chains  Specimen Source: .Blood Blood-Peripheral  Culture Results:   Growth in aerobic bottle: Gram Positive Cocci in Pairs and Chains   Growth in anaerobic bottle: Gram Positive Cocci in Pairs and Chains  Culture - Blood (23 @ 18:30)   Gram Stain:   Growth in aerobic bottle: Gram Positive Cocci in Pairs and Chains  - Streptococcus pyogenes (Group A): Detec  Specimen Source: .Blood Blood-Peripheral  Organism: Blood Culture PCR  Culture Results:   Growth in aerobic bottle: Gram Positive Cocci in Pairs and Chains   Direct identification is available within approximately 3-5   hours either by Blood Panel Multiplexed PCR or Direct   MALDI-TOF. Details: [email or web address]  Organism Identification: Blood Culture PCR  Method Type: PCRRapid HIV-1/2 Antibody (23 @ 18:30)   Rapid HIV-1/2 Antibody: Nonreact                 Patient is a 89y old  Male from home, ambulating independently, pmhx of HFrEFm Afib on Eliquis, s/p Micra PPM, CKD, HLD, BPH on chronic Chinchilla, now presents to the ER for evaluation of fever and generalized weakness. Pt was admitted on 2023, for dyspnea on exertion, palpitations and LE swelling. As per the daughter the size of LE has been unchanged and pt has been complaint on the meds he was discharged from the hospital. On admission, he found to have fever, tachycardia, Leukocytosis, Positive UTI and Moderate RIGHT effusion and/or basilar airspace consolidation on CXR and CT chest. He has started on Ceftriaxone and Azithromycin, and the ID consult requested to assist with further evaluation and antibiotic management.      REVIEW OF SYSTEMS: Total of twelve systems have been reviewed with patient and found to be negative unless mentioned in HPI      PAST MEDICAL & SURGICAL HISTORY:  Hypertension  Atrial fibrillation  BPH (benign prostatic hyperplasia)  Chronic kidney disease, unspecified CKD stage  Hyperlipidemia  S/P placement of cardiac pacemaker      SOCIAL HISTORY  Alcohol: Does not drink  Tobacco: Does not smoke  Illicit substance use: None      FAMILY HISTORY: Non contributory to the present illness      ALLERGIES: No Known Allergies      Vital Signs Last 24 Hrs  T(C): 36.7 (17 Aug 2023 11:10), Max: 38.4 (16 Aug 2023 17:36)  T(F): 98 (17 Aug 2023 11:10), Max: 101.2 (16 Aug 2023 17:36)  HR: 63 (17 Aug 2023 13:19) (60 - 110)  BP: 126/78 (17 Aug 2023 13:19) (100/55 - 157/56)  BP(mean): 83 (17 Aug 2023 03:08) (83 - 83)  RR: 18 (17 Aug 2023 11:10) (18 - 18)  SpO2: 98% (17 Aug 2023 11:10) (92% - 100%)    Parameters below as of 17 Aug 2023 11:10  Patient On (Oxygen Delivery Method): room air        PHYSICAL EXAM:  GENERAL: Not in distress   CHEST/LUNG: Not using accessory muscles   HEART: s1 and s2 present  ABDOMEN:  Nontender and  Nondistended  EXTREMITIES: No pedal  edema  CNS: Awake and Alert      LABS:                        9.4    16.71 )-----------( 162      ( 17 Aug 2023 07:14 )             29.6       08-17    135  |  106  |  74<H>  ----------------------------<  140<H>  4.5   |  23  |  2.87<H>    Ca    8.6      17 Aug 2023 07:14  Phos  5.1       Mg     2.8         TPro  6.1  /  Alb  2.7<L>  /  TBili  0.4  /  DBili  x   /  AST  62<H>  /  ALT  51  /  AlkPhos  66      PT/INR - ( 16 Aug 2023 18:30 )   PT: 15.6 sec;   INR: 1.38 ratio      PTT - ( 16 Aug 2023 18:30 )  PTT:30.5 sec      CAPILLARY BLOOD GLUCOSE  POCT Blood Glucose.: 153 mg/dL (16 Aug 2023 18:17)        Urinalysis Basic - ( 17 Aug 2023 08:04 )  Color: Yellow / Appearance: Turbid / S.014 / pH: x  Gluc: x / Ketone: Trace mg/dL  / Bili: Negative / Urobili: 1.0 mg/dL   Blood: x / Protein: 300 mg/dL / Nitrite: Negative   Leuk Esterase: Large / RBC: >50 /HPF / WBC >50 /HPF   Sq Epi: x / Non Sq Epi: x / Bacteria: Many /HPF        MEDICATIONS  (STANDING):  allopurinol 100 milliGRAM(s) Oral daily  ALPRAZolam 0.25 milliGRAM(s) Oral daily  apixaban 2.5 milliGRAM(s) Oral every 12 hours  azithromycin  IVPB 500 milliGRAM(s) IV Intermittent every 24 hours  cefTRIAXone   IVPB 1000 milliGRAM(s) IV Intermittent every 24 hours  furosemide   Injectable 40 milliGRAM(s) IV Push daily  hydrALAZINE 50 milliGRAM(s) Oral three times a day  isosorbide   mononitrate ER Tablet (IMDUR) 30 milliGRAM(s) Oral daily  metoprolol tartrate 25 milliGRAM(s) Oral every 12 hours  polyethylene glycol 3350 17 Gram(s) Oral at bedtime  risperiDONE   Tablet 1 milliGRAM(s) Oral daily  simvastatin 20 milliGRAM(s) Oral at bedtime    MEDICATIONS  (PRN):  acetaminophen     Tablet .. 650 milliGRAM(s) Oral every 6 hours PRN Temp greater or equal to 38C (100.4F), Mild Pain (1 - 3)        RADIOLOGY & ADDITIONAL TESTS:    23 : CT Chest No Cont (23 @ 01:47) Moderate to large right and small left pleural effusion with atelectasis,   increased since 3/13/2022. Nonspecific ground glass opacity and interlobular septal thickening at the right lung. Recommend clinical   correlation to assess underlying pneumonia.    Nonspecific mediastinal lymphadenopathy.      23 : Xray Chest 1 View- PORTABLE-Urgent (23 @ 18:48) >  IMPRESSION:   Moderate RIGHT effusion and/or basilar airspace consolidation.        MICROBIOLOGY DATA:    Legionella pneumophila Antigen, Urine (23 @ 22:40)   Legionella Antigen, Urine: Negative  Culture - Blood (23 @ 18:35)   Gram Stain:   Growth in aerobic bottle: Gram Positive Cocci in Pairs and Chains   Growth in anaerobic bottle: Gram Positive Cocci in Pairs and Chains  Specimen Source: .Blood Blood-Peripheral  Culture Results:   Growth in aerobic bottle: Gram Positive Cocci in Pairs and Chains   Growth in anaerobic bottle: Gram Positive Cocci in Pairs and Chains  Culture - Blood (23 @ 18:30)   Gram Stain:   Growth in aerobic bottle: Gram Positive Cocci in Pairs and Chains  - Streptococcus pyogenes (Group A): Detec  Specimen Source: .Blood Blood-Peripheral  Organism: Blood Culture PCR  Culture Results:   Growth in aerobic bottle: Gram Positive Cocci in Pairs and Chains   Direct identification is available within approximately 3-5   hours either by Blood Panel Multiplexed PCR or Direct   MALDI-TOF. Details: [email or web address]  Organism Identification: Blood Culture PCR  Method Type: PCRRapid HIV-1/2 Antibody (23 @ 18:30)   Rapid HIV-1/2 Antibody: Nonreact

## 2023-08-18 DIAGNOSIS — R78.81 BACTEREMIA: ICD-10-CM

## 2023-08-18 LAB
ANION GAP SERPL CALC-SCNC: 8 MMOL/L — SIGNIFICANT CHANGE UP (ref 5–17)
BUN SERPL-MCNC: 73 MG/DL — HIGH (ref 7–18)
CALCIUM SERPL-MCNC: 8.6 MG/DL — SIGNIFICANT CHANGE UP (ref 8.4–10.5)
CHLORIDE SERPL-SCNC: 103 MMOL/L — SIGNIFICANT CHANGE UP (ref 96–108)
CO2 SERPL-SCNC: 25 MMOL/L — SIGNIFICANT CHANGE UP (ref 22–31)
CREAT SERPL-MCNC: 2.85 MG/DL — HIGH (ref 0.5–1.3)
CULTURE RESULTS: SIGNIFICANT CHANGE UP
EGFR: 20 ML/MIN/1.73M2 — LOW
GLUCOSE SERPL-MCNC: 127 MG/DL — HIGH (ref 70–99)
HCT VFR BLD CALC: 31.1 % — LOW (ref 39–50)
HGB BLD-MCNC: 9.8 G/DL — LOW (ref 13–17)
MCHC RBC-ENTMCNC: 28.1 PG — SIGNIFICANT CHANGE UP (ref 27–34)
MCHC RBC-ENTMCNC: 31.5 GM/DL — LOW (ref 32–36)
MCV RBC AUTO: 89.1 FL — SIGNIFICANT CHANGE UP (ref 80–100)
MRSA PCR RESULT.: SIGNIFICANT CHANGE UP
NRBC # BLD: 0 /100 WBCS — SIGNIFICANT CHANGE UP (ref 0–0)
PLATELET # BLD AUTO: 167 K/UL — SIGNIFICANT CHANGE UP (ref 150–400)
POTASSIUM SERPL-MCNC: 4.2 MMOL/L — SIGNIFICANT CHANGE UP (ref 3.5–5.3)
POTASSIUM SERPL-SCNC: 4.2 MMOL/L — SIGNIFICANT CHANGE UP (ref 3.5–5.3)
RBC # BLD: 3.49 M/UL — LOW (ref 4.2–5.8)
RBC # FLD: 13.9 % — SIGNIFICANT CHANGE UP (ref 10.3–14.5)
S AUREUS DNA NOSE QL NAA+PROBE: SIGNIFICANT CHANGE UP
SODIUM SERPL-SCNC: 136 MMOL/L — SIGNIFICANT CHANGE UP (ref 135–145)
SPECIMEN SOURCE: SIGNIFICANT CHANGE UP
WBC # BLD: 11.76 K/UL — HIGH (ref 3.8–10.5)
WBC # FLD AUTO: 11.76 K/UL — HIGH (ref 3.8–10.5)

## 2023-08-18 RX ORDER — SOD,AMMONIUM,POTASSIUM LACTATE
1 CREAM (GRAM) TOPICAL ONCE
Refills: 0 | Status: COMPLETED | OUTPATIENT
Start: 2023-08-18 | End: 2023-08-18

## 2023-08-18 RX ORDER — ALPRAZOLAM 0.25 MG
0.25 TABLET ORAL AT BEDTIME
Refills: 0 | Status: DISCONTINUED | OUTPATIENT
Start: 2023-08-18 | End: 2023-08-25

## 2023-08-18 RX ORDER — RISPERIDONE 4 MG/1
1 TABLET ORAL AT BEDTIME
Refills: 0 | Status: DISCONTINUED | OUTPATIENT
Start: 2023-08-18 | End: 2023-08-28

## 2023-08-18 RX ADMIN — APIXABAN 2.5 MILLIGRAM(S): 2.5 TABLET, FILM COATED ORAL at 17:17

## 2023-08-18 RX ADMIN — APIXABAN 2.5 MILLIGRAM(S): 2.5 TABLET, FILM COATED ORAL at 06:29

## 2023-08-18 RX ADMIN — SIMVASTATIN 20 MILLIGRAM(S): 20 TABLET, FILM COATED ORAL at 22:20

## 2023-08-18 RX ADMIN — RISPERIDONE 1 MILLIGRAM(S): 4 TABLET ORAL at 22:20

## 2023-08-18 RX ADMIN — Medication 0.25 MILLIGRAM(S): at 22:20

## 2023-08-18 RX ADMIN — Medication 50 MILLIGRAM(S): at 22:20

## 2023-08-18 RX ADMIN — Medication 25 MILLIGRAM(S): at 06:29

## 2023-08-18 RX ADMIN — Medication 40 MILLIGRAM(S): at 06:29

## 2023-08-18 RX ADMIN — Medication 1 APPLICATION(S): at 17:32

## 2023-08-18 RX ADMIN — Medication 100 MILLIGRAM(S): at 13:15

## 2023-08-18 RX ADMIN — POLYETHYLENE GLYCOL 3350 17 GRAM(S): 17 POWDER, FOR SOLUTION ORAL at 22:20

## 2023-08-18 RX ADMIN — Medication 50 MILLIGRAM(S): at 06:29

## 2023-08-18 RX ADMIN — Medication 25 MILLIGRAM(S): at 17:17

## 2023-08-18 RX ADMIN — AZITHROMYCIN 255 MILLIGRAM(S): 500 TABLET, FILM COATED ORAL at 05:42

## 2023-08-18 RX ADMIN — ISOSORBIDE MONONITRATE 30 MILLIGRAM(S): 60 TABLET, EXTENDED RELEASE ORAL at 13:15

## 2023-08-18 NOTE — PROGRESS NOTE ADULT - SUBJECTIVE AND OBJECTIVE BOX
Date of Service 08-18-23 @ 12:29    CHIEF COMPLAINT:Patient is a 89y old  Male who presents with a chief complaint of PNA and ?effusion .Pt appears comfortable.    	  REVIEW OF SYSTEMS:  CONSTITUTIONAL: No fever, weight loss, or fatigue  EYES: No eye pain, visual disturbances, or discharge  ENT:  No difficulty hearing, tinnitus, vertigo; No sinus or throat pain  NECK: No pain or stiffness  RESPIRATORY: No cough, wheezing, chills or hemoptysis; No Shortness of Breath  CARDIOVASCULAR: No chest pain, palpitations, passing out, dizziness, or leg swelling  GASTROINTESTINAL: No abdominal or epigastric pain. No nausea, vomiting, or hematemesis; No diarrhea or constipation. No melena or hematochezia.  GENITOURINARY: No dysuria, frequency, hematuria, or incontinence  NEUROLOGICAL: No headaches, memory loss, loss of strength, numbness, or tremors  SKIN: No itching, burning, rashes, or lesions   LYMPH Nodes: No enlarged glands  ENDOCRINE: No heat or cold intolerance; No hair loss  MUSCULOSKELETAL: No joint pain or swelling; No muscle, back, or extremity pain  PSYCHIATRIC: No depression, anxiety, mood swings, or difficulty sleeping  HEME/LYMPH: No easy bruising, or bleeding gums  ALLERGY AND IMMUNOLOGIC: No hives or eczema	      PHYSICAL EXAM:  T(C): 36.7 (08-18-23 @ 11:10), Max: 36.9 (08-17-23 @ 19:46)  HR: 72 (08-18-23 @ 11:10) (63 - 73)  BP: 107/58 (08-18-23 @ 11:10) (101/49 - 126/78)  RR: 17 (08-18-23 @ 11:10) (16 - 18)  SpO2: 98% (08-18-23 @ 11:10) (96% - 100%)  Wt(kg): --  I&O's Summary    17 Aug 2023 07:01  -  18 Aug 2023 07:00  --------------------------------------------------------  IN: 430 mL / OUT: 1500 mL / NET: -1070 mL        Appearance: Normal	  HEENT:   Normal oral mucosa, PERRL, EOMI	  Lymphatic: No lymphadenopathy  Cardiovascular: Normal S1 S2, No JVD, No murmurs, No edema  Respiratory: Dec BS at bases  Psychiatry: A & O x 3, Mood & affect appropriate  Gastrointestinal:  Soft, Non-tender, + BS	  Skin: No rashes, No ecchymoses, No cyanosis	  Neurologic: Non-focal  Extremities: Normal range of motion, No clubbing, cyanosis or edema  Vascular: Peripheral pulses palpable 2+ bilaterally    MEDICATIONS  (STANDING):  allopurinol 100 milliGRAM(s) Oral daily  ALPRAZolam 0.25 milliGRAM(s) Oral daily  apixaban 2.5 milliGRAM(s) Oral every 12 hours  azithromycin  IVPB 500 milliGRAM(s) IV Intermittent every 24 hours  cefTRIAXone   IVPB 2000 milliGRAM(s) IV Intermittent every 24 hours  furosemide   Injectable 40 milliGRAM(s) IV Push daily  hydrALAZINE 50 milliGRAM(s) Oral three times a day  isosorbide   mononitrate ER Tablet (IMDUR) 30 milliGRAM(s) Oral daily  metoprolol tartrate 25 milliGRAM(s) Oral every 12 hours  polyethylene glycol 3350 17 Gram(s) Oral at bedtime  risperiDONE   Tablet 1 milliGRAM(s) Oral daily  simvastatin 20 milliGRAM(s) Oral at bedtime        LABS:	 	          Troponin I, High Sensitivity Result: 375.4 ng/L (08-17 @ 03:10)  Troponin I, High Sensitivity Result: 314.1 ng/L (08-16 @ 22:40)  Troponin I, High Sensitivity Result: 130.8 ng/L (08-16 @ 18:30)                            9.8    11.76 )-----------( 167      ( 18 Aug 2023 08:05 )             31.1     08-18    136  |  103  |  73<H>  ----------------------------<  127<H>  4.2   |  25  |  2.85<H>    Ca    8.6      18 Aug 2023 08:05  Phos  5.1     08-17  Mg     2.8     08-17    TPro  6.1  /  Alb  2.7<L>  /  TBili  0.4  /  DBili  x   /  AST  62<H>  /  ALT  51  /  AlkPhos  66  08-17    Culture - Urine (08.17.23 @ 08:04)   Specimen Source: Clean Catch Clean Catch (Midstream)  Culture Results:   <10,000 CFU/mL Normal Urogenital FloraCulture - Blood (08.16.23 @ 18:35)   Gram Stain:   Growth in aerobic bottle: Gram Positive Cocci in Pairs and Chains   Growth in anaerobic bottle: Gram Positive Cocci in Pairs and Chains  Specimen Source: .Blood Blood-Peripheral  Culture Results:   Growth in aerobic bottle: Gram Positive Cocci in Pairs and Chains   Growth in anaerobic bottle: Gram Positive Cocci in Pairs and ChainsCulture - Blood (08.16.23 @ 18:30)   - Streptococcus pyogenes (Group A): Detec  Gram Stain:   Growth in aerobic bottle: Gram Positive Cocci in Pairs and Chains  Specimen Source: .Blood Blood-Peripheral  Organism: Blood Culture PCR  Culture Results:   Growth in aerobic bottle: Gram Positive Cocci in Pairs and Chains   Direct identification is available within approximately 3-5   hours either by Blood Panel Multiplexed PCR or Direct   MALDI-TOF. Details: [email or web address]  Organism Identification: Blood Culture PCR  Method Type: PCR

## 2023-08-18 NOTE — PROGRESS NOTE ADULT - SUBJECTIVE AND OBJECTIVE BOX
NP Note discussed with  primary attending    Patient is a 89y old  Male who presents with a chief complaint of PNA and ?effusion (17 Aug 2023 14:30)      INTERVAL HPI/OVERNIGHT EVENTS: no new complaints    MEDICATIONS  (STANDING):  allopurinol 100 milliGRAM(s) Oral daily  ALPRAZolam 0.25 milliGRAM(s) Oral daily  apixaban 2.5 milliGRAM(s) Oral every 12 hours  azithromycin  IVPB 500 milliGRAM(s) IV Intermittent every 24 hours  cefTRIAXone   IVPB 2000 milliGRAM(s) IV Intermittent every 24 hours  furosemide   Injectable 40 milliGRAM(s) IV Push daily  hydrALAZINE 50 milliGRAM(s) Oral three times a day  isosorbide   mononitrate ER Tablet (IMDUR) 30 milliGRAM(s) Oral daily  metoprolol tartrate 25 milliGRAM(s) Oral every 12 hours  polyethylene glycol 3350 17 Gram(s) Oral at bedtime  risperiDONE   Tablet 1 milliGRAM(s) Oral daily  simvastatin 20 milliGRAM(s) Oral at bedtime    MEDICATIONS  (PRN):  acetaminophen     Tablet .. 650 milliGRAM(s) Oral every 6 hours PRN Temp greater or equal to 38C (100.4F), Mild Pain (1 - 3)      __________________________________________________  REVIEW OF SYSTEMS:    CONSTITUTIONAL: No fever,   RESPIRATORY: +cough; No shortness of breath  CARDIOVASCULAR: No chest pain, no palpitations  GASTROINTESTINAL: No pain. No nausea or vomiting; No diarrhea   NEUROLOGICAL: No headache or numbness, no tremors  MUSCULOSKELETAL: No joint pain, no muscle pain  GENITOURINARY: no dysuria,         Vital Signs Last 24 Hrs  T(C): 36.6 (18 Aug 2023 08:05), Max: 36.9 (17 Aug 2023 19:46)  T(F): 97.9 (18 Aug 2023 08:05), Max: 98.4 (17 Aug 2023 19:46)  HR: 67 (18 Aug 2023 08:05) (60 - 73)  BP: 103/50 (18 Aug 2023 08:05) (101/49 - 133/70)  BP(mean): --  RR: 17 (18 Aug 2023 08:05) (16 - 18)  SpO2: 99% (18 Aug 2023 08:05) (96% - 100%)    Parameters below as of 18 Aug 2023 08:05  Patient On (Oxygen Delivery Method): room air        ________________________________________________  PHYSICAL EXAM:  GENERAL: NAD  CHEST/LUNG: Clear to ausculitation bilaterally   HEART: S1 S2  regular; no murmurs, gallops or rubs  ABDOMEN: Soft, Nontender, Nondistended; Bowel sounds present   salmon in place   EXTREMITIES: no cyanosis; no edema; no calf tenderness  SKIN: warm and dry; no rash  NERVOUS SYSTEM:  Awake and alert; Oriented  to place, person and disoriented to  time ; no new deficits    _________________________________________________  LABS:                        9.8    11.76 )-----------( 167      ( 18 Aug 2023 08:05 )             31.1     08-18    136  |  103  |  73<H>  ----------------------------<  127<H>  4.2   |  25  |  2.85<H>    Ca    8.6      18 Aug 2023 08:05  Phos  5.1     08-17  Mg     2.8     08-17    TPro  6.1  /  Alb  2.7<L>  /  TBili  0.4  /  DBili  x   /  AST  62<H>  /  ALT  51  /  AlkPhos  66  08-17    PT/INR - ( 16 Aug 2023 18:30 )   PT: 15.6 sec;   INR: 1.38 ratio         PTT - ( 16 Aug 2023 18:30 )  PTT:30.5 sec  Urinalysis Basic - ( 18 Aug 2023 08:05 )    Color: x / Appearance: x / SG: x / pH: x  Gluc: 127 mg/dL / Ketone: x  / Bili: x / Urobili: x   Blood: x / Protein: x / Nitrite: x   Leuk Esterase: x / RBC: x / WBC x   Sq Epi: x / Non Sq Epi: x / Bacteria: x      CAPILLARY BLOOD GLUCOSE            RADIOLOGY & ADDITIONAL TESTS:    Imaging Personally Reviewed:  YES/NO    Consultant(s) Notes Reviewed:   YES/ No    Care Discussed with Consultants :     Plan of care was discussed with patient and /or primary care giver; all questions and concerns were addressed and care was aligned with patient's wishes.

## 2023-08-18 NOTE — PROGRESS NOTE ADULT - ASSESSMENT
89 yrs old from home, ambulating independently, pmhx of HFrEFm Afib on Eliquis, s/p Micra PPM, CKD, HLD, BPH on chronic Salmon, presented with fever and generalized weakness,sepsis,pneumonia,chronic diastolic HF and strep bactermia..  1.Sepsis due to strep bactermia-abx.  2.Pneumonia -abx as per ID.Pulm eval.  3.Chronic diastolic HF-IV lasix.  4.CRI-f/u lytes.Renal eval.  5.Afib-NOAC.lopressor.  6.BPH with chronic salmon-urince cx-.  7.PPI.

## 2023-08-18 NOTE — PROGRESS NOTE ADULT - PROBLEM SELECTOR PLAN 1
source urine vs lung   8/16 blood culture growing strep pyogenes   ID Dr Fong on board  c/w Ceftriaxone 2 g   follow up final urine and blood culture   8/18 repeat blood cultures sent, follow up results

## 2023-08-18 NOTE — CONSULT NOTE ADULT - ASSESSMENT
A:  Xerotic skin  Venous insufficiency    P:   Patient evaluated and Chart reviewed   Discussed diagnosis and treatment with patient  Continue with IV antibiotics As Per ID  Weight bearing as tolerated to b/l legs.   Provider to RN- put moisterizer on patients legs and dorsal feet once a day before bedtime.   Offloading to bilateral Heels.   Discussed importance of daily foot examinations and proper shoe gear and to importance of lower Fasting Blood Glucose levels.   Podiatry to follow while in house.  Discussed with Attending Dr. Walls  A:  Xerotic skin  Venous insufficiency    P:   Patient evaluated and Chart reviewed   Discussed diagnosis and treatment with patient  Continue with IV antibiotics As Per ID  Weight bearing as tolerated to b/l legs.   Provider to RN- put Moisterizer on patients legs and dorsal feet once a day before bedtime.   Pt was given CAIR boots to help offload his legs and feet. Pt to wear CAIR boots at all times.   Offloading to bilateral Heels.   Discussed importance of daily foot examinations and proper shoe gear and to importance of lower Fasting Blood Glucose levels.   Podiatry to follow while in house.  Discussed with Attending Dr. Walls

## 2023-08-18 NOTE — PROGRESS NOTE ADULT - ASSESSMENT
89 yrs old from home, ambulating independently, pmhx of HFrEFm Afib on Eliquis, s/p Micra PPM, CKD, HLD, BPH on chronic Salmon, presented with fever and generalized weakness  In ED found to be febrile, tachycardic,  CXR:  Moderate RIGHT effusion and/or basilar airspace consolidation.  in ED: temp 101.2, HR 81, /53, Sat 93% on RA  leukocytosis of 21.25 with left shift, Lactate 2.9 Urinalysis positve   Chronic salmon in place   s/p dose of cefepime in ED   PT was admitted for sepsis likely due to UTI and PNA   Started on Ceftriaxone and Zithromax  lactate trended down to 1.5  CT Chest No Cont Moderate to large right and small left pleural effusion with atelectasis,   increased since 3/13/2022. Nonspecific ground glass opacity and   interlobular septal thickening at the right lung.     Pt was seen at bedside, NAD, saturating >95% on RA, WBC trending down, no fever   Blood culture growing strep pyogens, ID on board, Ceftriaxone increased to 2 grams             f/u ESR, CRP, Procal, legionella, strep pneumo and sputum culture  c/w Eh and Klaus   ID Dr. Garcia consulted.  < from:

## 2023-08-18 NOTE — CONSULT NOTE ADULT - SUBJECTIVE AND OBJECTIVE BOX
Patient is a 89y old  Male who presents with a chief complaint of PNA and ?effusion (18 Aug 2023 13:51)      HPI:  89 yrs old from home, ambulating independently, pmhx of HFrEFm Afib on Eliquis, s/p Micra PPM, CKD, HLD, BPH on chronic Chinchilla, presented with fever and generalized weakness. Collateral information obtained from pt's daughter present at bedside [Ms. Hull] who stated that when she was at work today, his father called him and reported of generalized weakness and inability to ambulate due to lethargy. When she got home, found that his father is having a fever, measured at 102.7 with some runny nose. Pt denied any symptoms including cough, chills, dyspnea, chest pain, palpitation, abdominal pain, N/V/D, decreased sensation or unilateral weakness, headache, dizziness, visual changes. Pt's daughter confirmed that non of these symptoms were present as well.  Pt was admitted on July 2023, for dyspnea on exertion, palpitations and LE swelling. As per the daughter the size of LE has been unchanged and pt has been complaint on the meds he was discharged from the hospital.   Pt denied alcohol consumption, smoking, use of illicit drugs including marijuana  (16 Aug 2023 22:10)      Podiatry HPI    PMH:Hypertension    Atrial fibrillation    BPH (benign prostatic hyperplasia)    Chronic kidney disease, unspecified CKD stage    Hyperlipidemia      Allergies: No Known Allergies    Medications: acetaminophen     Tablet .. 650 milliGRAM(s) Oral every 6 hours PRN  allopurinol 100 milliGRAM(s) Oral daily  ALPRAZolam 0.25 milliGRAM(s) Oral at bedtime  apixaban 2.5 milliGRAM(s) Oral every 12 hours  azithromycin  IVPB 500 milliGRAM(s) IV Intermittent every 24 hours  cefTRIAXone   IVPB 2000 milliGRAM(s) IV Intermittent every 24 hours  furosemide   Injectable 40 milliGRAM(s) IV Push daily  hydrALAZINE 50 milliGRAM(s) Oral three times a day  isosorbide   mononitrate ER Tablet (IMDUR) 30 milliGRAM(s) Oral daily  metoprolol tartrate 25 milliGRAM(s) Oral every 12 hours  polyethylene glycol 3350 17 Gram(s) Oral at bedtime  risperiDONE   Tablet 1 milliGRAM(s) Oral at bedtime  simvastatin 20 milliGRAM(s) Oral at bedtime    FH:No pertinent family history in first degree relatives      PSX: No significant past surgical history    S/P placement of cardiac pacemaker      SH: acetaminophen     Tablet .. 650 milliGRAM(s) Oral every 6 hours PRN  allopurinol 100 milliGRAM(s) Oral daily  ALPRAZolam 0.25 milliGRAM(s) Oral at bedtime  apixaban 2.5 milliGRAM(s) Oral every 12 hours  azithromycin  IVPB 500 milliGRAM(s) IV Intermittent every 24 hours  cefTRIAXone   IVPB 2000 milliGRAM(s) IV Intermittent every 24 hours  furosemide   Injectable 40 milliGRAM(s) IV Push daily  hydrALAZINE 50 milliGRAM(s) Oral three times a day  isosorbide   mononitrate ER Tablet (IMDUR) 30 milliGRAM(s) Oral daily  metoprolol tartrate 25 milliGRAM(s) Oral every 12 hours  polyethylene glycol 3350 17 Gram(s) Oral at bedtime  risperiDONE   Tablet 1 milliGRAM(s) Oral at bedtime  simvastatin 20 milliGRAM(s) Oral at bedtime      Vital Signs Last 24 Hrs  T(C): 36.9 (18 Aug 2023 16:16), Max: 36.9 (17 Aug 2023 19:46)  T(F): 98.4 (18 Aug 2023 16:16), Max: 98.4 (17 Aug 2023 19:46)  HR: 65 (18 Aug 2023 16:16) (64 - 73)  BP: 120/51 (18 Aug 2023 16:16) (101/49 - 120/51)  BP(mean): --  RR: 18 (18 Aug 2023 16:16) (16 - 18)  SpO2: 94% (18 Aug 2023 16:16) (94% - 100%)    Parameters below as of 18 Aug 2023 16:16  Patient On (Oxygen Delivery Method): room air        LABS                        9.8    11.76 )-----------( 167      ( 18 Aug 2023 08:05 )             31.1               08-18    136  |  103  |  73<H>  ----------------------------<  127<H>  4.2   |  25  |  2.85<H>    Ca    8.6      18 Aug 2023 08:05  Phos  5.1     08-17  Mg     2.8     08-17    TPro  6.1  /  Alb  2.7<L>  /  TBili  0.4  /  DBili  x   /  AST  62<H>  /  ALT  51  /  AlkPhos  66  08-17      ROS  REVIEW OF SYSTEMS:    CONSTITUTIONAL: No fever, weight loss, or fatigue  EYES: No eye pain, visual disturbances, or discharge  ENMT:  No difficulty hearing, tinnitus, vertigo; No sinus or throat pain  NECK: No pain or stiffness  BREASTS: No pain, masses, or nipple discharge  RESPIRATORY: No cough, wheezing, chills or hemoptysis; No shortness of breath  CARDIOVASCULAR: No chest pain, palpitations, dizziness, or leg swelling  GASTROINTESTINAL: No abdominal or epigastric pain. No nausea, vomiting, or hematemesis; No diarrhea or constipation. No melena or hematochezia.  GENITOURINARY: No dysuria, frequency, hematuria, or incontinence  NEUROLOGICAL: No headaches, memory loss, loss of strength, numbness, or tremors  SKIN: No itching, burning, rashes, or lesions   LYMPH NODES: No enlarged glands  ENDOCRINE: No heat or cold intolerance; No hair loss  MUSCULOSKELETAL: No joint pain or swelling; No muscle, back, or extremity pain  PSYCHIATRIC: No depression, anxiety, mood swings, or difficulty sleeping  HEME/LYMPH: No easy bruising, or bleeding gums  ALLERY AND IMMUNOLOGIC: No hives or eczema      PHYSICAL EXAM  LE Focused:    Vasc:  DP and PT pulses palpable 2/4. CFT < 3seconds. Tg warm to warm with no increase in temp to lower legs. Varicosities noted b/l.  Derm: Xerotic skin noted in lower legs and dorsal feet. Edema noted to lower legs, not noted in feet. No open lesions noted. No erythema noted. Nail noted to be dystropic 1-5 b/l.  Neuro: Gross sensation intact.  MSK: No pop in lower legs or feet. Patient ambulates with use of walker.      CULTURES: Culture Results:   <10,000 CFU/mL Normal Urogenital Lesly (08-17 @ 08:04)  Culture Results:   Growth in aerobic bottle: Gram Positive Cocci in Pairs and Chains  Growth in anaerobic bottle: Gram Positive Cocci in Pairs and Chains (08-16 @ 18:35)  Culture Results:   Growth in aerobic bottle: Streptococcus pyogenes (Group A)  See previous culture [id number]  Direct identification is available within approximately 3-5  hours either by Blood Panel Multiplexed PCR or Direct  MALDI-TOF. Details: [email or web address] (08-16 @ 18:30)

## 2023-08-18 NOTE — PROGRESS NOTE ADULT - SUBJECTIVE AND OBJECTIVE BOX
Patient is seen and examined at the bed side, is afebrile. The leukocytosis trended down to normal. The repeat Blood cultures are in process.      REVIEW OF SYSTEMS: All other review systems are negative      ALLERGIES: No Known Allergies      Vital Signs Last 24 Hrs  T(C): 36.9 (18 Aug 2023 16:16), Max: 36.9 (17 Aug 2023 19:46)  T(F): 98.4 (18 Aug 2023 16:16), Max: 98.4 (17 Aug 2023 19:46)  HR: 65 (18 Aug 2023 16:16) (64 - 73)  BP: 120/51 (18 Aug 2023 16:16) (101/49 - 120/51)  BP(mean): --  RR: 18 (18 Aug 2023 16:16) (16 - 18)  SpO2: 94% (18 Aug 2023 16:16) (94% - 100%)    Parameters below as of 18 Aug 2023 16:16  Patient On (Oxygen Delivery Method): room air      PHYSICAL EXAM:  GENERAL: Not in distress   CHEST/LUNG: Not using accessory muscles   HEART: s1 and s2 present  ABDOMEN:  Nontender and  Nondistended  EXTREMITIES: No pedal  edema  CNS: Awake and Alert      LABS:                        9.8    11.76 )-----------( 167      ( 18 Aug 2023 08:05 )             31.1                           9.4    16.71 )-----------( 162      ( 17 Aug 2023 07:14 )             29.6       08-18    136  |  103  |  73<H>  ----------------------------<  127<H>  4.2   |  25  |  2.85<H>    Ca    8.6      18 Aug 2023 08:05  Phos  5.1     17  Mg     2.8         TPro  6.1  /  Alb  2.7<L>  /  TBili  0.4  /  DBili  x   /  AST  62<H>  /  ALT  51  /  AlkPhos  66      0817    135  |  106  |  74<H>  ----------------------------<  140<H>  4.5   |  23  |  2.87<H>    Ca    8.6      17 Aug 2023 07:14  Phos  5.1       Mg     2.8         TPro  6.1  /  Alb  2.7<L>  /  TBili  0.4  /  DBili  x   /  AST  62<H>  /  ALT  51  /  AlkPhos  66  -    PT/INR - ( 16 Aug 2023 18:30 )   PT: 15.6 sec;   INR: 1.38 ratio      PTT - ( 16 Aug 2023 18:30 )  PTT:30.5 sec      CAPILLARY BLOOD GLUCOSE  POCT Blood Glucose.: 153 mg/dL (16 Aug 2023 18:17)      Urinalysis Basic - ( 17 Aug 2023 08:04 )  Color: Yellow / Appearance: Turbid / S.014 / pH: x  Gluc: x / Ketone: Trace mg/dL  / Bili: Negative / Urobili: 1.0 mg/dL   Blood: x / Protein: 300 mg/dL / Nitrite: Negative   Leuk Esterase: Large / RBC: >50 /HPF / WBC >50 /HPF   Sq Epi: x / Non Sq Epi: x / Bacteria: Many /HPF      MEDICATIONS  (STANDING):    allopurinol 100 milliGRAM(s) Oral daily  ALPRAZolam 0.25 milliGRAM(s) Oral at bedtime  apixaban 2.5 milliGRAM(s) Oral every 12 hours  azithromycin  IVPB 500 milliGRAM(s) IV Intermittent every 24 hours  cefTRIAXone   IVPB 2000 milliGRAM(s) IV Intermittent every 24 hours  furosemide   Injectable 40 milliGRAM(s) IV Push daily  hydrALAZINE 50 milliGRAM(s) Oral three times a day  isosorbide   mononitrate ER Tablet (IMDUR) 30 milliGRAM(s) Oral daily  metoprolol tartrate 25 milliGRAM(s) Oral every 12 hours  polyethylene glycol 3350 17 Gram(s) Oral at bedtime  risperiDONE   Tablet 1 milliGRAM(s) Oral at bedtime  simvastatin 20 milliGRAM(s) Oral at bedtime      RADIOLOGY & ADDITIONAL TESTS:    23 : CT Chest No Cont (23 @ 01:47) Moderate to large right and small left pleural effusion with atelectasis,   increased since 3/13/2022. Nonspecific ground glass opacity and interlobular septal thickening at the right lung. Recommend clinical   correlation to assess underlying pneumonia.    Nonspecific mediastinal lymphadenopathy.      23 : Xray Chest 1 View- PORTABLE-Urgent (23 @ 18:48) IMPRESSION:   Moderate RIGHT effusion and/or basilar airspace consolidation.      MICROBIOLOGY DATA:    Legionella pneumophila Antigen, Urine (23 @ 22:40)   Legionella Antigen, Urine: Negative    Culture - Blood (23 @ 18:35)   Gram Stain:   Growth in aerobic bottle: Gram Positive Cocci in Pairs and Chains   Growth in anaerobic bottle: Gram Positive Cocci in Pairs and Chains  Specimen Source: .Blood Blood-Peripheral  Culture Results:   Growth in aerobic bottle: Gram Positive Cocci in Pairs and Chains   Growth in anaerobic bottle: Gram Positive Cocci in Pairs and Chains    Culture - Blood (23 @ 18:30)   Gram Stain:   Growth in aerobic bottle: Gram Positive Cocci in Pairs and Chains  - Streptococcus pyogenes (Group A): Detec  Specimen Source: .Blood Blood-Peripheral  Organism: Blood Culture PCR  Culture Results:   Growth in aerobic bottle: Gram Positive Cocci in Pairs and Chains   Direct identification is available within approximately 3-5   hours either by Blood Panel Multiplexed PCR or Direct   MALDI-TOF. Details: [email or web address]  Organism Identification: Blood Culture PCR  Method Type: PCRRapid HIV-1/2 Antibody (23 @ 18:30)   Rapid HIV-1/2 Antibody: Nonreact

## 2023-08-18 NOTE — PROGRESS NOTE ADULT - PROBLEM SELECTOR PLAN 2
continue Ceftiaxone 2g, and Zithromax   f/u  Procal, legionella, strep pneumo and sputum culture  supplemental oxygen as needed   ID Dr. Garcia follows

## 2023-08-18 NOTE — PROGRESS NOTE ADULT - ASSESSMENT
Patient is a 89y old  Male from home, ambulating independently, pmhx of HFrEFm Afib on Eliquis, s/p Micra PPM, CKD, HLD, BPH on chronic Chinchilla, now presents to the ER for evaluation of fever and generalized weakness. Pt was admitted on July 2023, for dyspnea on exertion, palpitations and LE swelling. As per the daughter the size of LE has been unchanged and pt has been complaint on the meds he was discharged from the hospital. On admission, he found to have fever, tachycardia, Leukocytosis, Positive UTI and Moderate RIGHT effusion and/or basilar airspace consolidation on CXR and CT chest. He has started on Ceftriaxone and Azithromycin, and the ID consult requested to assist with further evaluation and antibiotic management.    # Sepsis ( Fever + tachycardia + Leukocytosis)- resolved  # UTI-  WBC >50 /HPF   # Pneumonia with right pleural effusion  # Streptococcus pyogenes Bacteremia- 8/16- source most likely Lung    would recommend:    1. IR evaluation for Right thoracentesis is pending  2. Continue Ceftriaxone 2 g daily to cover Bacteremia   3. Follow up Repeat Blood cultures to document clearing the blood stream, are in process  4. Follow up final blood cultures from 8/16 for sensitivities  5. TTE followed by AIDEN  IF WITHIN GOAL OF CARE    d/w Covering Ayala MARQUES     Attending Attestation:    Spent more than 45 minutes on total encounter, more than 50 % of the visit was spent counseling and/or coordinating care by the Attending physician.

## 2023-08-19 LAB
-  CEFTRIAXONE: SIGNIFICANT CHANGE UP
-  PENICILLIN: SIGNIFICANT CHANGE UP
-  VANCOMYCIN: SIGNIFICANT CHANGE UP
CULTURE RESULTS: SIGNIFICANT CHANGE UP
CULTURE RESULTS: SIGNIFICANT CHANGE UP
METHOD TYPE: SIGNIFICANT CHANGE UP
ORGANISM # SPEC MICROSCOPIC CNT: SIGNIFICANT CHANGE UP
SPECIMEN SOURCE: SIGNIFICANT CHANGE UP
SPECIMEN SOURCE: SIGNIFICANT CHANGE UP

## 2023-08-19 RX ORDER — METRONIDAZOLE 500 MG
500 TABLET ORAL EVERY 8 HOURS
Refills: 0 | Status: DISCONTINUED | OUTPATIENT
Start: 2023-08-20 | End: 2023-08-28

## 2023-08-19 RX ORDER — METRONIDAZOLE 500 MG
500 TABLET ORAL ONCE
Refills: 0 | Status: COMPLETED | OUTPATIENT
Start: 2023-08-19 | End: 2023-08-19

## 2023-08-19 RX ORDER — METRONIDAZOLE 500 MG
TABLET ORAL
Refills: 0 | Status: DISCONTINUED | OUTPATIENT
Start: 2023-08-19 | End: 2023-08-28

## 2023-08-19 RX ADMIN — Medication 0.25 MILLIGRAM(S): at 23:43

## 2023-08-19 RX ADMIN — CEFTRIAXONE 100 MILLIGRAM(S): 500 INJECTION, POWDER, FOR SOLUTION INTRAMUSCULAR; INTRAVENOUS at 08:37

## 2023-08-19 RX ADMIN — ISOSORBIDE MONONITRATE 30 MILLIGRAM(S): 60 TABLET, EXTENDED RELEASE ORAL at 11:48

## 2023-08-19 RX ADMIN — APIXABAN 2.5 MILLIGRAM(S): 2.5 TABLET, FILM COATED ORAL at 08:37

## 2023-08-19 RX ADMIN — Medication 25 MILLIGRAM(S): at 08:37

## 2023-08-19 RX ADMIN — AZITHROMYCIN 255 MILLIGRAM(S): 500 TABLET, FILM COATED ORAL at 08:36

## 2023-08-19 RX ADMIN — Medication 50 MILLIGRAM(S): at 23:44

## 2023-08-19 RX ADMIN — APIXABAN 2.5 MILLIGRAM(S): 2.5 TABLET, FILM COATED ORAL at 17:38

## 2023-08-19 RX ADMIN — RISPERIDONE 1 MILLIGRAM(S): 4 TABLET ORAL at 23:43

## 2023-08-19 RX ADMIN — Medication 100 MILLIGRAM(S): at 23:44

## 2023-08-19 RX ADMIN — Medication 40 MILLIGRAM(S): at 08:37

## 2023-08-19 RX ADMIN — Medication 50 MILLIGRAM(S): at 08:37

## 2023-08-19 RX ADMIN — Medication 100 MILLIGRAM(S): at 11:48

## 2023-08-19 RX ADMIN — SIMVASTATIN 20 MILLIGRAM(S): 20 TABLET, FILM COATED ORAL at 23:43

## 2023-08-19 NOTE — PROGRESS NOTE ADULT - SUBJECTIVE AND OBJECTIVE BOX
Date of Service 08-19-23 @ 10:45    CHIEF COMPLAINT:Patient is a 89y old  Male who presents with a chief complaint of PNA and ?effusion .Pt appears comfortable.    	  REVIEW OF SYSTEMS:  CONSTITUTIONAL: No fever, weight loss, or fatigue  EYES: No eye pain, visual disturbances, or discharge  ENT:  No difficulty hearing, tinnitus, vertigo; No sinus or throat pain  NECK: No pain or stiffness  RESPIRATORY: No cough, wheezing, chills or hemoptysis; No Shortness of Breath  CARDIOVASCULAR: No chest pain, palpitations, passing out, dizziness, or leg swelling  GASTROINTESTINAL: No abdominal or epigastric pain. No nausea, vomiting, or hematemesis; No diarrhea or constipation. No melena or hematochezia.  GENITOURINARY: No dysuria, frequency, hematuria, or incontinence  NEUROLOGICAL: No headaches, memory loss, loss of strength, numbness, or tremors  SKIN: No itching, burning, rashes, or lesions   LYMPH Nodes: No enlarged glands  ENDOCRINE: No heat or cold intolerance; No hair loss  MUSCULOSKELETAL: No joint pain or swelling; No muscle, back, or extremity pain  PSYCHIATRIC: No depression, anxiety, mood swings, or difficulty sleeping  HEME/LYMPH: No easy bruising, or bleeding gums  ALLERGY AND IMMUNOLOGIC: No hives or eczema	    PHYSICAL EXAM:  T(C): 36.6 (08-19-23 @ 05:17), Max: 36.9 (08-18-23 @ 16:16)  HR: 83 (08-19-23 @ 05:17) (63 - 83)  BP: 148/75 (08-19-23 @ 05:17) (107/58 - 148/75)  RR: 17 (08-19-23 @ 05:17) (17 - 18)  SpO2: 98% (08-19-23 @ 05:17) (94% - 98%)  Wt(kg): --  I&O's Summary    18 Aug 2023 07:01  -  19 Aug 2023 07:00  --------------------------------------------------------  IN: 0 mL / OUT: 2500 mL / NET: -2500 mL    19 Aug 2023 07:01  -  19 Aug 2023 10:45  --------------------------------------------------------  IN: 200 mL / OUT: 0 mL / NET: 200 mL        Appearance: Normal	  HEENT:   Normal oral mucosa, PERRL, EOMI	  Lymphatic: No lymphadenopathy  Cardiovascular: Normal S1 S2, No JVD, No murmurs, No edema  Respiratory: Dec BS at bases  Psychiatry: A & O x 3, Mood & affect appropriate  Gastrointestinal:  Soft, Non-tender, + BS	  Skin: No rashes, No ecchymoses, No cyanosis	  Neurologic: Non-focal  Extremities: Normal range of motion, No clubbing, cyanosis or edema  Vascular: Peripheral pulses palpable 2+ bilaterally    MEDICATIONS  (STANDING):  allopurinol 100 milliGRAM(s) Oral daily  ALPRAZolam 0.25 milliGRAM(s) Oral at bedtime  apixaban 2.5 milliGRAM(s) Oral every 12 hours  azithromycin  IVPB 500 milliGRAM(s) IV Intermittent every 24 hours  cefTRIAXone   IVPB 2000 milliGRAM(s) IV Intermittent every 24 hours  furosemide   Injectable 40 milliGRAM(s) IV Push daily  hydrALAZINE 50 milliGRAM(s) Oral three times a day  isosorbide   mononitrate ER Tablet (IMDUR) 30 milliGRAM(s) Oral daily  metoprolol tartrate 25 milliGRAM(s) Oral every 12 hours  polyethylene glycol 3350 17 Gram(s) Oral at bedtime  risperiDONE   Tablet 1 milliGRAM(s) Oral at bedtime  simvastatin 20 milliGRAM(s) Oral at bedtime      TELEMETRY: 	afib intermittent v paced    	  	  LABS:	 	      Troponin I, High Sensitivity Result: 375.4 ng/L (08-17 @ 03:10)  Troponin I, High Sensitivity Result: 314.1 ng/L (08-16 @ 22:40)  Troponin I, High Sensitivity Result: 130.8 ng/L (08-16 @ 18:30)                            9.8    11.76 )-----------( 167      ( 18 Aug 2023 08:05 )             31.1     08-18    136  |  103  |  73<H>  ----------------------------<  127<H>  4.2   |  25  |  2.85<H>    Ca    8.6      18 Aug 2023 08:05

## 2023-08-19 NOTE — PROGRESS NOTE ADULT - ASSESSMENT
89 yrs old from home, ambulating independently, pmhx of HFrEFm Afib on Eliquis, s/p Micra PPM, CKD, HLD, BPH on chronic Salmon, presented with fever and generalized weakness,sepsis,pneumonia,chronic diastolic HF and strep bactermia..  1.Sepsis due to strep bactermia-abx.Repeat blood cx ordered.  2.Pneumonia -abx as per ID.Pulm f/u.  3.Chronic diastolic HF-IV lasix.  4.CRI-f/u lytes.Renal eval.  5.Afib-NOAC.lopressor.  6.BPH with chronic salmon-urince cx-.  7.PPI.  8.PT.  9.D/W IR Rt thoracentesisnot  done since pt got asa and on noac. 89 yrs old from home, ambulating independently, pmhx of HFrEFm Afib on Eliquis, s/p Micra PPM, CKD, HLD, BPH on chronic Salmon, presented with fever and generalized weakness,sepsis,pneumonia,chronic diastolic HF and strep bactermia..  1.Sepsis due to strep bactermia-abx.Repeat blood cx ordered.  2.Pneumonia -abx as per ID.Pulm f/u.  3.Chronic diastolic HF-IV lasix.  4.CRI-f/u lytes.Renal eval.  5.Afib-NOAC.lopressor.  6.BPH with chronic salmon-urince cx-.  7.PPI.  8.PT.  9.D/W IR Rt thoracentesis not  done since pt got asa and on noac. Repeat CXR today.

## 2023-08-19 NOTE — PROGRESS NOTE ADULT - PROBLEM SELECTOR PLAN 1
check pending cultures  antibiotics  follow up CXR  monitor temp aand WBC  oxygen supp  monitor oxygen sat cultures noted  antibiotics  follow up CXR  monitor temp and WBC  oxygen supp  monitor oxygen sat

## 2023-08-19 NOTE — PROGRESS NOTE ADULT - ASSESSMENT
Patient is a 89y old  Male from home, ambulating independently, pmhx of HFrEFm Afib on Eliquis, s/p Micra PPM, CKD, HLD, BPH on chronic Chinchilla, now presents to the ER for evaluation of fever and generalized weakness. Pt was admitted on July 2023, for dyspnea on exertion, palpitations and LE swelling. As per the daughter the size of LE has been unchanged and pt has been complaint on the meds he was discharged from the hospital. On admission, he found to have fever, tachycardia, Leukocytosis, Positive UTI and Moderate RIGHT effusion and/or basilar airspace consolidation on CXR and CT chest. He has started on Ceftriaxone and Azithromycin, and the ID consult requested to assist with further evaluation and antibiotic management.    # Sepsis ( Fever + tachycardia + Leukocytosis)- resolved  # UTI-  WBC >50 /HPF   # Pneumonia with right pleural effusion  # Streptococcus pyogenes Bacteremia- 8/16- source most likely Lung    would recommend:    1. Please discontinue Azithromycin and Add IV Flagyl  2. IR evaluation for Right thoracentesis is pending  3. Continue Ceftriaxone 2 g daily to cover Bacteremia   4. TTE followed by [facility]  IF WITHIN GOAL OF CARE    d/w Covering NP, JOHN    Attending Attestation:    Spent more than 35 minutes on total encounter, more than 50 % of the visit was spent counseling and/or coordinating care by the Attending physician.

## 2023-08-19 NOTE — PROGRESS NOTE ADULT - SUBJECTIVE AND OBJECTIVE BOX
Interval: Pt is seen resting in bed. Pt's bilateral xerosis improving since last seen. No acute overnight events. Pt did not have any other pedal complaints.      HPI:  89 yrs old from home, ambulating independently, pmhx of HFrEFm Afib on Eliquis, s/p Micra PPM, CKD, HLD, BPH on chronic Chinchilla, presented with fever and generalized weakness. Collateral information obtained from pt's daughter present at bedside [Ms. Hull] who stated that when she was at work today, his father called him and reported of generalized weakness and inability to ambulate due to lethargy. When she got home, found that his father is having a fever, measured at 102.7 with some runny nose. Pt denied any symptoms including cough, chills, dyspnea, chest pain, palpitation, abdominal pain, N/V/D, decreased sensation or unilateral weakness, headache, dizziness, visual changes. Pt's daughter confirmed that non of these symptoms were present as well.  Pt was admitted on July 2023, for dyspnea on exertion, palpitations and LE swelling. As per the daughter the size of LE has been unchanged and pt has been complaint on the meds he was discharged from the hospital.   Pt denied alcohol consumption, smoking, use of illicit drugs including marijuana  (16 Aug 2023 22:10)      Podiatry HPI    Patient admits to  (-) Fevers, (-) Chills, (-) Nausea, (-) Vomiting, (-) Shortness of Breath (-) calf pain (-) chest pain     Medications acetaminophen     Tablet .. 650 milliGRAM(s) Oral every 6 hours PRN  allopurinol 100 milliGRAM(s) Oral daily  ALPRAZolam 0.25 milliGRAM(s) Oral at bedtime  apixaban 2.5 milliGRAM(s) Oral every 12 hours  cefTRIAXone   IVPB 2000 milliGRAM(s) IV Intermittent every 24 hours  furosemide   Injectable 40 milliGRAM(s) IV Push daily  hydrALAZINE 50 milliGRAM(s) Oral three times a day  isosorbide   mononitrate ER Tablet (IMDUR) 30 milliGRAM(s) Oral daily  metoprolol tartrate 25 milliGRAM(s) Oral every 12 hours  metroNIDAZOLE  IVPB 500 milliGRAM(s) IV Intermittent every 8 hours  metroNIDAZOLE  IVPB      polyethylene glycol 3350 17 Gram(s) Oral at bedtime  risperiDONE   Tablet 1 milliGRAM(s) Oral at bedtime  simvastatin 20 milliGRAM(s) Oral at bedtime    FHNo pertinent family history in first degree relatives    ,   PMHHypertension    Atrial fibrillation    BPH (benign prostatic hyperplasia)    Chronic kidney disease, unspecified CKD stage    Hyperlipidemia       PSHNo significant past surgical history    S/P placement of cardiac pacemaker        Labs                          10.0   8.13  )-----------( 184      ( 21 Aug 2023 06:33 )             31.3      08-21    137  |  103  |  56<H>  ----------------------------<  121<H>  3.7   |  26  |  2.02<H>    Ca    8.6      21 Aug 2023 06:33    TPro  6.2  /  Alb  2.7<L>  /  TBili  0.3  /  DBili  x   /  AST  25  /  ALT  45  /  AlkPhos  71  08-21     Vital Signs Last 24 Hrs  T(C): 36.1 (21 Aug 2023 16:16), Max: 37.1 (21 Aug 2023 07:28)  T(F): 97 (21 Aug 2023 16:16), Max: 98.7 (21 Aug 2023 07:28)  HR: 70 (21 Aug 2023 16:16) (67 - 75)  BP: 122/57 (21 Aug 2023 16:16) (122/57 - 158/60)  BP(mean): --  RR: 17 (21 Aug 2023 16:16) (16 - 19)  SpO2: 97% (21 Aug 2023 16:16) (94% - 98%)    Parameters below as of 21 Aug 2023 16:16  Patient On (Oxygen Delivery Method): room air             C-Reactive Protein, Serum: 60 mg/L (08-16-23 @ 22:40)   WBC Count: 8.13 K/uL (08-21-23 @ 06:33)      ROS: Unremarkable outside HPI           PHYSICAL EXAM  LE Focused:    Vasc:  DP and PT pulses palpable 2/4. CFT < 3seconds. Tg warm to warm with no increase in temp to lower legs. Varicosities noted b/l.  Derm: Xerotic skin noted in lower legs and dorsal feet. Edema noted to lower legs, not noted in feet. No open lesions noted. No erythema noted. Nail noted to be dystropic 1-5 b/l.  Neuro: Gross sensation intact.  MSK: No pop in lower legs or feet. Patient ambulates with use of walker.      CULTURES: Culture Results:   <10,000 CFU/mL Normal Urogenital Lesly (08-17 @ 08:04)  Culture Results:   Growth in aerobic bottle: Gram Positive Cocci in Pairs and Chains  Growth in anaerobic bottle: Gram Positive Cocci in Pairs and Chains (08-16 @ 18:35)  Culture Results:   Growth in aerobic bottle: Streptococcus pyogenes (Group A)  See previous culture [id number]  Direct identification is available within approximately 3-5  hours either by Blood Panel Multiplexed PCR or Direct  MALDI-TOF. Details: [email or web address] (08-16 @ 18:30)

## 2023-08-19 NOTE — PROGRESS NOTE ADULT - ASSESSMENT
A:  Xerotic skin  Venous insufficiency    P:   Patient evaluated and Chart reviewed   Discussed diagnosis and treatment with patient  Continue with IV antibiotics As Per ID  Weight bearing as tolerated to b/l legs.   Provider to RN- Continue to put Moisterizer on patients legs and dorsal feet once a day before bedtime.   Pt was given CAIR boots to help offload his legs and feet. Pt to wear CAIR boots at all times.   Offloading to bilateral Heels.   Discussed importance of daily foot examinations and proper shoe gear and to importance of lower Fasting Blood Glucose levels.   Podiatry to follow while in house.  Patient was seen and treated bedside by Dr. Long  Discussed with Attending Dr. Walls

## 2023-08-19 NOTE — PROGRESS NOTE ADULT - SUBJECTIVE AND OBJECTIVE BOX
Patient is seen and examined at the bed side, is afebrile. The repeat Blood cultures from  have no growth to date.      REVIEW OF SYSTEMS: All other review systems are negative      ALLERGIES: No Known Allergies      Vital Signs Last 24 Hrs  T(C): 36.9 (19 Aug 2023 16:24), Max: 37.1 (19 Aug 2023 11:06)  T(F): 98.4 (19 Aug 2023 16:24), Max: 98.7 (19 Aug 2023 11:06)  HR: 60 (19 Aug 2023 16:24) (60 - 83)  BP: 96/42 (19 Aug 2023 16:24) (96/42 - 148/75)  BP(mean): --  RR: 18 (19 Aug 2023 16:24) (17 - 18)  SpO2: 99% (19 Aug 2023 16:24) (97% - 99%)    Parameters below as of 19 Aug 2023 16:24  Patient On (Oxygen Delivery Method): room air        PHYSICAL EXAM:  GENERAL: Not in distress   CHEST/LUNG: Not using accessory muscles   HEART: s1 and s2 present  ABDOMEN:  Nontender and  Nondistended  EXTREMITIES: No pedal  edema, both feet are moist, not dry anymore  CNS: Awake and Alert      LABS: No new labs                        9.8    11.76 )-----------( 167      ( 18 Aug 2023 08:05 )             31.1                           9.4    16.71 )-----------( 162      ( 17 Aug 2023 07:14 )             29.6       18    136  |  103  |  73<H>  ----------------------------<  127<H>  4.2   |  25  |  2.85<H>    Ca    8.6      18 Aug 2023 08:05  Phos  5.1       Mg     2.8         TPro  6.1  /  Alb  2.7<L>  /  TBili  0.4  /  DBili  x   /  AST  62<H>  /  ALT  51  /  AlkPhos  66      08    135  |  106  |  74<H>  ----------------------------<  140<H>  4.5   |  23  |  2.87<H>    Ca    8.6      17 Aug 2023 07:14  Phos  5.1       Mg     2.8         TPro  6.1  /  Alb  2.7<L>  /  TBili  0.4  /  DBili  x   /  AST  62<H>  /  ALT  51  /  AlkPhos  66  -    PT/INR - ( 16 Aug 2023 18:30 )   PT: 15.6 sec;   INR: 1.38 ratio      PTT - ( 16 Aug 2023 18:30 )  PTT:30.5 sec      CAPILLARY BLOOD GLUCOSE  POCT Blood Glucose.: 153 mg/dL (16 Aug 2023 18:17)      Urinalysis Basic - ( 17 Aug 2023 08:04 )  Color: Yellow / Appearance: Turbid / S.014 / pH: x  Gluc: x / Ketone: Trace mg/dL  / Bili: Negative / Urobili: 1.0 mg/dL   Blood: x / Protein: 300 mg/dL / Nitrite: Negative   Leuk Esterase: Large / RBC: >50 /HPF / WBC >50 /HPF   Sq Epi: x / Non Sq Epi: x / Bacteria: Many /HPF      MEDICATIONS  (STANDING):    allopurinol 100 milliGRAM(s) Oral daily  ALPRAZolam 0.25 milliGRAM(s) Oral at bedtime  apixaban 2.5 milliGRAM(s) Oral every 12 hours  azithromycin  IVPB 500 milliGRAM(s) IV Intermittent every 24 hours  cefTRIAXone   IVPB 2000 milliGRAM(s) IV Intermittent every 24 hours  furosemide   Injectable 40 milliGRAM(s) IV Push daily  hydrALAZINE 50 milliGRAM(s) Oral three times a day  isosorbide   mononitrate ER Tablet (IMDUR) 30 milliGRAM(s) Oral daily  metoprolol tartrate 25 milliGRAM(s) Oral every 12 hours  polyethylene glycol 3350 17 Gram(s) Oral at bedtime  risperiDONE   Tablet 1 milliGRAM(s) Oral at bedtime  simvastatin 20 milliGRAM(s) Oral at bedtime      RADIOLOGY & ADDITIONAL TESTS:      23 : CT Chest No Cont (23 @ 01:47) Moderate to large right and small left pleural effusion with atelectasis,   increased since 3/13/2022. Nonspecific ground glass opacity and interlobular septal thickening at the right lung. Recommend clinical   correlation to assess underlying pneumonia.    Nonspecific mediastinal lymphadenopathy.      23 : Xray Chest 1 View- PORTABLE-Urgent (23 @ 18:48) IMPRESSION:   Moderate RIGHT effusion and/or basilar airspace consolidation.      MICROBIOLOGY DATA:    Culture - Blood in AM (23 @ 08:15)   Specimen Source: .Blood Blood-Peripheral  Culture Results: No growth at 24 hours    Culture - Blood in AM (23 @ 08:05)   Specimen Source: .Blood Blood-Peripheral  Culture Results: No growth at 24 hours    Legionella pneumophila Antigen, Urine (23 @ 22:40)   Legionella Antigen, Urine: Negative      Culture - Blood (23 @ 18:35)   Gram Stain:   Growth in aerobic bottle: Gram Positive Cocci in Pairs and Chains   Growth in anaerobic bottle: Gram Positive Cocci in Pairs and Chains  - Vancomycin: S 0.5  - Penicillin: S <=0.03 Predicts results for ampicillin, amoxicillin, amoxicillin/clavulanate, ampicillin/sulbactam, 1st, 2nd and 3rd generation cephalosporins and carbapenems.  - Ceftriaxone: S <=0.25  Specimen Source: .Blood Blood-Peripheral  Organism: Streptococcus pyogenes (Group A)  Culture Results:   Growth in aerobic and anaerobic bottles: Streptococcus pyogenes (Group A)  Organism Identification: Streptococcus pyogenes (Group A)  Method Type: NOEMY    Culture - Blood (23 @ 18:30)   Gram Stain:   Growth in aerobic bottle: Gram Positive Cocci in Pairs and Chains  - Streptococcus pyogenes (Group A): Detec  Specimen Source: .Blood Blood-Peripheral  Organism: Blood Culture PCR  Culture Results:   Growth in aerobic bottle: Gram Positive Cocci in Pairs and Chains   Direct identification is available within approximately 3-5   hours either by Blood Panel Multiplexed PCR or Direct   MALDI-TOF. Details: [email or web address]  Organism Identification: Blood Culture PCR  Method Type: PCRRapid HIV-1/2 Antibody (23 @ 18:30)   Rapid HIV-1/2 Antibody: Nonreact

## 2023-08-19 NOTE — PROGRESS NOTE ADULT - SUBJECTIVE AND OBJECTIVE BOX
Patient is a 89y old  Male who presents with a chief complaint of PNA and ?effusion (18 Aug 2023 16:34)  Awake, alert, comfortable in bed in NAD. Leg edema improved    INTERVAL HPI/OVERNIGHT EVENTS:      VITAL SIGNS:  T(F): 97.9 (08-19-23 @ 05:17)  HR: 83 (08-19-23 @ 05:17)  BP: 148/75 (08-19-23 @ 05:17)  RR: 17 (08-19-23 @ 05:17)  SpO2: 98% (08-19-23 @ 05:17)  Wt(kg): --  I&O's Detail    18 Aug 2023 07:01  -  19 Aug 2023 07:00  --------------------------------------------------------  IN:  Total IN: 0 mL    OUT:    Indwelling Catheter - Urethral (mL): 2500 mL  Total OUT: 2500 mL    Total NET: -2500 mL      19 Aug 2023 07:01  -  19 Aug 2023 09:59  --------------------------------------------------------  IN:    Oral Fluid: 200 mL  Total IN: 200 mL    OUT:  Total OUT: 0 mL    Total NET: 200 mL              REVIEW OF SYSTEMS:    CONSTITUTIONAL:  No fevers, chills, sweats    HEENT:  Eyes:  No diplopia or blurred vision. ENT:  No earache, sore throat or runny nose.    CARDIOVASCULAR:  No pressure, squeezing, tightness, or heaviness about the chest; no palpitations.    RESPIRATORY:  Per HPI    GASTROINTESTINAL:  No abdominal pain, nausea, vomiting or diarrhea.    GENITOURINARY:  No dysuria, frequency or urgency.    NEUROLOGIC:  No paresthesias, fasciculations, seizures or weakness.    PSYCHIATRIC:  No disorder of thought or mood.      PHYSICAL EXAM:    Constitutional: Well developed and nourished  Eyes:Perrla  ENMT: normal  Neck:supple  Respiratory: good air entry  Cardiovascular: S1 S2 regular  Gastrointestinal: Soft, Non tender  Extremities: No edema  Vascular:normal  Neurological:Awake, alert,Ox3  Musculoskeletal:Normal      MEDICATIONS  (STANDING):  allopurinol 100 milliGRAM(s) Oral daily  ALPRAZolam 0.25 milliGRAM(s) Oral at bedtime  apixaban 2.5 milliGRAM(s) Oral every 12 hours  azithromycin  IVPB 500 milliGRAM(s) IV Intermittent every 24 hours  cefTRIAXone   IVPB 2000 milliGRAM(s) IV Intermittent every 24 hours  furosemide   Injectable 40 milliGRAM(s) IV Push daily  hydrALAZINE 50 milliGRAM(s) Oral three times a day  isosorbide   mononitrate ER Tablet (IMDUR) 30 milliGRAM(s) Oral daily  metoprolol tartrate 25 milliGRAM(s) Oral every 12 hours  polyethylene glycol 3350 17 Gram(s) Oral at bedtime  risperiDONE   Tablet 1 milliGRAM(s) Oral at bedtime  simvastatin 20 milliGRAM(s) Oral at bedtime    MEDICATIONS  (PRN):  acetaminophen     Tablet .. 650 milliGRAM(s) Oral every 6 hours PRN Temp greater or equal to 38C (100.4F), Mild Pain (1 - 3)      Allergies    No Known Allergies    Intolerances        LABS:                        9.8    11.76 )-----------( 167      ( 18 Aug 2023 08:05 )             31.1     08-18    136  |  103  |  73<H>  ----------------------------<  127<H>  4.2   |  25  |  2.85<H>    Ca    8.6      18 Aug 2023 08:05        Urinalysis Basic - ( 18 Aug 2023 08:05 )    Color: x / Appearance: x / SG: x / pH: x  Gluc: 127 mg/dL / Ketone: x  / Bili: x / Urobili: x   Blood: x / Protein: x / Nitrite: x   Leuk Esterase: x / RBC: x / WBC x   Sq Epi: x / Non Sq Epi: x / Bacteria: x            CAPILLARY BLOOD GLUCOSE            RADIOLOGY & ADDITIONAL TESTS:    CXR:    Ct scan chest:    ekg;    echo: Patient is a 89y old  Male who presents with a chief complaint of PNA and ?effusion (18 Aug 2023 16:34)  Awake, alert, comfortable in bed in NAD. Leg edema improved. Clinically stable    INTERVAL HPI/OVERNIGHT EVENTS:      VITAL SIGNS:  T(F): 97.9 (08-19-23 @ 05:17)  HR: 83 (08-19-23 @ 05:17)  BP: 148/75 (08-19-23 @ 05:17)  RR: 17 (08-19-23 @ 05:17)  SpO2: 98% (08-19-23 @ 05:17)  Wt(kg): --  I&O's Detail    18 Aug 2023 07:01  -  19 Aug 2023 07:00  --------------------------------------------------------  IN:  Total IN: 0 mL    OUT:    Indwelling Catheter - Urethral (mL): 2500 mL  Total OUT: 2500 mL    Total NET: -2500 mL      19 Aug 2023 07:01  -  19 Aug 2023 09:59  --------------------------------------------------------  IN:    Oral Fluid: 200 mL  Total IN: 200 mL    OUT:  Total OUT: 0 mL    Total NET: 200 mL              REVIEW OF SYSTEMS:    CONSTITUTIONAL:  No fevers, chills, sweats    HEENT:  Eyes:  No diplopia or blurred vision. ENT:  No earache, sore throat or runny nose.    CARDIOVASCULAR:  No pressure, squeezing, tightness, or heaviness about the chest; no palpitations.    RESPIRATORY:  Per HPI    GASTROINTESTINAL:  No abdominal pain, nausea, vomiting or diarrhea.    GENITOURINARY:  No dysuria, frequency or urgency.    NEUROLOGIC:  No paresthesias, fasciculations, seizures or weakness.    PSYCHIATRIC:  No disorder of thought or mood.      PHYSICAL EXAM:    Constitutional: Well developed and nourished  Eyes:Perrla  ENMT: normal  Neck:supple  Respiratory: good air entry  Cardiovascular: S1 S2 regular  Gastrointestinal: Soft, Non tender  Extremities: No edema  Vascular:normal  Neurological:Awake, alert,Ox3  Musculoskeletal:Normal      MEDICATIONS  (STANDING):  allopurinol 100 milliGRAM(s) Oral daily  ALPRAZolam 0.25 milliGRAM(s) Oral at bedtime  apixaban 2.5 milliGRAM(s) Oral every 12 hours  azithromycin  IVPB 500 milliGRAM(s) IV Intermittent every 24 hours  cefTRIAXone   IVPB 2000 milliGRAM(s) IV Intermittent every 24 hours  furosemide   Injectable 40 milliGRAM(s) IV Push daily  hydrALAZINE 50 milliGRAM(s) Oral three times a day  isosorbide   mononitrate ER Tablet (IMDUR) 30 milliGRAM(s) Oral daily  metoprolol tartrate 25 milliGRAM(s) Oral every 12 hours  polyethylene glycol 3350 17 Gram(s) Oral at bedtime  risperiDONE   Tablet 1 milliGRAM(s) Oral at bedtime  simvastatin 20 milliGRAM(s) Oral at bedtime    MEDICATIONS  (PRN):  acetaminophen     Tablet .. 650 milliGRAM(s) Oral every 6 hours PRN Temp greater or equal to 38C (100.4F), Mild Pain (1 - 3)      Allergies    No Known Allergies    Intolerances        LABS:                        9.8    11.76 )-----------( 167      ( 18 Aug 2023 08:05 )             31.1     08-18    136  |  103  |  73<H>  ----------------------------<  127<H>  4.2   |  25  |  2.85<H>    Ca    8.6      18 Aug 2023 08:05        Urinalysis Basic - ( 18 Aug 2023 08:05 )    Color: x / Appearance: x / SG: x / pH: x  Gluc: 127 mg/dL / Ketone: x  / Bili: x / Urobili: x   Blood: x / Protein: x / Nitrite: x   Leuk Esterase: x / RBC: x / WBC x   Sq Epi: x / Non Sq Epi: x / Bacteria: x            CAPILLARY BLOOD GLUCOSE    Culture - Blood in AM (08.18.23 @ 08:15)   Specimen Source: .Blood Blood-Peripheral  Culture Results:   No growth at 24 hoursCulture - Blood in AM (08.18.23 @ 08:05)   Specimen Source: .Blood Blood-Peripheral  Culture Results:   No growth at 24 hoursCulture - Blood (08.16.23 @ 18:35)   - Vancomycin: S 0.5  - Penicillin: S <=0.03 Predicts results for ampicillin, amoxicillin, amoxicillin/clavulanate, ampicillin/sulbactam, 1st, 2nd and 3rd generation cephalosporins and carbapenems.  Gram Stain:   Growth in aerobic bottle: Gram Positive Cocci in Pairs and Chains   Growth in anaerobic bottle: Gram Positive Cocci in Pairs and Chains  - Ceftriaxone: S <=0.25  Specimen Source: .Blood Blood-Peripheral  Organism: Streptococcus pyogenes (Group A)  Culture Results:   Growth in aerobic and anaerobic bottles: Streptococcus pyogenes (Group A)  Organism Identification: Streptococcus pyogenes (Group A)  Method Type: MICCulture - Blood (08.16.23 @ 18:30)   Gram Stain:   Growth in aerobic bottle: Gram Positive Cocci in Pairs and Chains  - Streptococcus pyogenes (Group A): Detec  Specimen Source: .Blood Blood-Peripheral  Organism: Blood Culture PCR  Culture Results:   Growth in aerobic bottle: Streptococcus pyogenes (Group A)   See previous culture [id number]   Direct identification is available within approximately 3-5   hours either by Blood Panel Multiplexed PCR or Direct   MALDI-TOF. Details: [email or web address]  Organism Identification: Blood Culture PCR  Method Type: PCR        RADIOLOGY & ADDITIONAL TESTS:    CXR:  < from: Xray Chest 1 View- PORTABLE-Urgent (08.16.23 @ 18:48) >  IMPRESSION:   Moderate RIGHT effusion and/or basilar airspace   consolidation.      < end of copied text >  < from: CT Chest No Cont (08.17.23 @ 01:47) >  IMPRESSION:    Moderate to large right and small left pleural effusion with atelectasis,   increased since 3/13/2022. Nonspecific ground glass opacity and   interlobular septal thickening at the right lung. Recommend clinical   correlation to assess underlying pneumonia.    Nonspecific mediastinal lymphadenopathy.    Additional findings as described.    < end of copied text >    Ct scan chest:    ekg;    echo:

## 2023-08-20 RX ADMIN — POLYETHYLENE GLYCOL 3350 17 GRAM(S): 17 POWDER, FOR SOLUTION ORAL at 00:07

## 2023-08-20 RX ADMIN — APIXABAN 2.5 MILLIGRAM(S): 2.5 TABLET, FILM COATED ORAL at 18:29

## 2023-08-20 RX ADMIN — Medication 40 MILLIGRAM(S): at 05:36

## 2023-08-20 RX ADMIN — POLYETHYLENE GLYCOL 3350 17 GRAM(S): 17 POWDER, FOR SOLUTION ORAL at 22:25

## 2023-08-20 RX ADMIN — ISOSORBIDE MONONITRATE 30 MILLIGRAM(S): 60 TABLET, EXTENDED RELEASE ORAL at 11:29

## 2023-08-20 RX ADMIN — Medication 0.25 MILLIGRAM(S): at 22:24

## 2023-08-20 RX ADMIN — APIXABAN 2.5 MILLIGRAM(S): 2.5 TABLET, FILM COATED ORAL at 05:35

## 2023-08-20 RX ADMIN — RISPERIDONE 1 MILLIGRAM(S): 4 TABLET ORAL at 22:24

## 2023-08-20 RX ADMIN — Medication 50 MILLIGRAM(S): at 22:24

## 2023-08-20 RX ADMIN — Medication 100 MILLIGRAM(S): at 22:55

## 2023-08-20 RX ADMIN — Medication 50 MILLIGRAM(S): at 05:35

## 2023-08-20 RX ADMIN — Medication 25 MILLIGRAM(S): at 05:35

## 2023-08-20 RX ADMIN — Medication 100 MILLIGRAM(S): at 05:35

## 2023-08-20 RX ADMIN — Medication 100 MILLIGRAM(S): at 13:19

## 2023-08-20 RX ADMIN — SIMVASTATIN 20 MILLIGRAM(S): 20 TABLET, FILM COATED ORAL at 22:24

## 2023-08-20 RX ADMIN — CEFTRIAXONE 100 MILLIGRAM(S): 500 INJECTION, POWDER, FOR SOLUTION INTRAMUSCULAR; INTRAVENOUS at 04:19

## 2023-08-20 RX ADMIN — Medication 50 MILLIGRAM(S): at 13:19

## 2023-08-20 RX ADMIN — Medication 100 MILLIGRAM(S): at 11:29

## 2023-08-20 NOTE — PROGRESS NOTE ADULT - SUBJECTIVE AND OBJECTIVE BOX
Patient is a 89y old  Male who presents with a chief complaint of Xerosis (20 Aug 2023 09:17)  Patient is awake, alert, lying comfortably in bed, NAD on RA.            INTERVAL HPI/OVERNIGHT EVENTS:      VITAL SIGNS:  T(F): 98.7 (08-20-23 @ 07:25)  HR: 83 (08-20-23 @ 07:25)  BP: 129/60 (08-20-23 @ 07:25)  RR: 18 (08-20-23 @ 07:25)  SpO2: 98% (08-20-23 @ 07:25)  Wt(kg): --  I&O's Detail    19 Aug 2023 07:01  -  20 Aug 2023 07:00  --------------------------------------------------------  IN:    Oral Fluid: 430 mL  Total IN: 430 mL    OUT:    Indwelling Catheter - Urethral (mL): 3200 mL  Total OUT: 3200 mL    Total NET: -2770 mL      20 Aug 2023 07:01  -  20 Aug 2023 09:49  --------------------------------------------------------  IN:    Oral Fluid: 200 mL  Total IN: 200 mL    OUT:  Total OUT: 0 mL    Total NET: 200 mL              REVIEW OF SYSTEMS:    CONSTITUTIONAL:  No fevers, chills, sweats    HEENT:  Eyes:  No diplopia or blurred vision. ENT:  No earache, sore throat or runny nose.    CARDIOVASCULAR:  No pressure, squeezing, tightness, or heaviness about the chest; no palpitations.    RESPIRATORY:  Per HPI    GASTROINTESTINAL:  No abdominal pain, nausea, vomiting or diarrhea.    GENITOURINARY:  No dysuria, frequency or urgency.    NEUROLOGIC:  No paresthesias, fasciculations, seizures or weakness.    PSYCHIATRIC:  No disorder of thought or mood.      PHYSICAL EXAM:    Constitutional: Well developed and nourished  Eyes:Perrla  ENMT: normal  Neck:supple  Respiratory: good air entry  Cardiovascular: S1 S2 regular  Gastrointestinal: Soft, Non tender  Extremities: No edema  Vascular:normal  Neurological:Awake, alert,Ox3  Musculoskeletal:Normal      MEDICATIONS  (STANDING):  allopurinol 100 milliGRAM(s) Oral daily  ALPRAZolam 0.25 milliGRAM(s) Oral at bedtime  apixaban 2.5 milliGRAM(s) Oral every 12 hours  cefTRIAXone   IVPB 2000 milliGRAM(s) IV Intermittent every 24 hours  furosemide   Injectable 40 milliGRAM(s) IV Push daily  hydrALAZINE 50 milliGRAM(s) Oral three times a day  isosorbide   mononitrate ER Tablet (IMDUR) 30 milliGRAM(s) Oral daily  metoprolol tartrate 25 milliGRAM(s) Oral every 12 hours  metroNIDAZOLE  IVPB      metroNIDAZOLE  IVPB 500 milliGRAM(s) IV Intermittent every 8 hours  polyethylene glycol 3350 17 Gram(s) Oral at bedtime  risperiDONE   Tablet 1 milliGRAM(s) Oral at bedtime  simvastatin 20 milliGRAM(s) Oral at bedtime    MEDICATIONS  (PRN):  acetaminophen     Tablet .. 650 milliGRAM(s) Oral every 6 hours PRN Temp greater or equal to 38C (100.4F), Mild Pain (1 - 3)      Allergies    No Known Allergies    Intolerances        LABS:                    CAPILLARY BLOOD GLUCOSE            RADIOLOGY & ADDITIONAL TESTS:  < from: CT Chest No Cont (08.17.23 @ 01:47) >  IMPRESSION:    Moderate to large right and small left pleural effusion with atelectasis,   increased since 3/13/2022. Nonspecific ground glass opacity and   interlobular septal thickening at the right lung. Recommend clinical   correlation to assess underlying pneumonia.    Nonspecific mediastinal lymphadenopathy.    Additional findings as described.    < end of copied text >  < from: Xray Chest 1 View- PORTABLE-Urgent (08.16.23 @ 18:48) >  IMPRESSION:   Moderate RIGHT effusion and/or basilar airspace   consolidation.    < end of copied text >  WBC Count: 11.76 K/uL (08.18.23 @ 08:05)   Hemoglobin: 9.8 g/dL (08.18.23 @ 08:05)   Creatinine: 2.85 mg/dL (08.18.23 @ 08:05)   Blood Urea Nitrogen: 73 mg/dL (08.18.23 @ 08:05)   Glucose: 127 mg/dL (08.18.23 @ 08:05)   Calcium: 8.6 mg/dL (08.18.23 @ 08:05)   eGFR: 20: The estimated glomerular filtration rate (eGFR) is calculated using the   CXR:    Ct scan chest:    ekg;    echo:

## 2023-08-20 NOTE — PROGRESS NOTE ADULT - ASSESSMENT
89 yrs old from home, ambulating independently, pmhx of HFrEFm Afib on Eliquis, s/p Micra PPM, CKD, HLD, BPH on chronic Salmon, presented with fever and generalized weakness,sepsis,pneumonia,chronic diastolic HF and strep bactermia..  1.Sepsis due to strep bactermia-abx.Repeat blood cx -neg.  2.Pneumonia -abx as per ID.Pulm f/u.  3.Chronic diastolic HF-IV lasix.  4.CRI-f/u lytes.Renal eval.  5.Afib-NOAC.lopressor.  6.BPH with chronic salmon-urince cx-.  7.PPI.  8.PT.  9.D/W IR Rt thoracentesis not  done since pt got asa and on noac. Repeat CXR-p. patient

## 2023-08-20 NOTE — PROGRESS NOTE ADULT - SUBJECTIVE AND OBJECTIVE BOX
Date of Service 08-20-23 @ 11:03    CHIEF COMPLAINT:Patient is a 89y old  Male who presents with a chief complaint of PNA and ?effusion.Pt appears comfortable.    	  REVIEW OF SYSTEMS:  CONSTITUTIONAL: No fever, weight loss, or fatigue  EYES: No eye pain, visual disturbances, or discharge  ENT:  No difficulty hearing, tinnitus, vertigo; No sinus or throat pain  NECK: No pain or stiffness  RESPIRATORY: No cough, wheezing, chills or hemoptysis; No Shortness of Breath  CARDIOVASCULAR: No chest pain, palpitations, passing out, dizziness, or leg swelling  GASTROINTESTINAL: No abdominal or epigastric pain. No nausea, vomiting, or hematemesis; No diarrhea or constipation. No melena or hematochezia.  GENITOURINARY: No dysuria, frequency, hematuria, or incontinence  NEUROLOGICAL: No headaches, memory loss, loss of strength, numbness, or tremors  SKIN: No itching, burning, rashes, or lesions   LYMPH Nodes: No enlarged glands  ENDOCRINE: No heat or cold intolerance; No hair loss  MUSCULOSKELETAL: No joint pain or swelling; No muscle, back, or extremity pain  PSYCHIATRIC: No depression, anxiety, mood swings, or difficulty sleeping  HEME/LYMPH: No easy bruising, or bleeding gums  ALLERGY AND IMMUNOLOGIC: No hives or eczema	        PHYSICAL EXAM:  T(C): 37.1 (08-20-23 @ 07:25), Max: 37.1 (08-19-23 @ 11:06)  HR: 83 (08-20-23 @ 07:25) (60 - 83)  BP: 129/60 (08-20-23 @ 07:25) (96/42 - 129/60)  RR: 18 (08-20-23 @ 07:25) (18 - 20)  SpO2: 98% (08-20-23 @ 07:25) (98% - 99%)  Wt(kg): --  I&O's Summary    19 Aug 2023 07:01  -  20 Aug 2023 07:00  --------------------------------------------------------  IN: 430 mL / OUT: 3200 mL / NET: -2770 mL    20 Aug 2023 07:01  -  20 Aug 2023 11:03  --------------------------------------------------------  IN: 200 mL / OUT: 0 mL / NET: 200 mL        Appearance: Normal	  HEENT:   Normal oral mucosa, PERRL, EOMI	  Lymphatic: No lymphadenopathy  Cardiovascular: Normal S1 S2, No JVD, No murmurs, No edema  Respiratory: Dec BS at bases  Psychiatry: A & O x 3, Mood & affect appropriate  Gastrointestinal:  Soft, Non-tender, + BS	  Skin: No rashes, No ecchymoses, No cyanosis	  Neurologic: Non-focal  Extremities: Normal range of motion, No clubbing, cyanosis or edema  Vascular: Peripheral pulses palpable 2+ bilaterally    MEDICATIONS  (STANDING):  allopurinol 100 milliGRAM(s) Oral daily  ALPRAZolam 0.25 milliGRAM(s) Oral at bedtime  apixaban 2.5 milliGRAM(s) Oral every 12 hours  cefTRIAXone   IVPB 2000 milliGRAM(s) IV Intermittent every 24 hours  furosemide   Injectable 40 milliGRAM(s) IV Push daily  hydrALAZINE 50 milliGRAM(s) Oral three times a day  isosorbide   mononitrate ER Tablet (IMDUR) 30 milliGRAM(s) Oral daily  metoprolol tartrate 25 milliGRAM(s) Oral every 12 hours  metroNIDAZOLE  IVPB      metroNIDAZOLE  IVPB 500 milliGRAM(s) IV Intermittent every 8 hours  polyethylene glycol 3350 17 Gram(s) Oral at bedtime  risperiDONE   Tablet 1 milliGRAM(s) Oral at bedtime  simvastatin 20 milliGRAM(s) Oral at bedtime      TELEMETRY: paced	    	  	  LABS:	 	      Blood cx-. urine cx-.

## 2023-08-20 NOTE — PROGRESS NOTE ADULT - PROBLEM SELECTOR PLAN 1
check pending cultures  antibiotics  follow up CXR  monitor temp aand WBC  oxygen supp  monitor oxygen sat

## 2023-08-20 NOTE — PROGRESS NOTE ADULT - SUBJECTIVE AND OBJECTIVE BOX
Patient is seen and examined at the bed side, is afebrile. The repeat Blood cultures from  have no growth to date.      REVIEW OF SYSTEMS: All other review systems are negative      ALLERGIES: No Known Allergies      Vital Signs Last 24 Hrs  T(C): 36.7 (20 Aug 2023 15:56), Max: 37.1 (20 Aug 2023 07:25)  T(F): 98.1 (20 Aug 2023 15:56), Max: 98.7 (20 Aug 2023 07:25)  HR: 70 (20 Aug 2023 17:27) (64 - 84)  BP: 107/65 (20 Aug 2023 17:27) (100/68 - 129/60)  BP(mean): --  RR: 18 (20 Aug 2023 15:56) (18 - 20)  SpO2: 100% (20 Aug 2023 15:56) (98% - 100%)    Parameters below as of 20 Aug 2023 15:56  Patient On (Oxygen Delivery Method): room air        PHYSICAL EXAM:  GENERAL: Not in distress   CHEST/LUNG: Not using accessory muscles   HEART: s1 and s2 present  ABDOMEN:  Nontender and  Nondistended  EXTREMITIES: No pedal  edema, both feet are moist, not dry anymore  CNS: Awake and Alert      LABS: No new labs                          9.8    11.76 )-----------( 167      ( 18 Aug 2023 08:05 )             31.1                           9.4    16.71 )-----------( 162      ( 17 Aug 2023 07:14 )             29.6               136  |  103  |  73<H>  ----------------------------<  127<H>  4.2   |  25  |  2.85<H>    Ca    8.6      18 Aug 2023 08:05  Phos  5.1       Mg     2.8         TPro  6.1  /  Alb  2.7<L>  /  TBili  0.4  /  DBili  x   /  AST  62<H>  /  ALT  51  /  AlkPhos  66      08    135  |  106  |  74<H>  ----------------------------<  140<H>  4.5   |  23  |  2.87<H>    Ca    8.6      17 Aug 2023 07:14  Phos  5.1       Mg     2.8         TPro  6.1  /  Alb  2.7<L>  /  TBili  0.4  /  DBili  x   /  AST  62<H>  /  ALT  51  /  AlkPhos  66  -    PT/INR - ( 16 Aug 2023 18:30 )   PT: 15.6 sec;   INR: 1.38 ratio      PTT - ( 16 Aug 2023 18:30 )  PTT:30.5 sec      CAPILLARY BLOOD GLUCOSE  POCT Blood Glucose.: 153 mg/dL (16 Aug 2023 18:17)      Urinalysis Basic - ( 17 Aug 2023 08:04 )  Color: Yellow / Appearance: Turbid / S.014 / pH: x  Gluc: x / Ketone: Trace mg/dL  / Bili: Negative / Urobili: 1.0 mg/dL   Blood: x / Protein: 300 mg/dL / Nitrite: Negative   Leuk Esterase: Large / RBC: >50 /HPF / WBC >50 /HPF   Sq Epi: x / Non Sq Epi: x / Bacteria: Many /HPF      MEDICATIONS  (STANDING):    allopurinol 100 milliGRAM(s) Oral daily  ALPRAZolam 0.25 milliGRAM(s) Oral at bedtime  apixaban 2.5 milliGRAM(s) Oral every 12 hours  cefTRIAXone   IVPB 2000 milliGRAM(s) IV Intermittent every 24 hours  furosemide   Injectable 40 milliGRAM(s) IV Push daily  hydrALAZINE 50 milliGRAM(s) Oral three times a day  isosorbide   mononitrate ER Tablet (IMDUR) 30 milliGRAM(s) Oral daily  metoprolol tartrate 25 milliGRAM(s) Oral every 12 hours  metroNIDAZOLE  IVPB      metroNIDAZOLE  IVPB 500 milliGRAM(s) IV Intermittent every 8 hours  polyethylene glycol 3350 17 Gram(s) Oral at bedtime  risperiDONE   Tablet 1 milliGRAM(s) Oral at bedtime  simvastatin 20 milliGRAM(s) Oral at bedtime      RADIOLOGY & ADDITIONAL TESTS:      23 : CT Chest No Cont (23 @ 01:47) Moderate to large right and small left pleural effusion with atelectasis,   increased since 3/13/2022. Nonspecific ground glass opacity and interlobular septal thickening at the right lung. Recommend clinical   correlation to assess underlying pneumonia.    Nonspecific mediastinal lymphadenopathy.      23 : Xray Chest 1 View- PORTABLE-Urgent (23 @ 18:48) IMPRESSION:   Moderate RIGHT effusion and/or basilar airspace consolidation.      MICROBIOLOGY DATA:    Culture - Blood in AM (23 @ 08:15)   Specimen Source: .Blood Blood-Peripheral  Culture Results: No growth at 48 hours    Culture - Blood in AM (23 @ 08:05)   Specimen Source: .Blood Blood-Peripheral  Culture Results: No growth at 48 hours    Legionella pneumophila Antigen, Urine (23 @ 22:40)   Legionella Antigen, Urine: Negative      Culture - Blood (23 @ 18:35)   Gram Stain:   Growth in aerobic bottle: Gram Positive Cocci in Pairs and Chains   Growth in anaerobic bottle: Gram Positive Cocci in Pairs and Chains  - Vancomycin: S 0.5  - Penicillin: S <=0.03 Predicts results for ampicillin, amoxicillin, amoxicillin/clavulanate, ampicillin/sulbactam, 1st, 2nd and 3rd generation cephalosporins and carbapenems.  - Ceftriaxone: S <=0.25  Specimen Source: .Blood Blood-Peripheral  Organism: Streptococcus pyogenes (Group A)  Culture Results:   Growth in aerobic and anaerobic bottles: Streptococcus pyogenes (Group A)  Organism Identification: Streptococcus pyogenes (Group A)  Method Type: NOEMY    Culture - Blood (23 @ 18:30)   Gram Stain:   Growth in aerobic bottle: Gram Positive Cocci in Pairs and Chains  - Streptococcus pyogenes (Group A): Detec  Specimen Source: .Blood Blood-Peripheral  Organism: Blood Culture PCR  Culture Results:   Growth in aerobic bottle: Gram Positive Cocci in Pairs and Chains   Direct identification is available within approximately 3-5   hours either by Blood Panel Multiplexed PCR or Direct   MALDI-TOF. Details: [email or web address]  Organism Identification: Blood Culture PCR  Method Type: PCRRapid HIV-1/2 Antibody (23 @ 18:30)   Rapid HIV-1/2 Antibody: Nonreact

## 2023-08-20 NOTE — PROGRESS NOTE ADULT - ASSESSMENT
A:  Xerotic skin  Venous insufficiency    P:   Patient evaluated and Chart reviewed   Discussed diagnosis and treatment with patient  Continue with IV antibiotics As Per ID  Weight bearing as tolerated to b/l legs.   Provider to RN- Continue to put Moisterizer on patients legs and dorsal feet once a day before bedtime.   Pt was given CAIR boots to help offload his legs and feet. Pt to wear CAIR boots at all times.   Offloading to bilateral Heels.   Discussed importance of daily foot examinations and proper shoe gear and to importance of lower Fasting Blood Glucose levels.   Podiatry to follow while in house.  Patient was seen and treated bedside by Dr. Long  Discussed with Attending Dr. Walls  A:  Xerotic skin  Venous insufficiency    P:   Patient evaluated and Chart reviewed   Discussed diagnosis and treatment with patient  Continue with IV antibiotics As Per ID  Weight bearing as tolerated to b/l legs.   Provider to RN- Continue to put Moisterizer on patients legs and dorsal feet once a day before bedtime.   Pt was given CAIR boots to help offload his legs and feet. Pt to wear CAIR boots at all times.   Offloading to bilateral Heels.   Pt is stable from podiatry standpoint. No acute surgical intervention at this time.   Discussed importance of daily foot examinations and proper shoe gear and to importance of lower Fasting Blood Glucose levels.   Podiatry to follow while in house.  Patient was seen and treated bedside by Dr. Long  Discussed with Attending Dr. Walls

## 2023-08-20 NOTE — PROGRESS NOTE ADULT - ASSESSMENT
Patient is a 89y old  Male from home, ambulating independently, pmhx of HFrEFm Afib on Eliquis, s/p Micra PPM, CKD, HLD, BPH on chronic Chinchilla, now presents to the ER for evaluation of fever and generalized weakness. Pt was admitted on July 2023, for dyspnea on exertion, palpitations and LE swelling. As per the daughter the size of LE has been unchanged and pt has been complaint on the meds he was discharged from the hospital. On admission, he found to have fever, tachycardia, Leukocytosis, Positive UTI and Moderate RIGHT effusion and/or basilar airspace consolidation on CXR and CT chest. He has started on Ceftriaxone and Azithromycin, and the ID consult requested to assist with further evaluation and antibiotic management.    # Sepsis ( Fever + tachycardia + Leukocytosis)- resolved  # UTI-  WBC >50 /HPF   # Pneumonia with right pleural effusion  # Streptococcus pyogenes Bacteremia- 8/16- source most likely Lung    would recommend:    1. IR evaluation for Right thoracentesis is pending  2. Please obtain Labs in AM  3. Continue IV Flagyl and Ceftriaxone 2 g daily to cover Bacteremia   4. TTE followed by [facility]  IF WITHIN GOAL OF CARE  5. Monitor kidney function     d/w Covering NP, Leif    Attending Attestation:    Spent more than 35 minutes on total encounter, more than 50 % of the visit was spent counseling and/or coordinating care by the Attending physician.

## 2023-08-20 NOTE — PROGRESS NOTE ADULT - SUBJECTIVE AND OBJECTIVE BOX
Interval: Pt is seen resting in bed. Pt's bilateral xerosis has improved significantly No acute overnight events. Pt did not have any other pedal complaints.      HPI:  89 yrs old from home, ambulating independently, pmhx of HFrEFm Afib on Eliquis, s/p Micra PPM, CKD, HLD, BPH on chronic Chinchilla, presented with fever and generalized weakness. Collateral information obtained from pt's daughter present at bedside [Ms. Hull] who stated that when she was at work today, his father called him and reported of generalized weakness and inability to ambulate due to lethargy. When she got home, found that his father is having a fever, measured at 102.7 with some runny nose. Pt denied any symptoms including cough, chills, dyspnea, chest pain, palpitation, abdominal pain, N/V/D, decreased sensation or unilateral weakness, headache, dizziness, visual changes. Pt's daughter confirmed that non of these symptoms were present as well.  Pt was admitted on July 2023, for dyspnea on exertion, palpitations and LE swelling. As per the daughter the size of LE has been unchanged and pt has been complaint on the meds he was discharged from the hospital.   Pt denied alcohol consumption, smoking, use of illicit drugs including marijuana  (16 Aug 2023 22:10)      Podiatry HPI    PMH:Hypertension    Atrial fibrillation    BPH (benign prostatic hyperplasia)    Chronic kidney disease, unspecified CKD stage    Hyperlipidemia      Allergies: No Known Allergies      Medications acetaminophen     Tablet .. 650 milliGRAM(s) Oral every 6 hours PRN  allopurinol 100 milliGRAM(s) Oral daily  ALPRAZolam 0.25 milliGRAM(s) Oral at bedtime  apixaban 2.5 milliGRAM(s) Oral every 12 hours  cefTRIAXone   IVPB 2000 milliGRAM(s) IV Intermittent every 24 hours  furosemide   Injectable 40 milliGRAM(s) IV Push daily  hydrALAZINE 50 milliGRAM(s) Oral three times a day  isosorbide   mononitrate ER Tablet (IMDUR) 30 milliGRAM(s) Oral daily  metoprolol tartrate 25 milliGRAM(s) Oral every 12 hours  metroNIDAZOLE  IVPB      metroNIDAZOLE  IVPB 500 milliGRAM(s) IV Intermittent every 8 hours  polyethylene glycol 3350 17 Gram(s) Oral at bedtime  risperiDONE   Tablet 1 milliGRAM(s) Oral at bedtime  simvastatin 20 milliGRAM(s) Oral at bedtime    FHNo pertinent family history in first degree relatives    ,   PMHHypertension    Atrial fibrillation    BPH (benign prostatic hyperplasia)    Chronic kidney disease, unspecified CKD stage    Hyperlipidemia       PSHNo significant past surgical history    S/P placement of cardiac pacemaker        Labs              Vital Signs Last 24 Hrs  T(C): 37.1 (20 Aug 2023 07:25), Max: 37.1 (19 Aug 2023 11:06)  T(F): 98.7 (20 Aug 2023 07:25), Max: 98.7 (19 Aug 2023 11:06)  HR: 83 (20 Aug 2023 07:25) (60 - 83)  BP: 129/60 (20 Aug 2023 07:25) (96/42 - 129/60)  BP(mean): --  RR: 18 (20 Aug 2023 07:25) (18 - 20)  SpO2: 98% (20 Aug 2023 07:25) (98% - 99%)    Parameters below as of 20 Aug 2023 07:25  Patient On (Oxygen Delivery Method): room air             C-Reactive Protein, Serum: 60 mg/L (08-16-23 @ 22:40)         PHYSICAL EXAM  LE Focused:    Vasc:  DP and PT pulses palpable 2/4. CFT < 3seconds. Tg warm to warm with no increase in temp to lower legs. Varicosities noted b/l.  Derm: Xerotic skin noted in lower legs and dorsal feet. Edema noted to lower legs, not noted in feet. No open lesions noted. No erythema noted. Nail noted to be dystropic 1-5 b/l.  Neuro: Gross sensation intact.  MSK: No pop in lower legs or feet. Patient ambulates with use of walker.      CULTURES: Culture Results:   <10,000 CFU/mL Normal Urogenital Lesly (08-17 @ 08:04)  Culture Results:   Growth in aerobic bottle: Gram Positive Cocci in Pairs and Chains  Growth in anaerobic bottle: Gram Positive Cocci in Pairs and Chains (08-16 @ 18:35)  Culture Results:   Growth in aerobic bottle: Streptococcus pyogenes (Group A)  See previous culture [id number]  Direct identification is available within approximately 3-5  hours either by Blood Panel Multiplexed PCR or Direct  MALDI-TOF. Details: [email or web address] (08-16 @ 18:30)

## 2023-08-21 LAB
ALBUMIN SERPL ELPH-MCNC: 2.7 G/DL — LOW (ref 3.5–5)
ALP SERPL-CCNC: 71 U/L — SIGNIFICANT CHANGE UP (ref 40–120)
ALT FLD-CCNC: 45 U/L DA — SIGNIFICANT CHANGE UP (ref 10–60)
ANION GAP SERPL CALC-SCNC: 8 MMOL/L — SIGNIFICANT CHANGE UP (ref 5–17)
AST SERPL-CCNC: 25 U/L — SIGNIFICANT CHANGE UP (ref 10–40)
BILIRUB SERPL-MCNC: 0.3 MG/DL — SIGNIFICANT CHANGE UP (ref 0.2–1.2)
BUN SERPL-MCNC: 56 MG/DL — HIGH (ref 7–18)
CALCIUM SERPL-MCNC: 8.6 MG/DL — SIGNIFICANT CHANGE UP (ref 8.4–10.5)
CHLORIDE SERPL-SCNC: 103 MMOL/L — SIGNIFICANT CHANGE UP (ref 96–108)
CO2 SERPL-SCNC: 26 MMOL/L — SIGNIFICANT CHANGE UP (ref 22–31)
CREAT SERPL-MCNC: 2.02 MG/DL — HIGH (ref 0.5–1.3)
EGFR: 30 ML/MIN/1.73M2 — LOW
GLUCOSE SERPL-MCNC: 121 MG/DL — HIGH (ref 70–99)
HCT VFR BLD CALC: 31.3 % — LOW (ref 39–50)
HGB BLD-MCNC: 10 G/DL — LOW (ref 13–17)
MCHC RBC-ENTMCNC: 27.8 PG — SIGNIFICANT CHANGE UP (ref 27–34)
MCHC RBC-ENTMCNC: 31.9 GM/DL — LOW (ref 32–36)
MCV RBC AUTO: 86.9 FL — SIGNIFICANT CHANGE UP (ref 80–100)
NRBC # BLD: 0 /100 WBCS — SIGNIFICANT CHANGE UP (ref 0–0)
PLATELET # BLD AUTO: 184 K/UL — SIGNIFICANT CHANGE UP (ref 150–400)
POTASSIUM SERPL-MCNC: 3.7 MMOL/L — SIGNIFICANT CHANGE UP (ref 3.5–5.3)
POTASSIUM SERPL-SCNC: 3.7 MMOL/L — SIGNIFICANT CHANGE UP (ref 3.5–5.3)
PROT SERPL-MCNC: 6.2 G/DL — SIGNIFICANT CHANGE UP (ref 6–8.3)
RBC # BLD: 3.6 M/UL — LOW (ref 4.2–5.8)
RBC # FLD: 13.3 % — SIGNIFICANT CHANGE UP (ref 10.3–14.5)
SODIUM SERPL-SCNC: 137 MMOL/L — SIGNIFICANT CHANGE UP (ref 135–145)
WBC # BLD: 8.13 K/UL — SIGNIFICANT CHANGE UP (ref 3.8–10.5)
WBC # FLD AUTO: 8.13 K/UL — SIGNIFICANT CHANGE UP (ref 3.8–10.5)

## 2023-08-21 PROCEDURE — 71045 X-RAY EXAM CHEST 1 VIEW: CPT | Mod: 26

## 2023-08-21 RX ADMIN — Medication 25 MILLIGRAM(S): at 18:03

## 2023-08-21 RX ADMIN — Medication 100 MILLIGRAM(S): at 05:43

## 2023-08-21 RX ADMIN — Medication 100 MILLIGRAM(S): at 13:40

## 2023-08-21 RX ADMIN — CEFTRIAXONE 100 MILLIGRAM(S): 500 INJECTION, POWDER, FOR SOLUTION INTRAMUSCULAR; INTRAVENOUS at 04:05

## 2023-08-21 RX ADMIN — SIMVASTATIN 20 MILLIGRAM(S): 20 TABLET, FILM COATED ORAL at 21:40

## 2023-08-21 RX ADMIN — ISOSORBIDE MONONITRATE 30 MILLIGRAM(S): 60 TABLET, EXTENDED RELEASE ORAL at 12:43

## 2023-08-21 RX ADMIN — Medication 50 MILLIGRAM(S): at 13:38

## 2023-08-21 RX ADMIN — Medication 25 MILLIGRAM(S): at 05:43

## 2023-08-21 RX ADMIN — Medication 40 MILLIGRAM(S): at 05:43

## 2023-08-21 RX ADMIN — Medication 50 MILLIGRAM(S): at 05:43

## 2023-08-21 RX ADMIN — Medication 0.25 MILLIGRAM(S): at 21:40

## 2023-08-21 RX ADMIN — APIXABAN 2.5 MILLIGRAM(S): 2.5 TABLET, FILM COATED ORAL at 05:42

## 2023-08-21 RX ADMIN — Medication 100 MILLIGRAM(S): at 21:40

## 2023-08-21 RX ADMIN — POLYETHYLENE GLYCOL 3350 17 GRAM(S): 17 POWDER, FOR SOLUTION ORAL at 21:40

## 2023-08-21 RX ADMIN — Medication 100 MILLIGRAM(S): at 12:43

## 2023-08-21 RX ADMIN — APIXABAN 2.5 MILLIGRAM(S): 2.5 TABLET, FILM COATED ORAL at 18:03

## 2023-08-21 RX ADMIN — Medication 50 MILLIGRAM(S): at 21:40

## 2023-08-21 RX ADMIN — RISPERIDONE 1 MILLIGRAM(S): 4 TABLET ORAL at 21:40

## 2023-08-21 NOTE — PHYSICAL THERAPY INITIAL EVALUATION ADULT - PERTINENT HX OF CURRENT PROBLEM, REHAB EVAL
89 yrs old from home, ambulating independently, pmhx of HFrEFm Afib on Eliquis, s/p Micra PPM, CKD, HLD, BPH on chronic Chinchilla, presented with fever and generalized weakness.

## 2023-08-21 NOTE — PROGRESS NOTE ADULT - ASSESSMENT
89 yrs old from home, ambulating independently, pmhx of HFrEFm Afib on Eliquis, s/p Micra PPM, CKD, HLD, BPH on chronic Salmon, presented with fever and generalized weakness  In ED found to be febrile, tachycardic,  CXR:  Moderate RIGHT effusion and/or basilar airspace consolidation.  in ED: temp 101.2, HR 81, /53, Sat 93% on RA  leukocytosis of 21.25 with left shift, Lactate 2.9 Urinalysis positve   Chronic salmon in place   s/p dose of cefepime in ED   PT was admitted for sepsis likely due to UTI and ?PNA   Started on Ceftriaxone and Zithromax  lactate trended down to 1.5  CT Chest No Cont Moderate to large right and small left pleural effusion with atelectasis,   increased since 3/13/2022. Nonspecific ground glass opacity and   interlobular septal thickening at the right lung.   +BCx2 with step pyogens on 8/16, repeat BC neg.          f/u ESR, CRP, Procal, legionella, strep pneumo and sputum culture  c/w Eh and Klaus   ID Dr. Garcia consulted.  < from:

## 2023-08-21 NOTE — PROGRESS NOTE ADULT - SUBJECTIVE AND OBJECTIVE BOX
No acute events overnight.  Feels well, eating lunch.  Denies pain.     Allergies    No Known Allergies    Intolerances    Vital Signs Last 24 Hrs  T(C): 36.7 (21 Aug 2023 11:07), Max: 37.1 (21 Aug 2023 07:28)  T(F): 98.1 (21 Aug 2023 11:07), Max: 98.7 (21 Aug 2023 07:28)  HR: 75 (21 Aug 2023 11:07) (67 - 84)  BP: 145/62 (21 Aug 2023 11:07) (100/68 - 158/60)  BP(mean): --  RR: 16 (21 Aug 2023 11:07) (16 - 19)  SpO2: 98% (21 Aug 2023 11:07) (94% - 100%)    Parameters below as of 21 Aug 2023 11:07  Patient On (Oxygen Delivery Method): room air        MedsMEDICATIONS  (STANDING):  allopurinol 100 milliGRAM(s) Oral daily  ALPRAZolam 0.25 milliGRAM(s) Oral at bedtime  apixaban 2.5 milliGRAM(s) Oral every 12 hours  cefTRIAXone   IVPB 2000 milliGRAM(s) IV Intermittent every 24 hours  furosemide   Injectable 40 milliGRAM(s) IV Push daily  hydrALAZINE 50 milliGRAM(s) Oral three times a day  isosorbide   mononitrate ER Tablet (IMDUR) 30 milliGRAM(s) Oral daily  metoprolol tartrate 25 milliGRAM(s) Oral every 12 hours  metroNIDAZOLE  IVPB      metroNIDAZOLE  IVPB 500 milliGRAM(s) IV Intermittent every 8 hours  polyethylene glycol 3350 17 Gram(s) Oral at bedtime  risperiDONE   Tablet 1 milliGRAM(s) Oral at bedtime  simvastatin 20 milliGRAM(s) Oral at bedtime    MEDICATIONS  (PRN):  acetaminophen     Tablet .. 650 milliGRAM(s) Oral every 6 hours PRN Temp greater or equal to 38C (100.4F), Mild Pain (1 - 3)      PHYSICAL EXAM:  GENERAL: NAD, well-groomed, well-developed  NEURO: Responds to name, awake and alert. Speaks Mal[place]ian  LUNG: Lungs dim b/l, worse on right, mild crackles at R base, no rhonchi/wheezing  HEART: S1, S2, no S3 or S4. Regular rate and rhythm. No murmurs, gallops, or rubs. No JVD  ABDOMEN: Bowel sounds present, abd Soft, Nontender, Nondistended  EXTREMITIES:  2+ Peripheral Pulses b/l, No clubbing, cyanosis, or edema  SKIN: No rashes or lesions    Consultant(s) Notes Reviewed:  [x ] YES  [ ] NO  Care Discussed with Consultants/Other Providers [ x] YES  [ ] NO    LABS:                        10.0   8.13  )-----------( 184      ( 21 Aug 2023 06:33 )             31.3     08-21    137  |  103  |  56<H>  ----------------------------<  121<H>  3.7   |  26  |  2.02<H>    Ca    8.6      21 Aug 2023 06:33    TPro  6.2  /  Alb  2.7<L>  /  TBili  0.3  /  DBili  x   /  AST  25  /  ALT  45  /  AlkPhos  71  08-21        RADIOLOGY & ADDITIONAL TESTS:    EKG:

## 2023-08-21 NOTE — PROGRESS NOTE ADULT - PROBLEM SELECTOR PLAN 1
source urine vs lung   8/16 blood culture growing strep pyogenes   ID Dr Garcia on board, rec TTE and R thora  c/w Ceftriaxone 2 g and IV flagyl  Initial urine culture contam, repeat UC neg.  8/18 repeat blood cultures no growth

## 2023-08-21 NOTE — PROGRESS NOTE ADULT - ASSESSMENT
Patient is a 89y old  Male from home, ambulating independently, pmhx of HFrEFm Afib on Eliquis, s/p Micra PPM, CKD, HLD, BPH on chronic Chinchilla, now presents to the ER for evaluation of fever and generalized weakness. Pt was admitted on July 2023, for dyspnea on exertion, palpitations and LE swelling. As per the daughter the size of LE has been unchanged and pt has been complaint on the meds he was discharged from the hospital. On admission, he found to have fever, tachycardia, Leukocytosis, Positive UTI and Moderate RIGHT effusion and/or basilar airspace consolidation on CXR and CT chest. He has started on Ceftriaxone and Azithromycin, and the ID consult requested to assist with further evaluation and antibiotic management.    # Sepsis ( Fever + tachycardia + Leukocytosis)- resolved  # UTI-  WBC >50 /HPF   # Pneumonia with right pleural effusion - Not a candidate for thoracentesis since need to hold Eliquis for 5 days but patient cannot be off Eliquis , need to be on Eliquis  # Streptococcus pyogenes Bacteremia- 8/16- source most likely Lung    would recommend:    1. Follow up repeat CXR to reassess the resolution of Right pleural effusion  2. Continue IV Flagyl and Ceftriaxone 2 g daily to cover Bacteremia   3. TTE followed by [facility]  IF WITHIN GOAL OF CARE  4. Monitor kidney function     d/w Covering NP, Leif and Dr. Garcia     Attending Attestation:    Spent more than 35 minutes on total encounter, more than 50 % of the visit was spent counseling and/or coordinating care by the Attending physician.

## 2023-08-21 NOTE — PROGRESS NOTE ADULT - PROBLEM SELECTOR PLAN 2
continue Ceftriaxone 2g and flagyl  supplemental oxygen as needed   ID Dr. Garcia on board rec EMMANUEL griggs  Discussed with IR, they would need AC held for 3 days  Repeat chest xray today, if pleural eff smaller, may forgo indu

## 2023-08-21 NOTE — PROGRESS NOTE ADULT - SUBJECTIVE AND OBJECTIVE BOX
Date of Service 08-21-23 @ 10:04    CHIEF COMPLAINT:Patient is a 89y old  Male who presents with a chief complaint of PNA and ?effusion .Pt appears comfortable.    	  REVIEW OF SYSTEMS:  CONSTITUTIONAL: No fever, weight loss, or fatigue  EYES: No eye pain, visual disturbances, or discharge  ENT:  No difficulty hearing, tinnitus, vertigo; No sinus or throat pain  NECK: No pain or stiffness  RESPIRATORY: No cough, wheezing, chills or hemoptysis; No Shortness of Breath  CARDIOVASCULAR: No chest pain, palpitations, passing out, dizziness, or leg swelling  GASTROINTESTINAL: No abdominal or epigastric pain. No nausea, vomiting, or hematemesis; No diarrhea or constipation. No melena or hematochezia.  GENITOURINARY: No dysuria, frequency, hematuria, or incontinence  NEUROLOGICAL: No headaches, memory loss, loss of strength, numbness, or tremors  SKIN: No itching, burning, rashes, or lesions   LYMPH Nodes: No enlarged glands  ENDOCRINE: No heat or cold intolerance; No hair loss  MUSCULOSKELETAL: No joint pain or swelling; No muscle, back, or extremity pain  PSYCHIATRIC: No depression, anxiety, mood swings, or difficulty sleeping  HEME/LYMPH: No easy bruising, or bleeding gums  ALLERGY AND IMMUNOLOGIC: No hives or eczema	        PHYSICAL EXAM:  T(C): 37.1 (08-21-23 @ 07:28), Max: 37.1 (08-21-23 @ 07:28)  HR: 68 (08-21-23 @ 07:28) (67 - 84)  BP: 158/60 (08-21-23 @ 07:28) (100/68 - 158/60)  RR: 16 (08-21-23 @ 07:28) (16 - 19)  SpO2: 97% (08-21-23 @ 07:28) (94% - 100%)  Wt(kg): --  I&O's Summary    20 Aug 2023 07:01  -  21 Aug 2023 07:00  --------------------------------------------------------  IN: 430 mL / OUT: 3200 mL / NET: -2770 mL    21 Aug 2023 07:01  -  21 Aug 2023 10:04  --------------------------------------------------------  IN: 220 mL / OUT: 0 mL / NET: 220 mL        Appearance: Normal	  HEENT:   Normal oral mucosa, PERRL, EOMI	  Lymphatic: No lymphadenopathy  Cardiovascular: Normal S1 S2, No JVD, No murmurs, No edema  Respiratory: Dec BS at bases	  Psychiatry: A & O x 3, Mood & affect appropriate  Gastrointestinal:  Soft, Non-tender, + BS	  Skin: No rashes, No ecchymoses, No cyanosis	  Neurologic: Non-focal  Extremities: Normal range of motion, No clubbing, cyanosis or edema  Vascular: Peripheral pulses palpable 2+ bilaterally    MEDICATIONS  (STANDING):  allopurinol 100 milliGRAM(s) Oral daily  ALPRAZolam 0.25 milliGRAM(s) Oral at bedtime  apixaban 2.5 milliGRAM(s) Oral every 12 hours  cefTRIAXone   IVPB 2000 milliGRAM(s) IV Intermittent every 24 hours  furosemide   Injectable 40 milliGRAM(s) IV Push daily  hydrALAZINE 50 milliGRAM(s) Oral three times a day  isosorbide   mononitrate ER Tablet (IMDUR) 30 milliGRAM(s) Oral daily  metoprolol tartrate 25 milliGRAM(s) Oral every 12 hours  metroNIDAZOLE  IVPB      metroNIDAZOLE  IVPB 500 milliGRAM(s) IV Intermittent every 8 hours  polyethylene glycol 3350 17 Gram(s) Oral at bedtime  risperiDONE   Tablet 1 milliGRAM(s) Oral at bedtime  simvastatin 20 milliGRAM(s) Oral at bedtime       	  	  LABS:	 	                      10.0   8.13  )-----------( 184      ( 21 Aug 2023 06:33 )             31.3     08-21    137  |  103  |  56<H>  ----------------------------<  121<H>  3.7   |  26  |  2.02<H>    Ca    8.6      21 Aug 2023 06:33    TPro  6.2  /  Alb  2.7<L>  /  TBili  0.3  /  DBili  x   /  AST  25  /  ALT  45  /  AlkPhos  71  08-21      	        Culture - Blood (08.19.23 @ 11:12)   Specimen Source: .Blood Blood  Culture Results:   No growth at 24 hoursCulture - Blood in AM (08.18.23 @ 08:15)   Specimen Source: .Blood Blood-Peripheral  Culture Results:   No growth at 48 HoursCulture - Urine (08.17.23 @ 08:04)   Specimen Source: Clean Catch Clean Catch (Midstream)  Culture Results:   <10,000 CFU/mL Normal Urogenital Lesly

## 2023-08-21 NOTE — PROGRESS NOTE ADULT - ASSESSMENT
89 yrs old from home, ambulating independently, pmhx of HFrEFm Afib on Eliquis, s/p Micra PPM, CKD, HLD, BPH on chronic Salmon, presented with fever and generalized weakness,sepsis,pneumonia,chronic diastolic HF and strep bactermia..  1.Sepsis due to strep bactermia-abx.Repeat blood cx -neg.  2.Pneumonia -abx as per ID.Pulm f/u.  3.Chronic diastolic HF-IV lasix.  4.CRI-f/u lytes.Renal eval.  5.Afib-NOAC.lopressor.  6.BPH with chronic salmon-urince cx-.  7.PPI.  8.PT.  9.D/W IR Rt thoracentesis not  done since pt got asa and on noac. Repeat CXR-p.

## 2023-08-21 NOTE — PROGRESS NOTE ADULT - SUBJECTIVE AND OBJECTIVE BOX
Patient is seen and examined at the bed side, is afebrile. The repeat Blood cultures from  remains negative to date.      REVIEW OF SYSTEMS: All other review systems are negative      ALLERGIES: No Known Allergies      Vital Signs Last 24 Hrs  T(C): 36.1 (21 Aug 2023 16:16), Max: 37.1 (21 Aug 2023 07:28)  T(F): 97 (21 Aug 2023 16:16), Max: 98.7 (21 Aug 2023 07:28)  HR: 70 (21 Aug 2023 16:16) (67 - 75)  BP: 122/57 (21 Aug 2023 16:16) (122/57 - 158/60)  BP(mean): --  RR: 17 (21 Aug 2023 16:16) (16 - 19)  SpO2: 97% (21 Aug 2023 16:16) (94% - 98%)    Parameters below as of 21 Aug 2023 16:16  Patient On (Oxygen Delivery Method): room air      PHYSICAL EXAM:  GENERAL: Not in distress   CHEST/LUNG: Not using accessory muscles   HEART: s1 and s2 present  ABDOMEN:  Nontender and  Nondistended  EXTREMITIES: No pedal  edema, both feet are moist, not dry anymore  CNS: Awake and Alert      LABS:                         10.0   8.13  )-----------( 184      ( 21 Aug 2023 06:33 )             31.3                             9.8    11.76 )-----------( 167      ( 18 Aug 2023 08:05 )             31.1             137  |  103  |  56<H>  ----------------------------<  121<H>  3.7   |  26  |  2.02<H>    Ca    8.6      21 Aug 2023 06:33    TPro  6.2  /  Alb  2.7<L>  /  TBili  0.3  /  DBili  x   /  AST  25  /  ALT  45  /  AlkPhos  71      136  |  103  |  73<H>  ----------------------------<  127<H>  4.2   |  25  |  2.85<H>    Ca    8.6      18 Aug 2023 08:05  Phos  5.1       Mg     2.8         TPro  6.1  /  Alb  2.7<L>  /  TBili  0.4  /  DBili  x   /  AST  62<H>  /  ALT  51  /  AlkPhos  66      PT/INR - ( 16 Aug 2023 18:30 )   PT: 15.6 sec;   INR: 1.38 ratio      PTT - ( 16 Aug 2023 18:30 )  PTT:30.5 sec      CAPILLARY BLOOD GLUCOSE  POCT Blood Glucose.: 153 mg/dL (16 Aug 2023 18:17)      Urinalysis Basic - ( 17 Aug 2023 08:04 )  Color: Yellow / Appearance: Turbid / S.014 / pH: x  Gluc: x / Ketone: Trace mg/dL  / Bili: Negative / Urobili: 1.0 mg/dL   Blood: x / Protein: 300 mg/dL / Nitrite: Negative   Leuk Esterase: Large / RBC: >50 /HPF / WBC >50 /HPF   Sq Epi: x / Non Sq Epi: x / Bacteria: Many /HPF      MEDICATIONS  (STANDING):    allopurinol 100 milliGRAM(s) Oral daily  ALPRAZolam 0.25 milliGRAM(s) Oral at bedtime  apixaban 2.5 milliGRAM(s) Oral every 12 hours  cefTRIAXone   IVPB 2000 milliGRAM(s) IV Intermittent every 24 hours  furosemide   Injectable 40 milliGRAM(s) IV Push daily  hydrALAZINE 50 milliGRAM(s) Oral three times a day  isosorbide   mononitrate ER Tablet (IMDUR) 30 milliGRAM(s) Oral daily  metoprolol tartrate 25 milliGRAM(s) Oral every 12 hours  metroNIDAZOLE  IVPB      metroNIDAZOLE  IVPB 500 milliGRAM(s) IV Intermittent every 8 hours  polyethylene glycol 3350 17 Gram(s) Oral at bedtime  risperiDONE   Tablet 1 milliGRAM(s) Oral at bedtime  simvastatin 20 milliGRAM(s) Oral at bedtime      RADIOLOGY & ADDITIONAL TESTS:      23 : CT Chest No Cont (23 @ 01:47) Moderate to large right and small left pleural effusion with atelectasis,   increased since 3/13/2022. Nonspecific ground glass opacity and interlobular septal thickening at the right lung. Recommend clinical   correlation to assess underlying pneumonia. Nonspecific mediastinal lymphadenopathy.      23 : Xray Chest 1 View- PORTABLE-Urgent (23 @ 18:48) IMPRESSION:   Moderate RIGHT effusion and/or basilar airspace consolidation.      MICROBIOLOGY DATA:    Culture - Blood in AM (23 @ 08:15)   Specimen Source: .Blood Blood-Peripheral  Culture Results: No growth at 72 hours    Culture - Blood in AM (23 @ 08:05)   Specimen Source: .Blood Blood-Peripheral  Culture Results: No growth at 72 hours      Legionella pneumophila Antigen, Urine (23 @ 22:40)   Legionella Antigen, Urine: Negative      Culture - Blood (23 @ 18:35)   Gram Stain:   Growth in aerobic bottle: Gram Positive Cocci in Pairs and Chains   Growth in anaerobic bottle: Gram Positive Cocci in Pairs and Chains  - Vancomycin: S 0.5  - Penicillin: S <=0.03 Predicts results for ampicillin, amoxicillin, amoxicillin/clavulanate, ampicillin/sulbactam, 1st, 2nd and 3rd generation cephalosporins and carbapenems.  - Ceftriaxone: S <=0.25  Specimen Source: .Blood Blood-Peripheral  Organism: Streptococcus pyogenes (Group A)  Culture Results:   Growth in aerobic and anaerobic bottles: Streptococcus pyogenes (Group A)  Organism Identification: Streptococcus pyogenes (Group A)  Method Type: NOEMY      Culture - Blood (23 @ 18:30)   Gram Stain:   Growth in aerobic bottle: Gram Positive Cocci in Pairs and Chains  - Streptococcus pyogenes (Group A): Detec  Specimen Source: .Blood Blood-Peripheral  Organism: Blood Culture PCR  Culture Results:   Growth in aerobic bottle: Gram Positive Cocci in Pairs and Chains   Direct identification is available within approximately 3-5   hours either by Blood Panel Multiplexed PCR or Direct   MALDI-TOF. Details: [email or web address]  Organism Identification: Blood Culture PCR  Method Type: PCRRapid HIV-1/2 Antibody (23 @ 18:30)   Rapid HIV-1/2 Antibody: Nonreact

## 2023-08-22 LAB
ANION GAP SERPL CALC-SCNC: 7 MMOL/L — SIGNIFICANT CHANGE UP (ref 5–17)
BUN SERPL-MCNC: 48 MG/DL — HIGH (ref 7–18)
CALCIUM SERPL-MCNC: 8.9 MG/DL — SIGNIFICANT CHANGE UP (ref 8.4–10.5)
CHLORIDE SERPL-SCNC: 101 MMOL/L — SIGNIFICANT CHANGE UP (ref 96–108)
CO2 SERPL-SCNC: 29 MMOL/L — SIGNIFICANT CHANGE UP (ref 22–31)
CREAT SERPL-MCNC: 1.93 MG/DL — HIGH (ref 0.5–1.3)
EGFR: 32 ML/MIN/1.73M2 — LOW
ERYTHROCYTE [SEDIMENTATION RATE] IN BLOOD: 26 MM/HR — HIGH (ref 0–20)
GLUCOSE SERPL-MCNC: 145 MG/DL — HIGH (ref 70–99)
HCT VFR BLD CALC: 33.8 % — LOW (ref 39–50)
HGB BLD-MCNC: 10.6 G/DL — LOW (ref 13–17)
MCHC RBC-ENTMCNC: 27.8 PG — SIGNIFICANT CHANGE UP (ref 27–34)
MCHC RBC-ENTMCNC: 31.4 GM/DL — LOW (ref 32–36)
MCV RBC AUTO: 88.7 FL — SIGNIFICANT CHANGE UP (ref 80–100)
NRBC # BLD: 0 /100 WBCS — SIGNIFICANT CHANGE UP (ref 0–0)
PLATELET # BLD AUTO: 206 K/UL — SIGNIFICANT CHANGE UP (ref 150–400)
POTASSIUM SERPL-MCNC: 3.8 MMOL/L — SIGNIFICANT CHANGE UP (ref 3.5–5.3)
POTASSIUM SERPL-SCNC: 3.8 MMOL/L — SIGNIFICANT CHANGE UP (ref 3.5–5.3)
RBC # BLD: 3.81 M/UL — LOW (ref 4.2–5.8)
RBC # FLD: 13.2 % — SIGNIFICANT CHANGE UP (ref 10.3–14.5)
SODIUM SERPL-SCNC: 137 MMOL/L — SIGNIFICANT CHANGE UP (ref 135–145)
WBC # BLD: 9.83 K/UL — SIGNIFICANT CHANGE UP (ref 3.8–10.5)
WBC # FLD AUTO: 9.83 K/UL — SIGNIFICANT CHANGE UP (ref 3.8–10.5)

## 2023-08-22 PROCEDURE — 71250 CT THORAX DX C-: CPT | Mod: 26

## 2023-08-22 RX ADMIN — APIXABAN 2.5 MILLIGRAM(S): 2.5 TABLET, FILM COATED ORAL at 17:25

## 2023-08-22 RX ADMIN — RISPERIDONE 1 MILLIGRAM(S): 4 TABLET ORAL at 21:21

## 2023-08-22 RX ADMIN — Medication 100 MILLIGRAM(S): at 05:19

## 2023-08-22 RX ADMIN — Medication 25 MILLIGRAM(S): at 06:42

## 2023-08-22 RX ADMIN — APIXABAN 2.5 MILLIGRAM(S): 2.5 TABLET, FILM COATED ORAL at 05:20

## 2023-08-22 RX ADMIN — Medication 50 MILLIGRAM(S): at 13:24

## 2023-08-22 RX ADMIN — Medication 50 MILLIGRAM(S): at 21:20

## 2023-08-22 RX ADMIN — Medication 50 MILLIGRAM(S): at 06:42

## 2023-08-22 RX ADMIN — CEFTRIAXONE 100 MILLIGRAM(S): 500 INJECTION, POWDER, FOR SOLUTION INTRAMUSCULAR; INTRAVENOUS at 04:06

## 2023-08-22 RX ADMIN — Medication 40 MILLIGRAM(S): at 05:20

## 2023-08-22 RX ADMIN — SIMVASTATIN 20 MILLIGRAM(S): 20 TABLET, FILM COATED ORAL at 21:21

## 2023-08-22 RX ADMIN — Medication 0.25 MILLIGRAM(S): at 21:20

## 2023-08-22 RX ADMIN — Medication 100 MILLIGRAM(S): at 13:27

## 2023-08-22 RX ADMIN — Medication 100 MILLIGRAM(S): at 21:20

## 2023-08-22 RX ADMIN — ISOSORBIDE MONONITRATE 30 MILLIGRAM(S): 60 TABLET, EXTENDED RELEASE ORAL at 13:25

## 2023-08-22 RX ADMIN — POLYETHYLENE GLYCOL 3350 17 GRAM(S): 17 POWDER, FOR SOLUTION ORAL at 21:21

## 2023-08-22 RX ADMIN — Medication 100 MILLIGRAM(S): at 13:26

## 2023-08-22 NOTE — PROGRESS NOTE ADULT - SUBJECTIVE AND OBJECTIVE BOX
Date of Service 08-22-23 @ 10:43    CHIEF COMPLAINT:Patient is a 89y old  Male who presents with a chief complaint of PNA and Rt effusion.Pt appears comfortable.    	  REVIEW OF SYSTEMS:  CONSTITUTIONAL: No fever, weight loss, or fatigue  EYES: No eye pain, visual disturbances, or discharge  ENT:  No difficulty hearing, tinnitus, vertigo; No sinus or throat pain  NECK: No pain or stiffness  RESPIRATORY: No cough, wheezing, chills or hemoptysis; No Shortness of Breath  CARDIOVASCULAR: No chest pain, palpitations, passing out, dizziness, or leg swelling  GASTROINTESTINAL: No abdominal or epigastric pain. No nausea, vomiting, or hematemesis; No diarrhea or constipation. No melena or hematochezia.  GENITOURINARY: No dysuria, frequency, hematuria, or incontinence  NEUROLOGICAL: No headaches, memory loss, loss of strength, numbness, or tremors  SKIN: No itching, burning, rashes, or lesions   LYMPH Nodes: No enlarged glands  ENDOCRINE: No heat or cold intolerance; No hair loss  MUSCULOSKELETAL: No joint pain or swelling; No muscle, back, or extremity pain  PSYCHIATRIC: No depression, anxiety, mood swings, or difficulty sleeping  HEME/LYMPH: No easy bruising, or bleeding gums  ALLERGY AND IMMUNOLOGIC: No hives or eczema	    PHYSICAL EXAM:  T(C): 36.4 (08-22-23 @ 08:32), Max: 36.8 (08-21-23 @ 21:48)  HR: 65 (08-22-23 @ 08:32) (65 - 84)  BP: 130/54 (08-22-23 @ 08:32) (122/57 - 146/56)  RR: 17 (08-22-23 @ 08:32) (16 - 18)  SpO2: 96% (08-22-23 @ 08:32) (96% - 100%)  Wt(kg): --  I&O's Summary    21 Aug 2023 07:01  -  22 Aug 2023 07:00  --------------------------------------------------------  IN: 450 mL / OUT: 1850 mL / NET: -1400 mL        Appearance: Normal	  HEENT:   Normal oral mucosa, PERRL, EOMI	  Lymphatic: No lymphadenopathy  Cardiovascular: Normal S1 S2, No JVD, No murmurs, No edema  Respiratory: Dec BS at bases  Psychiatry: A & O x 3, Mood & affect appropriate  Gastrointestinal:  Soft, Non-tender, + BS	  Skin: No rashes, No ecchymoses, No cyanosis	  Neurologic: Non-focal  Extremities: Normal range of motion, No clubbing, cyanosis or edema  Vascular: Peripheral pulses palpable 2+ bilaterally    MEDICATIONS  (STANDING):  allopurinol 100 milliGRAM(s) Oral daily  ALPRAZolam 0.25 milliGRAM(s) Oral at bedtime  apixaban 2.5 milliGRAM(s) Oral every 12 hours  cefTRIAXone   IVPB 2000 milliGRAM(s) IV Intermittent every 24 hours  furosemide   Injectable 40 milliGRAM(s) IV Push daily  hydrALAZINE 50 milliGRAM(s) Oral three times a day  isosorbide   mononitrate ER Tablet (IMDUR) 30 milliGRAM(s) Oral daily  metoprolol tartrate 25 milliGRAM(s) Oral every 12 hours  metroNIDAZOLE  IVPB 500 milliGRAM(s) IV Intermittent every 8 hours  metroNIDAZOLE  IVPB      polyethylene glycol 3350 17 Gram(s) Oral at bedtime  risperiDONE   Tablet 1 milliGRAM(s) Oral at bedtime  simvastatin 20 milliGRAM(s) Oral at bedtime        	  LABS:	 	                       10.6   9.83  )-----------( 206      ( 22 Aug 2023 08:29 )             33.8     08-22    137  |  101  |  48<H>  ----------------------------<  145<H>  3.8   |  29  |  1.93<H>    Ca    8.9      22 Aug 2023 08:29    TPro  6.2  /  Alb  2.7<L>  /  TBili  0.3  /  DBili  x   /  AST  25  /  ALT  45  /  AlkPhos  71  08-21    < from: Xray Chest 1 View- PORTABLE-Routine (Xray Chest 1 View- PORTABLE-Routine .) (08.21.23 @ 12:39) >  ACC: [id number] EXAM:  XR CHEST PORTABLE ROUTINE 1V   ORDERED BY: NALINI ALEJANDRO     PROCEDURE DATE:  08/21/2023          INTERPRETATION:  HISTORY: Admitting Dxs: J18.9 PNEUMONIA, UNSPECIFIED   ORGANISM;  f/u pleural eff;  TECHNIQUE: Portable frontal viewof the chest, 1 view.  COMPARISON: 8/16.  FINDINGS/  IMPRESSION:  A MICRA implant overlies the left heart border.  HEART:  Enlarged.  LUNGS: There is opacity at the right base compatible with   effusion/infiltrate..When taking into account differencein technique,   there is no significant change.  BONES: degenerative changes    < end of copied text >

## 2023-08-22 NOTE — PROGRESS NOTE ADULT - ASSESSMENT
89 yrs old from home, ambulating independently, pmhx of HFrEFm Afib on Eliquis, s/p Micra PPM, CKD, HLD, BPH on chronic [place], presented with fever and generalized weakness,sepsis,pneumonia,chronic diastolic HF and strep bactermia..  1.Sepsis due to strep bactermia-abx.Repeat blood cx -neg.  2.Pneumonia -abx as per ID.  3.Chronic diastolic HF-IV lasix.  4.CRI-f/u lytes.  5.Afib-NOAC.lopressor.  6.BPH with chronic salmon-urince cx-.  7.PPI.  8.Echocardiogram.  9.D/W IR Rt thoracentesis rec repeat CT chest w/o contrast.  10.D/W pt's daughter regarding possible Rt thoracenthesis +/- AIDEN, would favor conservative management.

## 2023-08-22 NOTE — PROGRESS NOTE ADULT - SUBJECTIVE AND OBJECTIVE BOX
Patient is seen and examined at the bed side, is afebrile. The repeat CXR has been reviewed, shows unchanged. The CT chest has ordered.       REVIEW OF SYSTEMS: All other review systems are negative      ALLERGIES: No Known Allergies      Vital Signs Last 24 Hrs  T(C): 36.5 (22 Aug 2023 16:14), Max: 36.8 (21 Aug 2023 21:48)  T(F): 97.7 (22 Aug 2023 16:14), Max: 98.2 (21 Aug 2023 21:48)  HR: 65 (22 Aug 2023 16:14) (65 - 84)  BP: 108/44 (22 Aug 2023 16:14) (108/44 - 146/56)  BP(mean): --  RR: 18 (22 Aug 2023 16:14) (17 - 18)  SpO2: 98% (22 Aug 2023 16:14) (96% - 100%)    Parameters below as of 22 Aug 2023 16:14  Patient On (Oxygen Delivery Method): room air      PHYSICAL EXAM:  GENERAL: Not in distress   CHEST/LUNG: Not using accessory muscles   HEART: s1 and s2 present  ABDOMEN:  Nontender and  Nondistended  EXTREMITIES: No pedal  edema, both feet are moist, not dry anymore  CNS: Awake and Alert      LABS:                         10.6   9.83  )-----------( 206      ( 22 Aug 2023 08:29 )             33.8                           10.0   8.13  )-----------( 184      ( 21 Aug 2023 06:33 )             31.3             137  |  101  |  48<H>  ----------------------------<  145<H>  3.8   |  29  |  1.93<H>    Ca    8.9      22 Aug 2023 08:29    TPro  6.2  /  Alb  2.7<L>  /  TBili  0.3  /  DBili  x   /  AST  25  /  ALT  45  /  AlkPhos  71      137  |  103  |  56<H>  ----------------------------<  121<H>  3.7   |  26  |  2.02<H>    Ca    8.6      21 Aug 2023 06:33    TPro  6.2  /  Alb  2.7<L>  /  TBili  0.3  /  DBili  x   /  AST  25  /  ALT  45  /  AlkPhos  71  08-    PT/INR - ( 16 Aug 2023 18:30 )   PT: 15.6 sec;   INR: 1.38 ratio      PTT - ( 16 Aug 2023 18:30 )  PTT:30.5 sec      CAPILLARY BLOOD GLUCOSE  POCT Blood Glucose.: 153 mg/dL (16 Aug 2023 18:17)      Urinalysis Basic - ( 17 Aug 2023 08:04 )  Color: Yellow / Appearance: Turbid / S.014 / pH: x  Gluc: x / Ketone: Trace mg/dL  / Bili: Negative / Urobili: 1.0 mg/dL   Blood: x / Protein: 300 mg/dL / Nitrite: Negative   Leuk Esterase: Large / RBC: >50 /HPF / WBC >50 /HPF   Sq Epi: x / Non Sq Epi: x / Bacteria: Many /HPF      MEDICATIONS  (STANDING):    allopurinol 100 milliGRAM(s) Oral daily  ALPRAZolam 0.25 milliGRAM(s) Oral at bedtime  apixaban 2.5 milliGRAM(s) Oral every 12 hours  cefTRIAXone   IVPB 2000 milliGRAM(s) IV Intermittent every 24 hours  furosemide   Injectable 40 milliGRAM(s) IV Push daily  hydrALAZINE 50 milliGRAM(s) Oral three times a day  isosorbide   mononitrate ER Tablet (IMDUR) 30 milliGRAM(s) Oral daily  metoprolol tartrate 25 milliGRAM(s) Oral every 12 hours  metroNIDAZOLE  IVPB      metroNIDAZOLE  IVPB 500 milliGRAM(s) IV Intermittent every 8 hours  polyethylene glycol 3350 17 Gram(s) Oral at bedtime  risperiDONE   Tablet 1 milliGRAM(s) Oral at bedtime  simvastatin 20 milliGRAM(s) Oral at bedtime        RADIOLOGY & ADDITIONAL TESTS:    23 : Xray Chest 1 View- PORTABLE-Routine (Xray Chest 1 View- PORTABLE-Routine .) (23 @ 12:39) >  LUNGS: There is opacity at the right base compatible with effusion/infiltrate..When taking into account differencein technique, there is no significant change.  BONES: degenerative changes      23 : CT Chest No Cont (23 @ 01:47) Moderate to large right and small left pleural effusion with atelectasis,   increased since 3/13/2022. Nonspecific ground glass opacity and interlobular septal thickening at the right lung. Recommend clinical   correlation to assess underlying pneumonia. Nonspecific mediastinal lymphadenopathy.      23 : Xray Chest 1 View- PORTABLE-Urgent (23 @ 18:48) IMPRESSION:   Moderate RIGHT effusion and/or basilar airspace consolidation.      MICROBIOLOGY DATA:    Culture - Blood in AM (23 @ 08:15)   Specimen Source: .Blood Blood-Peripheral  Culture Results: No growth at 4 days    Culture - Blood in AM (23 @ 08:05)   Specimen Source: .Blood Blood-Peripheral  Culture Results: No growth at 4 days      Legionella pneumophila Antigen, Urine (23 @ 22:40)   Legionella Antigen, Urine: Negative      Culture - Blood (23 @ 18:35)   Gram Stain:   Growth in aerobic bottle: Gram Positive Cocci in Pairs and Chains   Growth in anaerobic bottle: Gram Positive Cocci in Pairs and Chains  - Vancomycin: S 0.5  - Penicillin: S <=0.03 Predicts results for ampicillin, amoxicillin, amoxicillin/clavulanate, ampicillin/sulbactam, 1st, 2nd and 3rd generation cephalosporins and carbapenems.  - Ceftriaxone: S <=0.25  Specimen Source: .Blood Blood-Peripheral  Organism: Streptococcus pyogenes (Group A)  Culture Results:   Growth in aerobic and anaerobic bottles: Streptococcus pyogenes (Group A)  Organism Identification: Streptococcus pyogenes (Group A)  Method Type: NOEMY      Culture - Blood (23 @ 18:30)   Gram Stain:   Growth in aerobic bottle: Gram Positive Cocci in Pairs and Chains  - Streptococcus pyogenes (Group A): Detec  Specimen Source: .Blood Blood-Peripheral  Organism: Blood Culture PCR  Culture Results:   Growth in aerobic bottle: Gram Positive Cocci in Pairs and Chains   Direct identification is available within approximately 3-5   hours either by Blood Panel Multiplexed PCR or Direct   MALDI-TOF. Details: [email or web address]  Organism Identification: Blood Culture PCR  Method Type: PCRRapid HIV-1/2 Antibody (23 @ 18:30)   Rapid HIV-1/2 Antibody: Nonreact

## 2023-08-22 NOTE — DIETITIAN INITIAL EVALUATION ADULT - OTHER INFO
Pt lives home PTA, recent admission with RD Assessment 7/15/23 noted; asleep when visited, forgetful/disoriented at times per chart; Limited intake/wt change history data available at present; tolerating meals well, % intake per flowsheets, intake may vary depending on food and how pt feels; lunch tray untouched yet; Unknown food allergies per Chart Pt lives home PTA, recent admission with RD Assessment 7/15/23 noted; asleep when visited, forgetful/disoriented at times per chart; Limited intake/wt change history data from pt at present; tolerating meals well, % intake per flowsheets, but intake may vary depending on food and how pt feels; lunch tray untouched yet; Unknown food allergies per Chart Pt lives home PTA, recent admission with RD Assessment 7/15/23 noted; asleep when visited (revisited later, still sleepy), forgetful/disoriented at times per chart; Limited intake/wt change history data from pt at present; tolerating meals well, % intake per flowsheets, but intake may vary depending on food and how pt feels; lunch tray untouched yet; Unknown food allergies per Chart

## 2023-08-22 NOTE — DIETITIAN INITIAL EVALUATION ADULT - PERTINENT LABORATORY DATA
08-22    137  |  101  |  48<H>  ----------------------------<  145<H>  3.8   |  29  |  1.93<H>    Ca    8.9      22 Aug 2023 08:29    TPro  6.2  /  Alb  2.7<L>  /  TBili  0.3  /  DBili  x   /  AST  25  /  ALT  45  /  AlkPhos  71  08-21

## 2023-08-22 NOTE — DIETITIAN INITIAL EVALUATION ADULT - PERTINENT MEDS FT
MEDICATIONS  (STANDING):  allopurinol 100 milliGRAM(s) Oral daily  ALPRAZolam 0.25 milliGRAM(s) Oral at bedtime  apixaban 2.5 milliGRAM(s) Oral every 12 hours  cefTRIAXone   IVPB 2000 milliGRAM(s) IV Intermittent every 24 hours  furosemide   Injectable 40 milliGRAM(s) IV Push daily  hydrALAZINE 50 milliGRAM(s) Oral three times a day  isosorbide   mononitrate ER Tablet (IMDUR) 30 milliGRAM(s) Oral daily  metoprolol tartrate 25 milliGRAM(s) Oral every 12 hours  metroNIDAZOLE  IVPB      metroNIDAZOLE  IVPB 500 milliGRAM(s) IV Intermittent every 8 hours  polyethylene glycol 3350 17 Gram(s) Oral at bedtime  risperiDONE   Tablet 1 milliGRAM(s) Oral at bedtime  simvastatin 20 milliGRAM(s) Oral at bedtime    MEDICATIONS  (PRN):  acetaminophen     Tablet .. 650 milliGRAM(s) Oral every 6 hours PRN Temp greater or equal to 38C (100.4F), Mild Pain (1 - 3)

## 2023-08-22 NOTE — DIETITIAN INITIAL EVALUATION ADULT - PROBLEM SELECTOR PLAN 2
pmhx of HFrEF   p/w ?Rt sided effusion and LE edema  pro- BNP 10K [baseline 4K]  Trop 130 > 350 > 375  s/p  mg   f/u repeat trop  EKG afib  repeat EKG: concern for Peaked T waves - repeat CMP neg for hyperkalemia  c/w tele and vitals q4

## 2023-08-22 NOTE — PROGRESS NOTE ADULT - SUBJECTIVE AND OBJECTIVE BOX
Interval events:  No acute events overnight.  Denies pain.    Allergies    No Known Allergies    Intolerances    Vital Signs Last 24 Hrs  T(C): 36.4 (22 Aug 2023 08:32), Max: 36.8 (21 Aug 2023 21:48)  T(F): 97.5 (22 Aug 2023 08:32), Max: 98.2 (21 Aug 2023 21:48)  HR: 65 (22 Aug 2023 08:32) (65 - 84)  BP: 130/54 (22 Aug 2023 08:32) (122/57 - 146/56)  BP(mean): --  RR: 17 (22 Aug 2023 08:32) (17 - 18)  SpO2: 96% (22 Aug 2023 08:32) (96% - 100%)    Parameters below as of 22 Aug 2023 08:32  Patient On (Oxygen Delivery Method): room air        MedsMEDICATIONS  (STANDING):  allopurinol 100 milliGRAM(s) Oral daily  ALPRAZolam 0.25 milliGRAM(s) Oral at bedtime  apixaban 2.5 milliGRAM(s) Oral every 12 hours  cefTRIAXone   IVPB 2000 milliGRAM(s) IV Intermittent every 24 hours  furosemide   Injectable 40 milliGRAM(s) IV Push daily  hydrALAZINE 50 milliGRAM(s) Oral three times a day  isosorbide   mononitrate ER Tablet (IMDUR) 30 milliGRAM(s) Oral daily  metoprolol tartrate 25 milliGRAM(s) Oral every 12 hours  metroNIDAZOLE  IVPB      metroNIDAZOLE  IVPB 500 milliGRAM(s) IV Intermittent every 8 hours  polyethylene glycol 3350 17 Gram(s) Oral at bedtime  risperiDONE   Tablet 1 milliGRAM(s) Oral at bedtime  simvastatin 20 milliGRAM(s) Oral at bedtime    MEDICATIONS  (PRN):  acetaminophen     Tablet .. 650 milliGRAM(s) Oral every 6 hours PRN Temp greater or equal to 38C (100.4F), Mild Pain (1 - 3)      PHYSICAL EXAM:  GENERAL: NAD, well-groomed, well-developed  NEURO: Responds to name, awake and alert. Speaks Bangladeshi  LUNG: Lungs dim b/l, worse on right, crackles at R base, no rhonchi/wheezing  HEART: S1, S2, no S3 or S4. Regular rate and rhythm. No murmurs, gallops, or rubs. No JVD  ABDOMEN: Bowel sounds present, abd Soft, Nontender, Nondistended  EXTREMITIES:  2+ Peripheral Pulses b/l, No clubbing, cyanosis, or edema  SKIN: No rashes or lesions    Consultant(s) Notes Reviewed:  [x ] YES  [ ] NO  Care Discussed with Consultants/Other Providers [ x] YES  [ ] NO    LABS:                        10.6   9.83  )-----------( 206      ( 22 Aug 2023 08:29 )             33.8     08-22    137  |  101  |  48<H>  ----------------------------<  145<H>  3.8   |  29  |  1.93<H>    Ca    8.9      22 Aug 2023 08:29    TPro  6.2  /  Alb  2.7<L>  /  TBili  0.3  /  DBili  x   /  AST  25  /  ALT  45  /  AlkPhos  71  08-21        RADIOLOGY & ADDITIONAL TESTS:    EKG:

## 2023-08-22 NOTE — PROGRESS NOTE ADULT - PROBLEM SELECTOR PLAN 2
continue Ceftriaxone 2g and flagyl  supplemental oxygen as needed   ID Dr. Garcia on board rec R indu, however patient is high risk given advanced age and comorbidities  Discussed with IR, they would need AC held for 3 days  Discussed with daughter, she is amendable to AIDEN or thora  Repeat cxray yesterday possibly slightly better vs unchanged  Repeat noncon chest CT

## 2023-08-22 NOTE — DIETITIAN INITIAL EVALUATION ADULT - PROBLEM SELECTOR PLAN 1
p/w fever and generalized weakness  CXR:  Moderate RIGHT effusion and/or basilar airspace consolidation.  in ED: temp 101.2, HR 81, /53, Sat 93% on RA  leukocytosis of 21.25 with left shift  Lactate 2.9  s/p cefepime by ED  f/u u/a, urine and blood cultures  f/u repeat lactate trended down to 1.5  f/u Chest CT non-con   f/u ESR, CRP, Procal, legionella, strep pneumo and sputum culture  c/w Miriam   ID Dr. Garcia consulted

## 2023-08-22 NOTE — PROGRESS NOTE ADULT - PROBLEM SELECTOR PLAN 1
source likely lung   8/16 blood culture growing strep pyogenes   ID Dr Garcia on board, rec TTE and R thora  c/w Ceftriaxone 2 g and IV flagyl  Initial urine culture contam, repeat UC neg.  8/18 repeat blood cultures no growth

## 2023-08-22 NOTE — DIETITIAN INITIAL EVALUATION ADULT - FACTORS AFF FOOD INTAKE
acute on chronic comorbidities including CHF, CKD,/change in mental status/difficulty with food procurement/preparation/[employer]/ethnic/cultural/personal food preferences

## 2023-08-22 NOTE — PROGRESS NOTE ADULT - ASSESSMENT
Patient is a 89y old  Male from home, ambulating independently, pmhx of HFrEFm Afib on Eliquis, s/p Micra PPM, CKD, HLD, BPH on chronic [place], now presents to the ER for evaluation of fever and generalized weakness. Pt was admitted on July 2023, for dyspnea on exertion, palpitations and LE swelling. As per the daughter the size of LE has been unchanged and pt has been complaint on the meds he was discharged from the hospital. On admission, he found to have fever, tachycardia, Leukocytosis, Positive UTI and Moderate RIGHT effusion and/or basilar airspace consolidation on CXR and CT chest. He has started on Ceftriaxone and Azithromycin, and the ID consult requested to assist with further evaluation and antibiotic management.    # Sepsis ( Fever + tachycardia + Leukocytosis)- resolved  # UTI-  WBC >50 /HPF   # Pneumonia with right pleural effusion - Not a candidate for thoracentesis since need to hold Eliquis for 5 days but patient cannot be off Eliquis , need to be on Eliquis  # Streptococcus pyogenes Bacteremia- 8/16- source most likely Lung- repeat Blood cultures X 2 from 8/18 have NGTD    would recommend:    1. Follow up repeat CT chest to reassess the size of Right pleural effusion  2. Continue IV Flagyl and Ceftriaxone 2 g daily to cover Bacteremia   3. TTE followed by AIDEN  IF WITHIN GOAL OF CARE  4. Monitor kidney function     d/w Covering NPLeif     Attending Attestation:    Spent more than 35 minutes on total encounter, more than 50 % of the visit was spent counseling and/or coordinating care by the Attending physician.

## 2023-08-22 NOTE — DIETITIAN INITIAL EVALUATION ADULT - NSFNSGIIOFT_GEN_A_CORE
KJO=582 lb   Wt data in EMR: 180.1 lb 7/14/23; 169 l 8/17/23; 184.3 lb 8/22/23, change may due to scale/fluid variance, diuretic Rx

## 2023-08-23 DIAGNOSIS — J90 PLEURAL EFFUSION, NOT ELSEWHERE CLASSIFIED: ICD-10-CM

## 2023-08-23 DIAGNOSIS — Z02.9 ENCOUNTER FOR ADMINISTRATIVE EXAMINATIONS, UNSPECIFIED: ICD-10-CM

## 2023-08-23 LAB
ANION GAP SERPL CALC-SCNC: 4 MMOL/L — LOW (ref 5–17)
BUN SERPL-MCNC: 50 MG/DL — HIGH (ref 7–18)
CALCIUM SERPL-MCNC: 8.4 MG/DL — SIGNIFICANT CHANGE UP (ref 8.4–10.5)
CHLORIDE SERPL-SCNC: 102 MMOL/L — SIGNIFICANT CHANGE UP (ref 96–108)
CO2 SERPL-SCNC: 28 MMOL/L — SIGNIFICANT CHANGE UP (ref 22–31)
CREAT SERPL-MCNC: 1.81 MG/DL — HIGH (ref 0.5–1.3)
CULTURE RESULTS: SIGNIFICANT CHANGE UP
CULTURE RESULTS: SIGNIFICANT CHANGE UP
EGFR: 34 ML/MIN/1.73M2 — LOW
GLUCOSE SERPL-MCNC: 118 MG/DL — HIGH (ref 70–99)
HCT VFR BLD CALC: 31.1 % — LOW (ref 39–50)
HGB BLD-MCNC: 9.9 G/DL — LOW (ref 13–17)
MCHC RBC-ENTMCNC: 28 PG — SIGNIFICANT CHANGE UP (ref 27–34)
MCHC RBC-ENTMCNC: 31.8 GM/DL — LOW (ref 32–36)
MCV RBC AUTO: 88.1 FL — SIGNIFICANT CHANGE UP (ref 80–100)
NRBC # BLD: 0 /100 WBCS — SIGNIFICANT CHANGE UP (ref 0–0)
PLATELET # BLD AUTO: 204 K/UL — SIGNIFICANT CHANGE UP (ref 150–400)
POTASSIUM SERPL-MCNC: 3.8 MMOL/L — SIGNIFICANT CHANGE UP (ref 3.5–5.3)
POTASSIUM SERPL-SCNC: 3.8 MMOL/L — SIGNIFICANT CHANGE UP (ref 3.5–5.3)
RBC # BLD: 3.53 M/UL — LOW (ref 4.2–5.8)
RBC # FLD: 13.4 % — SIGNIFICANT CHANGE UP (ref 10.3–14.5)
SODIUM SERPL-SCNC: 134 MMOL/L — LOW (ref 135–145)
SPECIMEN SOURCE: SIGNIFICANT CHANGE UP
SPECIMEN SOURCE: SIGNIFICANT CHANGE UP
WBC # BLD: 9.62 K/UL — SIGNIFICANT CHANGE UP (ref 3.8–10.5)
WBC # FLD AUTO: 9.62 K/UL — SIGNIFICANT CHANGE UP (ref 3.8–10.5)

## 2023-08-23 RX ORDER — HEPARIN SODIUM 5000 [USP'U]/ML
5000 INJECTION INTRAVENOUS; SUBCUTANEOUS EVERY 12 HOURS
Refills: 0 | Status: DISCONTINUED | OUTPATIENT
Start: 2023-08-23 | End: 2023-08-26

## 2023-08-23 RX ADMIN — POLYETHYLENE GLYCOL 3350 17 GRAM(S): 17 POWDER, FOR SOLUTION ORAL at 21:46

## 2023-08-23 RX ADMIN — Medication 25 MILLIGRAM(S): at 19:23

## 2023-08-23 RX ADMIN — Medication 100 MILLIGRAM(S): at 21:47

## 2023-08-23 RX ADMIN — Medication 100 MILLIGRAM(S): at 12:43

## 2023-08-23 RX ADMIN — SIMVASTATIN 20 MILLIGRAM(S): 20 TABLET, FILM COATED ORAL at 21:48

## 2023-08-23 RX ADMIN — Medication 100 MILLIGRAM(S): at 15:05

## 2023-08-23 RX ADMIN — Medication 50 MILLIGRAM(S): at 06:28

## 2023-08-23 RX ADMIN — Medication 100 MILLIGRAM(S): at 06:28

## 2023-08-23 RX ADMIN — Medication 40 MILLIGRAM(S): at 06:28

## 2023-08-23 RX ADMIN — Medication 0.25 MILLIGRAM(S): at 21:46

## 2023-08-23 RX ADMIN — APIXABAN 2.5 MILLIGRAM(S): 2.5 TABLET, FILM COATED ORAL at 06:27

## 2023-08-23 RX ADMIN — ISOSORBIDE MONONITRATE 30 MILLIGRAM(S): 60 TABLET, EXTENDED RELEASE ORAL at 12:44

## 2023-08-23 RX ADMIN — Medication 50 MILLIGRAM(S): at 15:05

## 2023-08-23 RX ADMIN — RISPERIDONE 1 MILLIGRAM(S): 4 TABLET ORAL at 21:47

## 2023-08-23 RX ADMIN — CEFTRIAXONE 100 MILLIGRAM(S): 500 INJECTION, POWDER, FOR SOLUTION INTRAMUSCULAR; INTRAVENOUS at 04:10

## 2023-08-23 RX ADMIN — HEPARIN SODIUM 5000 UNIT(S): 5000 INJECTION INTRAVENOUS; SUBCUTANEOUS at 19:24

## 2023-08-23 RX ADMIN — Medication 25 MILLIGRAM(S): at 06:27

## 2023-08-23 RX ADMIN — Medication 50 MILLIGRAM(S): at 21:47

## 2023-08-23 NOTE — PROGRESS NOTE ADULT - SUBJECTIVE AND OBJECTIVE BOX
Date of Service 08-23-23 @ 10:14    CHIEF COMPLAINT:Patient is a 89y old  Male who presents with a chief complaint of PNA and ?effusion.Pt appears comfortable.    	  REVIEW OF SYSTEMS:  CONSTITUTIONAL: No fever, weight loss, or fatigue  EYES: No eye pain, visual disturbances, or discharge  ENT:  No difficulty hearing, tinnitus, vertigo; No sinus or throat pain  NECK: No pain or stiffness  RESPIRATORY: No cough, wheezing, chills or hemoptysis; No Shortness of Breath  CARDIOVASCULAR: No chest pain, palpitations, passing out, dizziness, or leg swelling  GASTROINTESTINAL: No abdominal or epigastric pain. No nausea, vomiting, or hematemesis; No diarrhea or constipation. No melena or hematochezia.  GENITOURINARY: No dysuria, frequency, hematuria, or incontinence  NEUROLOGICAL: No headaches, memory loss, loss of strength, numbness, or tremors  SKIN: No itching, burning, rashes, or lesions   LYMPH Nodes: No enlarged glands  ENDOCRINE: No heat or cold intolerance; No hair loss  MUSCULOSKELETAL: No joint pain or swelling; No muscle, back, or extremity pain  PSYCHIATRIC: No depression, anxiety, mood swings, or difficulty sleeping  HEME/LYMPH: No easy bruising, or bleeding gums  ALLERGY AND IMMUNOLOGIC: No hives or eczema	      PHYSICAL EXAM:  T(C): 36.6 (08-23-23 @ 08:10), Max: 37 (08-22-23 @ 20:28)  HR: 65 (08-23-23 @ 08:10) (65 - 98)  BP: 114/58 (08-23-23 @ 08:10) (108/44 - 144/61)  RR: 17 (08-23-23 @ 08:10) (17 - 18)  SpO2: 96% (08-23-23 @ 08:10) (94% - 98%)  Wt(kg): --  I&O's Summary    22 Aug 2023 07:01  -  23 Aug 2023 07:00  --------------------------------------------------------  IN: 498 mL / OUT: 3000 mL / NET: -2502 mL        Appearance: Normal	  HEENT:   Normal oral mucosa, PERRL, EOMI	  Lymphatic: No lymphadenopathy  Cardiovascular: Normal S1 S2, No JVD, No murmurs, No edema  Respiratory: Dec BS at bases	  Psychiatry: A & O x 3, Mood & affect appropriate  Gastrointestinal:  Soft, Non-tender, + BS	  Skin: No rashes, No ecchymoses, No cyanosis	  Neurologic: Non-focal  Extremities: Normal range of motion, No clubbing, cyanosis or edema  Vascular: Peripheral pulses palpable 2+ bilaterally    MEDICATIONS  (STANDING):  allopurinol 100 milliGRAM(s) Oral daily  ALPRAZolam 0.25 milliGRAM(s) Oral at bedtime  cefTRIAXone   IVPB 2000 milliGRAM(s) IV Intermittent every 24 hours  furosemide   Injectable 40 milliGRAM(s) IV Push daily  hydrALAZINE 50 milliGRAM(s) Oral three times a day  isosorbide   mononitrate ER Tablet (IMDUR) 30 milliGRAM(s) Oral daily  metoprolol tartrate 25 milliGRAM(s) Oral every 12 hours  metroNIDAZOLE  IVPB 500 milliGRAM(s) IV Intermittent every 8 hours  metroNIDAZOLE  IVPB      polyethylene glycol 3350 17 Gram(s) Oral at bedtime  risperiDONE   Tablet 1 milliGRAM(s) Oral at bedtime  simvastatin 20 milliGRAM(s) Oral at bedtime      TELEMETRY: afib,intermittent v paced	    	  	  LABS:	 	                        9.9    9.62  )-----------( 204      ( 23 Aug 2023 07:50 )             31.1     08-23    134<L>  |  102  |  50<H>  ----------------------------<  118<H>  3.8   |  28  |  1.81<H>    Ca    8.4      23 Aug 2023 07:50      < from: CT Chest No Cont (08.22.23 @ 15:35) >    ACC: [id number] EXAM:  CT CHEST   ORDERED BY:  ALYSHA MAYORGA     PROCEDURE DATE:  08/22/2023          INTERPRETATION:  CLINICAL INFORMATION: Follow-up pneumonia and right   pleural effusion    COMPARISON: CT chest 8/17/2023, CT abdomen and pelvis 3/13/2022.    CONTRAST/COMPLICATIONS:  IV Contrast: NONE  Oral Contrast: NONE  Complications: None reported at time of study completion    PROCEDURE:  CT of the Chest was performed.  Sagittal and coronal reformats were performed.    FINDINGS:    LUNGSAND AIRWAYS: Proximal trachea secretions.  Partial atelectasis of   the right upper and middle lobes, and complete atelectasis of the right   lower lobe, increased from 8/17/2023. Left lower lobe subsegmental   atelectasis.  PLEURA: Large right and small left pleural effusions, increased from   8/17/2023.  MEDIASTINUM AND OMA: Mediastinal lymph nodes are unchanged in size from   8/17/2023, for example in inferior right paratracheal node measures 1.6 x   1.4 cm (3-62).  VESSELS: Calcified plaque within the aorta and coronary arteries.  HEART: Leadless pacemaker in the right ventricle. Mild aortic valvular   and mitral annular calcifications. Heart size is enlarged. Small   circumferential pericardial effusion, unchanged. Hypodensity of the blood   pool in relation to left ventricular myocardium suggests underlying   anemia.  CHEST WALL AND LOWER NECK: Heterogeneous thyroid gland.  VISUALIZED UPPER ABDOMEN: Hepatic cysts and subcentimeter hypodense foci   too small to characterize. Partially visualized numerous renal cysts and   left hydronephrosis, similar in appearance to 3/13/2022. An indeterminate   1.1 cm right renal hyperdensity is unchanged from 3/13/2022.  BONES: Mild degenerative changes of the spine. Small right posterior   intramuscular lipoma.    IMPRESSION:  Increased large right and small left pleural effusions since 8/17/2023,   with worsening, now completely right lower lobe atelectasis.    No pneumonia on this noncontrast CT.    --- End of Report ---           MAGUI STOUT MD; Resident Radiologist  This document has been electronically signed.  BERRY SCHUMACHER M.D., Attending Radiologist    < end of copied text >  < from: Transthoracic Echocardiogram (08.22.23 @ 15:12) >  OBSERVATIONS:  Mitral Valve: Mitral annular calcification. Mild mitral  regurgitation.  Aortic Root: Aortic Root: 3.0 cm.    Aortic Valve: Calcified trileaflet aortic valve with normal  opening. Mild aortic regurgitation.  Left Atrium: Severely dilated left atrium.  LA volume index  = 54 cc/m2.  Left Ventricle: Normal Left Ventricular Systolic Function,  (EF = 55 to 60%) Mild concentric left ventricular  hypertrophy. Unable to adequately assess diastolic function  due to technical aspects of this study.  Right Heart: Normal right atrium. Normal right ventricular  size and systolic function (TAPSE  2.9cm). There is mild  tricuspid regurgitation. There is trace pulmonic  regurgitation.  Pericardium/PleuraSmall pericardial effusion. Right pleural  effusion. Spherical hypoechoic area is incidentally noted  in the liver, probably representing hepatic cyst. Consider  dedicated hepatic ultrasound for further evaluation if  clinically indicated.  Hemodynamic: RA Pressure is 8 mm Hg. RV systolic pressure  is moderately increased at  57 mm Hg.  ------------------------------------------------------------------------    < end of copied text >

## 2023-08-23 NOTE — PROGRESS NOTE ADULT - SUBJECTIVE AND OBJECTIVE BOX
Patient is a 89y old  Male who presents with a chief complaint of PNA and ?effusion (23 Aug 2023 10:11)  Seen and examined with Cymro  Corinne ID # [id number]    OVERNIGHT EVENTS: no acute events overnight, offering no new complaints     REVIEW OF SYSTEMS:  CONSTITUTIONAL: No fever, chills  NECK: No pain or stiffness  RESPIRATORY: No cough, SOB  CARDIOVASCULAR: No chest pain, palpitations  GASTROINTESTINAL: No abdominal pain. No nausea, vomiting, or diarrhea  GENITOURINARY: No dysuria  NEUROLOGICAL: No HA  MUSCULOSKELETAL: No joint pain or swelling      T(C): 36.6 (08-23-23 @ 08:10), Max: 37 (08-22-23 @ 20:28)  HR: 65 (08-23-23 @ 08:10) (65 - 98)  BP: 114/58 (08-23-23 @ 08:10) (108/44 - 144/61)  RR: 17 (08-23-23 @ 08:10) (17 - 18)  SpO2: 96% (08-23-23 @ 08:10) (94% - 98%)  Wt(kg): --Vital Signs Last 24 Hrs  T(C): 36.6 (23 Aug 2023 08:10), Max: 37 (22 Aug 2023 20:28)  T(F): 97.9 (23 Aug 2023 08:10), Max: 98.6 (22 Aug 2023 20:28)  HR: 65 (23 Aug 2023 08:10) (65 - 98)  BP: 114/58 (23 Aug 2023 08:10) (108/44 - 144/61)  BP(mean): --  RR: 17 (23 Aug 2023 08:10) (17 - 18)  SpO2: 96% (23 Aug 2023 08:10) (94% - 98%)    Parameters below as of 23 Aug 2023 08:10  Patient On (Oxygen Delivery Method): room air    MEDICATIONS  (STANDING):  allopurinol 100 milliGRAM(s) Oral daily  ALPRAZolam 0.25 milliGRAM(s) Oral at bedtime  cefTRIAXone   IVPB 2000 milliGRAM(s) IV Intermittent every 24 hours  furosemide   Injectable 40 milliGRAM(s) IV Push daily  heparin   Injectable 5000 Unit(s) SubCutaneous every 12 hours  hydrALAZINE 50 milliGRAM(s) Oral three times a day  isosorbide   mononitrate ER Tablet (IMDUR) 30 milliGRAM(s) Oral daily  metoprolol tartrate 25 milliGRAM(s) Oral every 12 hours  metroNIDAZOLE  IVPB 500 milliGRAM(s) IV Intermittent every 8 hours  metroNIDAZOLE  IVPB      polyethylene glycol 3350 17 Gram(s) Oral at bedtime  risperiDONE   Tablet 1 milliGRAM(s) Oral at bedtime  simvastatin 20 milliGRAM(s) Oral at bedtime    MEDICATIONS  (PRN):  acetaminophen     Tablet .. 650 milliGRAM(s) Oral every 6 hours PRN Temp greater or equal to 38C (100.4F), Mild Pain (1 - 3)      PHYSICAL EXAM:  GENERAL: NAD  EYES: clear conjunctiva  ENMT: Moist mucous membranes  NECK: Supple, No JVD  CHEST/LUNG: Clear to auscultation bilaterally; No rales, rhonchi, wheezing, or rubs  HEART: S1, S2, Regular rate and rhythm  ABDOMEN: Soft, Nontender, Nondistended; Bowel sounds present  NEURO: Alert & Oriented X3  EXTREMITIES: No LE edema, no calf tenderness  SKIN: No rashes or lesions  : salmon with clear yellow urine     Consultant(s) Notes Reviewed:  [x ] YES  [ ] NO  Care Discussed with Consultants/Other Providers [ x] YES  [ ] NO    LABS:                        9.9    9.62  )-----------( 204      ( 23 Aug 2023 07:50 )             31.1     08-23    134<L>  |  102  |  50<H>  ----------------------------<  118<H>  3.8   |  28  |  1.81<H>    Ca    8.4      23 Aug 2023 07:50    CAPILLARY BLOOD GLUCOSE  Urinalysis Basic - ( 23 Aug 2023 07:50 )    Color: x / Appearance: x / SG: x / pH: x  Gluc: 118 mg/dL / Ketone: x  / Bili: x / Urobili: x   Blood: x / Protein: x / Nitrite: x   Leuk Esterase: x / RBC: x / WBC x   Sq Epi: x / Non Sq Epi: x / Bacteria: x    RADIOLOGY & ADDITIONAL TESTS:  < from: CT Chest No Cont (08.22.23 @ 15:35) >    ACC: [id number] EXAM:  CT CHEST   ORDERED BY:  ALYSHA MAYORGA     PROCEDURE DATE:  08/22/2023          INTERPRETATION:  CLINICAL INFORMATION: Follow-up pneumonia and right   pleural effusion    COMPARISON: CT chest 8/17/2023, CT abdomen and pelvis 3/13/2022.    CONTRAST/COMPLICATIONS:  IV Contrast: NONE  Oral Contrast: NONE  Complications: None reported at time of study completion    PROCEDURE:  CT of the Chest was performed.  Sagittal and coronal reformats were performed.    FINDINGS:    LUNGSAND AIRWAYS: Proximal trachea secretions.  Partial atelectasis of   the right upper and middle lobes, and complete atelectasis of the right   lower lobe, increased from 8/17/2023. Left lower lobe subsegmental   atelectasis.  PLEURA: Large right and small left pleural effusions, increased from   8/17/2023.  MEDIASTINUM AND OMA: Mediastinal lymph nodes are unchanged in size from   8/17/2023, for example in inferior right paratracheal node measures 1.6 x   1.4 cm (3-62).  VESSELS: Calcified plaque within the aorta and coronary arteries.  HEART: Leadless pacemaker in the right ventricle. Mild aortic valvular   and mitral annular calcifications. Heart size is enlarged. Small   circumferential pericardial effusion, unchanged. Hypodensity of the blood   pool in relation to left ventricular myocardium suggests underlying   anemia.  CHEST WALL AND LOWER NECK: Heterogeneous thyroid gland.  VISUALIZED UPPER ABDOMEN: Hepatic cysts and subcentimeter hypodense foci   too small to characterize. Partially visualized numerous renal cysts and   left hydronephrosis, similar in appearance to 3/13/2022. An indeterminate   1.1 cm right renal hyperdensity is unchanged from 3/13/2022.  BONES: Mild degenerative changes of the spine. Small right posterior   intramuscular lipoma.    IMPRESSION:  Increased large right and small left pleural effusions since 8/17/2023,   with worsening, now completely right lower lobe atelectasis.    No pneumonia on this noncontrast CT.      < end of copied text >

## 2023-08-23 NOTE — PROGRESS NOTE ADULT - ASSESSMENT
Patient is a 89y old  Male from home, ambulating independently, pmhx of HFrEFm Afib on Eliquis, s/p Micra PPM, CKD, HLD, BPH on chronic Chinchilla, now presents to the ER for evaluation of fever and generalized weakness. Pt was admitted on July 2023, for dyspnea on exertion, palpitations and LE swelling. As per the daughter the size of LE has been unchanged and pt has been complaint on the meds he was discharged from the hospital. On admission, he found to have fever, tachycardia, Leukocytosis, Positive UTI and Moderate RIGHT effusion and/or basilar airspace consolidation on CXR and CT chest. He has started on Ceftriaxone and Azithromycin, and the ID consult requested to assist with further evaluation and antibiotic management.    # Sepsis ( Fever + tachycardia + Leukocytosis)- resolved  # UTI-  WBC >50 /HPF   # Pneumonia with right pleural effusion - Not a candidate for thoracentesis since need to hold Eliquis for 5 days but patient cannot be off Eliquis , need to be on Eliquis-  CT chest from 8/22 shows  Increased large right and small left pleural effusions since 8/17/2023, with worsening, now completely right lower lobe atelectasis.   # Streptococcus pyogenes Bacteremia- 8/16- source most likely Lung- repeat Blood cultures X 2 from 8/18 have NGTD- The TTE from 8/22 shows no vegetation.     would recommend:    1. Agree with IR guided Right Thoracentesis on  Friday, 8/25/23  2. Continue IV Flagyl and Ceftriaxone 2 g daily to cover Bacteremia   3. AIDEN  IF WITHIN GOAL OF CARE  4. Monitor kidney function     d/w Covering NP, Willem    Attending Attestation:    Spent more than 35 minutes on total encounter, more than 50 % of the visit was spent counseling and/or coordinating care by the Attending physician.

## 2023-08-23 NOTE — PROGRESS NOTE ADULT - PROBLEM SELECTOR PLAN 2
-repeat CT with Increasing  large right pleural effusion   -cont IV Lasix  -IR consulted pt for right thora on Friday   -hold Eliquis today -repeat CT with Increasing  large right pleural effusion   -cont IV Lasix  -IR consulted pt for right thora on Friday   -hold Eliquis prior to thora

## 2023-08-23 NOTE — PROGRESS NOTE ADULT - PROBLEM SELECTOR PLAN 1
-Streptococcus pyogenes bacteremia, likely source lungs   -Echo negative for vegetation   -cont Ceftriaxone and Flagyl   -repeat cultures NTD   -ID Dr. Garcia No

## 2023-08-23 NOTE — PROGRESS NOTE ADULT - ASSESSMENT
89 yrs old from home, ambulating independently, pmhx of HFrEFm Afib on Eliquis, s/p Micra PPM, CKD, HLD, BPH on chronic Salmon, presented with fever and generalized weakness. In ED found to be febrile, tachycardic, CXR with Moderate right  effusion and/or basilar airspace consolidation. leukocytosis of 21.25 with left shift, Lactate 2.9 Urinalysis positive. Chronic salmon in place. Pt was admitted for sepsis likely due to UTI and ?PNA. Started on Ceftriaxone and Zithromax    CT Chest showed  Moderate to large right and small left pleural effusion with atelectasis. Pt was noted with strep pyogens bacteremia, Echo with no evidence of vegetation.  ID Dr. Garcia following. Repeat blood cultures negative.  Hospital course complicated with increasing large right pleural effusion, IR consulted, Pt for right thoracentesis on Friday. Eliquis placed on hold 8/23

## 2023-08-23 NOTE — PROGRESS NOTE ADULT - SUBJECTIVE AND OBJECTIVE BOX
Patient is seen and examined at the bed side, is afebrile.  The CT chest from  has been reviewed. The TTE from  shows no vegetation.       REVIEW OF SYSTEMS: All other review systems are negative      ALLERGIES: No Known Allergies      Vital Signs Last 24 Hrs  T(C): 36.9 (23 Aug 2023 16:19), Max: 37 (22 Aug 2023 20:28)  T(F): 98.4 (23 Aug 2023 16:19), Max: 98.6 (22 Aug 2023 20:28)  HR: 70 (23 Aug 2023 16:19) (65 - 98)  BP: 121/49 (23 Aug 2023 16:19) (113/56 - 144/61)  BP(mean): --  RR: 18 (23 Aug 2023 16:19) (17 - 18)  SpO2: 96% (23 Aug 2023 16:19) (94% - 97%)    Parameters below as of 23 Aug 2023 16:19  Patient On (Oxygen Delivery Method): room air        PHYSICAL EXAM:  GENERAL: Not in distress   CHEST/LUNG: Not using accessory muscles   HEART: s1 and s2 present  ABDOMEN:  Nontender and  Nondistended  EXTREMITIES: No pedal  edema, both feet are moist, not dry anymore  CNS: Awake and Alert      LABS:                         9.9    9.62  )-----------( 204      ( 23 Aug 2023 07:50 )             31.1                           10.6   9.83  )-----------( 206      ( 22 Aug 2023 08:29 )             33.8         08-23    134<L>  |  102  |  50<H>  ----------------------------<  118<H>  3.8   |  28  |  1.81<H>    Ca    8.4      23 Aug 2023 07:50      08-22    137  |  101  |  48<H>  ----------------------------<  145<H>  3.8   |  29  |  1.93<H>    Ca    8.9      22 Aug 2023 08:29    TPro  6.2  /  Alb  2.7<L>  /  TBili  0.3  /  DBili  x   /  AST  25  /  ALT  45  /  AlkPhos  71  08-21    PT/INR - ( 16 Aug 2023 18:30 )   PT: 15.6 sec;   INR: 1.38 ratio      PTT - ( 16 Aug 2023 18:30 )  PTT:30.5 sec      CAPILLARY BLOOD GLUCOSE  POCT Blood Glucose.: 153 mg/dL (16 Aug 2023 18:17)      Urinalysis Basic - ( 17 Aug 2023 08:04 )  Color: Yellow / Appearance: Turbid / S.014 / pH: x  Gluc: x / Ketone: Trace mg/dL  / Bili: Negative / Urobili: 1.0 mg/dL   Blood: x / Protein: 300 mg/dL / Nitrite: Negative   Leuk Esterase: Large / RBC: >50 /HPF / WBC >50 /HPF   Sq Epi: x / Non Sq Epi: x / Bacteria: Many /HPF      MEDICATIONS  (STANDING):  allopurinol 100 milliGRAM(s) Oral daily  ALPRAZolam 0.25 milliGRAM(s) Oral at bedtime  cefTRIAXone   IVPB 2000 milliGRAM(s) IV Intermittent every 24 hours  furosemide   Injectable 40 milliGRAM(s) IV Push daily  heparin   Injectable 5000 Unit(s) SubCutaneous every 12 hours  hydrALAZINE 50 milliGRAM(s) Oral three times a day  isosorbide   mononitrate ER Tablet (IMDUR) 30 milliGRAM(s) Oral daily  metoprolol tartrate 25 milliGRAM(s) Oral every 12 hours  metroNIDAZOLE  IVPB      metroNIDAZOLE  IVPB 500 milliGRAM(s) IV Intermittent every 8 hours  polyethylene glycol 3350 17 Gram(s) Oral at bedtime  risperiDONE   Tablet 1 milliGRAM(s) Oral at bedtime  simvastatin 20 milliGRAM(s) Oral at bedtime      23  : Transthoracic Echocardiogram (23 @ 15:12) >  ------------------------------------------------------------------------  CONCLUSIONS:  1. Mitral annular calcification. Mild mitral regurgitation.    2. Calcified trileaflet aortic valve with normal opening. Mild aortic regurgitation.  3. Aortic Root: 3.0 cm.    4. Severely dilated left atrium.  LA volume index = 54 cc/m2.  5. Mild concentric left ventricular hypertrophy.  6. Normal Left Ventricular Systolic Function,  (EF = 55 to  60%)  7. Unable to adequately assess diastolic function due to technical aspects of this study.  8. Normal right atrium.  9. Normal right ventricular size and systolic function (TAPSE  2.9cm).  10. RA Pressure is 8 mm Hg.  11. RV systolic pressure is moderately increased at  57 mm Hg.  12. There is mild tricuspid regurgitation.  13. There is trace pulmonic regurgitation.  14. Small pericardial effusion.  15. Right pleural effusion.  16. Spherical hypoechoic area is incidentally noted in the liver, probably representing hepatic cyst. Consider  dedicated hepatic ultrasound for further evaluation if clinically indicated.      RADIOLOGY & ADDITIONAL TESTS:    23 : CT Chest No Cont (23 @ 15:35) Increased large right and small left pleural effusions since 2023,  with worsening, now completely right lower lobe atelectasis.   No pneumonia on this noncontrast CT.      23 : Xray Chest 1 View- PORTABLE-Routine (Xray Chest 1 View- PORTABLE-Routine .) (23 @ 12:39) >  LUNGS: There is opacity at the right base compatible with effusion/infiltrate. When taking into account difference in technique, there is no significant change.  BONES: degenerative changes      23 : CT Chest No Cont (23 @ 01:47) Moderate to large right and small left pleural effusion with atelectasis,   increased since 3/13/2022. Nonspecific ground glass opacity and interlobular septal thickening at the right lung. Recommend clinical   correlation to assess underlying pneumonia. Nonspecific mediastinal lymphadenopathy.      23 : Xray Chest 1 View- PORTABLE-Urgent (23 @ 18:48) IMPRESSION:   Moderate RIGHT effusion and/or basilar airspace consolidation.      MICROBIOLOGY DATA:    Culture - Blood in AM (23 @ 08:15)   Specimen Source: .Blood Blood-Peripheral  Culture Results: No growth at 5 days    Culture - Blood in AM (23 @ 08:05)   Specimen Source: .Blood Blood-Peripheral  Culture Results: No growth at 5 days      Legionella pneumophila Antigen, Urine (23 @ 22:40)   Legionella Antigen, Urine: Negative      Culture - Blood (23 @ 18:35)   Gram Stain:   Growth in aerobic bottle: Gram Positive Cocci in Pairs and Chains   Growth in anaerobic bottle: Gram Positive Cocci in Pairs and Chains  - Vancomycin: S 0.5  - Penicillin: S <=0.03 Predicts results for ampicillin, amoxicillin, amoxicillin/clavulanate, ampicillin/sulbactam, 1st, 2nd and 3rd generation cephalosporins and carbapenems.  - Ceftriaxone: S <=0.25  Specimen Source: .Blood Blood-Peripheral  Organism: Streptococcus pyogenes (Group A)  Culture Results:   Growth in aerobic and anaerobic bottles: Streptococcus pyogenes (Group A)  Organism Identification: Streptococcus pyogenes (Group A)  Method Type: NOEMY      Culture - Blood (23 @ 18:30)   Gram Stain:   Growth in aerobic bottle: Gram Positive Cocci in Pairs and Chains  - Streptococcus pyogenes (Group A): Detec  Specimen Source: .Blood Blood-Peripheral  Organism: Blood Culture PCR  Culture Results:   Growth in aerobic bottle: Gram Positive Cocci in Pairs and Chains   Direct identification is available within approximately 3-5   hours either by Blood Panel Multiplexed PCR or Direct   MALDI-TOF. Details: [email or web address]  Organism Identification: Blood Culture PCR  Method Type: PCRRapid HIV-1/2 Antibody (23 @ 18:30)   Rapid HIV-1/2 Antibody: Nonreact

## 2023-08-23 NOTE — PROGRESS NOTE ADULT - ASSESSMENT
89 yrs old from home, ambulating independently, pmhx of HFrEFm Afib on Eliquis, s/p Micra PPM, CKD, HLD, BPH on chronic Salmon, presented with fever and generalized weakness,sepsis,pneumonia,chronic diastolic HF and strep bactermia..  1.Sepsis due to strep bactermia-abx.Repeat blood cx -neg.  2.Pneumonia -abx as per ID.  3.Chronic diastolic HF-IV lasix.  4.CRI-f/u lytes.  5.Afib-NOAC on hold.lopressor.  6.BPH with chronic salmon-urince cx-.  7.D/W Bridgett PATEL for Rt thoracentesis on Friday will d/c eliquis.  8.GI and DVT prophylaxis.  9.D/W pt's daughter agree with above plan.

## 2023-08-24 ENCOUNTER — TRANSCRIPTION ENCOUNTER (OUTPATIENT)
Age: 88
End: 2023-08-24

## 2023-08-24 LAB
CULTURE RESULTS: SIGNIFICANT CHANGE UP
SPECIMEN SOURCE: SIGNIFICANT CHANGE UP

## 2023-08-24 RX ADMIN — Medication 50 MILLIGRAM(S): at 14:20

## 2023-08-24 RX ADMIN — SIMVASTATIN 20 MILLIGRAM(S): 20 TABLET, FILM COATED ORAL at 21:30

## 2023-08-24 RX ADMIN — ISOSORBIDE MONONITRATE 30 MILLIGRAM(S): 60 TABLET, EXTENDED RELEASE ORAL at 13:18

## 2023-08-24 RX ADMIN — Medication 50 MILLIGRAM(S): at 21:31

## 2023-08-24 RX ADMIN — Medication 100 MILLIGRAM(S): at 05:40

## 2023-08-24 RX ADMIN — Medication 100 MILLIGRAM(S): at 21:31

## 2023-08-24 RX ADMIN — CEFTRIAXONE 100 MILLIGRAM(S): 500 INJECTION, POWDER, FOR SOLUTION INTRAMUSCULAR; INTRAVENOUS at 03:50

## 2023-08-24 RX ADMIN — Medication 0.25 MILLIGRAM(S): at 21:30

## 2023-08-24 RX ADMIN — Medication 50 MILLIGRAM(S): at 05:38

## 2023-08-24 RX ADMIN — Medication 40 MILLIGRAM(S): at 05:39

## 2023-08-24 RX ADMIN — RISPERIDONE 1 MILLIGRAM(S): 4 TABLET ORAL at 21:30

## 2023-08-24 RX ADMIN — Medication 25 MILLIGRAM(S): at 17:50

## 2023-08-24 RX ADMIN — Medication 25 MILLIGRAM(S): at 05:38

## 2023-08-24 RX ADMIN — Medication 100 MILLIGRAM(S): at 14:20

## 2023-08-24 RX ADMIN — Medication 100 MILLIGRAM(S): at 13:18

## 2023-08-24 NOTE — PROGRESS NOTE ADULT - ASSESSMENT
89 yrs old from home, ambulating independently, pmhx of HFrEFm Afib on Eliquis, s/p Micra PPM, CKD, HLD, BPH on chronic Salmon, presented with fever and generalized weakness. In ED found to be febrile, tachycardic, CXR with Moderate right  effusion and/or basilar airspace consolidation. leukocytosis of 21.25 with left shift, Lactate 2.9 Urinalysis positive. Chronic salmon in place. Pt was admitted for sepsis likely due to UTI and ?PNA. Started on Ceftriaxone and Zithromax    CT Chest showed  Moderate to large right and small left pleural effusion with atelectasis. Pt was noted with strep pyogens bacteremia, Echo with no evidence of vegetation.  ID Dr. Garcia following. Repeat blood cultures negative. Pt will likely requires 4 weeks of abx from negative blood cultures   Hospital course complicated with increasing large right pleural effusion, IR consulted, Pt for right thoracentesis on Friday. Eliquis placed on hold 8/23 89 yrs old from home, ambulating independently, pmhx of HFrEFm Afib on Eliquis, s/p Micra PPM, CKD, HLD, BPH on chronic Salmon, presented with fever and generalized weakness. In ED found to be febrile, tachycardic, CXR with Moderate right  effusion and/or basilar airspace consolidation. leukocytosis of 21.25 with left shift, Lactate 2.9 Urinalysis positive. Chronic salmon in place. Pt was admitted for sepsis likely due to UTI and ?PNA. Started on Ceftriaxone and Zithromax    CT Chest showed  Moderate to large right and small left pleural effusion with atelectasis. Pt was noted with strep pyogens bacteremia, Echo with no evidence of vegetation.  ID Dr. Garcia following. Repeat blood cultures negative. Pt will likely requires 4 weeks of abx from day of thoracentesis   Hospital course complicated with increasing large right pleural effusion, IR consulted, Pt for right thoracentesis on Friday. Eliquis placed on hold 8/23

## 2023-08-24 NOTE — PROGRESS NOTE ADULT - SUBJECTIVE AND OBJECTIVE BOX
Patient is seen and examined at the bed side, is afebrile.  He is doing better, tolerating Abx well. The Family refusing AIDEN.      REVIEW OF SYSTEMS: All other review systems are negative      ALLERGIES: No Known Allergies      Vital Signs Last 24 Hrs  T(C): 36.6 (24 Aug 2023 15:59), Max: 36.8 (23 Aug 2023 19:33)  T(F): 97.9 (24 Aug 2023 15:59), Max: 98.2 (23 Aug 2023 19:33)  HR: 70 (24 Aug 2023 15:59) (65 - 75)  BP: 113/52 (24 Aug 2023 15:59) (106/45 - 130/49)  BP(mean): --  RR: 18 (24 Aug 2023 15:59) (18 - 19)  SpO2: 97% (24 Aug 2023 15:59) (94% - 97%)    Parameters below as of 24 Aug 2023 15:59  Patient On (Oxygen Delivery Method): room air        PHYSICAL EXAM:  GENERAL: Not in distress   CHEST/LUNG: Not using accessory muscles   HEART: s1 and s2 present  ABDOMEN:  Nontender and  Nondistended  EXTREMITIES: No pedal  edema, both feet are moist, not dry anymore  CNS: Awake and Alert      LABS: No new Labs                        9.9    9.62  )-----------( 204      ( 23 Aug 2023 07:50 )             31.1         08-23    134<L>  |  102  |  50<H>  ----------------------------<  118<H>  3.8   |  28  |  1.81<H>    Ca    8.4      23 Aug 2023 07:50      08-22    137  |  101  |  48<H>  ----------------------------<  145<H>  3.8   |  29  |  1.93<H>    Ca    8.9      22 Aug 2023 08:29    TPro  6.2  /  Alb  2.7<L>  /  TBili  0.3  /  DBili  x   /  AST  25  /  ALT  45  /  AlkPhos  71  08-21    PT/INR - ( 16 Aug 2023 18:30 )   PT: 15.6 sec;   INR: 1.38 ratio      PTT - ( 16 Aug 2023 18:30 )  PTT:30.5 sec      CAPILLARY BLOOD GLUCOSE  POCT Blood Glucose.: 153 mg/dL (16 Aug 2023 18:17)      Urinalysis Basic - ( 17 Aug 2023 08:04 )  Color: Yellow / Appearance: Turbid / S.014 / pH: x  Gluc: x / Ketone: Trace mg/dL  / Bili: Negative / Urobili: 1.0 mg/dL   Blood: x / Protein: 300 mg/dL / Nitrite: Negative   Leuk Esterase: Large / RBC: >50 /HPF / WBC >50 /HPF   Sq Epi: x / Non Sq Epi: x / Bacteria: Many /HPF      MEDICATIONS  (STANDING):    allopurinol 100 milliGRAM(s) Oral daily  ALPRAZolam 0.25 milliGRAM(s) Oral at bedtime  cefTRIAXone   IVPB 2000 milliGRAM(s) IV Intermittent every 24 hours  furosemide   Injectable 40 milliGRAM(s) IV Push daily  heparin   Injectable 5000 Unit(s) SubCutaneous every 12 hours  hydrALAZINE 50 milliGRAM(s) Oral three times a day  isosorbide   mononitrate ER Tablet (IMDUR) 30 milliGRAM(s) Oral daily  metoprolol tartrate 25 milliGRAM(s) Oral every 12 hours  metroNIDAZOLE  IVPB      metroNIDAZOLE  IVPB 500 milliGRAM(s) IV Intermittent every 8 hours  polyethylene glycol 3350 17 Gram(s) Oral at bedtime  risperiDONE   Tablet 1 milliGRAM(s) Oral at bedtime  simvastatin 20 milliGRAM(s) Oral at bedtime        23  : Transthoracic Echocardiogram (23 @ 15:12) >  ------------------------------------------------------------------------  CONCLUSIONS:  1. Mitral annular calcification. Mild mitral regurgitation.    2. Calcified trileaflet aortic valve with normal opening. Mild aortic regurgitation.  3. Aortic Root: 3.0 cm.    4. Severely dilated left atrium.  LA volume index = 54 cc/m2.  5. Mild concentric left ventricular hypertrophy.  6. Normal Left Ventricular Systolic Function,  (EF = 55 to  60%)  7. Unable to adequately assess diastolic function due to technical aspects of this study.  8. Normal right atrium.  9. Normal right ventricular size and systolic function (TAPSE  2.9cm).  10. RA Pressure is 8 mm Hg.  11. RV systolic pressure is moderately increased at  57 mm Hg.  12. There is mild tricuspid regurgitation.  13. There is trace pulmonic regurgitation.  14. Small pericardial effusion.  15. Right pleural effusion.  16. Spherical hypoechoic area is incidentally noted in the liver, probably representing hepatic cyst. Consider  dedicated hepatic ultrasound for further evaluation if clinically indicated.      RADIOLOGY & ADDITIONAL TESTS:    23 : CT Chest No Cont (23 @ 15:35) Increased large right and small left pleural effusions since 2023,  with worsening, now completely right lower lobe atelectasis.   No pneumonia on this noncontrast CT.      23 : Xray Chest 1 View- PORTABLE-Routine (Xray Chest 1 View- PORTABLE-Routine .) (23 @ 12:39) >  LUNGS: There is opacity at the right base compatible with effusion/infiltrate. When taking into account difference in technique, there is no significant change.  BONES: degenerative changes      23 : CT Chest No Cont (23 @ 01:47) Moderate to large right and small left pleural effusion with atelectasis,   increased since 3/13/2022. Nonspecific ground glass opacity and interlobular septal thickening at the right lung. Recommend clinical   correlation to assess underlying pneumonia. Nonspecific mediastinal lymphadenopathy.      23 : Xray Chest 1 View- PORTABLE-Urgent (23 @ 18:48) IMPRESSION:   Moderate RIGHT effusion and/or basilar airspace consolidation.      MICROBIOLOGY DATA:    Culture - Blood in AM (23 @ 08:15)   Specimen Source: .Blood Blood-Peripheral  Culture Results: No growth at 5 days    Culture - Blood in AM (23 @ 08:05)   Specimen Source: .Blood Blood-Peripheral  Culture Results: No growth at 5 days      Legionella pneumophila Antigen, Urine (23 @ 22:40)   Legionella Antigen, Urine: Negative      Culture - Blood (23 @ 18:35)   Gram Stain:   Growth in aerobic bottle: Gram Positive Cocci in Pairs and Chains   Growth in anaerobic bottle: Gram Positive Cocci in Pairs and Chains  - Vancomycin: S 0.5  - Penicillin: S <=0.03 Predicts results for ampicillin, amoxicillin, amoxicillin/clavulanate, ampicillin/sulbactam, 1st, 2nd and 3rd generation cephalosporins and carbapenems.  - Ceftriaxone: S <=0.25  Specimen Source: .Blood Blood-Peripheral  Organism: Streptococcus pyogenes (Group A)  Culture Results:   Growth in aerobic and anaerobic bottles: Streptococcus pyogenes (Group A)  Organism Identification: Streptococcus pyogenes (Group A)  Method Type: NOEMY      Culture - Blood (23 @ 18:30)   Gram Stain:   Growth in aerobic bottle: Gram Positive Cocci in Pairs and Chains  - Streptococcus pyogenes (Group A): Detec  Specimen Source: .Blood Blood-Peripheral  Organism: Blood Culture PCR  Culture Results:   Growth in aerobic bottle: Gram Positive Cocci in Pairs and Chains   Direct identification is available within approximately 3-5   hours either by Blood Panel Multiplexed PCR or Direct   MALDI-TOF. Details: [email or web address]  Organism Identification: Blood Culture PCR  Method Type: PCRRapid HIV-1/2 Antibody (23 @ 18:30)   Rapid HIV-1/2 Antibody: Nonreact

## 2023-08-24 NOTE — DISCHARGE NOTE PROVIDER - CARE PROVIDER_API CALL
Emerald Garcia  Cardiology  [address] [place] [address]  Phone: [phone]  Fax: [phone]  Follow Up Time: 1 week

## 2023-08-24 NOTE — DISCHARGE NOTE PROVIDER - NSDCMRMEDTOKEN_GEN_ALL_CORE_FT
allopurinol 100 mg oral tablet: 1 tab(s) orally once a day  cyclopentolate 1% ophthalmic solution: 1 drop(s) in each eye once a day (at bedtime)  Eliquis 2.5 mg oral tablet: 1 tab(s) orally 2 times a day  finasteride 5 mg oral tablet: 1 tab(s) orally once a day  hydrALAZINE 50 mg oral tablet: 1 tab(s) orally 3 times a day  isosorbide mononitrate 30 mg oral tablet, extended release: 1 tab(s) orally once a day  ketorolac 0.5% ophthalmic solution: 1 drop(s) in the right eye once a day 2 days prior to surgery  metoprolol tartrate 50 mg oral tablet: 0.5 tab(s) orally 2 times a day   Ocuflox 0.3% ophthalmic solution: 1 drop(s) in the right eye 4 times a day 2 days before surgery  polyethylene glycol 3350 oral powder for reconstitution: 17 gram(s) orally once a day (at bedtime)  Prednisol 1% ophthalmic solution: 1 drop(s) in the right eye 4 times a day after surgery  risperiDONE 1 mg oral tablet: 1 tab(s) orally once a day  simvastatin 20 mg oral tablet: 1 tab(s) orally once a day (at bedtime)  torsemide 5 mg oral tablet: 1 tab(s) orally 2 times a day  Xanax 0.25 mg oral tablet: 1 tab(s) orally once a day   allopurinol 100 mg oral tablet: 1 tab(s) orally once a day  cefTRIAXone 2 g/50 mL-iso-osmotic dextrose intravenous solution: 2 gram(s) intravenous once a day LAST DAY 9/22/23  cyclopentolate 1% ophthalmic solution: 1 drop(s) in each eye once a day (at bedtime)  Eliquis 2.5 mg oral tablet: 1 tab(s) orally 2 times a day  finasteride 5 mg oral tablet: 1 tab(s) orally once a day  Flush PICC line with NS before and after IV infusion, last day 9/22/2023: Flush PICC line with NS before and after IV infusion, last day 9/22/2023  hydrALAZINE 50 mg oral tablet: 1 tab(s) orally 3 times a day  ID follow up with Dr. Gurjit Mahan via [employer] [phone] and Fax [phone]: ID follow up with Dr. Gurjit Mahan via [employer] [phone] and Fax [phone]  isosorbide mononitrate 30 mg oral tablet, extended release: 1 tab(s) orally once a day  ketorolac 0.5% ophthalmic solution: 1 drop(s) in the right eye once a day 2 days prior to surgery  metoprolol tartrate 50 mg oral tablet: 0.5 tab(s) orally 2 times a day   metroNIDAZOLE 500 mg/100 mL intravenous solution: 1 dose(s) intravenous every 8 hours LAST DAY 9/22/2023  Ocuflox 0.3% ophthalmic solution: 1 drop(s) in the right eye 4 times a day 2 days before surgery  Please draw CBC and CMP every week while on antibiotics until 9/22/2023: Please draw CBC and CMP every week while on antibiotics until 9/22/2023  polyethylene glycol 3350 oral powder for reconstitution: 17 gram(s) orally once a day (at bedtime)  Prednisol 1% ophthalmic solution: 1 drop(s) in the right eye 4 times a day after surgery  Remove PICC line after antibiotics completion on 9/22/2023: Remove PICC line after antibiotics completion on 9/22/2023  risperiDONE 1 mg oral tablet: 1 tab(s) orally once a day  simvastatin 20 mg oral tablet: 1 tab(s) orally once a day (at bedtime)  torsemide 5 mg oral tablet: 1 tab(s) orally 2 times a day  Xanax 0.25 mg oral tablet: 1 tab(s) orally once a day   allopurinol 100 mg oral tablet: 1 tab(s) orally once a day  cefTRIAXone 2 g/50 mL-iso-osmotic dextrose intravenous solution: 2 gram(s) intravenous once a day LAST DAY 9/22/23  cyclopentolate 1% ophthalmic solution: 1 drop(s) in each eye once a day (at bedtime)  Eliquis 2.5 mg oral tablet: 1 tab(s) orally 2 times a day  finasteride 5 mg oral tablet: 1 tab(s) orally once a day  Flush PICC line with NS before and after IV infusion, last day 9/22/2023: Flush PICC line with NS before and after IV infusion, last day 9/22/2023  hydrALAZINE 50 mg oral tablet: 1 tab(s) orally 3 times a day  ID follow up with Dr. Gurjit Mahan via [employer] [phone] and Fax [phone]: ID follow up with Dr. Gurjit Mahan via [employer] [phone] and Fax [phone]  isosorbide mononitrate 30 mg oral tablet, extended release: 1 tab(s) orally once a day  ketorolac 0.5% ophthalmic solution: 1 drop(s) in the right eye once a day 2 days prior to surgery  metroNIDAZOLE 500 mg/100 mL intravenous solution: 1 dose(s) intravenous every 8 hours LAST DAY 9/22/2023  Ocuflox 0.3% ophthalmic solution: 1 drop(s) in the right eye 4 times a day 2 days before surgery  Please draw CBC and CMP every week while on antibiotics until 9/22/2023: Please draw CBC and CMP every week while on antibiotics until 9/22/2023  polyethylene glycol 3350 oral powder for reconstitution: 17 gram(s) orally once a day (at bedtime)  Prednisol 1% ophthalmic solution: 1 drop(s) in the right eye 4 times a day after surgery  Remove PICC line after antibiotics completion on 9/22/2023: Remove PICC line after antibiotics completion on 9/22/2023  risperiDONE 1 mg oral tablet: 1 tab(s) orally once a day  simvastatin 20 mg oral tablet: 1 tab(s) orally once a day (at bedtime)  Xanax 0.25 mg oral tablet: 1 tab(s) orally once a day   allopurinol 100 mg oral tablet: 1 tab(s) orally once a day  cefTRIAXone 2 g/50 mL-iso-osmotic dextrose intravenous solution: 2 gram(s) intravenous once a day LAST DAY 9/22/23  cyclopentolate 1% ophthalmic solution: 1 drop(s) in each eye once a day (at bedtime)  Eliquis 2.5 mg oral tablet: 1 tab(s) orally 2 times a day  finasteride 5 mg oral tablet: 1 tab(s) orally once a day  Flush PICC line with NS before and after IV infusion, last day 9/22/2023: Flush PICC line with NS before and after IV infusion, last day 9/22/2023  hydrALAZINE 50 mg oral tablet: 1 tab(s) orally 3 times a day  ID follow up with Dr. Gurjit Mahan via [employer] [phone] and Fax [phone]: ID follow up with Dr. Gurjit Mahan via [employer] [phone] and Fax [phone]  isosorbide mononitrate 30 mg oral tablet, extended release: 1 tab(s) orally once a day  ketorolac 0.5% ophthalmic solution: 1 drop(s) in the right eye once a day 2 days prior to surgery  Ocuflox 0.3% ophthalmic solution: 1 drop(s) in the right eye 4 times a day 2 days before surgery  Please draw CBC and CMP every week while on antibiotics until 9/22/2023: Please draw CBC and CMP every week while on antibiotics until 9/22/2023  polyethylene glycol 3350 oral powder for reconstitution: 17 gram(s) orally once a day (at bedtime)  Prednisol 1% ophthalmic solution: 1 drop(s) in the right eye 4 times a day after surgery  Remove PICC line after antibiotics completion on 9/22/2023: Remove PICC line after antibiotics completion on 9/22/2023  risperiDONE 1 mg oral tablet: 1 tab(s) orally once a day  simvastatin 20 mg oral tablet: 1 tab(s) orally once a day (at bedtime)  Xanax 0.25 mg oral tablet: 1 tab(s) orally once a day   allopurinol 100 mg oral tablet: 1 tab(s) orally once a day  cefTRIAXone 2 g/50 mL-iso-osmotic dextrose intravenous solution: 2 gram(s) intravenous once a day LAST DAY 9/22/23  Eliquis 2.5 mg oral tablet: 1 tab(s) orally 2 times a day  Flush PICC line with NS before and after IV infusion, last day 9/22/2023: Flush PICC line with NS before and after IV infusion, last day 9/22/2023  hydrALAZINE 50 mg oral tablet: 1 tab(s) orally 3 times a day  ID follow up with Dr. Gurjit Mahan via [employer] [phone] and Fax [phone]: ID follow up with Dr. Gurjit Mahan via [employer] [phone] and Fax [phone]  isosorbide mononitrate 30 mg oral tablet, extended release: 1 tab(s) orally once a day  metoprolol tartrate 25 mg oral tablet: 1 tab(s) orally every 12 hours  Please draw CBC and CMP every week while on antibiotics until 9/22/2023: Please draw CBC and CMP every week while on antibiotics until 9/22/2023  polyethylene glycol 3350 oral powder for reconstitution: 17 gram(s) orally once a day (at bedtime)  Remove PICC line after antibiotics completion on 9/22/2023: Remove PICC line after antibiotics completion on 9/22/2023  risperiDONE 1 mg oral tablet: 1 tab(s) orally once a day  simvastatin 20 mg oral tablet: 1 tab(s) orally once a day (at bedtime)  Xanax 0.25 mg oral tablet: 1 tab(s) orally once a day

## 2023-08-24 NOTE — PROGRESS NOTE ADULT - SUBJECTIVE AND OBJECTIVE BOX
Date of Service 08-24-23 @ 09:46    CHIEF COMPLAINT:Patient is a 89y old  Male who presents with a chief complaint of PNA and ?effusion.Pt appears comfortable.    	  REVIEW OF SYSTEMS:  CONSTITUTIONAL: No fever, weight loss, or fatigue  EYES: No eye pain, visual disturbances, or discharge  ENT:  No difficulty hearing, tinnitus, vertigo; No sinus or throat pain  NECK: No pain or stiffness  RESPIRATORY: No cough, wheezing, chills or hemoptysis; No Shortness of Breath  CARDIOVASCULAR: No chest pain, palpitations, passing out, dizziness, or leg swelling  GASTROINTESTINAL: No abdominal or epigastric pain. No nausea, vomiting, or hematemesis; No diarrhea or constipation. No melena or hematochezia.  GENITOURINARY: No dysuria, frequency, hematuria, or incontinence  NEUROLOGICAL: No headaches, memory loss, loss of strength, numbness, or tremors  SKIN: No itching, burning, rashes, or lesions   LYMPH Nodes: No enlarged glands  ENDOCRINE: No heat or cold intolerance; No hair loss  MUSCULOSKELETAL: No joint pain or swelling; No muscle, back, or extremity pain  PSYCHIATRIC: No depression, anxiety, mood swings, or difficulty sleeping  HEME/LYMPH: No easy bruising, or bleeding gums  ALLERGY AND IMMUNOLOGIC: No hives or eczema	        PHYSICAL EXAM:  T(C): 36.6 (08-24-23 @ 07:27), Max: 36.9 (08-23-23 @ 16:19)  HR: 75 (08-24-23 @ 07:27) (67 - 75)  BP: 109/54 (08-24-23 @ 07:27) (109/54 - 138/55)  RR: 19 (08-24-23 @ 07:27) (17 - 19)  SpO2: 96% (08-24-23 @ 07:27) (94% - 96%)  Wt(kg): --  I&O's Summary    23 Aug 2023 07:01  -  24 Aug 2023 07:00  --------------------------------------------------------  IN: 580 mL / OUT: 3450 mL / NET: -2870 mL        Appearance: Normal	  HEENT:   Normal oral mucosa, PERRL, EOMI	  Lymphatic: No lymphadenopathy  Cardiovascular: Normal S1 S2, No JVD, No murmurs, No edema  Respiratory: Lungs clear to auscultation	  Psychiatry: A & O x 3, Mood & affect appropriate  Gastrointestinal:  Soft, Non-tender, + BS	  Skin: No rashes, No ecchymoses, No cyanosis	  Neurologic: Non-focal  Extremities: Normal range of motion, No clubbing, cyanosis or edema  Vascular: Peripheral pulses palpable 2+ bilaterally    MEDICATIONS  (STANDING):  allopurinol 100 milliGRAM(s) Oral daily  ALPRAZolam 0.25 milliGRAM(s) Oral at bedtime  cefTRIAXone   IVPB 2000 milliGRAM(s) IV Intermittent every 24 hours  furosemide   Injectable 40 milliGRAM(s) IV Push daily  heparin   Injectable 5000 Unit(s) SubCutaneous every 12 hours  hydrALAZINE 50 milliGRAM(s) Oral three times a day  isosorbide   mononitrate ER Tablet (IMDUR) 30 milliGRAM(s) Oral daily  metoprolol tartrate 25 milliGRAM(s) Oral every 12 hours  metroNIDAZOLE  IVPB      metroNIDAZOLE  IVPB 500 milliGRAM(s) IV Intermittent every 8 hours  polyethylene glycol 3350 17 Gram(s) Oral at bedtime  risperiDONE   Tablet 1 milliGRAM(s) Oral at bedtime  simvastatin 20 milliGRAM(s) Oral at bedtime      TELEMETRY: v paced	    	  	  LABS:	 	                       9.9    9.62  )-----------( 204      ( 23 Aug 2023 07:50 )             31.1     08-23    134<L>  |  102  |  50<H>  ----------------------------<  118<H>  3.8   |  28  |  1.81<H>    Ca    8.4      23 Aug 2023 07:50

## 2023-08-24 NOTE — DISCHARGE NOTE PROVIDER - NSDCCPCAREPLAN_GEN_ALL_CORE_FT
PRINCIPAL DISCHARGE DIAGNOSIS  Diagnosis: Bacteremia  Assessment and Plan of Treatment: You were found to have Bacteremia which is bacteria in the blood. Bacteremia happens when germs from infections in your body travel to your blood. The bacteria originated from your lungs . You were treated with antibitiocs and seen by an infectious disease specialist.   Your blood cultures were repeated as showed clearing of infetion   Please continue taking antibiotics as prescribed   If you have fevers, chills, change in mental status, or your heart is beating faster than usual, please seek emergent medical attention.        SECONDARY DISCHARGE DIAGNOSES  Diagnosis: Pleural effusion, right  Assessment and Plan of Treatment: You were also found to have Pleural effusion in your right lung which  is fluid buildup in the space between the layers of the pleura. There is normally a small amount of fluid between these layers. This fluid helps your lungs move easily when you breathe. . As the fluid builds up it make the breathing difficult. The fluid was drained ---------------------------------------------------------------, your symptoms improved after fluid removal and remained stable  DISCHARGE INSTRUCTIONS:  Call your local emergency number (911 in the [place]) if:  You find it very hard to breathe.  You feel faint, or you cannot think clearly.  Seek care immediately if:  Your breathing problems do not go away, or they get worse.    Diagnosis: BPH (benign prostatic hyperplasia)  Assessment and Plan of Treatment: You have chronic salmon  Continue medications as prescribed    Diagnosis: Chronic atrial fibrillation  Assessment and Plan of Treatment: you have irregular heart rate   continue taking eliquis as prescribed       Diagnosis: Acute on chronic diastolic congestive heart failure  Assessment and Plan of Treatment: you were found to have congestive heart failure flare up, you were started on diuretics and supplemental oxygen. you were evaluated by Cardiologist, your symptoms improved and remained stable   Take all of your medication as directed.  If you become dizzy call your Health Care Provider.       PRINCIPAL DISCHARGE DIAGNOSIS  Diagnosis: Bacteremia  Assessment and Plan of Treatment: You were found to have Bacteremia which is bacteria in the blood. Bacteremia happens when germs from infections in your body travel to your blood. The bacteria originated from your lungs . You were treated with antibitiocs and seen by an infectious disease specialist.   Your blood cultures were repeated as showed clearing of infetion   Please continue taking antibiotics as prescribed   If you have fevers, chills, change in mental status, or your heart is beating faster than usual, please seek emergent medical attention.        SECONDARY DISCHARGE DIAGNOSES  Diagnosis: Pleural effusion, right  Assessment and Plan of Treatment: You were also found to have Pleural effusion in your right lung which  is fluid buildup in the space between the layers of the pleura. There is normally a small amount of fluid between these layers. This fluid helps your lungs move easily when you breathe. . As the fluid builds up it make the breathing difficult. The fluid was drained on 8/25 your symptoms improved after fluid removal and remained stable  DISCHARGE INSTRUCTIONS:  Call your local emergency number (911 in the [place]) if:  You find it very hard to breathe.  You feel faint, or you cannot think clearly.  Seek care immediately if:  Your breathing problems do not go away, or they get worse.    Diagnosis: BPH (benign prostatic hyperplasia)  Assessment and Plan of Treatment: You have chronic salmon  Continue medications as prescribed    Diagnosis: Chronic atrial fibrillation  Assessment and Plan of Treatment: you have irregular heart rate   continue taking eliquis as prescribed       Diagnosis: Acute on chronic diastolic congestive heart failure  Assessment and Plan of Treatment: you were found to have congestive heart failure flare up, you were started on diuretics and supplemental oxygen. you were evaluated by Cardiologist, your symptoms improved and remained stable   Take all of your medication as directed.  If you become dizzy call your Health Care Provider.       PRINCIPAL DISCHARGE DIAGNOSIS  Diagnosis: Bacteremia  Assessment and Plan of Treatment: You were found to have Bacteremia which is bacteria in the blood. Bacteremia happens when germs from infections in your body travel to your blood. The bacteria originated from your lungs . You were treated with antibitiocs and seen by an infectious disease specialist.   Your blood cultures were repeated as showed clearing of infetion   Please continue taking antibiotics as prescribed Ceftriaxone 2 grams until 9/22 via picc line   If you have fevers, chills, change in mental status, or your heart is beating faster than usual, please seek emergent medical attention.        SECONDARY DISCHARGE DIAGNOSES  Diagnosis: Acute on chronic diastolic congestive heart failure  Assessment and Plan of Treatment: you were found to have congestive heart failure flare up, you were started on diuretics and supplemental oxygen. you were evaluated by Cardiologist, your symptoms improved and remained stable   Take all of your medication as directed.  If you become dizzy call your Health Care Provider.      Diagnosis: Pleural effusion, right  Assessment and Plan of Treatment: You were also found to have Pleural effusion in your right lung which  is fluid buildup in the space between the layers of the pleura. There is normally a small amount of fluid between these layers. This fluid helps your lungs move easily when you breathe. . As the fluid builds up it make the breathing difficult. The fluid was drained on 8/25 your symptoms improved after fluid removal and remained stable  DISCHARGE INSTRUCTIONS:  Call your local emergency number (911 in the [place]) if:  You find it very hard to breathe.  You feel faint, or you cannot think clearly.  Seek care immediately if:  Your breathing problems do not go away, or they get worse.    Diagnosis: Chronic atrial fibrillation  Assessment and Plan of Treatment: you have irregular heart rate   continue taking eliquis as prescribed       Diagnosis: BPH (benign prostatic hyperplasia)  Assessment and Plan of Treatment: You have chronic salmon  Continue medications as prescribed

## 2023-08-24 NOTE — PROGRESS NOTE ADULT - ASSESSMENT
89 yrs old from home, ambulating independently, pmhx of HFrEFm Afib on Eliquis, s/p Micra PPM, CKD, HLD, BPH on chronic Salmon, presented with fever and generalized weakness,sepsis,pneumonia,chronic diastolic HF and strep bactermia..  1.Sepsis due to strep bactermia-abx.Repeat blood cx -neg. D/W daughter, no AIDEN, will treat fir 4 weeks with ABX.  2.Pneumonia -abx as per ID.  3.Chronic diastolic HF-IV lasix.  4.CRI-f/u lytes.  5.Afib-NOAC on hold.lopressor.  6.BPH with chronic salmon-urince cx-.  7.D/W Bridgett PATEL for Rt thoracentesis on Friday will d/c eliquis.  8.GI and DVT prophylaxis.  9.D/W pt's daughter agree with above plan.

## 2023-08-24 NOTE — PROGRESS NOTE ADULT - ASSESSMENT
Patient is a 89y old  Male from home, ambulating independently, pmhx of HFrEFm Afib on Eliquis, s/p Micra PPM, CKD, HLD, BPH on chronic Chinchilla, now presents to the ER for evaluation of fever and generalized weakness. Pt was admitted on July 2023, for dyspnea on exertion, palpitations and LE swelling. As per the daughter the size of LE has been unchanged and pt has been complaint on the meds he was discharged from the hospital. On admission, he found to have fever, tachycardia, Leukocytosis, Positive UTI and Moderate RIGHT effusion and/or basilar airspace consolidation on CXR and CT chest. He has started on Ceftriaxone and Azithromycin, and the ID consult requested to assist with further evaluation and antibiotic management.    # Sepsis ( Fever + tachycardia + Leukocytosis)- resolved  # UTI-  WBC >50 /HPF   # Pneumonia with right pleural effusion - Not a candidate for thoracentesis since need to hold Eliquis for 5 days but patient cannot be off Eliquis , need to be on Eliquis-  CT chest from 8/22 shows  Increased large right and small left pleural effusions since 8/17/2023, with worsening, now completely right lower lobe atelectasis.   # Streptococcus pyogenes Bacteremia- 8/16- source most likely Lung- repeat Blood cultures X 2 from 8/18 have NGTD- The TTE from 8/22 shows no vegetation.     would recommend:    1. IR guided Right Thoracentesis on  Friday, 8/25/23  2. Continue IV Flagyl and Ceftriaxone 2 g daily to cover Bacteremia   3. Monitor kidney function   4. Need  at least 4 weeks starting from, until 9/22/23    d/w Covering NP, Willem    Attending Attestation:    Spent more than 35 minutes on total encounter, more than 50 % of the visit was spent counseling and/or coordinating care by the Attending physician.

## 2023-08-24 NOTE — PROGRESS NOTE ADULT - PROBLEM SELECTOR PLAN 1
-Streptococcus pyogenes bacteremia, likely source lungs   -Echo negative for vegetation   -cont Ceftriaxone and Flagyl   -repeat cultures NTD, pt will likely require 4 weeks of abx from negative blood cultures   -ID Dr. Garcia -Streptococcus pyogenes bacteremia, likely source lungs   -Echo negative for vegetation   -cont Ceftriaxone and Flagyl   -repeat cultures NTD  -will likely requires 4 weeks of abx from day of thoracentesis until 9/22/2023  -ID Dr. Garcia

## 2023-08-24 NOTE — PROGRESS NOTE ADULT - PROBLEM SELECTOR PLAN 2
-repeat CT with Increasing  large right pleural effusion   -cont IV Lasix  -IR consulted, pt for right thora on Friday   -hold Eliquis prior to thora

## 2023-08-24 NOTE — PROGRESS NOTE ADULT - PROBLEM SELECTOR PLAN 9
-discharge disposition back home pending thoracentesis on Friday -discharge disposition back home pending thoracentesis on Friday, will likely requires 4 weeks of abx from day of thoracentesis until 9/22/2023, likely line placement on Monday

## 2023-08-24 NOTE — PROGRESS NOTE ADULT - SUBJECTIVE AND OBJECTIVE BOX
Patient is a 89y old  Male who presents with a chief complaint of PNA and ?effusion (23 Aug 2023 13:46)  Seen and examined using Filipino  Mack ID # [id number]    OVERNIGHT EVENTS: no acute events overnight, offering no new complaints     REVIEW OF SYSTEMS:  CONSTITUTIONAL: No fever, chills  NECK: No pain or stiffness  RESPIRATORY: No cough, SOB  CARDIOVASCULAR: No chest pain, palpitations  GASTROINTESTINAL: No abdominal pain. No nausea, vomiting, or diarrhea  GENITOURINARY: No dysuria  NEUROLOGICAL: No HA  MUSCULOSKELETAL: No joint pain or swelling      T(C): 36.6 (08-24-23 @ 07:27), Max: 36.9 (08-23-23 @ 16:19)  HR: 75 (08-24-23 @ 07:27) (67 - 75)  BP: 109/54 (08-24-23 @ 07:27) (109/54 - 138/55)  RR: 19 (08-24-23 @ 07:27) (17 - 19)  SpO2: 96% (08-24-23 @ 07:27) (94% - 96%)  Wt(kg): --Vital Signs Last 24 Hrs  T(C): 36.6 (24 Aug 2023 07:27), Max: 36.9 (23 Aug 2023 16:19)  T(F): 97.9 (24 Aug 2023 07:27), Max: 98.4 (23 Aug 2023 16:19)  HR: 75 (24 Aug 2023 07:27) (67 - 75)  BP: 109/54 (24 Aug 2023 07:27) (109/54 - 138/55)  BP(mean): --  RR: 19 (24 Aug 2023 07:27) (17 - 19)  SpO2: 96% (24 Aug 2023 07:27) (94% - 96%)    Parameters below as of 24 Aug 2023 07:27  Patient On (Oxygen Delivery Method): room air    MEDICATIONS  (STANDING):  allopurinol 100 milliGRAM(s) Oral daily  ALPRAZolam 0.25 milliGRAM(s) Oral at bedtime  cefTRIAXone   IVPB 2000 milliGRAM(s) IV Intermittent every 24 hours  furosemide   Injectable 40 milliGRAM(s) IV Push daily  heparin   Injectable 5000 Unit(s) SubCutaneous every 12 hours  hydrALAZINE 50 milliGRAM(s) Oral three times a day  isosorbide   mononitrate ER Tablet (IMDUR) 30 milliGRAM(s) Oral daily  metoprolol tartrate 25 milliGRAM(s) Oral every 12 hours  metroNIDAZOLE  IVPB      metroNIDAZOLE  IVPB 500 milliGRAM(s) IV Intermittent every 8 hours  polyethylene glycol 3350 17 Gram(s) Oral at bedtime  risperiDONE   Tablet 1 milliGRAM(s) Oral at bedtime  simvastatin 20 milliGRAM(s) Oral at bedtime    MEDICATIONS  (PRN):  acetaminophen     Tablet .. 650 milliGRAM(s) Oral every 6 hours PRN Temp greater or equal to 38C (100.4F), Mild Pain (1 - 3)    PHYSICAL EXAM:  GENERAL: NAD  EYES: clear conjunctiva  ENMT: Moist mucous membranes  NECK: Supple, No JVD  CHEST/LUNG: Clear to auscultation bilaterally; No rales, rhonchi, wheezing, or rubs  HEART: S1, S2, Regular rate and rhythm  ABDOMEN: Soft, Nontender, Nondistended; Bowel sounds present  NEURO: Alert & Oriented X3  EXTREMITIES: No LE edema, no calf tenderness  SKIN: No rashes or lesions  : salmon with clear yellow urine     Consultant(s) Notes Reviewed:  [x ] YES  [ ] NO  Care Discussed with Consultants/Other Providers [ x] YES  [ ] NO    LABS:                        9.9    9.62  )-----------( 204      ( 23 Aug 2023 07:50 )             31.1     08-23    134<L>  |  102  |  50<H>  ----------------------------<  118<H>  3.8   |  28  |  1.81<H>    Ca    8.4      23 Aug 2023 07:50    CAPILLARY BLOOD GLUCOSE  Urinalysis Basic - ( 23 Aug 2023 07:50 )    Color: x / Appearance: x / SG: x / pH: x  Gluc: 118 mg/dL / Ketone: x  / Bili: x / Urobili: x   Blood: x / Protein: x / Nitrite: x   Leuk Esterase: x / RBC: x / WBC x   Sq Epi: x / Non Sq Epi: x / Bacteria: x    RADIOLOGY & ADDITIONAL TESTS:  < from: CT Chest No Cont (08.22.23 @ 15:35) >    ACC: [id number] EXAM:  CT CHEST   ORDERED BY:  ALYSHA MAYORGA     PROCEDURE DATE:  08/22/2023          INTERPRETATION:  CLINICAL INFORMATION: Follow-up pneumonia and right   pleural effusion    COMPARISON: CT chest 8/17/2023, CT abdomen and pelvis 3/13/2022.    CONTRAST/COMPLICATIONS:  IV Contrast: NONE  Oral Contrast: NONE  Complications: None reported at time of study completion    PROCEDURE:  CT of the Chest was performed.  Sagittal and coronal reformats were performed.    FINDINGS:    LUNGSAND AIRWAYS: Proximal trachea secretions.  Partial atelectasis of   the right upper and middle lobes, and complete atelectasis of the right   lower lobe, increased from 8/17/2023. Left lower lobe subsegmental   atelectasis.  PLEURA: Large right and small left pleural effusions, increased from   8/17/2023.  MEDIASTINUM AND OMA: Mediastinal lymph nodes are unchanged in size from   8/17/2023, for example in inferior right paratracheal node measures 1.6 x   1.4 cm (3-62).  VESSELS: Calcified plaque within the aorta and coronary arteries.  HEART: Leadless pacemaker in the right ventricle. Mild aortic valvular   and mitral annular calcifications. Heart size is enlarged. Small   circumferential pericardial effusion, unchanged. Hypodensity of the blood   pool in relation to left ventricular myocardium suggests underlying   anemia.  CHEST WALL AND LOWER NECK: Heterogeneous thyroid gland.  VISUALIZED UPPER ABDOMEN: Hepatic cysts and subcentimeter hypodense foci   too small to characterize. Partially visualized numerous renal cysts and   left hydronephrosis, similar in appearance to 3/13/2022. An indeterminate   1.1 cm right renal hyperdensity is unchanged from 3/13/2022.  BONES: Mild degenerative changes of the spine. Small right posterior   intramuscular lipoma.    IMPRESSION:  Increased large right and small left pleural effusions    < end of copied text >

## 2023-08-24 NOTE — DISCHARGE NOTE PROVIDER - HOSPITAL COURSE
89 yrs old from home, ambulating independently, pmhx of HFrEFm Afib on Eliquis, s/p Micra PPM, CKD, HLD, BPH on chronic Salmon, presented with fever and generalized weakness. In ED found to be febrile, tachycardic, CXR with Moderate right  effusion and/or basilar airspace consolidation. leukocytosis of 21.25 with left shift, Lactate 2.9 Urinalysis positive. Chronic salmon in place. Pt was admitted for sepsis likely due to UTI and ?PNA. Started on Ceftriaxone and Zithromax    CT Chest showed  Moderate to large right and small left pleural effusion with atelectasis. Pt was noted with strep pyogens bacteremia, Echo with no evidence of vegetation.  ID Dr. Garcia following. Repeat blood cultures negative. Pt will likely requires 4 weeks of abx from negative blood cultures   Hospital course complicated with increasing large right pleural effusion, IR consulted, Pt for right thoracentesis on Friday. Eliquis placed on hold 8/23  Thoracentesis done on ????????????????    NOT COMPLETE 8/24    Clinically improved, optimized for discharge with outpt follow up  Please note that this a brief summary of hospital course please refer to daily progress notes and consult notes for full course and events        89 yrs old from home, ambulating independently, pmhx of HFrEFm Afib on Eliquis, s/p Micra PPM, CKD, HLD, BPH on chronic Salmon, presented with fever and generalized weakness. In ED found to be febrile, tachycardic, CXR with Moderate right  effusion and/or basilar airspace consolidation. leukocytosis of 21.25 with left shift, Lactate 2.9 Urinalysis positive. Chronic salmon in place. Pt was admitted for sepsis likely due to UTI and ?PNA. Started on Ceftriaxone and Zithromax    CT Chest showed  Moderate to large right and small left pleural effusion with atelectasis. Pt was noted with strep pyogens bacteremia, Echo with no evidence of vegetation.  ID Dr. Garcia following. Repeat blood cultures negative. Pt will likely requires 4 weeks of abx from day of thoracentesis   Hospital course complicated with increasing large right pleural effusion, IR consulted, Eliquis placed on hold 8/23.  S/p right thoracentesis on 8/25 with removal of 1.4L   Pt will require PICC to complete abx until 9/22/23, PICC inserted on 8/25  Clinically improved, optimized for discharge with outpt follow up  Please note that this a brief summary of hospital course please refer to daily progress notes and consult notes for full course and events        89 yrs old from home, ambulating independently, pmhx of HFrEFm Afib on Eliquis, s/p Micra PPM, CKD, HLD, BPH on chronic Salmon, presented with fever and generalized weakness. In ED found to be febrile, tachycardic, CXR with Moderate right  effusion and/or basilar airspace consolidation. leukocytosis of 21.25 with left shift, Lactate 2.9 Urinalysis positive. Chronic salmon in place. Pt was admitted for sepsis likely due to UTI and ?PNA. Started on Ceftriaxone and Zithromax    CT Chest showed  Moderate to large right and small left pleural effusion with atelectasis. Pt was noted with strep pyogens bacteremia, Echo with no evidence of vegetation.  ID Dr. Garcia following. Repeat blood cultures negative. Pt will likely requires 4 weeks of abx from day of thoracentesis   Hospital course complicated with increasing large right pleural effusion, IR consulted, Eliquis placed on hold 8/23.  S/p right thoracentesis on 8/25 with removal of 1.4L   Pt will require PICC to complete abx: Ceftriaxone 2 grams until 9/22/23, PICC inserted on 8/25  Clinically improved, optimized for discharge with outpt follow up  Please note that this a brief summary of hospital course please refer to daily progress notes and consult notes for full course and events

## 2023-08-25 LAB
ALBUMIN FLD-MCNC: 1.8 G/DL — SIGNIFICANT CHANGE UP
ANION GAP SERPL CALC-SCNC: 8 MMOL/L — SIGNIFICANT CHANGE UP (ref 5–17)
APTT BLD: 31.4 SEC — SIGNIFICANT CHANGE UP (ref 24.5–35.6)
B PERT IGG+IGM PNL SER: CLEAR — SIGNIFICANT CHANGE UP
BUN SERPL-MCNC: 51 MG/DL — HIGH (ref 7–18)
CALCIUM SERPL-MCNC: 8.8 MG/DL — SIGNIFICANT CHANGE UP (ref 8.4–10.5)
CHLORIDE SERPL-SCNC: 99 MMOL/L — SIGNIFICANT CHANGE UP (ref 96–108)
CO2 SERPL-SCNC: 29 MMOL/L — SIGNIFICANT CHANGE UP (ref 22–31)
COLOR FLD: YELLOW — SIGNIFICANT CHANGE UP
CREAT SERPL-MCNC: 1.96 MG/DL — HIGH (ref 0.5–1.3)
EGFR: 31 ML/MIN/1.73M2 — LOW
FLUID INTAKE SUBSTANCE CLASS: SIGNIFICANT CHANGE UP
GLUCOSE FLD-MCNC: 111 MG/DL — SIGNIFICANT CHANGE UP
GLUCOSE SERPL-MCNC: 120 MG/DL — HIGH (ref 70–99)
GRAM STN FLD: SIGNIFICANT CHANGE UP
HCT VFR BLD CALC: 33.4 % — LOW (ref 39–50)
HGB BLD-MCNC: 10.5 G/DL — LOW (ref 13–17)
INR BLD: 1.11 RATIO — SIGNIFICANT CHANGE UP (ref 0.85–1.18)
LDH SERPL L TO P-CCNC: 60 U/L — SIGNIFICANT CHANGE UP
LYMPHOCYTES # FLD: 19 % — SIGNIFICANT CHANGE UP
MCHC RBC-ENTMCNC: 28.1 PG — SIGNIFICANT CHANGE UP (ref 27–34)
MCHC RBC-ENTMCNC: 31.4 GM/DL — LOW (ref 32–36)
MCV RBC AUTO: 89.3 FL — SIGNIFICANT CHANGE UP (ref 80–100)
MESOTHL CELL # FLD: 4 % — SIGNIFICANT CHANGE UP
MONOS+MACROS # FLD: 68 % — SIGNIFICANT CHANGE UP
NEUTROPHILS-BODY FLUID: 9 % — SIGNIFICANT CHANGE UP
NRBC # BLD: 0 /100 WBCS — SIGNIFICANT CHANGE UP (ref 0–0)
NRBC # FLD: 1 % — HIGH (ref 0–0)
PH FLD: 7.7 — SIGNIFICANT CHANGE UP
PLATELET # BLD AUTO: 234 K/UL — SIGNIFICANT CHANGE UP (ref 150–400)
POTASSIUM SERPL-MCNC: 3.8 MMOL/L — SIGNIFICANT CHANGE UP (ref 3.5–5.3)
POTASSIUM SERPL-SCNC: 3.8 MMOL/L — SIGNIFICANT CHANGE UP (ref 3.5–5.3)
PROT FLD-MCNC: 2.6 G/DL — SIGNIFICANT CHANGE UP
PROTHROM AB SERPL-ACNC: 12.6 SEC — SIGNIFICANT CHANGE UP (ref 9.5–13)
RBC # BLD: 3.74 M/UL — LOW (ref 4.2–5.8)
RBC # FLD: 13.6 % — SIGNIFICANT CHANGE UP (ref 10.3–14.5)
RCV VOL RI: 34 /UL — HIGH (ref 0–5)
SODIUM SERPL-SCNC: 136 MMOL/L — SIGNIFICANT CHANGE UP (ref 135–145)
SPECIMEN SOURCE: SIGNIFICANT CHANGE UP
TOTAL NUCLEATED CELL COUNT, BODY FLUID: 111 /UL — SIGNIFICANT CHANGE UP
TUBE TYPE: SIGNIFICANT CHANGE UP
WBC # BLD: 8.21 K/UL — SIGNIFICANT CHANGE UP (ref 3.8–10.5)
WBC # FLD AUTO: 8.21 K/UL — SIGNIFICANT CHANGE UP (ref 3.8–10.5)

## 2023-08-25 PROCEDURE — 88305 TISSUE EXAM BY PATHOLOGIST: CPT | Mod: 26

## 2023-08-25 PROCEDURE — 36573 INSJ PICC RS&I 5 YR+: CPT | Mod: 59

## 2023-08-25 PROCEDURE — 76942 ECHO GUIDE FOR BIOPSY: CPT | Mod: 26,59

## 2023-08-25 PROCEDURE — 71045 X-RAY EXAM CHEST 1 VIEW: CPT | Mod: 26

## 2023-08-25 PROCEDURE — 88112 CYTOPATH CELL ENHANCE TECH: CPT | Mod: 26

## 2023-08-25 PROCEDURE — 32555 ASPIRATE PLEURA W/ IMAGING: CPT | Mod: RT,59

## 2023-08-25 RX ORDER — CHLORHEXIDINE GLUCONATE 213 G/1000ML
1 SOLUTION TOPICAL DAILY
Refills: 0 | Status: DISCONTINUED | OUTPATIENT
Start: 2023-08-26 | End: 2023-08-28

## 2023-08-25 RX ORDER — CEFTRIAXONE 500 MG/1
2000 INJECTION, POWDER, FOR SOLUTION INTRAMUSCULAR; INTRAVENOUS EVERY 24 HOURS
Refills: 0 | Status: DISCONTINUED | OUTPATIENT
Start: 2023-08-26 | End: 2023-08-28

## 2023-08-25 RX ORDER — SODIUM CHLORIDE 9 MG/ML
10 INJECTION INTRAMUSCULAR; INTRAVENOUS; SUBCUTANEOUS
Refills: 0 | Status: DISCONTINUED | OUTPATIENT
Start: 2023-08-25 | End: 2023-08-28

## 2023-08-25 RX ADMIN — Medication 50 MILLIGRAM(S): at 05:09

## 2023-08-25 RX ADMIN — Medication 25 MILLIGRAM(S): at 05:10

## 2023-08-25 RX ADMIN — POLYETHYLENE GLYCOL 3350 17 GRAM(S): 17 POWDER, FOR SOLUTION ORAL at 21:15

## 2023-08-25 RX ADMIN — Medication 25 MILLIGRAM(S): at 17:22

## 2023-08-25 RX ADMIN — Medication 50 MILLIGRAM(S): at 21:15

## 2023-08-25 RX ADMIN — Medication 50 MILLIGRAM(S): at 17:22

## 2023-08-25 RX ADMIN — SIMVASTATIN 20 MILLIGRAM(S): 20 TABLET, FILM COATED ORAL at 21:16

## 2023-08-25 RX ADMIN — Medication 100 MILLIGRAM(S): at 21:16

## 2023-08-25 RX ADMIN — Medication 100 MILLIGRAM(S): at 05:10

## 2023-08-25 RX ADMIN — Medication 40 MILLIGRAM(S): at 05:10

## 2023-08-25 RX ADMIN — ISOSORBIDE MONONITRATE 30 MILLIGRAM(S): 60 TABLET, EXTENDED RELEASE ORAL at 12:29

## 2023-08-25 RX ADMIN — Medication 100 MILLIGRAM(S): at 12:29

## 2023-08-25 RX ADMIN — Medication 100 MILLIGRAM(S): at 17:21

## 2023-08-25 RX ADMIN — HEPARIN SODIUM 5000 UNIT(S): 5000 INJECTION INTRAVENOUS; SUBCUTANEOUS at 17:22

## 2023-08-25 RX ADMIN — CEFTRIAXONE 100 MILLIGRAM(S): 500 INJECTION, POWDER, FOR SOLUTION INTRAMUSCULAR; INTRAVENOUS at 03:11

## 2023-08-25 RX ADMIN — Medication 0.25 MILLIGRAM(S): at 21:15

## 2023-08-25 RX ADMIN — RISPERIDONE 1 MILLIGRAM(S): 4 TABLET ORAL at 21:16

## 2023-08-25 NOTE — PROGRESS NOTE ADULT - PROBLEM SELECTOR PLAN 9
-discharge disposition back home vs [facility] requires 4 weeks of abx from day of thoracentesis until 9/22/2023, likely line placement on Monday. -discharge disposition back home vs [facility] requires 4 weeks of abx from day of thoracentesis until 9/22/2023, likely line placement on Monday. IR consulted

## 2023-08-25 NOTE — PROCEDURE NOTE - NSINFORMCONSENT_GEN_A_CORE
Daughter- In person/Benefits, risks, and possible complications of procedure explained to patient/caregiver who verbalized understanding and gave written consent.
Daughter/Benefits, risks, and possible complications of procedure explained to patient/caregiver who verbalized understanding and gave written consent.

## 2023-08-25 NOTE — PROCEDURE NOTE - ADDITIONAL PROCEDURE DETAILS
42 cm 4Fr  PICC inserted via the left brachial vein.  Sterile dressing applied.  Tip location SVC/ RA Junction.

## 2023-08-25 NOTE — PROGRESS NOTE ADULT - ASSESSMENT
Patient is a 89y old  Male from home, ambulating independently, pmhx of HFrEFm Afib on Eliquis, s/p Micra PPM, CKD, HLD, BPH on chronic Chinchilla, now presents to the ER for evaluation of fever and generalized weakness. Pt was admitted on July 2023, for dyspnea on exertion, palpitations and LE swelling. As per the daughter the size of LE has been unchanged and pt has been complaint on the meds he was discharged from the hospital. On admission, he found to have fever, tachycardia, Leukocytosis, Positive UTI and Moderate RIGHT effusion and/or basilar airspace consolidation on CXR and CT chest. He has started on Ceftriaxone and Azithromycin, and the ID consult requested to assist with further evaluation and antibiotic management.    # Sepsis ( Fever + tachycardia + Leukocytosis)- resolved  # UTI-  WBC >50 /HPF   # Pneumonia with right pleural effusion - Not a candidate for thoracentesis since need to hold Eliquis for 5 days but patient cannot be off Eliquis , need to be on Eliquis-  CT chest from 8/22 shows  Increased large right and small left pleural effusions since 8/17/2023, with worsening, now completely right lower lobe atelectasis. - s/p Right thoracentesis today with removal of 1400 ml fluid on 8/25/23  # Streptococcus pyogenes Bacteremia- 8/16- source most likely Lung- repeat Blood cultures X 2 from 8/18 have NGTD- The TTE from 8/22 shows no vegetation. Family is refusing AIDEN  # S/P PICC Line placement on 8/25/23    would recommend:    1. Follow up Pleuritis fluid culture, is in process  2. Continue IV Flagyl and Ceftriaxone 2 g daily to cover Bacteremia and pneumonia  3. Monitor kidney function   4. Need  at least 4 weeks of IV Abx until 9/22/23    d/w  DR. Garcia and Covering NP, Willem    - ID follow up with Dr. Gurjit Mahan via Telehealth [phone] and Fax [phone]    Attending Attestation:    Spent more than 35 minutes on total encounter, more than 50 % of the visit was spent counseling and/or coordinating care by the Attending physician.

## 2023-08-25 NOTE — PROGRESS NOTE ADULT - SUBJECTIVE AND OBJECTIVE BOX
Patient is seen and examined at the bed side, is afebrile.  He is s/p Right thoracentesis today with removal of 1400 ml fluid, and tolerated the procedure well.       REVIEW OF SYSTEMS: All other review systems are negative      ALLERGIES: No Known Allergies      Vital Signs Last 24 Hrs  T(C): 36.8 (25 Aug 2023 15:45), Max: 37 (24 Aug 2023 20:56)  T(F): 98.2 (25 Aug 2023 15:45), Max: 98.6 (24 Aug 2023 20:56)  HR: 69 (25 Aug 2023 15:45) (67 - 71)  BP: 140/53 (25 Aug 2023 17:20) (112/51 - 159/67)  BP(mean): --  RR: 19 (25 Aug 2023 17:20) (18 - 19)  SpO2: 97% (25 Aug 2023 17:20) (97% - 98%)    Parameters below as of 25 Aug 2023 17:20  Patient On (Oxygen Delivery Method): room air      PHYSICAL EXAM:  GENERAL: Not in distress   CHEST/LUNG: Not using accessory muscles   HEART: s1 and s2 present  ABDOMEN:  Nontender and  Nondistended  EXTREMITIES: No pedal  edema, both feet are moist, not dry anymore  CNS: Awake and Alert      LABS:                                    10.5   8.21  )-----------( 234      ( 25 Aug 2023 07:55 )             33.4                9.9    9.62  )-----------( 204      ( 23 Aug 2023 07:50 )             31.1         08-25    136  |  99  |  51<H>  ----------------------------<  120<H>  3.8   |  29  |  1.96<H>    Ca    8.8      25 Aug 2023 07:55      08-23    134<L>  |  102  |  50<H>  ----------------------------<  118<H>  3.8   |  28  |  1.81<H>    Ca    8.4      23 Aug 2023 07:50    TPro  6.2  /  Alb  2.7<L>  /  TBili  0.3  /  DBili  x   /  AST  25  /  ALT  45  /  AlkPhos  71  08-21    PT/INR - ( 16 Aug 2023 18:30 )   PT: 15.6 sec;   INR: 1.38 ratio      PTT - ( 16 Aug 2023 18:30 )  PTT:30.5 sec      CAPILLARY BLOOD GLUCOSE  POCT Blood Glucose.: 153 mg/dL (16 Aug 2023 18:17)      Urinalysis Basic - ( 17 Aug 2023 08:04 )  Color: Yellow / Appearance: Turbid / S.014 / pH: x  Gluc: x / Ketone: Trace mg/dL  / Bili: Negative / Urobili: 1.0 mg/dL   Blood: x / Protein: 300 mg/dL / Nitrite: Negative   Leuk Esterase: Large / RBC: >50 /HPF / WBC >50 /HPF   Sq Epi: x / Non Sq Epi: x / Bacteria: Many /HPF      MEDICATIONS  (STANDING):    allopurinol 100 milliGRAM(s) Oral daily  ALPRAZolam 0.25 milliGRAM(s) Oral at bedtime  heparin   Injectable 5000 Unit(s) SubCutaneous every 12 hours  hydrALAZINE 50 milliGRAM(s) Oral three times a day  isosorbide   mononitrate ER Tablet (IMDUR) 30 milliGRAM(s) Oral daily  metoprolol tartrate 25 milliGRAM(s) Oral every 12 hours  metroNIDAZOLE  IVPB      metroNIDAZOLE  IVPB 500 milliGRAM(s) IV Intermittent every 8 hours  polyethylene glycol 3350 17 Gram(s) Oral at bedtime  risperiDONE   Tablet 1 milliGRAM(s) Oral at bedtime  simvastatin 20 milliGRAM(s) Oral at bedtime        23  : Transthoracic Echocardiogram (23 @ 15:12) >  ------------------------------------------------------------------------  CONCLUSIONS:  1. Mitral annular calcification. Mild mitral regurgitation.    2. Calcified trileaflet aortic valve with normal opening. Mild aortic regurgitation.  3. Aortic Root: 3.0 cm.    4. Severely dilated left atrium.  LA volume index = 54 cc/m2.  5. Mild concentric left ventricular hypertrophy.  6. Normal Left Ventricular Systolic Function,  (EF = 55 to  60%)  7. Unable to adequately assess diastolic function due to technical aspects of this study.  8. Normal right atrium.  9. Normal right ventricular size and systolic function (TAPSE  2.9cm).  10. RA Pressure is 8 mm Hg.  11. RV systolic pressure is moderately increased at  57 mm Hg.  12. There is mild tricuspid regurgitation.  13. There is trace pulmonic regurgitation.  14. Small pericardial effusion.  15. Right pleural effusion.  16. Spherical hypoechoic area is incidentally noted in the liver, probably representing hepatic cyst. Consider  dedicated hepatic ultrasound for further evaluation if clinically indicated.      RADIOLOGY & ADDITIONAL TESTS:    23 : CT Chest No Cont (23 @ 15:35) Increased large right and small left pleural effusions since 2023,  with worsening, now completely right lower lobe atelectasis.   No pneumonia on this noncontrast CT.      23 : Xray Chest 1 View- PORTABLE-Routine (Xray Chest 1 View- PORTABLE-Routine .) (23 @ 12:39) >  LUNGS: There is opacity at the right base compatible with effusion/infiltrate. When taking into account difference in technique, there is no significant change.  BONES: degenerative changes      23 : CT Chest No Cont (23 @ 01:47) Moderate to large right and small left pleural effusion with atelectasis,   increased since 3/13/2022. Nonspecific ground glass opacity and interlobular septal thickening at the right lung. Recommend clinical   correlation to assess underlying pneumonia. Nonspecific mediastinal lymphadenopathy.      23 : Xray Chest 1 View- PORTABLE-Urgent (23 @ 18:48) IMPRESSION:   Moderate RIGHT effusion and/or basilar airspace consolidation.      MICROBIOLOGY DATA:    Culture - Blood in AM (23 @ 08:15)   Specimen Source: .Blood Blood-Peripheral  Culture Results: No growth at 5 days    Culture - Blood in AM (23 @ 08:05)   Specimen Source: .Blood Blood-Peripheral  Culture Results: No growth at 5 days      Legionella pneumophila Antigen, Urine (23 @ 22:40)   Legionella Antigen, Urine: Negative      Culture - Blood (23 @ 18:35)   Gram Stain:   Growth in aerobic bottle: Gram Positive Cocci in Pairs and Chains   Growth in anaerobic bottle: Gram Positive Cocci in Pairs and Chains  - Vancomycin: S 0.5  - Penicillin: S <=0.03 Predicts results for ampicillin, amoxicillin, amoxicillin/clavulanate, ampicillin/sulbactam, 1st, 2nd and 3rd generation cephalosporins and carbapenems.  - Ceftriaxone: S <=0.25  Specimen Source: .Blood Blood-Peripheral  Organism: Streptococcus pyogenes (Group A)  Culture Results:   Growth in aerobic and anaerobic bottles: Streptococcus pyogenes (Group A)  Organism Identification: Streptococcus pyogenes (Group A)  Method Type: NOEMY      Culture - Blood (23 @ 18:30)   Gram Stain:   Growth in aerobic bottle: Gram Positive Cocci in Pairs and Chains  - Streptococcus pyogenes (Group A): Detec  Specimen Source: .Blood Blood-Peripheral  Organism: Blood Culture PCR  Culture Results:   Growth in aerobic bottle: Gram Positive Cocci in Pairs and Chains   Direct identification is available within approximately 3-5   hours either by Blood Panel Multiplexed PCR or Direct   MALDI-TOF. Details: [email or web address]  Organism Identification: Blood Culture PCR  Method Type: PCRRapid HIV-1/2 Antibody (23 @ 18:30)   Rapid HIV-1/2 Antibody: Nonreact

## 2023-08-25 NOTE — PROGRESS NOTE ADULT - SUBJECTIVE AND OBJECTIVE BOX
Date of Service 08-25-23 @ 10:42    CHIEF COMPLAINT:Patient is a 89y old  Male who presents with a chief complaint of PNA and ?effusion.Pt s/p thoracenthesis.    	  REVIEW OF SYSTEMS:  CONSTITUTIONAL: No fever, weight loss, or fatigue  EYES: No eye pain, visual disturbances, or discharge  ENT:  No difficulty hearing, tinnitus, vertigo; No sinus or throat pain  NECK: No pain or stiffness  RESPIRATORY: No cough, wheezing, chills or hemoptysis; No Shortness of Breath  CARDIOVASCULAR: No chest pain, palpitations, passing out, dizziness, or leg swelling  GASTROINTESTINAL: No abdominal or epigastric pain. No nausea, vomiting, or hematemesis; No diarrhea or constipation. No melena or hematochezia.  GENITOURINARY: No dysuria, frequency, hematuria, or incontinence  NEUROLOGICAL: No headaches, memory loss, loss of strength, numbness, or tremors  SKIN: No itching, burning, rashes, or lesions   LYMPH Nodes: No enlarged glands  ENDOCRINE: No heat or cold intolerance; No hair loss  MUSCULOSKELETAL: No joint pain or swelling; No muscle, back, or extremity pain  PSYCHIATRIC: No depression, anxiety, mood swings, or difficulty sleeping  HEME/LYMPH: No easy bruising, or bleeding gums  ALLERGY AND IMMUNOLOGIC: No hives or eczema	      PHYSICAL EXAM:  T(C): 36.5 (08-25-23 @ 05:04), Max: 37 (08-24-23 @ 20:56)  HR: 70 (08-25-23 @ 05:04) (65 - 71)  BP: 159/67 (08-25-23 @ 05:04) (106/45 - 159/67)  RR: 18 (08-25-23 @ 05:04) (18 - 19)  SpO2: 98% (08-25-23 @ 05:04) (96% - 98%)  Wt(kg): --  I&O's Summary    24 Aug 2023 07:01  -  25 Aug 2023 07:00  --------------------------------------------------------  IN: 616 mL / OUT: 2450 mL / NET: -1834 mL        Appearance: Normal	  HEENT:   Normal oral mucosa, PERRL, EOMI	  Lymphatic: No lymphadenopathy  Cardiovascular: Normal S1 S2, No JVD, No murmurs, No edema  Respiratory: Lungs clear to auscultation	  Psychiatry: A & O x 3, Mood & affect appropriate  Gastrointestinal:  Soft, Non-tender, + BS	  Skin: No rashes, No ecchymoses, No cyanosis	  Neurologic: Non-focal  Extremities: Normal range of motion, No clubbing, cyanosis or edema  Vascular: Peripheral pulses palpable 2+ bilaterally    MEDICATIONS  (STANDING):  allopurinol 100 milliGRAM(s) Oral daily  ALPRAZolam 0.25 milliGRAM(s) Oral at bedtime  heparin   Injectable 5000 Unit(s) SubCutaneous every 12 hours  hydrALAZINE 50 milliGRAM(s) Oral three times a day  isosorbide   mononitrate ER Tablet (IMDUR) 30 milliGRAM(s) Oral daily  metoprolol tartrate 25 milliGRAM(s) Oral every 12 hours  metroNIDAZOLE  IVPB 500 milliGRAM(s) IV Intermittent every 8 hours  metroNIDAZOLE  IVPB      polyethylene glycol 3350 17 Gram(s) Oral at bedtime  risperiDONE   Tablet 1 milliGRAM(s) Oral at bedtime  simvastatin 20 milliGRAM(s) Oral at bedtime       	  	  LABS:	 	                        10.5   8.21  )-----------( 234      ( 25 Aug 2023 07:55 )             33.4     08-25    136  |  99  |  51<H>  ----------------------------<  120<H>  3.8   |  29  |  1.96<H>    Ca    8.8      25 Aug 2023 07:55

## 2023-08-25 NOTE — PROGRESS NOTE ADULT - ASSESSMENT
89 yrs old from home, ambulating independently, pmhx of HFrEFm Afib on Eliquis, s/p Micra PPM, CKD, HLD, BPH on chronic Salmon, presented with fever and generalized weakness. In ED found to be febrile, tachycardic, CXR with Moderate right  effusion and/or basilar airspace consolidation. leukocytosis of 21.25 with left shift, Lactate 2.9 Urinalysis positive. Chronic salmon in place. Pt was admitted for sepsis likely due to UTI and ?PNA. Started on Ceftriaxone and Zithromax    CT Chest showed  Moderate to large right and small left pleural effusion with atelectasis. Pt was noted with strep pyogens bacteremia, Echo with no evidence of vegetation.  ID Dr. Garcia following. Repeat blood cultures negative. Pt will likely requires 4 weeks of abx from day of thoracentesis   Hospital course complicated with increasing large right pleural effusion, IR consulted, Eliquis placed on hold 8/23.  S/p right thoracentesis on 8/25 with removal of 1.4L   Pt will require PICC to complete abx until 9/22/23

## 2023-08-25 NOTE — PROCEDURE NOTE - NSICDXPROCEDURE_GEN_ALL_CORE_FT
PROCEDURES:  Right thoracentesis 25-Aug-2023 10:21:36  Bridgett Sandoval  
PROCEDURES:  FL guided PICC insertion 25-Aug-2023 14:24:08  Bridgett Sandoval

## 2023-08-25 NOTE — PROGRESS NOTE ADULT - ASSESSMENT
A:  Xerotic skin  Venous insufficiency    P:   Patient evaluated and Chart reviewed   Discussed diagnosis and treatment with patient  Continue with IV antibiotics As Per ID  Weight bearing as tolerated to b/l legs.   Provider to RN- Continue to put Moisterizer on patients legs and dorsal feet once a day before bedtime.   Advised patient to wear his CAIR boots to help offload his legs and feet. Pt advised to wear them at all times while in bed. Pt refused to wear CAIR boots.   Offloading to bilateral Heels while in bed.   Pt is stable from podiatry standpoint. No acute surgical intervention at this time.   Discussed importance of daily foot examinations and proper shoe gear and to importance of lower Fasting Blood Glucose levels.   PRN podiatry as needed.  Discussed with Attending Dr. Walls

## 2023-08-25 NOTE — PROGRESS NOTE ADULT - PROBLEM SELECTOR PLAN 1
-Streptococcus pyogenes bacteremia, likely source lungs   -Echo negative for vegetation   -cont Ceftriaxone and Flagyl   -repeat cultures NTD  -will likely requires 4 weeks of abx from day of thoracentesis until 9/22/2023  -IR consulted for PICC   -ID Dr. Garcia -Streptococcus pyogenes bacteremia, likely source lungs   -Echo negative for vegetation   -cont Ceftriaxone and Flagyl   -repeat cultures NTD  -will requires 4 weeks of abx from day of thoracentesis until 9/22/2023  -IR consulted for PICC   -ID Dr. Garcia

## 2023-08-25 NOTE — PROGRESS NOTE ADULT - SUBJECTIVE AND OBJECTIVE BOX
Interval: Pt is seen resting in bed. Pt's bilateral xerosis has improved significantly. Pt is not wearing CAIR boots as he does not like them. No acute overnight events. Pt did not have any other pedal complaints.    HPI:  89 yrs old from home, ambulating independently, pmhx of HFrEFm Afib on Eliquis, s/p Micra PPM, CKD, HLD, BPH on chronic [place]illa, presented with fever and generalized weakness. Collateral information obtained from pt's daughter present at bedside [Ms. Hull] who stated that when she was at work today, his father called him and reported of generalized weakness and inability to ambulate due to lethargy. When she got home, found that his father is having a fever, measured at 102.7 with some runny nose. Pt denied any symptoms including cough, chills, dyspnea, chest pain, palpitation, abdominal pain, N/V/D, decreased sensation or unilateral weakness, headache, dizziness, visual changes. Pt's daughter confirmed that non of these symptoms were present as well.  Pt was admitted on July 2023, for dyspnea on exertion, palpitations and LE swelling. As per the daughter the size of LE has been unchanged and pt has been complaint on the meds he was discharged from the hospital.     Pt denied alcohol consumption, smoking, use of illicit drugs including marijuana  (16 Aug 2023 22:10)  Medications acetaminophen     Tablet .. 650 milliGRAM(s) Oral every 6 hours PRN  allopurinol 100 milliGRAM(s) Oral daily  ALPRAZolam 0.25 milliGRAM(s) Oral at bedtime  heparin   Injectable 5000 Unit(s) SubCutaneous every 12 hours  hydrALAZINE 50 milliGRAM(s) Oral three times a day  isosorbide   mononitrate ER Tablet (IMDUR) 30 milliGRAM(s) Oral daily  metoprolol tartrate 25 milliGRAM(s) Oral every 12 hours  metroNIDAZOLE  IVPB 500 milliGRAM(s) IV Intermittent every 8 hours  metroNIDAZOLE  IVPB      polyethylene glycol 3350 17 Gram(s) Oral at bedtime  risperiDONE   Tablet 1 milliGRAM(s) Oral at bedtime  simvastatin 20 milliGRAM(s) Oral at bedtime  sodium chloride 0.9% lock flush 10 milliLiter(s) IV Push every 1 hour PRN    FHNo pertinent family history in first degree relatives    ,   PMHHypertension    Atrial fibrillation    BPH (benign prostatic hyperplasia)    Chronic kidney disease, unspecified CKD stage    Hyperlipidemia       PSHNo significant past surgical history    S/P placement of cardiac pacemaker        Labs                          10.5   8.21  )-----------( 234      ( 25 Aug 2023 07:55 )             33.4      08-25    136  |  99  |  51<H>  ----------------------------<  120<H>  3.8   |  29  |  1.96<H>    Ca    8.8      25 Aug 2023 07:55       Vital Signs Last 24 Hrs  T(C): 36.8 (25 Aug 2023 15:45), Max: 37 (24 Aug 2023 20:56)  T(F): 98.2 (25 Aug 2023 15:45), Max: 98.6 (24 Aug 2023 20:56)  HR: 69 (25 Aug 2023 15:45) (67 - 71)  BP: 112/51 (25 Aug 2023 15:45) (112/51 - 159/67)  BP(mean): --  RR: 19 (25 Aug 2023 15:45) (18 - 19)  SpO2: 98% (25 Aug 2023 11:12) (97% - 98%)    Parameters below as of 25 Aug 2023 15:45  Patient On (Oxygen Delivery Method): room air      Sedimentation Rate, Erythrocyte: 26 mm/Hr (08-22-23 @ 08:29)         C-Reactive Protein, Serum: 60 mg/L (08-16-23 @ 22:40)   WBC Count: 8.21 K/uL (08-25-23 @ 07:55)      PHYSICAL EXAM  LE Focused:    Vasc:  DP and PT pulses palpable 2/4. CFT < 3seconds. Tg warm to warm with no increase in temp to lower legs. Varicosities noted b/l.  Derm: Skin noted in lower legs and dorsal feet appears well hydrated. Edema noted to lower legs, not noted in feet. No open lesions noted. No erythema noted. Nail noted to be dystropic 1-5 b/l.  Neuro: Gross sensation intact.  MSK: No pop in lower legs or feet. Patient ambulates with use of walker.      CULTURES: Culture Results:   <10,000 CFU/mL Normal Urogenital Lesly (08-17 @ 08:04)  Culture Results:   Growth in aerobic bottle: Gram Positive Cocci in Pairs and Chains  Growth in anaerobic bottle: Gram Positive Cocci in Pairs and Chains (08-16 @ 18:35)  Culture Results:   Growth in aerobic bottle: Streptococcus pyogenes (Group A)  See previous culture [id number]  Direct identification is available within approximately 3-5  hours either by Blood Panel Multiplexed PCR or Direct  MALDI-TOF. Details: [email or web address] (08-16 @ 18:30)

## 2023-08-25 NOTE — PROGRESS NOTE ADULT - ASSESSMENT
89 yrs old from home, ambulating independently, pmhx of HFrEFm Afib on Eliquis, s/p Micra PPM, CKD, HLD, BPH on chronic Salmon, presented with fever and generalized weakness,sepsis,pneumonia,chronic diastolic HF and strep bactermia..  1.Sepsis due to strep bactermia-abx.Repeat blood cx -neg. D/W daughter, no AIDEN, will treat for 4 weeks with ABX.  2.Pneumonia -abx as per ID.  3.Chronic diastolic HF-d/c IV lasix.  4.CRI-f/u lytes.  5.Afib-NOAC on hold.lopressor.  6.BPH with chronic salmon-urince cx-.  7.S/P Rt thoracentesis today,will resume  eliquis in am.  8.GI and DVT prophylaxis.  9.D/W pt's daughter agree with above plan.

## 2023-08-25 NOTE — PROCEDURE NOTE - PROCEDURE FINDINGS AND DETAILS
1400 cc serous fluid drained. Catheter removed and a sterile  dressing applied.  Specimens were obtained and sent for a complete evaluation including cytology.

## 2023-08-25 NOTE — PROGRESS NOTE ADULT - SUBJECTIVE AND OBJECTIVE BOX
Patient is a 89y old  Male who presents with a chief complaint of PNA and ?effusion (25 Aug 2023 10:41)  Seen and examined using [place]ian      INTERVAL HPI/OVERNIGHT EVENTS:        REVIEW OF SYSTEMS:  CONSTITUTIONAL: No fever, chills  ENMT:  No difficulty hearing, no change in vision  NECK: No pain or stiffness  RESPIRATORY: No cough, SOB  CARDIOVASCULAR: No chest pain, palpitations  GASTROINTESTINAL: No abdominal pain. No nausea, vomiting, or diarrhea  GENITOURINARY: No dysuria  NEUROLOGICAL: No HA  SKIN: No itching, burning, rashes, or lesions   LYMPH NODES: No enlarged glands  ENDOCRINE: No heat or cold intolerance; No hair loss  MUSCULOSKELETAL: No joint pain or swelling; No muscle, back, or extremity pain  PSYCHIATRIC: No depression, anxiety  HEME/LYMPH: No easy bruising, or bleeding gums    T(C): 37 (08-25-23 @ 11:12), Max: 37 (08-24-23 @ 20:56)  HR: 70 (08-25-23 @ 11:12) (67 - 71)  BP: 129/60 (08-25-23 @ 11:12) (113/52 - 159/67)  RR: 18 (08-25-23 @ 11:12) (18 - 19)  SpO2: 98% (08-25-23 @ 11:12) (97% - 98%)  Wt(kg): --Vital Signs Last 24 Hrs  T(C): 37 (25 Aug 2023 11:12), Max: 37 (24 Aug 2023 20:56)  T(F): 98.6 (25 Aug 2023 11:12), Max: 98.6 (24 Aug 2023 20:56)  HR: 70 (25 Aug 2023 11:12) (67 - 71)  BP: 129/60 (25 Aug 2023 11:12) (113/52 - 159/67)  BP(mean): --  RR: 18 (25 Aug 2023 11:12) (18 - 19)  SpO2: 98% (25 Aug 2023 11:12) (97% - 98%)    Parameters below as of 25 Aug 2023 11:12  Patient On (Oxygen Delivery Method): room air        PHYSICAL EXAM:  GENERAL: NAD  EYES: clear conjunctiva; EOMI  ENMT: Moist mucous membranes  NECK: Supple, No JVD, Normal thyroid  CHEST/LUNG: Clear to auscultation bilaterally; No rales, rhonchi, wheezing, or rubs  HEART: S1, S2, Regular rate and rhythm  ABDOMEN: Soft, Nontender, Nondistended; Bowel sounds present  NEURO: Alert & Oriented X3  EXTREMITIES: No LE edema, no calf tenderness  LYMPH: No lymphadenopathy noted  SKIN: No rashes or lesions    Consultant(s) Notes Reviewed:  [x ] YES  [ ] NO  Care Discussed with Consultants/Other Providers [ x] YES  [ ] NO    LABS:                        10.5   8.21  )-----------( 234      ( 25 Aug 2023 07:55 )             33.4     08-25    136  |  99  |  51<H>  ----------------------------<  120<H>  3.8   |  29  |  1.96<H>    Ca    8.8      25 Aug 2023 07:55      PT/INR - ( 25 Aug 2023 07:55 )   PT: 12.6 sec;   INR: 1.11 ratio         PTT - ( 25 Aug 2023 07:55 )  PTT:31.4 sec  CAPILLARY BLOOD GLUCOSE            Urinalysis Basic - ( 25 Aug 2023 07:55 )    Color: x / Appearance: x / SG: x / pH: x  Gluc: 120 mg/dL / Ketone: x  / Bili: x / Urobili: x   Blood: x / Protein: x / Nitrite: x   Leuk Esterase: x / RBC: x / WBC x   Sq Epi: x / Non Sq Epi: x / Bacteria: x        RADIOLOGY & ADDITIONAL TESTS:    Imaging Personally Reviewed:  [ ] YES  [ ] NO   Patient is a 89y old  Male who presents with a chief complaint of PNA and ?effusion (25 Aug 2023 10:41)  Seen and examined using [place]ese  Sravani ID # [id number]    OVERNIGHT EVENTS: no acute events overnight, s/p thoracentesis today     REVIEW OF SYSTEMS:  CONSTITUTIONAL: No fever, chills  NECK: No pain or stiffness  RESPIRATORY: No cough, SOB  CARDIOVASCULAR: No chest pain, palpitations  GASTROINTESTINAL: No abdominal pain. No nausea, vomiting, or diarrhea  GENITOURINARY: No dysuria  NEUROLOGICAL: No HA  MUSCULOSKELETAL: No joint pain or swelling    T(C): 37 (08-25-23 @ 11:12), Max: 37 (08-24-23 @ 20:56)  HR: 70 (08-25-23 @ 11:12) (67 - 71)  BP: 129/60 (08-25-23 @ 11:12) (113/52 - 159/67)  RR: 18 (08-25-23 @ 11:12) (18 - 19)  SpO2: 98% (08-25-23 @ 11:12) (97% - 98%)  Wt(kg): --Vital Signs Last 24 Hrs  T(C): 37 (25 Aug 2023 11:12), Max: 37 (24 Aug 2023 20:56)  T(F): 98.6 (25 Aug 2023 11:12), Max: 98.6 (24 Aug 2023 20:56)  HR: 70 (25 Aug 2023 11:12) (67 - 71)  BP: 129/60 (25 Aug 2023 11:12) (113/52 - 159/67)  BP(mean): --  RR: 18 (25 Aug 2023 11:12) (18 - 19)  SpO2: 98% (25 Aug 2023 11:12) (97% - 98%)    Parameters below as of 25 Aug 2023 11:12  Patient On (Oxygen Delivery Method): room air    MEDICATIONS  (STANDING):  allopurinol 100 milliGRAM(s) Oral daily  ALPRAZolam 0.25 milliGRAM(s) Oral at bedtime  heparin   Injectable 5000 Unit(s) SubCutaneous every 12 hours  hydrALAZINE 50 milliGRAM(s) Oral three times a day  isosorbide   mononitrate ER Tablet (IMDUR) 30 milliGRAM(s) Oral daily  metoprolol tartrate 25 milliGRAM(s) Oral every 12 hours  metroNIDAZOLE  IVPB      metroNIDAZOLE  IVPB 500 milliGRAM(s) IV Intermittent every 8 hours  polyethylene glycol 3350 17 Gram(s) Oral at bedtime  risperiDONE   Tablet 1 milliGRAM(s) Oral at bedtime  simvastatin 20 milliGRAM(s) Oral at bedtime    MEDICATIONS  (PRN):  acetaminophen     Tablet .. 650 milliGRAM(s) Oral every 6 hours PRN Temp greater or equal to 38C (100.4F), Mild Pain (1 - 3)    PHYSICAL EXAM:  GENERAL: NAD  EYES: clear conjunctiva  ENMT: Moist mucous membranes  NECK: Supple, No JVD  CHEST/LUNG: Clear to auscultation bilaterally; No rales, rhonchi, wheezing, or rubs  HEART: S1, S2, Regular rate and rhythm  ABDOMEN: Soft, Nontender, Nondistended; Bowel sounds present  NEURO: Alert & Oriented X3  EXTREMITIES: No LE edema, no calf tenderness  SKIN: No rashes or lesions  : salmon with clear yellow urine     Consultant(s) Notes Reviewed:  [x ] YES  [ ] NO  Care Discussed with Consultants/Other Providers [ x] YES  [ ] NO    LABS:                        10.5   8.21  )-----------( 234      ( 25 Aug 2023 07:55 )             33.4     08-25    136  |  99  |  51<H>  ----------------------------<  120<H>  3.8   |  29  |  1.96<H>    Ca    8.8      25 Aug 2023 07:55  PT/INR - ( 25 Aug 2023 07:55 )   PT: 12.6 sec;   INR: 1.11 ratio      PTT - ( 25 Aug 2023 07:55 )  PTT:31.4 sec  CAPILLARY BLOOD GLUCOSE  Urinalysis Basic - ( 25 Aug 2023 07:55 )  Color: x / Appearance: x / SG: x / pH: x  Gluc: 120 mg/dL / Ketone: x  / Bili: x / Urobili: x   Blood: x / Protein: x / Nitrite: x   Leuk Esterase: x / RBC: x / WBC x   Sq Epi: x / Non Sq Epi: x / Bacteria: x    RADIOLOGY & ADDITIONAL TESTS:  < from: CT Chest No Cont (08.22.23 @ 15:35) >    ACC: [id number] EXAM:  CT CHEST   ORDERED BY:  ALYSHA MAYORGA     PROCEDURE DATE:  08/22/2023          INTERPRETATION:  CLINICAL INFORMATION: Follow-up pneumonia and right   pleural effusion    COMPARISON: CT chest 8/17/2023, CT abdomen and pelvis 3/13/2022.    CONTRAST/COMPLICATIONS:  IV Contrast: NONE  Oral Contrast: NONE  Complications: None reported at time of study completion    PROCEDURE:  CT of the Chest was performed.  Sagittal and coronal reformats were performed.    FINDINGS:    LUNGSAND AIRWAYS: Proximal trachea secretions.  Partial atelectasis of   the right upper and middle lobes, and complete atelectasis of the right   lower lobe, increased from 8/17/2023. Left lower lobe subsegmental   atelectasis.  PLEURA: Large right and small left pleural effusions, increased from   8/17/2023.  MEDIASTINUM AND OMA: Mediastinal lymph nodes are unchanged in size from   8/17/2023, for example in inferior right paratracheal node measures 1.6 x   1.4 cm (3-62).  VESSELS: Calcified plaque within the aorta and coronary arteries.  HEART: Leadless pacemaker in the right ventricle. Mild aortic valvular   and mitral annular calcifications. Heart size is enlarged. Small   circumferential pericardial effusion, unchanged. Hypodensity of the blood   pool in relation to left ventricular myocardium suggests underlying   anemia.  CHEST WALL AND LOWER NECK: Heterogeneous thyroid gland.  VISUALIZED UPPER ABDOMEN: Hepatic cysts and subcentimeter hypodense foci   too small to characterize. Partially visualized numerous renal cysts and   left hydronephrosis, similar in appearance to 3/13/2022. An indeterminate   1.1 cm right renal hyperdensity is unchanged from 3/13/2022.  BONES: Mild degenerative changes of the spine. Small right posterior   intramuscular lipoma.    IMPRESSION:  Increased large right and small left pleural effusions since 8/17/2023,   with worsening, now completely right lower lobe atelectasis.    No pneumonia on this noncontrast CT.    < end of copied text >

## 2023-08-25 NOTE — PROGRESS NOTE ADULT - PROBLEM SELECTOR PLAN 2
-repeat CT with Increasing  large right pleural effusion   -d/c IV Lasix  -s/p right thoracentesis on 8/25 with removal of 1.4L   -resume Eliquis in AM

## 2023-08-26 LAB
ANION GAP SERPL CALC-SCNC: 5 MMOL/L — SIGNIFICANT CHANGE UP (ref 5–17)
BUN SERPL-MCNC: 55 MG/DL — HIGH (ref 7–18)
CALCIUM SERPL-MCNC: 8.7 MG/DL — SIGNIFICANT CHANGE UP (ref 8.4–10.5)
CHLORIDE SERPL-SCNC: 103 MMOL/L — SIGNIFICANT CHANGE UP (ref 96–108)
CO2 SERPL-SCNC: 28 MMOL/L — SIGNIFICANT CHANGE UP (ref 22–31)
CREAT SERPL-MCNC: 1.96 MG/DL — HIGH (ref 0.5–1.3)
EGFR: 31 ML/MIN/1.73M2 — LOW
GLUCOSE SERPL-MCNC: 125 MG/DL — HIGH (ref 70–99)
HCT VFR BLD CALC: 32.1 % — LOW (ref 39–50)
HGB BLD-MCNC: 10.2 G/DL — LOW (ref 13–17)
MCHC RBC-ENTMCNC: 28 PG — SIGNIFICANT CHANGE UP (ref 27–34)
MCHC RBC-ENTMCNC: 31.8 GM/DL — LOW (ref 32–36)
MCV RBC AUTO: 88.2 FL — SIGNIFICANT CHANGE UP (ref 80–100)
MRSA PCR RESULT.: SIGNIFICANT CHANGE UP
NRBC # BLD: 0 /100 WBCS — SIGNIFICANT CHANGE UP (ref 0–0)
PLATELET # BLD AUTO: 220 K/UL — SIGNIFICANT CHANGE UP (ref 150–400)
POTASSIUM SERPL-MCNC: 3.9 MMOL/L — SIGNIFICANT CHANGE UP (ref 3.5–5.3)
POTASSIUM SERPL-SCNC: 3.9 MMOL/L — SIGNIFICANT CHANGE UP (ref 3.5–5.3)
RBC # BLD: 3.64 M/UL — LOW (ref 4.2–5.8)
RBC # FLD: 13.5 % — SIGNIFICANT CHANGE UP (ref 10.3–14.5)
S AUREUS DNA NOSE QL NAA+PROBE: SIGNIFICANT CHANGE UP
SODIUM SERPL-SCNC: 136 MMOL/L — SIGNIFICANT CHANGE UP (ref 135–145)
WBC # BLD: 9.62 K/UL — SIGNIFICANT CHANGE UP (ref 3.8–10.5)
WBC # FLD AUTO: 9.62 K/UL — SIGNIFICANT CHANGE UP (ref 3.8–10.5)

## 2023-08-26 RX ORDER — ALPRAZOLAM 0.25 MG
0.25 TABLET ORAL AT BEDTIME
Refills: 0 | Status: DISCONTINUED | OUTPATIENT
Start: 2023-08-26 | End: 2023-08-28

## 2023-08-26 RX ORDER — CEFTRIAXONE 500 MG/1
2 INJECTION, POWDER, FOR SOLUTION INTRAMUSCULAR; INTRAVENOUS
Qty: 28 | Refills: 0
Start: 2023-08-26 | End: 2023-09-22

## 2023-08-26 RX ORDER — METRONIDAZOLE 500 MG
1 TABLET ORAL
Qty: 27 | Refills: 0
Start: 2023-08-26 | End: 2023-09-21

## 2023-08-26 RX ORDER — APIXABAN 2.5 MG/1
2.5 TABLET, FILM COATED ORAL EVERY 12 HOURS
Refills: 0 | Status: DISCONTINUED | OUTPATIENT
Start: 2023-08-26 | End: 2023-08-28

## 2023-08-26 RX ADMIN — Medication 25 MILLIGRAM(S): at 06:12

## 2023-08-26 RX ADMIN — APIXABAN 2.5 MILLIGRAM(S): 2.5 TABLET, FILM COATED ORAL at 06:26

## 2023-08-26 RX ADMIN — Medication 50 MILLIGRAM(S): at 06:12

## 2023-08-26 RX ADMIN — APIXABAN 2.5 MILLIGRAM(S): 2.5 TABLET, FILM COATED ORAL at 17:58

## 2023-08-26 RX ADMIN — Medication 650 MILLIGRAM(S): at 23:03

## 2023-08-26 RX ADMIN — Medication 0.25 MILLIGRAM(S): at 22:58

## 2023-08-26 RX ADMIN — Medication 50 MILLIGRAM(S): at 22:59

## 2023-08-26 RX ADMIN — Medication 100 MILLIGRAM(S): at 14:58

## 2023-08-26 RX ADMIN — Medication 100 MILLIGRAM(S): at 22:51

## 2023-08-26 RX ADMIN — SIMVASTATIN 20 MILLIGRAM(S): 20 TABLET, FILM COATED ORAL at 22:59

## 2023-08-26 RX ADMIN — POLYETHYLENE GLYCOL 3350 17 GRAM(S): 17 POWDER, FOR SOLUTION ORAL at 22:59

## 2023-08-26 RX ADMIN — Medication 100 MILLIGRAM(S): at 11:23

## 2023-08-26 RX ADMIN — Medication 100 MILLIGRAM(S): at 06:12

## 2023-08-26 RX ADMIN — RISPERIDONE 1 MILLIGRAM(S): 4 TABLET ORAL at 22:59

## 2023-08-26 RX ADMIN — ISOSORBIDE MONONITRATE 30 MILLIGRAM(S): 60 TABLET, EXTENDED RELEASE ORAL at 11:23

## 2023-08-26 RX ADMIN — CEFTRIAXONE 100 MILLIGRAM(S): 500 INJECTION, POWDER, FOR SOLUTION INTRAMUSCULAR; INTRAVENOUS at 06:11

## 2023-08-26 RX ADMIN — Medication 650 MILLIGRAM(S): at 23:30

## 2023-08-26 RX ADMIN — CHLORHEXIDINE GLUCONATE 1 APPLICATION(S): 213 SOLUTION TOPICAL at 11:23

## 2023-08-26 NOTE — CHART NOTE - NSCHARTNOTEFT_GEN_A_CORE
EVENT:Notified by RN of BP 96/42.     SUBJECTIVE:Pt seen and examined. Pt awake/alert, eating dinner. Pt endorses no chest discomfort, no abd discomfort. No N/V/diarrhea noted.     OBJECTIVE:  Vital Signs Last 24 Hrs  T(C): 36.9 (19 Aug 2023 16:24), Max: 37.1 (19 Aug 2023 11:06)  T(F): 98.4 (19 Aug 2023 16:24), Max: 98.7 (19 Aug 2023 11:06)  HR: 60 (19 Aug 2023 16:24) (60 - 83)  BP: 96/42 (19 Aug 2023 16:24) (96/42 - 148/75)  BP(mean): --  RR: 18 (19 Aug 2023 16:24) (17 - 18)  SpO2: 99% (19 Aug 2023 16:24) (97% - 99%)    Parameters below as of 19 Aug 2023 16:24  Patient On (Oxygen Delivery Method): room air    Gen: NAD  HEENT: PERRL  Resp: Clear upper lung fields, decreased at bases on auscultation. No rales, no wheezing  CVS: S1S2 present, regular  GI: BS(+), Soft, ND, NT   : salmon with yellow urine.  Extremities : BLE No edema or calf tenderness. PPP  Neuro: Awake/alert.  no new neuro deficits  Skin: warm,dry      LABS:                        9.8    11.76 )-----------( 167      ( 18 Aug 2023 08:05 )             31.1     08-18    136  |  103  |  73<H>  ----------------------------<  127<H>  4.2   |  25  |  2.85<H>    Ca    8.6      18 Aug 2023 08:05        EKG:   IMAGING:    ASSESSMENT:  HPI:  89 yrs old from home, ambulating independently, pmhx of HFrEFm Afib on Eliquis, s/p Micra PPM, CKD, HLD, BPH on chronic Salmon, presented with fever and generalized weakness. Pt was admitted for sepsis likely due to UTI and PNA . Hospital course complicated by bacteremia. On Ceftriaxone and Azithromycin.   Now with soft BP  . On lasix , hydralazine, metoprolol for chronic diastolic HF. Orquidea Garcia attending.     PLAN:   -Continue above meds for chronic HF with hold parameters.   -Monitor BP   -F/u with Orquidea Garcia.     Kathy Costa NP Medicine
Pt optimized for discharge, s/p PICC line insertion on 8/25  Spoke to pt's daughter regarding IV abx infusion, daughter is undecided regarding home infusion vs JETT, she wants to take the pt home but is not available to infuse abx multiple times during the day   CM consulted, prescriptions for home abx infusion printed and give to covering CM to start home abx infusion set up   Primary team to follow up on Monday
Pt is for R Thoracentesis in AM.     PLAN:   1. Order NPO after MN except for meds

## 2023-08-26 NOTE — PROGRESS NOTE ADULT - ASSESSMENT
89 yrs old from home, ambulating independently, pmhx of HFrEFm Afib on Eliquis, s/p Micra PPM, CKD, HLD, BPH on chronic Salmon, presented with fever and generalized weakness,sepsis,pneumonia,chronic diastolic HF and strep bactermia..  1.Sepsis due to strep bactermia-abx.Repeat blood cx -neg. D/W daughter, no AIDEN, will treat for 4 weeks with ABX.  2.Pneumonia -abx as per ID.  3.Chronic diastolic HF-off IV lasix.  4.CRI-f/u lytes.  5.Afib-NOAC on hold.lopressor.  6.BPH with chronic salmon-urince cx-.  7.S/P  PICC-daughter to decide regarding JETT.  8.GI and DVT prophylaxis.   89 yrs old from home, ambulating independently, pmhx of HFrEFm Afib on Eliquis, s/p Micra PPM, CKD, HLD, BPH on chronic Salmon, presented with fever and generalized weakness,sepsis,pneumonia,chronic diastolic HF and strep bactermia..  1.Sepsis due to strep bactermia-abx.Repeat blood cx -neg. D/W daughter, no AIDEN, will treat for 4 weeks with ABX.  2.Pneumonia -abx as per ID.  3.Chronic diastolic HF-off IV lasix.  4.CRI-f/u lytes.  5.Afib-resume NOAC.lopressor.  6.BPH with chronic salmon-urince cx-.  7.S/P  PICC-daughter to decide regarding JETT.  8.GI  prophylaxis.

## 2023-08-26 NOTE — PROGRESS NOTE ADULT - SUBJECTIVE AND OBJECTIVE BOX
Date of Service 08-26-23 @ 11:51    CHIEF COMPLAINT:Patient is a 89y old  Male who presents with a chief complaint of PNA and ?effusion.Pt appears comfortable.    	  REVIEW OF SYSTEMS:  CONSTITUTIONAL: No fever, weight loss, or fatigue  EYES: No eye pain, visual disturbances, or discharge  ENT:  No difficulty hearing, tinnitus, vertigo; No sinus or throat pain  NECK: No pain or stiffness  RESPIRATORY: No cough, wheezing, chills or hemoptysis; No Shortness of Breath  CARDIOVASCULAR: No chest pain, palpitations, passing out, dizziness, or leg swelling  GASTROINTESTINAL: No abdominal or epigastric pain. No nausea, vomiting, or hematemesis; No diarrhea or constipation. No melena or hematochezia.  GENITOURINARY: No dysuria, frequency, hematuria, or incontinence  NEUROLOGICAL: No headaches, memory loss, loss of strength, numbness, or tremors  SKIN: No itching, burning, rashes, or lesions   LYMPH Nodes: No enlarged glands  ENDOCRINE: No heat or cold intolerance; No hair loss  MUSCULOSKELETAL: No joint pain or swelling; No muscle, back, or extremity pain  PSYCHIATRIC: No depression, anxiety, mood swings, or difficulty sleeping  HEME/LYMPH: No easy bruising, or bleeding gums  ALLERGY AND IMMUNOLOGIC: No hives or eczema	      PHYSICAL EXAM:  T(C): 36.5 (08-26-23 @ 11:17), Max: 37 (08-26-23 @ 07:25)  HR: 68 (08-26-23 @ 11:17) (63 - 72)  BP: 105/43 (08-26-23 @ 11:17) (101/45 - 140/53)  RR: 18 (08-26-23 @ 11:17) (18 - 19)  SpO2: 100% (08-26-23 @ 11:17) (95% - 100%)  Wt(kg): --  I&O's Summary    25 Aug 2023 07:01  -  26 Aug 2023 07:00  --------------------------------------------------------  IN: 230 mL / OUT: 1100 mL / NET: -870 mL        Appearance: Normal	  HEENT:   Normal oral mucosa, PERRL, EOMI	  Lymphatic: No lymphadenopathy  Cardiovascular: Normal S1 S2, No JVD, No murmurs, No edema  Respiratory: Lungs clear to auscultation	  Psychiatry: A & O x 3, Mood & affect appropriate  Gastrointestinal:  Soft, Non-tender, + BS	  Skin: No rashes, No ecchymoses, No cyanosis	  Neurologic: Non-focal  Extremities: Normal range of motion, No clubbing, cyanosis or edema  Vascular: Peripheral pulses palpable 2+ bilaterally    MEDICATIONS  (STANDING):  allopurinol 100 milliGRAM(s) Oral daily  ALPRAZolam 0.25 milliGRAM(s) Oral at bedtime  apixaban 2.5 milliGRAM(s) Oral every 12 hours  cefTRIAXone   IVPB 2000 milliGRAM(s) IV Intermittent every 24 hours  chlorhexidine 2% Cloths 1 Application(s) Topical daily  hydrALAZINE 50 milliGRAM(s) Oral three times a day  isosorbide   mononitrate ER Tablet (IMDUR) 30 milliGRAM(s) Oral daily  metoprolol tartrate 25 milliGRAM(s) Oral every 12 hours  metroNIDAZOLE  IVPB      metroNIDAZOLE  IVPB 500 milliGRAM(s) IV Intermittent every 8 hours  polyethylene glycol 3350 17 Gram(s) Oral at bedtime  risperiDONE   Tablet 1 milliGRAM(s) Oral at bedtime  simvastatin 20 milliGRAM(s) Oral at bedtime      	  	  LABS:	 	                      10.2   9.62  )-----------( 220      ( 26 Aug 2023 07:58 )             32.1     08-26    136  |  103  |  55<H>  ----------------------------<  125<H>  3.9   |  28  |  1.96<H>    Ca    8.7      26 Aug 2023 07:58      < from: Xray Chest 1 View-PORTABLE IMMEDIATE (Xray Chest 1 View-PORTABLE IMMEDIATE .) (08.25.23 @ 11:51) >  ACC: [id number] EXAM:  XR CHEST PORTABLE IMMED 1V   ORDERED BY: MARK TEJADA     PROCEDURE DATE:  08/25/2023          INTERPRETATION:  CLINICAL INDICATION: 89 years  Male with s/p right   thoracentesis.    COMPARISON: 8/21/2023    AP view of the chest demonstrates the lungs to be clear. There is no   pleural effusion. The pulmonary vasculature is normal. There is no   pneumothorax.    The heart is enlarged. A micra pacemaker is redemonstrated.. The   mediastinum and roland cannot be assessed.    Mild thoracic degenerative changes are present.    IMPRESSION:    Resolution of right pleural effusion. No pneumothorax.    Cardiomegaly. Micra pacemaker.    --- End of Report ---            OLI ALCARAZ MD; Attending Radiologist  This document has been electronically signed. Aug 25 2023 12:49PM    < end of copied text >

## 2023-08-26 NOTE — PROGRESS NOTE ADULT - SUBJECTIVE AND OBJECTIVE BOX
Patient is seen and examined at the bed side, is afebrile.  He is doing better, no new complaints.        REVIEW OF SYSTEMS: All other review systems are negative      ALLERGIES: No Known Allergies      Vital Signs Last 24 Hrs  T(C): 37 (26 Aug 2023 07:25), Max: 37 (25 Aug 2023 11:12)  T(F): 98.6 (26 Aug 2023 07:25), Max: 98.6 (25 Aug 2023 11:12)  HR: 68 (26 Aug 2023 07:25) (63 - 72)  BP: 101/45 (26 Aug 2023 07:25) (101/45 - 140/53)  BP(mean): --  RR: 18 (26 Aug 2023 07:25) (18 - 19)  SpO2: 96% (26 Aug 2023 07:25) (95% - 98%)    Parameters below as of 26 Aug 2023 07:25  Patient On (Oxygen Delivery Method): room air      PHYSICAL EXAM:  GENERAL: Not in distress   CHEST/LUNG: Not using accessory muscles   HEART: s1 and s2 present  ABDOMEN:  Nontender and  Nondistended  EXTREMITIES: No pedal  edema, both feet are moist, not dry anymore  CNS: Awake and Alert      LABS:                          10.2   9.62  )-----------( 220      ( 26 Aug 2023 07:58 )             32.1                                   10.5   8.21  )-----------( 234      ( 25 Aug 2023 07:55 )             33.4       08    136  |  103  |  55<H>  ----------------------------<  125<H>  3.9   |  28  |  1.96<H>    Ca    8.7      26 Aug 2023 07:58      08-    136  |  99  |  51<H>  ----------------------------<  120<H>  3.8   |  29  |  1.96<H>    Ca    8.8      25 Aug 2023 07:55    TPro  6.2  /  Alb  2.7<L>  /  TBili  0.3  /  DBili  x   /  AST  25  /  ALT  45  /  AlkPhos  71  08-21    PT/INR - ( 16 Aug 2023 18:30 )   PT: 15.6 sec;   INR: 1.38 ratio      PTT - ( 16 Aug 2023 18:30 )  PTT:30.5 sec      CAPILLARY BLOOD GLUCOSE  POCT Blood Glucose.: 153 mg/dL (16 Aug 2023 18:17)      Urinalysis Basic - ( 17 Aug 2023 08:04 )  Color: Yellow / Appearance: Turbid / S.014 / pH: x  Gluc: x / Ketone: Trace mg/dL  / Bili: Negative / Urobili: 1.0 mg/dL   Blood: x / Protein: 300 mg/dL / Nitrite: Negative   Leuk Esterase: Large / RBC: >50 /HPF / WBC >50 /HPF   Sq Epi: x / Non Sq Epi: x / Bacteria: Many /HPF      MEDICATIONS  (STANDING):    allopurinol 100 milliGRAM(s) Oral daily  ALPRAZolam 0.25 milliGRAM(s) Oral at bedtime  apixaban 2.5 milliGRAM(s) Oral every 12 hours  cefTRIAXone   IVPB 2000 milliGRAM(s) IV Intermittent every 24 hours  chlorhexidine 2% Cloths 1 Application(s) Topical daily  hydrALAZINE 50 milliGRAM(s) Oral three times a day  isosorbide   mononitrate ER Tablet (IMDUR) 30 milliGRAM(s) Oral daily  metoprolol tartrate 25 milliGRAM(s) Oral every 12 hours  metroNIDAZOLE  IVPB 500 milliGRAM(s) IV Intermittent every 8 hours  metroNIDAZOLE  IVPB      polyethylene glycol 3350 17 Gram(s) Oral at bedtime  risperiDONE   Tablet 1 milliGRAM(s) Oral at bedtime  simvastatin 20 milliGRAM(s) Oral at bedtime          23  : Transthoracic Echocardiogram (23 @ 15:12) >  ------------------------------------------------------------------------  CONCLUSIONS:  1. Mitral annular calcification. Mild mitral regurgitation.    2. Calcified trileaflet aortic valve with normal opening. Mild aortic regurgitation.  3. Aortic Root: 3.0 cm.    4. Severely dilated left atrium.  LA volume index = 54 cc/m2.  5. Mild concentric left ventricular hypertrophy.  6. Normal Left Ventricular Systolic Function,  (EF = 55 to  60%)  7. Unable to adequately assess diastolic function due to technical aspects of this study.  8. Normal right atrium.  9. Normal right ventricular size and systolic function (TAPSE  2.9cm).  10. RA Pressure is 8 mm Hg.  11. RV systolic pressure is moderately increased at  57 mm Hg.  12. There is mild tricuspid regurgitation.  13. There is trace pulmonic regurgitation.  14. Small pericardial effusion.  15. Right pleural effusion.  16. Spherical hypoechoic area is incidentally noted in the liver, probably representing hepatic cyst. Consider  dedicated hepatic ultrasound for further evaluation if clinically indicated.      RADIOLOGY & ADDITIONAL TESTS:    23 : CT Chest No Cont (23 @ 15:35) Increased large right and small left pleural effusions since 2023,  with worsening, now completely right lower lobe atelectasis.   No pneumonia on this noncontrast CT.      23 : Xray Chest 1 View- PORTABLE-Routine (Xray Chest 1 View- PORTABLE-Routine .) (23 @ 12:39) >  LUNGS: There is opacity at the right base compatible with effusion/infiltrate. When taking into account difference in technique, there is no significant change.  BONES: degenerative changes      23 : CT Chest No Cont (23 @ 01:47) Moderate to large right and small left pleural effusion with atelectasis,   increased since 3/13/2022. Nonspecific ground glass opacity and interlobular septal thickening at the right lung. Recommend clinical   correlation to assess underlying pneumonia. Nonspecific mediastinal lymphadenopathy.      23 : Xray Chest 1 View- PORTABLE-Urgent (23 @ 18:48) IMPRESSION:   Moderate RIGHT effusion and/or basilar airspace consolidation.      MICROBIOLOGY DATA:    Culture - Blood in AM (23 @ 08:15)   Specimen Source: .Blood Blood-Peripheral  Culture Results: No growth at 5 days    Culture - Blood in AM (23 @ 08:05)   Specimen Source: .Blood Blood-Peripheral  Culture Results: No growth at 5 days      Legionella pneumophila Antigen, Urine (23 @ 22:40)   Legionella Antigen, Urine: Negative      Culture - Blood (23 @ 18:35)   Gram Stain:   Growth in aerobic bottle: Gram Positive Cocci in Pairs and Chains   Growth in anaerobic bottle: Gram Positive Cocci in Pairs and Chains  - Vancomycin: S 0.5  - Penicillin: S <=0.03 Predicts results for ampicillin, amoxicillin, amoxicillin/clavulanate, ampicillin/sulbactam, 1st, 2nd and 3rd generation cephalosporins and carbapenems.  - Ceftriaxone: S <=0.25  Specimen Source: .Blood Blood-Peripheral  Organism: Streptococcus pyogenes (Group A)  Culture Results:   Growth in aerobic and anaerobic bottles: Streptococcus pyogenes (Group A)  Organism Identification: Streptococcus pyogenes (Group A)  Method Type: NOEMY      Culture - Blood (23 @ 18:30)   Gram Stain:   Growth in aerobic bottle: Gram Positive Cocci in Pairs and Chains  - Streptococcus pyogenes (Group A): Detec  Specimen Source: .Blood Blood-Peripheral  Organism: Blood Culture PCR  Culture Results:   Growth in aerobic bottle: Gram Positive Cocci in Pairs and Chains   Direct identification is available within approximately 3-5   hours either by Blood Panel Multiplexed PCR or Direct   MALDI-TOF. Details: [email or web address]  Organism Identification: Blood Culture PCR  Method Type: PCRRapid HIV-1/2 Antibody (23 @ 18:30)   Rapid HIV-1/2 Antibody: Nonreact               3

## 2023-08-26 NOTE — PROGRESS NOTE ADULT - ASSESSMENT
Patient is a 89y old  Male from home, ambulating independently, pmhx of HFrEFm Afib on Eliquis, s/p Micra PPM, CKD, HLD, BPH on chronic Chinchilla, now presents to the ER for evaluation of fever and generalized weakness. Pt was admitted on July 2023, for dyspnea on exertion, palpitations and LE swelling. As per the daughter the size of LE has been unchanged and pt has been complaint on the meds he was discharged from the hospital. On admission, he found to have fever, tachycardia, Leukocytosis, Positive UTI and Moderate RIGHT effusion and/or basilar airspace consolidation on CXR and CT chest. He has started on Ceftriaxone and Azithromycin, and the ID consult requested to assist with further evaluation and antibiotic management.    # Sepsis ( Fever + tachycardia + Leukocytosis)- resolved  # UTI-  WBC >50 /HPF   # Pneumonia with right pleural effusion - Not a candidate for thoracentesis since need to hold Eliquis for 5 days but patient cannot be off Eliquis , need to be on Eliquis-  CT chest from 8/22 shows  Increased large right and small left pleural effusions since 8/17/2023, with worsening, now completely right lower lobe atelectasis. - s/p Right thoracentesis with removal of 1400 ml fluid on 8/25/23  # Streptococcus pyogenes Bacteremia- 8/16- source most likely Lung- repeat Blood cultures X 2 from 8/18 have NGTD- The TTE from 8/22 shows no vegetation. Family is refusing AIDEN  # S/P PICC Line placement on 8/25/23    would recommend:    1. PT/OOB  to chair  2. Follow up Pleuritis fluid culture, is in process  3. Continue IV Flagyl and Ceftriaxone 2 g daily to cover Bacteremia and pneumonia  4. Monitor kidney function   5. Need  at least 4 weeks of IV Abx until 9/22/23    d/w  DR. Garcia and Covering NP, Willem    - ID follow up with Dr. Gurjit Mahan via [employer] [phone] and Fax [phone]    Attending Attestation:    Spent more than 35 minutes on total encounter, more than 50 % of the visit was spent counseling and/or coordinating care by the Attending physician.

## 2023-08-26 NOTE — PHARMACOTHERAPY INTERVENTION NOTE - COMMENTS
Blood culture review    Culture Date/Time: 2023-08-16 18:30  BCID: -  Streptococcus pyogenes (Group A)      Seen by ID  
Informed prescriber about the  alprazolam order per report and to reorder, if medically necessary.

## 2023-08-27 RX ADMIN — RISPERIDONE 1 MILLIGRAM(S): 4 TABLET ORAL at 22:24

## 2023-08-27 RX ADMIN — APIXABAN 2.5 MILLIGRAM(S): 2.5 TABLET, FILM COATED ORAL at 17:41

## 2023-08-27 RX ADMIN — Medication 50 MILLIGRAM(S): at 07:12

## 2023-08-27 RX ADMIN — Medication 100 MILLIGRAM(S): at 14:31

## 2023-08-27 RX ADMIN — CEFTRIAXONE 100 MILLIGRAM(S): 500 INJECTION, POWDER, FOR SOLUTION INTRAMUSCULAR; INTRAVENOUS at 07:11

## 2023-08-27 RX ADMIN — SIMVASTATIN 20 MILLIGRAM(S): 20 TABLET, FILM COATED ORAL at 22:24

## 2023-08-27 RX ADMIN — Medication 100 MILLIGRAM(S): at 22:24

## 2023-08-27 RX ADMIN — Medication 100 MILLIGRAM(S): at 12:41

## 2023-08-27 RX ADMIN — Medication 25 MILLIGRAM(S): at 07:12

## 2023-08-27 RX ADMIN — Medication 100 MILLIGRAM(S): at 07:12

## 2023-08-27 RX ADMIN — APIXABAN 2.5 MILLIGRAM(S): 2.5 TABLET, FILM COATED ORAL at 07:12

## 2023-08-27 RX ADMIN — Medication 50 MILLIGRAM(S): at 22:22

## 2023-08-27 RX ADMIN — Medication 0.25 MILLIGRAM(S): at 22:23

## 2023-08-27 RX ADMIN — CHLORHEXIDINE GLUCONATE 1 APPLICATION(S): 213 SOLUTION TOPICAL at 12:35

## 2023-08-27 RX ADMIN — POLYETHYLENE GLYCOL 3350 17 GRAM(S): 17 POWDER, FOR SOLUTION ORAL at 22:23

## 2023-08-27 NOTE — PROGRESS NOTE ADULT - ASSESSMENT
Patient is a 89y old  Male from home, ambulating independently, pmhx of HFrEFm Afib on Eliquis, s/p Micra PPM, CKD, HLD, BPH on chronic Chinchilla, now presents to the ER for evaluation of fever and generalized weakness. Pt was admitted on July 2023, for dyspnea on exertion, palpitations and LE swelling. As per the daughter the size of LE has been unchanged and pt has been complaint on the meds he was discharged from the hospital. On admission, he found to have fever, tachycardia, Leukocytosis, Positive UTI and Moderate RIGHT effusion and/or basilar airspace consolidation on CXR and CT chest. He has started on Ceftriaxone and Azithromycin, and the ID consult requested to assist with further evaluation and antibiotic management.    # Sepsis ( Fever + tachycardia + Leukocytosis)- resolved  # UTI-  WBC >50 /HPF   # Pneumonia with right pleural effusion - Not a candidate for thoracentesis since need to hold Eliquis for 5 days but patient cannot be off Eliquis , need to be on Eliquis-  CT chest from 8/22 shows  Increased large right and small left pleural effusions since 8/17/2023, with worsening, now completely right lower lobe atelectasis. - s/p Right thoracentesis with removal of 1400 ml fluid on 8/25/23  # Streptococcus pyogenes Bacteremia- 8/16- source most likely Lung- repeat Blood cultures X 2 from 8/18 have NGTD- The TTE from 8/22 shows no vegetation. Family is refusing AIDEN  # S/P PICC Line placement on 8/25/23    would recommend:    1. PT/OOB  to chair  2. Continue IV Flagyl and Ceftriaxone 2 g daily to cover Bacteremia and pneumonia  3. Monitor kidney function   4. Need  at least 4 weeks of IV Abx until 9/22/23    - ID follow up with Dr. Gurjit Mahan via [employer] [phone] and Fax [phone]    Attending Attestation:    Spent more than 35 minutes on total encounter, more than 50 % of the visit was spent counseling and/or coordinating care by the Attending physician.

## 2023-08-27 NOTE — PROGRESS NOTE ADULT - ASSESSMENT
89 yrs old from home, ambulating independently, pmhx of HFrEFm Afib on Eliquis, s/p Micra PPM, CKD, HLD, BPH on chronic Salmon, presented with fever and generalized weakness,sepsis,pneumonia,chronic diastolic HF and strep bactermia..  1.Sepsis due to strep bactermia-abx.Repeat blood cx -neg. D/W daughter, no AIDEN, will treat for 4 weeks with ABX.  2.Pneumonia -abx as per ID.  3.Chronic diastolic HF-s/p iv lasix.  4.CRI-f/u lytes.  5.Afib-NOAC,lopressor.  6.BPH with chronic salmon-urince cx-.  7.S/P  PICC, plan for JETT to complete 4 wks abx.  8.GI prophylaxis.

## 2023-08-27 NOTE — PROGRESS NOTE ADULT - SUBJECTIVE AND OBJECTIVE BOX
Date of Service 08-27-23 @ 11:35    CHIEF COMPLAINT:Patient is a 89y old  Male who presents with a chief complaint of PNA and ?effusion.Pt appears comfortable.    	  REVIEW OF SYSTEMS:  CONSTITUTIONAL: No fever, weight loss, or fatigue  EYES: No eye pain, visual disturbances, or discharge  ENT:  No difficulty hearing, tinnitus, vertigo; No sinus or throat pain  NECK: No pain or stiffness  RESPIRATORY: No cough, wheezing, chills or hemoptysis; No Shortness of Breath  CARDIOVASCULAR: No chest pain, palpitations, passing out, dizziness, or leg swelling  GASTROINTESTINAL: No abdominal or epigastric pain. No nausea, vomiting, or hematemesis; No diarrhea or constipation. No melena or hematochezia.  GENITOURINARY: No dysuria, frequency, hematuria, or incontinence  NEUROLOGICAL: No headaches, memory loss, loss of strength, numbness, or tremors  SKIN: No itching, burning, rashes, or lesions   LYMPH Nodes: No enlarged glands  ENDOCRINE: No heat or cold intolerance; No hair loss  MUSCULOSKELETAL: No joint pain or swelling; No muscle, back, or extremity pain  PSYCHIATRIC: No depression, anxiety, mood swings, or difficulty sleeping  HEME/LYMPH: No easy bruising, or bleeding gums  ALLERGY AND IMMUNOLOGIC: No hives or eczema	        PHYSICAL EXAM:  T(C): 36.4 (08-27-23 @ 11:23), Max: 37.1 (08-26-23 @ 16:39)  HR: 68 (08-27-23 @ 11:23) (62 - 73)  BP: 96/49 (08-27-23 @ 11:23) (94/53 - 140/49)  RR: 18 (08-27-23 @ 11:23) (17 - 18)  SpO2: 100% (08-27-23 @ 11:23) (96% - 100%)  Wt(kg): --  I&O's Summary    26 Aug 2023 07:01  -  27 Aug 2023 07:00  --------------------------------------------------------  IN: 0 mL / OUT: 1200 mL / NET: -1200 mL        Appearance: Normal	  HEENT:   Normal oral mucosa, PERRL, EOMI	  Lymphatic: No lymphadenopathy  Cardiovascular: Normal S1 S2, No JVD, No murmurs, No edema  Respiratory: Lungs clear to auscultation	  Psychiatry: A & O x 3, Mood & affect appropriate  Gastrointestinal:  Soft, Non-tender, + BS	  Skin: No rashes, No ecchymoses, No cyanosis	  Neurologic: Non-focal  Extremities: Normal range of motion, No clubbing, cyanosis or edema  Vascular: Peripheral pulses palpable 2+ bilaterally    MEDICATIONS  (STANDING):  allopurinol 100 milliGRAM(s) Oral daily  ALPRAZolam 0.25 milliGRAM(s) Oral at bedtime  apixaban 2.5 milliGRAM(s) Oral every 12 hours  cefTRIAXone   IVPB 2000 milliGRAM(s) IV Intermittent every 24 hours  chlorhexidine 2% Cloths 1 Application(s) Topical daily  hydrALAZINE 50 milliGRAM(s) Oral three times a day  isosorbide   mononitrate ER Tablet (IMDUR) 30 milliGRAM(s) Oral daily  metoprolol tartrate 25 milliGRAM(s) Oral every 12 hours  metroNIDAZOLE  IVPB 500 milliGRAM(s) IV Intermittent every 8 hours  metroNIDAZOLE  IVPB      polyethylene glycol 3350 17 Gram(s) Oral at bedtime  risperiDONE   Tablet 1 milliGRAM(s) Oral at bedtime  simvastatin 20 milliGRAM(s) Oral at bedtime    	  	  LABS:	 	                         10.2   9.62  )-----------( 220      ( 26 Aug 2023 07:58 )             32.1     08-26    136  |  103  |  55<H>  ----------------------------<  125<H>  3.9   |  28  |  1.96<H>    Ca    8.7      26 Aug 2023 07:58

## 2023-08-27 NOTE — PROGRESS NOTE ADULT - SUBJECTIVE AND OBJECTIVE BOX
Patient is seen and examined at the bed side, is afebrile.  He is tolerating Ceftriaxone well.      REVIEW OF SYSTEMS: All other review systems are negative      ALLERGIES: No Known Allergies      Vital Signs Last 24 Hrs  T(C): 36.8 (27 Aug 2023 19:32), Max: 36.8 (27 Aug 2023 19:32)  T(F): 98.2 (27 Aug 2023 19:32), Max: 98.2 (27 Aug 2023 19:32)  HR: 70 (27 Aug 2023 19:32) (62 - 73)  BP: 119/48 (27 Aug 2023 19:32) (94/49 - 140/49)  BP(mean): --  RR: 17 (27 Aug 2023 19:32) (16 - 18)  SpO2: 98% (27 Aug 2023 19:32) (96% - 100%)    Parameters below as of 27 Aug 2023 19:32  Patient On (Oxygen Delivery Method): room air        PHYSICAL EXAM:  GENERAL: Not in distress   CHEST/LUNG: Not using accessory muscles   HEART: s1 and s2 present  ABDOMEN:  Nontender and  Nondistended  EXTREMITIES: No pedal  edema, both feet are moist, not dry anymore  CNS: Awake and Alert      LABS: No new Labs                        10.2   9.62  )-----------( 220      ( 26 Aug 2023 07:58 )             32.1                         10.5   8.21  )-----------( 234      ( 25 Aug 2023 07:55 )             33.4       08-    136  |  103  |  55<H>  ----------------------------<  125<H>  3.9   |  28  |  1.96<H>    Ca    8.7      26 Aug 2023 07:58      08-    136  |  99  |  51<H>  ----------------------------<  120<H>  3.8   |  29  |  1.96<H>    Ca    8.8      25 Aug 2023 07:55    TPro  6.2  /  Alb  2.7<L>  /  TBili  0.3  /  DBili  x   /  AST  25  /  ALT  45  /  AlkPhos  71  08-21    PT/INR - ( 16 Aug 2023 18:30 )   PT: 15.6 sec;   INR: 1.38 ratio      PTT - ( 16 Aug 2023 18:30 )  PTT:30.5 sec      CAPILLARY BLOOD GLUCOSE  POCT Blood Glucose.: 153 mg/dL (16 Aug 2023 18:17)      Urinalysis Basic - ( 17 Aug 2023 08:04 )  Color: Yellow / Appearance: Turbid / S.014 / pH: x  Gluc: x / Ketone: Trace mg/dL  / Bili: Negative / Urobili: 1.0 mg/dL   Blood: x / Protein: 300 mg/dL / Nitrite: Negative   Leuk Esterase: Large / RBC: >50 /HPF / WBC >50 /HPF   Sq Epi: x / Non Sq Epi: x / Bacteria: Many /HPF      MEDICATIONS  (STANDING):    allopurinol 100 milliGRAM(s) Oral daily  ALPRAZolam 0.25 milliGRAM(s) Oral at bedtime  apixaban 2.5 milliGRAM(s) Oral every 12 hours  cefTRIAXone   IVPB 2000 milliGRAM(s) IV Intermittent every 24 hours  chlorhexidine 2% Cloths 1 Application(s) Topical daily  hydrALAZINE 50 milliGRAM(s) Oral three times a day  isosorbide   mononitrate ER Tablet (IMDUR) 30 milliGRAM(s) Oral daily  metoprolol tartrate 25 milliGRAM(s) Oral every 12 hours  metroNIDAZOLE  IVPB 500 milliGRAM(s) IV Intermittent every 8 hours  metroNIDAZOLE  IVPB      polyethylene glycol 3350 17 Gram(s) Oral at bedtime  risperiDONE   Tablet 1 milliGRAM(s) Oral at bedtime  simvastatin 20 milliGRAM(s) Oral at bedtime          23  : Transthoracic Echocardiogram (23 @ 15:12) >  ------------------------------------------------------------------------  CONCLUSIONS:  1. Mitral annular calcification. Mild mitral regurgitation.    2. Calcified trileaflet aortic valve with normal opening. Mild aortic regurgitation.  3. Aortic Root: 3.0 cm.    4. Severely dilated left atrium.  LA volume index = 54 cc/m2.  5. Mild concentric left ventricular hypertrophy.  6. Normal Left Ventricular Systolic Function,  (EF = 55 to  60%)  7. Unable to adequately assess diastolic function due to technical aspects of this study.  8. Normal right atrium.  9. Normal right ventricular size and systolic function (TAPSE  2.9cm).  10. RA Pressure is 8 mm Hg.  11. RV systolic pressure is moderately increased at  57 mm Hg.  12. There is mild tricuspid regurgitation.  13. There is trace pulmonic regurgitation.  14. Small pericardial effusion.  15. Right pleural effusion.  16. Spherical hypoechoic area is incidentally noted in the liver, probably representing hepatic cyst. Consider  dedicated hepatic ultrasound for further evaluation if clinically indicated.      RADIOLOGY & ADDITIONAL TESTS:    23 : CT Chest No Cont (23 @ 15:35) Increased large right and small left pleural effusions since 2023,  with worsening, now completely right lower lobe atelectasis.   No pneumonia on this noncontrast CT.      23 : Xray Chest 1 View- PORTABLE-Routine (Xray Chest 1 View- PORTABLE-Routine .) (23 @ 12:39) >  LUNGS: There is opacity at the right base compatible with effusion/infiltrate. When taking into account difference in technique, there is no significant change.  BONES: degenerative changes      23 : CT Chest No Cont (23 @ 01:47) Moderate to large right and small left pleural effusion with atelectasis,   increased since 3/13/2022. Nonspecific ground glass opacity and interlobular septal thickening at the right lung. Recommend clinical   correlation to assess underlying pneumonia. Nonspecific mediastinal lymphadenopathy.      23 : Xray Chest 1 View- PORTABLE-Urgent (23 @ 18:48) IMPRESSION:   Moderate RIGHT effusion and/or basilar airspace consolidation.      MICROBIOLOGY DATA:    MRSA/MSSA PCR (23 @ 17:57)   MRSA PCR Result.: NotDetec  Culture - Body Fluid with Gram Stain (23 @ 09:45)   Gram Stain:   polymorphonuclear leukocytes seen   No organisms seen   by cytocentrifuge  Specimen Source: Pleural Fl Pleural Fluid  Culture Results: No growth to date.    Culture - Blood in AM (23 @ 08:15)   Specimen Source: .Blood Blood-Peripheral  Culture Results: No growth at 5 days    Culture - Blood in AM (23 @ 08:05)   Specimen Source: .Blood Blood-Peripheral  Culture Results: No growth at 5 days      Legionella pneumophila Antigen, Urine (23 @ 22:40)   Legionella Antigen, Urine: Negative      Culture - Blood (23 @ 18:35)   Gram Stain:   Growth in aerobic bottle: Gram Positive Cocci in Pairs and Chains   Growth in anaerobic bottle: Gram Positive Cocci in Pairs and Chains  - Vancomycin: S 0.5  - Penicillin: S <=0.03 Predicts results for ampicillin, amoxicillin, amoxicillin/clavulanate, ampicillin/sulbactam, 1st, 2nd and 3rd generation cephalosporins and carbapenems.  - Ceftriaxone: S <=0.25  Specimen Source: .Blood Blood-Peripheral  Organism: Streptococcus pyogenes (Group A)  Culture Results:   Growth in aerobic and anaerobic bottles: Streptococcus pyogenes (Group A)  Organism Identification: Streptococcus pyogenes (Group A)  Method Type: NOEMY      Culture - Blood (23 @ 18:30)   Gram Stain:   Growth in aerobic bottle: Gram Positive Cocci in Pairs and Chains  - Streptococcus pyogenes (Group A): Detec  Specimen Source: .Blood Blood-Peripheral  Organism: Blood Culture PCR  Culture Results:   Growth in aerobic bottle: Gram Positive Cocci in Pairs and Chains   Direct identification is available within approximately 3-5   hours either by Blood Panel Multiplexed PCR or Direct   MALDI-TOF. Details: [email or web address]  Organism Identification: Blood Culture PCR  Method Type: PCRRapid HIV-1/2 Antibody (23 @ 18:30)   Rapid HIV-1/2 Antibody: Nonreact

## 2023-08-28 ENCOUNTER — TRANSCRIPTION ENCOUNTER (OUTPATIENT)
Age: 88
End: 2023-08-28

## 2023-08-28 VITALS
RESPIRATION RATE: 17 BRPM | HEART RATE: 86 BPM | SYSTOLIC BLOOD PRESSURE: 105 MMHG | OXYGEN SATURATION: 99 % | TEMPERATURE: 97 F | DIASTOLIC BLOOD PRESSURE: 39 MMHG

## 2023-08-28 LAB
ANION GAP SERPL CALC-SCNC: 9 MMOL/L — SIGNIFICANT CHANGE UP (ref 5–17)
BUN SERPL-MCNC: 62 MG/DL — HIGH (ref 7–18)
CALCIUM SERPL-MCNC: 8.2 MG/DL — LOW (ref 8.4–10.5)
CHLORIDE SERPL-SCNC: 102 MMOL/L — SIGNIFICANT CHANGE UP (ref 96–108)
CO2 SERPL-SCNC: 25 MMOL/L — SIGNIFICANT CHANGE UP (ref 22–31)
CREAT SERPL-MCNC: 2.09 MG/DL — HIGH (ref 0.5–1.3)
EGFR: 29 ML/MIN/1.73M2 — LOW
GLUCOSE BLDC GLUCOMTR-MCNC: 135 MG/DL — HIGH (ref 70–99)
GLUCOSE SERPL-MCNC: 115 MG/DL — HIGH (ref 70–99)
HCT VFR BLD CALC: 29.6 % — LOW (ref 39–50)
HGB BLD-MCNC: 9.2 G/DL — LOW (ref 13–17)
MCHC RBC-ENTMCNC: 27.7 PG — SIGNIFICANT CHANGE UP (ref 27–34)
MCHC RBC-ENTMCNC: 31.1 GM/DL — LOW (ref 32–36)
MCV RBC AUTO: 89.2 FL — SIGNIFICANT CHANGE UP (ref 80–100)
NRBC # BLD: 0 /100 WBCS — SIGNIFICANT CHANGE UP (ref 0–0)
PLATELET # BLD AUTO: 205 K/UL — SIGNIFICANT CHANGE UP (ref 150–400)
POTASSIUM SERPL-MCNC: 3.8 MMOL/L — SIGNIFICANT CHANGE UP (ref 3.5–5.3)
POTASSIUM SERPL-SCNC: 3.8 MMOL/L — SIGNIFICANT CHANGE UP (ref 3.5–5.3)
RBC # BLD: 3.32 M/UL — LOW (ref 4.2–5.8)
RBC # FLD: 13.8 % — SIGNIFICANT CHANGE UP (ref 10.3–14.5)
SARS-COV-2 RNA SPEC QL NAA+PROBE: SIGNIFICANT CHANGE UP
SODIUM SERPL-SCNC: 136 MMOL/L — SIGNIFICANT CHANGE UP (ref 135–145)
WBC # BLD: 9.63 K/UL — SIGNIFICANT CHANGE UP (ref 3.8–10.5)
WBC # FLD AUTO: 9.63 K/UL — SIGNIFICANT CHANGE UP (ref 3.8–10.5)

## 2023-08-28 PROCEDURE — 96375 TX/PRO/DX INJ NEW DRUG ADDON: CPT

## 2023-08-28 PROCEDURE — 93306 TTE W/DOPPLER COMPLETE: CPT

## 2023-08-28 PROCEDURE — 88305 TISSUE EXAM BY PATHOLOGIST: CPT

## 2023-08-28 PROCEDURE — 76937 US GUIDE VASCULAR ACCESS: CPT

## 2023-08-28 PROCEDURE — 96365 THER/PROPH/DIAG IV INF INIT: CPT

## 2023-08-28 PROCEDURE — 87075 CULTR BACTERIA EXCEPT BLOOD: CPT

## 2023-08-28 PROCEDURE — 87086 URINE CULTURE/COLONY COUNT: CPT

## 2023-08-28 PROCEDURE — 84484 ASSAY OF TROPONIN QUANT: CPT

## 2023-08-28 PROCEDURE — 87899 AGENT NOS ASSAY W/OPTIC: CPT

## 2023-08-28 PROCEDURE — 85025 COMPLETE CBC W/AUTO DIFF WBC: CPT

## 2023-08-28 PROCEDURE — 71250 CT THORAX DX C-: CPT

## 2023-08-28 PROCEDURE — 36573 INSJ PICC RS&I 5 YR+: CPT

## 2023-08-28 PROCEDURE — 88112 CYTOPATH CELL ENHANCE TECH: CPT

## 2023-08-28 PROCEDURE — 86140 C-REACTIVE PROTEIN: CPT

## 2023-08-28 PROCEDURE — 84145 PROCALCITONIN (PCT): CPT

## 2023-08-28 PROCEDURE — 80048 BASIC METABOLIC PNL TOTAL CA: CPT

## 2023-08-28 PROCEDURE — 83605 ASSAY OF LACTIC ACID: CPT

## 2023-08-28 PROCEDURE — 83615 LACTATE (LD) (LDH) ENZYME: CPT

## 2023-08-28 PROCEDURE — 84100 ASSAY OF PHOSPHORUS: CPT

## 2023-08-28 PROCEDURE — 32555 ASPIRATE PLEURA W/ IMAGING: CPT

## 2023-08-28 PROCEDURE — 87637 SARSCOV2&INF A&B&RSV AMP PRB: CPT

## 2023-08-28 PROCEDURE — 87150 DNA/RNA AMPLIFIED PROBE: CPT

## 2023-08-28 PROCEDURE — 93005 ELECTROCARDIOGRAM TRACING: CPT

## 2023-08-28 PROCEDURE — 87449 NOS EACH ORGANISM AG IA: CPT

## 2023-08-28 PROCEDURE — 86703 HIV-1/HIV-2 1 RESULT ANTBDY: CPT

## 2023-08-28 PROCEDURE — C1751: CPT

## 2023-08-28 PROCEDURE — 89051 BODY FLUID CELL COUNT: CPT

## 2023-08-28 PROCEDURE — 77001 FLUOROGUIDE FOR VEIN DEVICE: CPT

## 2023-08-28 PROCEDURE — 87186 SC STD MICRODIL/AGAR DIL: CPT

## 2023-08-28 PROCEDURE — 96368 THER/DIAG CONCURRENT INF: CPT

## 2023-08-28 PROCEDURE — 81001 URINALYSIS AUTO W/SCOPE: CPT

## 2023-08-28 PROCEDURE — 82945 GLUCOSE OTHER FLUID: CPT

## 2023-08-28 PROCEDURE — 87640 STAPH A DNA AMP PROBE: CPT

## 2023-08-28 PROCEDURE — 36415 COLL VENOUS BLD VENIPUNCTURE: CPT

## 2023-08-28 PROCEDURE — 76942 ECHO GUIDE FOR BIOPSY: CPT

## 2023-08-28 PROCEDURE — 97110 THERAPEUTIC EXERCISES: CPT

## 2023-08-28 PROCEDURE — 80053 COMPREHEN METABOLIC PANEL: CPT

## 2023-08-28 PROCEDURE — 87070 CULTURE OTHR SPECIMN AEROBIC: CPT

## 2023-08-28 PROCEDURE — 71045 X-RAY EXAM CHEST 1 VIEW: CPT

## 2023-08-28 PROCEDURE — 85610 PROTHROMBIN TIME: CPT

## 2023-08-28 PROCEDURE — 87040 BLOOD CULTURE FOR BACTERIA: CPT

## 2023-08-28 PROCEDURE — 87077 CULTURE AEROBIC IDENTIFY: CPT

## 2023-08-28 PROCEDURE — 85652 RBC SED RATE AUTOMATED: CPT

## 2023-08-28 PROCEDURE — 87641 MR-STAPH DNA AMP PROBE: CPT

## 2023-08-28 PROCEDURE — 82962 GLUCOSE BLOOD TEST: CPT

## 2023-08-28 PROCEDURE — 87205 SMEAR GRAM STAIN: CPT

## 2023-08-28 PROCEDURE — 82042 OTHER SOURCE ALBUMIN QUAN EA: CPT

## 2023-08-28 PROCEDURE — 87635 SARS-COV-2 COVID-19 AMP PRB: CPT

## 2023-08-28 PROCEDURE — 83986 ASSAY PH BODY FLUID NOS: CPT

## 2023-08-28 PROCEDURE — 85027 COMPLETE CBC AUTOMATED: CPT

## 2023-08-28 PROCEDURE — 85730 THROMBOPLASTIN TIME PARTIAL: CPT

## 2023-08-28 PROCEDURE — 83880 ASSAY OF NATRIURETIC PEPTIDE: CPT

## 2023-08-28 PROCEDURE — 99285 EMERGENCY DEPT VISIT HI MDM: CPT

## 2023-08-28 PROCEDURE — 84157 ASSAY OF PROTEIN OTHER: CPT

## 2023-08-28 PROCEDURE — 83735 ASSAY OF MAGNESIUM: CPT

## 2023-08-28 RX ORDER — METOPROLOL TARTRATE 50 MG
1 TABLET ORAL
Qty: 0 | Refills: 0 | DISCHARGE
Start: 2023-08-28

## 2023-08-28 RX ORDER — KETOROLAC TROMETHAMINE 0.5 %
1 DROPS OPHTHALMIC (EYE)
Refills: 0 | DISCHARGE

## 2023-08-28 RX ORDER — PREDNISOLONE SODIUM PHOSPHATE 1 %
1 DROPS OPHTHALMIC (EYE)
Refills: 0 | DISCHARGE

## 2023-08-28 RX ORDER — CYCLOPENTOLATE HYDROCHLORIDE 10 MG/ML
1 SOLUTION/ DROPS OPHTHALMIC
Refills: 0 | DISCHARGE

## 2023-08-28 RX ORDER — OFLOXACIN 0.3 %
1 DROPS OPHTHALMIC (EYE)
Refills: 0 | DISCHARGE

## 2023-08-28 RX ADMIN — CEFTRIAXONE 100 MILLIGRAM(S): 500 INJECTION, POWDER, FOR SOLUTION INTRAMUSCULAR; INTRAVENOUS at 05:18

## 2023-08-28 RX ADMIN — Medication 50 MILLIGRAM(S): at 06:01

## 2023-08-28 RX ADMIN — CHLORHEXIDINE GLUCONATE 1 APPLICATION(S): 213 SOLUTION TOPICAL at 12:27

## 2023-08-28 RX ADMIN — Medication 100 MILLIGRAM(S): at 12:26

## 2023-08-28 RX ADMIN — Medication 100 MILLIGRAM(S): at 13:49

## 2023-08-28 RX ADMIN — APIXABAN 2.5 MILLIGRAM(S): 2.5 TABLET, FILM COATED ORAL at 06:01

## 2023-08-28 RX ADMIN — APIXABAN 2.5 MILLIGRAM(S): 2.5 TABLET, FILM COATED ORAL at 17:54

## 2023-08-28 RX ADMIN — Medication 100 MILLIGRAM(S): at 06:01

## 2023-08-28 RX ADMIN — Medication 25 MILLIGRAM(S): at 06:01

## 2023-08-28 RX ADMIN — ISOSORBIDE MONONITRATE 30 MILLIGRAM(S): 60 TABLET, EXTENDED RELEASE ORAL at 12:26

## 2023-08-28 NOTE — PROGRESS NOTE ADULT - PROBLEM SELECTOR PLAN 5
check PSA   follow up as OP
check pSA   follow up as OP
pmhx of chronic afib on Eliquis 2.5 mg BID and Metoprolol 50 mg BID   continue with Eliquis and Metoprolol 25 mg BID   c/w tele
pmhx of chronic afib on Eliquis 2.5 mg BID and Metoprolol 50 mg BID   continue with Eliquis and Metoprolol 25 mg BID   c/w tele
check pSA   follow up as OP
-rate controlled   -cont BB   -resume Eliquis in AM
pmhx of chronic afib on Eliquis 2.5 mg BID and Metoprolol 50 mg BID   continue with Eliquis and Metoprolol 25 mg BID   c/w tele
-rate controlled   -cont BB   -Eliquis
-rate controlled   -cont BB   -Hold Eliquis prior to thoracentesis
-rate controlled   -cont BB   -Hold Eliquis prior to thoracentesis
Trop 130 > 350 > 375  EKG afib  Echo done on 7/2023: normal EF and Grade II Diastolic dysfunction  likely demand ischemia in the setting of acute infection and acute on chronic CHF   c/w tele

## 2023-08-28 NOTE — PROGRESS NOTE ADULT - PROBLEM SELECTOR PROBLEM 7
BPH (benign prostatic hyperplasia)
Acute UTI
BPH (benign prostatic hyperplasia)
Chronic kidney disease (CKD)
BPH (benign prostatic hyperplasia)

## 2023-08-28 NOTE — PROGRESS NOTE ADULT - PROBLEM SELECTOR PLAN 4
Avoid nephrotoxins  monitor BMP  Renal eval
pmhx of chronic afib on Eliquis 2.5 mg BID and Metoprolol 50 mg BID   continue with Eliquis and Metoprolol 25 mg BID   c/w tele
pmhx of HFrEF   p/w ?Rt sided effusion and LE edema  pro- BNP 10K [baseline 4K]  Trop 130 > 350 > 375  s/p  mg   EKG afib  repeat EKG: concern for Peaked T waves - repeat CMP neg for hyperkalemia  c/w tele and vitals q4
Avoid nephrotoxins  monitor BMP  Renal eval
pmhx of HFrEF   pro- BNP 10K [baseline 4K]  Trop 130 > 350 > 375  s/p  mg   Fluid status stable on lasix 40mg IVP qday
pmhx of HFrEF   pro- BNP 10K [baseline 4K]  Trop 130 > 350 > 375  s/p  mg   Fluid status stable on lasix 40mg IVP qday
-Echo done on 7/2023 with normal EF and Grade II Diastolic dysfunction  -cont BB and Statin
Avoid nephrotoxins  monitor BMP  Renal eval
-Echo done on 7/2023 with normal EF and Grade II Diastolic dysfunction  -cont IV Lasix, BB and Statin
-Echo done on 7/2023 with normal EF and Grade II Diastolic dysfunction  -cont BB and Statin
-Echo done on 7/2023 with normal EF and Grade II Diastolic dysfunction  -cont IV Lasix, BB and Statin

## 2023-08-28 NOTE — PROGRESS NOTE ADULT - PROBLEM SELECTOR PROBLEM 5
Chronic atrial fibrillation
BPH (benign prostatic hyperplasia)
Elevated troponin
BPH (benign prostatic hyperplasia)
Chronic atrial fibrillation
Chronic atrial fibrillation
BPH (benign prostatic hyperplasia)
Chronic atrial fibrillation

## 2023-08-28 NOTE — PROGRESS NOTE ADULT - SUBJECTIVE AND OBJECTIVE BOX
NP Note discussed with  primary attending    Patient is a 89y old  Male who presents with a chief complaint of PNA and ?effusion (28 Aug 2023 11:14)      INTERVAL HPI/OVERNIGHT EVENTS: no new complaints    MEDICATIONS  (STANDING):  allopurinol 100 milliGRAM(s) Oral daily  ALPRAZolam 0.25 milliGRAM(s) Oral at bedtime  apixaban 2.5 milliGRAM(s) Oral every 12 hours  cefTRIAXone   IVPB 2000 milliGRAM(s) IV Intermittent every 24 hours  chlorhexidine 2% Cloths 1 Application(s) Topical daily  hydrALAZINE 50 milliGRAM(s) Oral three times a day  isosorbide   mononitrate ER Tablet (IMDUR) 30 milliGRAM(s) Oral daily  metoprolol tartrate 25 milliGRAM(s) Oral every 12 hours  metroNIDAZOLE  IVPB 500 milliGRAM(s) IV Intermittent every 8 hours  metroNIDAZOLE  IVPB      polyethylene glycol 3350 17 Gram(s) Oral at bedtime  risperiDONE   Tablet 1 milliGRAM(s) Oral at bedtime  simvastatin 20 milliGRAM(s) Oral at bedtime    MEDICATIONS  (PRN):  acetaminophen     Tablet .. 650 milliGRAM(s) Oral every 6 hours PRN Temp greater or equal to 38C (100.4F), Mild Pain (1 - 3)  sodium chloride 0.9% lock flush 10 milliLiter(s) IV Push every 1 hour PRN Pre/post blood products, medications, blood draw, and to maintain line patency      __________________________________________________  REVIEW OF SYSTEMS:    CONSTITUTIONAL: No fever,   EYES: no acute visual disturbances  NECK: No pain or stiffness  RESPIRATORY: No cough; No shortness of breath  CARDIOVASCULAR: No chest pain, no palpitations  GASTROINTESTINAL: No pain. No nausea or vomiting; No diarrhea   NEUROLOGICAL: No headache or numbness, no tremors  MUSCULOSKELETAL: No joint pain, no muscle pain  GENITOURINARY: no dysuria, no frequency, no hesitancy        Vital Signs Last 24 Hrs  T(C): 36.8 (28 Aug 2023 11:17), Max: 36.8 (27 Aug 2023 19:32)  T(F): 98.3 (28 Aug 2023 11:17), Max: 98.3 (28 Aug 2023 11:17)  HR: 70 (28 Aug 2023 11:17) (62 - 72)  BP: 126/67 (28 Aug 2023 11:17) (94/49 - 139/52)  BP(mean): --  RR: 16 (28 Aug 2023 11:17) (16 - 17)  SpO2: 98% (28 Aug 2023 11:17) (97% - 99%)    Parameters below as of 28 Aug 2023 11:17  Patient On (Oxygen Delivery Method): room air        ________________________________________________  PHYSICAL EXAM:  GENERAL: NAD  HEENT: Normocephalic;  conjunctivae and sclerae clear; moist mucous membranes;   NECK : supple  CHEST/LUNG: Clear to ausculitation bilaterally   HEART: S1 S2  regular; no murmurs, gallops or rubs  ABDOMEN: Soft, Nontender, Nondistended; Bowel sounds present  EXTREMITIES: no cyanosis; no edema; no calf tenderness  Left picc liune   SKIN: warm and dry; no rash  NERVOUS SYSTEM:  Awake and alert; Oriented  to place, person and disoriented to time ; no new deficits    _________________________________________________  LABS:                        9.2    9.63  )-----------( 205      ( 28 Aug 2023 07:04 )             29.6     08-28    136  |  102  |  62<H>  ----------------------------<  115<H>  3.8   |  25  |  2.09<H>    Ca    8.2<L>      28 Aug 2023 07:04        Urinalysis Basic - ( 28 Aug 2023 07:04 )    Color: x / Appearance: x / SG: x / pH: x  Gluc: 115 mg/dL / Ketone: x  / Bili: x / Urobili: x   Blood: x / Protein: x / Nitrite: x   Leuk Esterase: x / RBC: x / WBC x   Sq Epi: x / Non Sq Epi: x / Bacteria: x      CAPILLARY BLOOD GLUCOSE            RADIOLOGY & ADDITIONAL TESTS:    Imaging Personally Reviewed:  YES/NO    Consultant(s) Notes Reviewed:   YES/ No    Care Discussed with Consultants :     Plan of care was discussed with patient and /or primary care giver; all questions and concerns were addressed and care was aligned with patient's wishes.

## 2023-08-28 NOTE — PROGRESS NOTE ADULT - PROBLEM SELECTOR PROBLEM 2
Pleural effusion, right
Chronic atrial fibrillation
Pneumonia
Chronic atrial fibrillation
Acute UTI
Pneumonia
Pleural effusion, right
Pleural effusion, right
Pneumonia
Pleural effusion, right
Chronic atrial fibrillation

## 2023-08-28 NOTE — DISCHARGE NOTE NURSING/CASE MANAGEMENT/SOCIAL WORK - PATIENT PORTAL LINK FT
You can access the FollowMyHealth Patient Portal offered by [facility] by registering at the following website: [email or web address]. By joining [employer]’s FollowMyHealth portal, you will also be able to view your health information using other applications (apps) compatible with our system.

## 2023-08-28 NOTE — PROGRESS NOTE ADULT - SUBJECTIVE AND OBJECTIVE BOX
Patient is seen and examined at the bed side, is afebrile.  The discharge plan noted.       REVIEW OF SYSTEMS: All other review systems are negative      ALLERGIES: No Known Allergies      Vital Signs Last 24 Hrs  T(C): 36.3 (28 Aug 2023 16:10), Max: 36.8 (27 Aug 2023 19:32)  T(F): 97.3 (28 Aug 2023 16:10), Max: 98.3 (28 Aug 2023 11:17)  HR: 86 (28 Aug 2023 16:10) (63 - 86)  BP: 105/39 (28 Aug 2023 16:10) (105/39 - 139/52)  BP(mean): --  RR: 17 (28 Aug 2023 16:10) (16 - 17)  SpO2: 99% (28 Aug 2023 16:10) (97% - 99%)    Parameters below as of 28 Aug 2023 16:10  Patient On (Oxygen Delivery Method): room air        PHYSICAL EXAM:  GENERAL: Not in distress   CHEST/LUNG: Not using accessory muscles   HEART: s1 and s2 present  ABDOMEN:  Nontender and  Nondistended  EXTREMITIES: No pedal  edema, both feet are moist, not dry anymore  CNS: Awake and Alert      LABS:                         9.2    9.63  )-----------( 205      ( 28 Aug 2023 07:04 )             29.6                           10.2   9.62  )-----------( 220      ( 26 Aug 2023 07:58 )             32.1                08-28    136  |  102  |  62<H>  ----------------------------<  115<H>  3.8   |  25  |  2.09<H>    Ca    8.2<L>      28 Aug 2023 07:04      08-    136  |  103  |  55<H>  ----------------------------<  125<H>  3.9   |  28  |  1.96<H>    Ca    8.7      26 Aug 2023 07:58    TPro  6.2  /  Alb  2.7<L>  /  TBili  0.3  /  DBili  x   /  AST  25  /  ALT  45  /  AlkPhos  71  08-21    PT/INR - ( 16 Aug 2023 18:30 )   PT: 15.6 sec;   INR: 1.38 ratio      PTT - ( 16 Aug 2023 18:30 )  PTT:30.5 sec      CAPILLARY BLOOD GLUCOSE  POCT Blood Glucose.: 153 mg/dL (16 Aug 2023 18:17)      Urinalysis Basic - ( 17 Aug 2023 08:04 )  Color: Yellow / Appearance: Turbid / S.014 / pH: x  Gluc: x / Ketone: Trace mg/dL  / Bili: Negative / Urobili: 1.0 mg/dL   Blood: x / Protein: 300 mg/dL / Nitrite: Negative   Leuk Esterase: Large / RBC: >50 /HPF / WBC >50 /HPF   Sq Epi: x / Non Sq Epi: x / Bacteria: Many /HPF      MEDICATIONS  (STANDING):    allopurinol 100 milliGRAM(s) Oral daily  ALPRAZolam 0.25 milliGRAM(s) Oral at bedtime  apixaban 2.5 milliGRAM(s) Oral every 12 hours  cefTRIAXone   IVPB 2000 milliGRAM(s) IV Intermittent every 24 hours  chlorhexidine 2% Cloths 1 Application(s) Topical daily  hydrALAZINE 50 milliGRAM(s) Oral three times a day  isosorbide   mononitrate ER Tablet (IMDUR) 30 milliGRAM(s) Oral daily  metoprolol tartrate 25 milliGRAM(s) Oral every 12 hours  metroNIDAZOLE  IVPB      metroNIDAZOLE  IVPB 500 milliGRAM(s) IV Intermittent every 8 hours  polyethylene glycol 3350 17 Gram(s) Oral at bedtime  risperiDONE   Tablet 1 milliGRAM(s) Oral at bedtime  simvastatin 20 milliGRAM(s) Oral at bedtime      23  : Transthoracic Echocardiogram (23 @ 15:12) >  ------------------------------------------------------------------------  CONCLUSIONS:  1. Mitral annular calcification. Mild mitral regurgitation.    2. Calcified trileaflet aortic valve with normal opening. Mild aortic regurgitation.  3. Aortic Root: 3.0 cm.    4. Severely dilated left atrium.  LA volume index = 54 cc/m2.  5. Mild concentric left ventricular hypertrophy.  6. Normal Left Ventricular Systolic Function,  (EF = 55 to  60%)  7. Unable to adequately assess diastolic function due to technical aspects of this study.  8. Normal right atrium.  9. Normal right ventricular size and systolic function (TAPSE  2.9cm).  10. RA Pressure is 8 mm Hg.  11. RV systolic pressure is moderately increased at  57 mm Hg.  12. There is mild tricuspid regurgitation.  13. There is trace pulmonic regurgitation.  14. Small pericardial effusion.  15. Right pleural effusion.  16. Spherical hypoechoic area is incidentally noted in the liver, probably representing hepatic cyst. Consider  dedicated hepatic ultrasound for further evaluation if clinically indicated.      RADIOLOGY & ADDITIONAL TESTS:    23 : CT Chest No Cont (23 @ 15:35) Increased large right and small left pleural effusions since 2023,  with worsening, now completely right lower lobe atelectasis.   No pneumonia on this noncontrast CT.      23 : Xray Chest 1 View- PORTABLE-Routine (Xray Chest 1 View- PORTABLE-Routine .) (23 @ 12:39) >  LUNGS: There is opacity at the right base compatible with effusion/infiltrate. When taking into account difference in technique, there is no significant change.  BONES: degenerative changes      23 : CT Chest No Cont (23 @ 01:47) Moderate to large right and small left pleural effusion with atelectasis,   increased since 3/13/2022. Nonspecific ground glass opacity and interlobular septal thickening at the right lung. Recommend clinical   correlation to assess underlying pneumonia. Nonspecific mediastinal lymphadenopathy.      23 : Xray Chest 1 View- PORTABLE-Urgent (23 @ 18:48) IMPRESSION:   Moderate RIGHT effusion and/or basilar airspace consolidation.      MICROBIOLOGY DATA:    MRSA/MSSA PCR (23 @ 17:57)   MRSA PCR Result.: NotDetec    Culture - Body Fluid with Gram Stain (23 @ 09:45)   Gram Stain:   polymorphonuclear leukocytes seen   No organisms seen  by cytocentrifuge  Specimen Source: Pleural Fl Pleural Fluid  Culture Results: No growth to date.    Culture - Blood in AM (23 @ 08:15)   Specimen Source: .Blood Blood-Peripheral  Culture Results: No growth at 5 days    Culture - Blood in AM (23 @ 08:05)   Specimen Source: .Blood Blood-Peripheral  Culture Results: No growth at 5 days      Legionella pneumophila Antigen, Urine (23 @ 22:40)   Legionella Antigen, Urine: Negative      Culture - Blood (23 @ 18:35)   Gram Stain:   Growth in aerobic bottle: Gram Positive Cocci in Pairs and Chains   Growth in anaerobic bottle: Gram Positive Cocci in Pairs and Chains  - Vancomycin: S 0.5  - Penicillin: S <=0.03 Predicts results for ampicillin, amoxicillin, amoxicillin/clavulanate, ampicillin/sulbactam, 1st, 2nd and 3rd generation cephalosporins and carbapenems.  - Ceftriaxone: S <=0.25  Specimen Source: .Blood Blood-Peripheral  Organism: Streptococcus pyogenes (Group A)  Culture Results:   Growth in aerobic and anaerobic bottles: Streptococcus pyogenes (Group A)  Organism Identification: Streptococcus pyogenes (Group A)  Method Type: NOEMY      Culture - Blood (23 @ 18:30)   Gram Stain:   Growth in aerobic bottle: Gram Positive Cocci in Pairs and Chains  - Streptococcus pyogenes (Group A): Detec  Specimen Source: .Blood Blood-Peripheral  Organism: Blood Culture PCR  Culture Results:   Growth in aerobic bottle: Gram Positive Cocci in Pairs and Chains   Direct identification is available within approximately 3-5   hours either by Blood Panel Multiplexed PCR or Direct   MALDI-TOF. Details: [email or web address]  Organism Identification: Blood Culture PCR  Method Type: PCRRapid HIV-1/2 Antibody (23 @ 18:30)   Rapid HIV-1/2 Antibody: Nonreact

## 2023-08-28 NOTE — PROGRESS NOTE ADULT - PROBLEM SELECTOR PROBLEM 3
Elevated troponin
Elevated troponin
Acute UTI
Elevated troponin
Acute on chronic diastolic congestive heart failure
Acute UTI

## 2023-08-28 NOTE — PROGRESS NOTE ADULT - SUBJECTIVE AND OBJECTIVE BOX
Patient is a 89y old  Male who presents with a chief complaint of PNA and ?effusion (28 Aug 2023 08:06)  Awake, alert, comfortable out of  bed in NAD. Doing well on RA    INTERVAL HPI/OVERNIGHT EVENTS:      VITAL SIGNS:  T(F): 98 (08-28-23 @ 07:36)  HR: 67 (08-28-23 @ 07:36)  BP: 109/62 (08-28-23 @ 07:36)  RR: 16 (08-28-23 @ 07:36)  SpO2: 98% (08-28-23 @ 07:36)  Wt(kg): --  I&O's Detail    27 Aug 2023 07:01  -  28 Aug 2023 07:00  --------------------------------------------------------  IN:    IV PiggyBack: 100 mL  Total IN: 100 mL    OUT:    Indwelling Catheter - Urethral (mL): 1025 mL    Voided (mL): 600 mL  Total OUT: 1625 mL    Total NET: -1525 mL      28 Aug 2023 07:01  -  28 Aug 2023 11:14  --------------------------------------------------------  IN:    Oral Fluid: 200 mL  Total IN: 200 mL    OUT:  Total OUT: 0 mL    Total NET: 200 mL              REVIEW OF SYSTEMS:    CONSTITUTIONAL:  No fevers, chills, sweats    HEENT:  Eyes:  No diplopia or blurred vision. ENT:  No earache, sore throat or runny nose.    CARDIOVASCULAR:  No pressure, squeezing, tightness, or heaviness about the chest; no palpitations.    RESPIRATORY:  Per HPI    GASTROINTESTINAL:  No abdominal pain, nausea, vomiting or diarrhea.    GENITOURINARY:  No dysuria, frequency or urgency.    NEUROLOGIC:  No paresthesias, fasciculations, seizures or weakness.    PSYCHIATRIC:  No disorder of thought or mood.      PHYSICAL EXAM:    Constitutional: Well developed and nourished  Eyes:Perrla  ENMT: normal  Neck:supple  Respiratory: good air entry  Cardiovascular: S1 S2 regular  Gastrointestinal: Soft, Non tender  Extremities: No edema  Vascular:normal  Neurological:Awake, alert,Ox3  Musculoskeletal:Normal      MEDICATIONS  (STANDING):  allopurinol 100 milliGRAM(s) Oral daily  ALPRAZolam 0.25 milliGRAM(s) Oral at bedtime  apixaban 2.5 milliGRAM(s) Oral every 12 hours  cefTRIAXone   IVPB 2000 milliGRAM(s) IV Intermittent every 24 hours  chlorhexidine 2% Cloths 1 Application(s) Topical daily  hydrALAZINE 50 milliGRAM(s) Oral three times a day  isosorbide   mononitrate ER Tablet (IMDUR) 30 milliGRAM(s) Oral daily  metoprolol tartrate 25 milliGRAM(s) Oral every 12 hours  metroNIDAZOLE  IVPB 500 milliGRAM(s) IV Intermittent every 8 hours  metroNIDAZOLE  IVPB      polyethylene glycol 3350 17 Gram(s) Oral at bedtime  risperiDONE   Tablet 1 milliGRAM(s) Oral at bedtime  simvastatin 20 milliGRAM(s) Oral at bedtime    MEDICATIONS  (PRN):  acetaminophen     Tablet .. 650 milliGRAM(s) Oral every 6 hours PRN Temp greater or equal to 38C (100.4F), Mild Pain (1 - 3)  sodium chloride 0.9% lock flush 10 milliLiter(s) IV Push every 1 hour PRN Pre/post blood products, medications, blood draw, and to maintain line patency      Allergies    No Known Allergies    Intolerances        LABS:                        9.2    9.63  )-----------( 205      ( 28 Aug 2023 07:04 )             29.6     08-28    136  |  102  |  62<H>  ----------------------------<  115<H>  3.8   |  25  |  2.09<H>    Ca    8.2<L>      28 Aug 2023 07:04        Urinalysis Basic - ( 28 Aug 2023 07:04 )    Color: x / Appearance: x / SG: x / pH: x  Gluc: 115 mg/dL / Ketone: x  / Bili: x / Urobili: x   Blood: x / Protein: x / Nitrite: x   Leuk Esterase: x / RBC: x / WBC x   Sq Epi: x / Non Sq Epi: x / Bacteria: x            CAPILLARY BLOOD GLUCOSE      Culture - Body Fluid with Gram Stain (08.25.23 @ 09:45)   Gram Stain:   polymorphonuclear leukocytes seen   No organisms seen   by cytocentrifuge  Specimen Source: Pleural Fl Pleural Fluid  Culture Results:   No growth to date.Culture - Blood (08.19.23 @ 11:12)   Specimen Source: .Blood Blood  Culture Results:   No growth at 5 days    Culture - Blood in AM (08.18.23 @ 08:15)   Specimen Source: .Blood Blood-Peripheral  Culture Results:   No growth at 5 daysCulture - Blood in AM (08.18.23 @ 08:05)   Specimen Source: .Blood Blood-Peripheral  Culture Results:   No growth at 5 days    Culture - Urine (08.17.23 @ 08:04)   Specimen Source: Clean Catch Clean Catch (Midstream)  Culture Results:   <10,000 CFU/mL Normal Urogenital Lesly      RADIOLOGY & ADDITIONAL TESTS:    CXR:  x< from: Xray Chest 1 View-PORTABLE IMMEDIATE (Xray Chest 1 View-PORTABLE IMMEDIATE .) (08.25.23 @ 11:51) >  IMPRESSION:    Resolution of right pleural effusion. No pneumothorax.    Cardiomegaly. Micra pacemaker.    < end of copied text >    Ct scan chest:    ekg;    echo:< from: Transthoracic Echocardiogram (08.22.23 @ 15:12) >  ------------------------------------------------------------------------  CONCLUSIONS:  1. Mitral annular calcification. Mild mitral regurgitation.    2. Calcified trileaflet aortic valve with normal opening.  Mild aortic regurgitation.  3. Aortic Root: 3.0 cm.    4. Severely dilated left atrium.  LA volume index = 54  cc/m2.  5. Mild concentric left ventricular hypertrophy.  6. Normal Left Ventricular Systolic Function,  (EF = 55 to  60%)  7. Unable to adequately assess diastolic function due to  technical aspects of this study.  8. Normal right atrium.  9. Normal right ventricular size and systolic function  (TAPSE  2.9cm).  10. RA Pressure is 8 mm Hg.  11. RV systolic pressure is moderately increased at  57 mm  Hg.  12. There is mild tricuspid regurgitation.  13. There is trace pulmonic regurgitation.  14. Small pericardial effusion.  15. Right pleural effusion.  16. Spherical hypoechoic area is incidentally noted in the  liver, probably representing hepatic cyst. Consider  dedicated hepatic ultrasound for further evaluation if  clinically indicated.    < end of copied text >

## 2023-08-28 NOTE — PROGRESS NOTE ADULT - PROBLEM SELECTOR PROBLEM 4
Chronic atrial fibrillation
Acute on chronic diastolic congestive heart failure
Chronic kidney disease (CKD)
Chronic kidney disease (CKD)
Acute on chronic diastolic congestive heart failure
Chronic kidney disease (CKD)
Acute on chronic diastolic congestive heart failure

## 2023-08-28 NOTE — PROGRESS NOTE ADULT - PROBLEM SELECTOR PROBLEM 9
Discharge planning issues
Discharge planning issues
Prophylactic measure
Prophylactic measure
Discharge planning issues
Prophylactic measure
Discharge planning issues

## 2023-08-28 NOTE — PROGRESS NOTE ADULT - PROBLEM SELECTOR PROBLEM 8
BPH (benign prostatic hyperplasia)
Bacteremia
Prophylactic measure
Prophylactic measure
Bacteremia
Bacteremia
Prophylactic measure
BPH (benign prostatic hyperplasia)
Prophylactic measure
Prophylactic measure
BPH (benign prostatic hyperplasia)

## 2023-08-28 NOTE — PROGRESS NOTE ADULT - PROBLEM SELECTOR PLAN 7
cont antibiotics  check urine culture
cont antibiotics  check urine culture
pmhx of CKD with BUN and SCr of 63 and 2.96   baseline Scr of 2.16, GFR 19 compared to baseline of 28-30  likely AJMES on CKD in the setting of acute infection vs progression of CKD and new baseline   monitor CMP
pmhx of CKD with BUN and SCr of 63 and 2.96   baseline Scr of 2.16, GFR 19 compared to baseline of 28-30  likely JAMES on CKD in the setting of acute infection vs progression of CKD and new baseline   Continues to improve BUN/Cr slightly improved
-cont chronic Chinchilla
-cont chronic Chinchilla
cont antibiotics  check urine culture
-cont chronic Chinchilla
pmhx of BPH on chronic Chinchilla  Chinchilla was 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 to obtain new sample
pmhx of CKD with BUN and SCr of 63 and 2.96   baseline Scr of 2.16, GFR 19 compared to baseline of 28-30  likely JAMES on CKD in the setting of acute infection vs progression of CKD and new baseline   Has stabilized to baseline 2.0 today
-cont chronic Chinchilla

## 2023-08-28 NOTE — PROGRESS NOTE ADULT - SUBJECTIVE AND OBJECTIVE BOX
Date of Service 08-28-23 @ 08:06    CHIEF COMPLAINT:Patient is a 89y old  Male who presents with a chief complaint of PNA and ?effusion.Pt appears comfortable.    	  REVIEW OF SYSTEMS:  CONSTITUTIONAL: No fever, weight loss, or fatigue  EYES: No eye pain, visual disturbances, or discharge  ENT:  No difficulty hearing, tinnitus, vertigo; No sinus or throat pain  NECK: No pain or stiffness  RESPIRATORY: No cough, wheezing, chills or hemoptysis; No Shortness of Breath  CARDIOVASCULAR: No chest pain, palpitations, passing out, dizziness, or leg swelling  GASTROINTESTINAL: No abdominal or epigastric pain. No nausea, vomiting, or hematemesis; No diarrhea or constipation. No melena or hematochezia.  GENITOURINARY: No dysuria, frequency, hematuria, or incontinence  NEUROLOGICAL: No headaches, memory loss, loss of strength, numbness, or tremors  SKIN: No itching, burning, rashes, or lesions   LYMPH Nodes: No enlarged glands  ENDOCRINE: No heat or cold intolerance; No hair loss  MUSCULOSKELETAL: No joint pain or swelling; No muscle, back, or extremity pain  PSYCHIATRIC: No depression, anxiety, mood swings, or difficulty sleeping  HEME/LYMPH: No easy bruising, or bleeding gums  ALLERGY AND IMMUNOLOGIC: No hives or eczema	        PHYSICAL EXAM:  T(C): 36.7 (08-28-23 @ 07:36), Max: 36.8 (08-27-23 @ 19:32)  HR: 67 (08-28-23 @ 07:36) (62 - 72)  BP: 109/62 (08-28-23 @ 07:36) (94/49 - 139/52)  RR: 16 (08-28-23 @ 07:36) (16 - 18)  SpO2: 98% (08-28-23 @ 07:36) (97% - 100%)  Wt(kg): --  I&O's Summary    27 Aug 2023 07:01  -  28 Aug 2023 07:00  --------------------------------------------------------  IN: 100 mL / OUT: 1625 mL / NET: -1525 mL        Appearance: Normal	  HEENT:   Normal oral mucosa, PERRL, EOMI	  Lymphatic: No lymphadenopathy  Cardiovascular: Normal S1 S2, No JVD, No murmurs, No edema  Respiratory: Lungs clear to auscultation	  Psychiatry: A & O x 3, Mood & affect appropriate  Gastrointestinal:  Soft, Non-tender, + BS	  Skin: No rashes, No ecchymoses, No cyanosis	  Neurologic: Non-focal  Extremities: Normal range of motion, No clubbing, cyanosis or edema  Vascular: Peripheral pulses palpable 2+ bilaterally    MEDICATIONS  (STANDING):  allopurinol 100 milliGRAM(s) Oral daily  ALPRAZolam 0.25 milliGRAM(s) Oral at bedtime  apixaban 2.5 milliGRAM(s) Oral every 12 hours  cefTRIAXone   IVPB 2000 milliGRAM(s) IV Intermittent every 24 hours  chlorhexidine 2% Cloths 1 Application(s) Topical daily  hydrALAZINE 50 milliGRAM(s) Oral three times a day  isosorbide   mononitrate ER Tablet (IMDUR) 30 milliGRAM(s) Oral daily  metoprolol tartrate 25 milliGRAM(s) Oral every 12 hours  metroNIDAZOLE  IVPB 500 milliGRAM(s) IV Intermittent every 8 hours  metroNIDAZOLE  IVPB      polyethylene glycol 3350 17 Gram(s) Oral at bedtime  risperiDONE   Tablet 1 milliGRAM(s) Oral at bedtime  simvastatin 20 milliGRAM(s) Oral at bedtime      LABS:	 	    none new

## 2023-08-28 NOTE — PROGRESS NOTE ADULT - ASSESSMENT
89 yrs old from home, ambulating independently, pmhx of HFrEFm Afib on Eliquis, s/p Micra PPM, CKD, HLD, BPH on chronic Salmon, presented with fever and generalized weakness. In ED found to be febrile, tachycardic, CXR with Moderate right  effusion and/or basilar airspace consolidation. leukocytosis of 21.25 with left shift, Lactate 2.9 Urinalysis positive. Chronic salmon in place. Pt was admitted for sepsis likely due to UTI and ?PNA. Started on Ceftriaxone and Zithromax    CT Chest showed  Moderate to large right and small left pleural effusion with atelectasis. Pt was noted with strep pyogens bacteremia, Echo with no evidence of vegetation.  ID Dr. Garcia following. Repeat blood cultures negative. Pt will likely requires 4 weeks of abx from day of thoracentesis   Hospital course complicated with increasing large right pleural effusion, IR consulted, Eliquis placed on hold 8/23.  S/p right thoracentesis on 8/25 with removal of 1.4L   Pt had PICC line placed 8/25  to complete abx until 9/22/23    Pt seen no new complaints, pending authorisation

## 2023-08-28 NOTE — PROGRESS NOTE ADULT - PROVIDER SPECIALTY LIST ADULT
[facility] [place] 
[facility] 
[address] [id number] 
[phone] Work/School Note Date: 4/10/2017 To Whom It May concern: Wing Frances was seen and treated today in the emergency room by the following provider(s): 
Attending Provider: Tiffany Olmedo DO Physician Assistant: Marisa Cook. Wing Frances may return to work on 4/13/17 or until fever subsides in 24 hours Sincerely, Emeli Cheng PA-C  
 
 
 
 Pulmonology

## 2023-08-28 NOTE — PROGRESS NOTE ADULT - PROBLEM SELECTOR PLAN 9
-discharge disposition back home vs JETT requires 4 weeks of abx from day of thoracentesis until 9/22/2023, likely line placement on Monday. IR consulted

## 2023-08-28 NOTE — PROGRESS NOTE ADULT - PROBLEM SELECTOR PLAN 1
-Streptococcus pyogenes bacteremia, likely source lungs   -Echo negative for vegetation   -cont Ceftriaxone and Flagyl   -repeat cultures NTD  -will requires 4 weeks of abx from day of thoracentesis until 9/22/2023  -PICC line placed by IR 8/25  -ID Dr. Garcia

## 2023-08-28 NOTE — PROGRESS NOTE ADULT - PROBLEM SELECTOR PROBLEM 1
Pneumonia
Pneumonia
Bacteremia
Pneumonia
Bacteremia
Pneumonia

## 2023-08-28 NOTE — PROGRESS NOTE ADULT - PROBLEM SELECTOR PLAN 3
Repeat UC neg  Urine leg neg  c/w Ceftriaxone and flagyl  ID Dr Garcia follows   Salmon in place (chronic salmon)
-chronic salmon   -repeat urine culture negative
ACS protocol  Echo  Cardio follow up
-chronic salmon   -repeat urine culture negative
c/w Ceftriaxone and Zithromax   ID Dr Garcia follows   follow up urine culture and blood culture   Salmon in place (chronic salmon
ACS protocol  Echo  Cardio follow up
Repeat UC neg  Urine leg neg  c/w Ceftriaxone and flagyl  ID Dr Garcia follows   Salmon in place (chronic salmon)
-chronic salmon   -repeat urine culture negative
pmhx of HFrEF   p/w ?Rt sided effusion and LE edema  pro- BNP 10K [baseline 4K]  Trop 130 > 350 > 375  s/p  mg   EKG afib  repeat EKG: concern for Peaked T waves - repeat CMP neg for hyperkalemia  c/w tele and vitals q4
ACS protocol  Echo noted  Cardio follow up
-chronic salmon   -repeat urine culture negative

## 2023-08-28 NOTE — PROGRESS NOTE ADULT - PROBLEM SELECTOR PLAN 6
lasix IV  monitor intake and output  follow up CXR
-CKD with a history of obstructive uropathy   -JAMES on baseline CKD likely in the setting of acute infection vs progression of CKD and new baseline   -continue chronic salmon   -renal function improving   -avoid Nephrotoxins
Trop 130 > 350 > 375  EKG afib  Echo done on 7/2023: normal EF and Grade II Diastolic dysfunction  likely demand ischemia in the setting of acute infection and acute on chronic CHF   c/w tele
Trop 130 > 350 > 375  EKG afib  Echo done on 7/2023: normal EF and Grade II Diastolic dysfunction  likely demand ischemia in the setting of acute infection and acute on chronic CHF   c/w tele, will f/u repeat echo
lasix IV  monitor intake and output  follow up CXR
lasix IV  monitor intake and output  follow up CXR
-CKD with a history of obstructive uropathy   -JAMES on baseline CKD likely in the setting of acute infection vs progression of CKD and new baseline   -continue chronic salmon   -renal function improving   -avoid Nephrotoxins
Trop 130 > 350 > 375  EKG afib  Echo done on 7/2023: normal EF and Grade II Diastolic dysfunction  likely demand ischemia in the setting of acute infection and acute on chronic CHF   c/w tele, will f/u repeat echo
-CKD with a history of obstructive uropathy   -JAMES on baseline CKD likely in the setting of acute infection vs progression of CKD and new baseline   -continue chronic salmon   -renal function improving   -avoid Nephrotoxins
pmhx of CKD with BUN and SCr of 63 and 2.96   baseline Scr of 2.16, GFR 19 compared to baseline of 28-30  likely JAMES on CKD in the setting of acute infection vs progression of CKD and new baseline   monitor CMP
-CKD with a history of obstructive uropathy   -JAMES on baseline CKD likely in the setting of acute infection vs progression of CKD and new baseline   -continue chronic salmon   -renal function improving   -avoid Nephrotoxins

## 2023-08-28 NOTE — PROGRESS NOTE ADULT - REASON FOR ADMISSION
PNA and ?effusion
Xerosis
PNA and ?effusion

## 2023-08-28 NOTE — PROGRESS NOTE ADULT - PROBLEM SELECTOR PROBLEM 6
Elevated troponin
Chronic kidney disease (CKD)
Acute on chronic diastolic congestive heart failure
Chronic kidney disease (CKD)
Elevated troponin
Elevated troponin
Acute on chronic diastolic congestive heart failure
Chronic kidney disease (CKD)
Acute on chronic diastolic congestive heart failure
Chronic kidney disease (CKD)
Chronic kidney disease (CKD)

## 2023-08-30 LAB
CULTURE RESULTS: NO GROWTH — SIGNIFICANT CHANGE UP
NON-GYNECOLOGICAL CYTOLOGY STUDY: SIGNIFICANT CHANGE UP
SPECIMEN SOURCE: SIGNIFICANT CHANGE UP

## 2024-04-08 ENCOUNTER — INPATIENT (INPATIENT)
Facility: HOSPITAL | Age: 89
LOS: 3 days | Discharge: HOME | DRG: 690 | End: 2024-04-12
Attending: INTERNAL MEDICINE | Admitting: INTERNAL MEDICINE
Payer: MEDICAID

## 2024-04-08 VITALS
TEMPERATURE: 98 F | RESPIRATION RATE: 17 BRPM | HEIGHT: 64 IN | HEART RATE: 62 BPM | WEIGHT: 163.14 LBS | SYSTOLIC BLOOD PRESSURE: 143 MMHG | DIASTOLIC BLOOD PRESSURE: 59 MMHG | OXYGEN SATURATION: 97 %

## 2024-04-08 DIAGNOSIS — I48.91 UNSPECIFIED ATRIAL FIBRILLATION: ICD-10-CM

## 2024-04-08 DIAGNOSIS — N40.0 BENIGN PROSTATIC HYPERPLASIA WITHOUT LOWER URINARY TRACT SYMPTOMS: ICD-10-CM

## 2024-04-08 DIAGNOSIS — E78.5 HYPERLIPIDEMIA, UNSPECIFIED: ICD-10-CM

## 2024-04-08 DIAGNOSIS — N17.9 ACUTE KIDNEY FAILURE, UNSPECIFIED: ICD-10-CM

## 2024-04-08 DIAGNOSIS — I10 ESSENTIAL (PRIMARY) HYPERTENSION: ICD-10-CM

## 2024-04-08 DIAGNOSIS — T83.511A INFECTION AND INFLAMMATORY REACTION DUE TO INDWELLING URETHRAL CATHETER, INITIAL ENCOUNTER: ICD-10-CM

## 2024-04-08 DIAGNOSIS — Z29.9 ENCOUNTER FOR PROPHYLACTIC MEASURES, UNSPECIFIED: ICD-10-CM

## 2024-04-08 DIAGNOSIS — Z95.0 PRESENCE OF CARDIAC PACEMAKER: Chronic | ICD-10-CM

## 2024-04-08 DIAGNOSIS — N39.0 URINARY TRACT INFECTION, SITE NOT SPECIFIED: ICD-10-CM

## 2024-04-08 DIAGNOSIS — I50.22 CHRONIC SYSTOLIC (CONGESTIVE) HEART FAILURE: ICD-10-CM

## 2024-04-08 LAB
ALBUMIN SERPL ELPH-MCNC: 3.5 G/DL — SIGNIFICANT CHANGE UP (ref 3.5–5)
ALP SERPL-CCNC: 90 U/L — SIGNIFICANT CHANGE UP (ref 40–120)
ALT FLD-CCNC: 18 U/L DA — SIGNIFICANT CHANGE UP (ref 10–60)
ANION GAP SERPL CALC-SCNC: 3 MMOL/L — LOW (ref 5–17)
APPEARANCE UR: ABNORMAL
AST SERPL-CCNC: 13 U/L — SIGNIFICANT CHANGE UP (ref 10–40)
BACTERIA # UR AUTO: ABNORMAL /HPF
BASOPHILS # BLD AUTO: 0.04 K/UL — SIGNIFICANT CHANGE UP (ref 0–0.2)
BASOPHILS NFR BLD AUTO: 0.5 % — SIGNIFICANT CHANGE UP (ref 0–2)
BILIRUB SERPL-MCNC: 0.3 MG/DL — SIGNIFICANT CHANGE UP (ref 0.2–1.2)
BILIRUB UR-MCNC: NEGATIVE — SIGNIFICANT CHANGE UP
BUN SERPL-MCNC: 47 MG/DL — HIGH (ref 7–18)
CALCIUM SERPL-MCNC: 9.6 MG/DL — SIGNIFICANT CHANGE UP (ref 8.4–10.5)
CHLORIDE SERPL-SCNC: 109 MMOL/L — HIGH (ref 96–108)
CO2 SERPL-SCNC: 26 MMOL/L — SIGNIFICANT CHANGE UP (ref 22–31)
COLOR SPEC: ABNORMAL
COMMENT - URINE: SIGNIFICANT CHANGE UP
CREAT SERPL-MCNC: 2.14 MG/DL — HIGH (ref 0.5–1.3)
DIFF PNL FLD: ABNORMAL
EGFR: 28 ML/MIN/1.73M2 — LOW
EOSINOPHIL # BLD AUTO: 0.12 K/UL — SIGNIFICANT CHANGE UP (ref 0–0.5)
EOSINOPHIL NFR BLD AUTO: 1.5 % — SIGNIFICANT CHANGE UP (ref 0–6)
GLUCOSE SERPL-MCNC: 130 MG/DL — HIGH (ref 70–99)
GLUCOSE UR QL: NEGATIVE MG/DL — SIGNIFICANT CHANGE UP
GRAN CASTS # UR COMP ASSIST: PRESENT
HCT VFR BLD CALC: 32.1 % — LOW (ref 39–50)
HGB BLD-MCNC: 9.8 G/DL — LOW (ref 13–17)
IMM GRANULOCYTES NFR BLD AUTO: 0.1 % — SIGNIFICANT CHANGE UP (ref 0–0.9)
KETONES UR-MCNC: NEGATIVE MG/DL — SIGNIFICANT CHANGE UP
LACTATE SERPL-SCNC: 0.8 MMOL/L — SIGNIFICANT CHANGE UP (ref 0.7–2)
LEUKOCYTE ESTERASE UR-ACNC: ABNORMAL
LYMPHOCYTES # BLD AUTO: 1.39 K/UL — SIGNIFICANT CHANGE UP (ref 1–3.3)
LYMPHOCYTES # BLD AUTO: 17.2 % — SIGNIFICANT CHANGE UP (ref 13–44)
MCHC RBC-ENTMCNC: 27.5 PG — SIGNIFICANT CHANGE UP (ref 27–34)
MCHC RBC-ENTMCNC: 30.5 GM/DL — LOW (ref 32–36)
MCV RBC AUTO: 89.9 FL — SIGNIFICANT CHANGE UP (ref 80–100)
MONOCYTES # BLD AUTO: 0.71 K/UL — SIGNIFICANT CHANGE UP (ref 0–0.9)
MONOCYTES NFR BLD AUTO: 8.8 % — SIGNIFICANT CHANGE UP (ref 2–14)
NEUTROPHILS # BLD AUTO: 5.79 K/UL — SIGNIFICANT CHANGE UP (ref 1.8–7.4)
NEUTROPHILS NFR BLD AUTO: 71.9 % — SIGNIFICANT CHANGE UP (ref 43–77)
NITRITE UR-MCNC: NEGATIVE — SIGNIFICANT CHANGE UP
NRBC # BLD: 0 /100 WBCS — SIGNIFICANT CHANGE UP (ref 0–0)
PH UR: 6 — SIGNIFICANT CHANGE UP (ref 5–8)
PLATELET # BLD AUTO: 215 K/UL — SIGNIFICANT CHANGE UP (ref 150–400)
POTASSIUM SERPL-MCNC: 4.4 MMOL/L — SIGNIFICANT CHANGE UP (ref 3.5–5.3)
POTASSIUM SERPL-SCNC: 4.4 MMOL/L — SIGNIFICANT CHANGE UP (ref 3.5–5.3)
PROT SERPL-MCNC: 7.3 G/DL — SIGNIFICANT CHANGE UP (ref 6–8.3)
PROT UR-MCNC: 300 MG/DL
RBC # BLD: 3.57 M/UL — LOW (ref 4.2–5.8)
RBC # FLD: 14 % — SIGNIFICANT CHANGE UP (ref 10.3–14.5)
RBC CASTS # UR COMP ASSIST: ABNORMAL /HPF
SODIUM SERPL-SCNC: 138 MMOL/L — SIGNIFICANT CHANGE UP (ref 135–145)
SP GR SPEC: 1.01 — SIGNIFICANT CHANGE UP (ref 1–1.03)
UROBILINOGEN FLD QL: 0.2 MG/DL — SIGNIFICANT CHANGE UP (ref 0.2–1)
WBC # BLD: 8.06 K/UL — SIGNIFICANT CHANGE UP (ref 3.8–10.5)
WBC # FLD AUTO: 8.06 K/UL — SIGNIFICANT CHANGE UP (ref 3.8–10.5)
WBC UR QL: ABNORMAL /HPF (ref 0–5)

## 2024-04-08 PROCEDURE — 99285 EMERGENCY DEPT VISIT HI MDM: CPT

## 2024-04-08 RX ORDER — CEFTRIAXONE 500 MG/1
1000 INJECTION, POWDER, FOR SOLUTION INTRAMUSCULAR; INTRAVENOUS ONCE
Refills: 0 | Status: COMPLETED | OUTPATIENT
Start: 2024-04-08 | End: 2024-04-08

## 2024-04-08 RX ADMIN — CEFTRIAXONE 100 MILLIGRAM(S): 500 INJECTION, POWDER, FOR SOLUTION INTRAMUSCULAR; INTRAVENOUS at 18:30

## 2024-04-08 NOTE — H&P ADULT - ASSESSMENT
Patient is a 89 year old male from home, ambulates independently, with PMH of HTN, HLD, A Fib (On Eliquis) S/P Micra PPM, CKD, HFrEF, BPH (Chronic Chinchilla) who presented to the ED fever and low blood pressure.  In the ED patient hemodynamically stable and afebrile.   Patients labs show no leukocytosis with positive UA.  Patient Chinchilla cath was ex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.  Patient is being admitted to medicine for Catheter associated UTI

## 2024-04-08 NOTE — ED ADULT NURSE NOTE - HISTORY OF COVID-19 VACCINATION
Patient arrives with [facility] who dropped him off and left. Patient has had multiple ED visits in the past 6 weeks, this being the 6th one. He reports being homeless and \"just needing some food and a place to lay my head. \" This writer suggested the shelter which he gladly accepted. Patient is not suicidal, homicidal or psychotic but only in need of a shelter. He will be discharged with transportation to the [address] at [facility]. Faraz Jarquin.
Vaccine status unknown

## 2024-04-08 NOTE — H&P ADULT - NSHPPHYSICALEXAM_GEN_ALL_CORE
T(C): 36.4 (04-08-24 @ 19:48), Max: 36.8 (04-08-24 @ 15:51)  T(F): 97.6 (04-08-24 @ 19:48), Max: 98.2 (04-08-24 @ 15:51)  HR: 58 (04-08-24 @ 19:48) (58 - 62)  BP: 169/81 (04-08-24 @ 19:48) (143/59 - 169/81)  RR: 17 (04-08-24 @ 19:48) (17 - 17)  SpO2: 97% (04-08-24 @ 19:48) (97% - 97%)    GENERAL: NAD; lying in bed  HEAD:  Atraumatic, Normocephalic  EYES: EOMI, PERRLA, conjunctiva and sclera clear  ENMT: No tonsillar erythema, exudates, or enlargement  NECK: Supple, normal appearance, No JVD;   NERVOUS SYSTEM:  Alert & Oriented X3,  Non focal  CHEST/LUNG: Lungs clear to auscultation bilaterally, No rales, rhonchi, wheezing   HEART: Regular rate and rhythm; ?murmurs, NO rubs, or gallops  ABDOMEN: Soft, mild tenderness in suprapubic region, Nondistended; Bowel sounds present; Chinchilla Present on exam  EXTREMITIES:  2+ Peripheral Pulses, No clubbing, cyanosis, or edema  SKIN: No rashes or lesions; T(C): 36.4 (04-08-24 @ 19:48), Max: 36.8 (04-08-24 @ 15:51)  T(F): 97.6 (04-08-24 @ 19:48), Max: 98.2 (04-08-24 @ 15:51)  HR: 58 (04-08-24 @ 19:48) (58 - 62)  BP: 169/81 (04-08-24 @ 19:48) (143/59 - 169/81)  RR: 17 (04-08-24 @ 19:48) (17 - 17)  SpO2: 97% (04-08-24 @ 19:48) (97% - 97%)    GENERAL: NAD; lying in bed; Elderly; pleasant; Hong Konger speaking   HEAD:  Atraumatic, Normocephalic  EYES: EOMI, PERRLA, conjunctiva and sclera clear  ENMT: No tonsillar erythema, exudates, or enlargement  NECK: Supple, normal appearance, No JVD;   NERVOUS SYSTEM:  Alert & Oriented X3,  Non focal  CHEST/LUNG: Lungs clear to auscultation bilaterally, No rales, rhonchi, wheezing   HEART: Regular rate and rhythm; ?murmurs, NO rubs, or gallops  ABDOMEN: Soft, mild tenderness in suprapubic region, Nondistended; Bowel sounds present; Chinchilla Present on exam  EXTREMITIES:  2+ Peripheral Pulses, No clubbing, cyanosis, or edema  SKIN: No rashes or lesions;

## 2024-04-08 NOTE — H&P ADULT - HISTORY OF PRESENT ILLNESS
Patient is a 89 year old male from home, ambulates independently, with PMH of HTN, HLD, A Fib (On Eliquis) S/P Micra PPM, CKD, HFrEF, BPH (Chronic Chinchilla) who presented to the ED fever and low blood pressure.    Patient denies nausea, vomiting, headache, blurry vision, dizziness, chest pain, abdominal pain, constipation, diarrhea, leg swelling.  Patient is a 89 year old male from home, ambulates independently, with PMH of HTN, HLD, A Fib (On Eliquis) S/P Micra PPM, CKD, HFrEF, BPH (Chronic Chinchilla) who presented to the ED fever and low blood pressure.  Patient states he was at his PCP for a routine office visit and had a Urine study done when he told his doctor that he had fevers.  His urine study resulted as positive for an infection and his doctor adviced him to come to the hospital to get treated.  patient states he does not currently had a fever and is feeling well.  patient states his doctor also mentioned his blood pressure was lower than normal.       Patient denies nausea, vomiting, headache, blurry vision, dizziness, chest pain, abdominal pain, constipation, diarrhea, leg swelling.  Patient is a 89 year old male from home with daughter, with PMH of HTN, HLD, A Fib (On Eliquis) S/P Micra PPM, CKD, HFrEF, BPH (Chronic Salmon) who presented to the ED fever and low blood pressure.  Patient states he was at his PCP for a routine office visit and had a Urine study done when he told his doctor that he had fevers.  His urine study resulted as positive for an infection and his doctor adviced him to come to the hospital to get treated.  patient states he does not currently had a fever and is feeling well.  patient states his doctor also mentioned his blood pressure was lower than normal.  Multiple attmepts made to talk with patients daughter over the phone overnight however unable to reach her ([phone], Della Martino).  Patient states he had his salmon cath changed in the ED.  Patient denies nausea, vomiting, headache, blurry vision, dizziness, chest pain, abdominal pain, constipation, diarrhea, leg swelling.

## 2024-04-08 NOTE — H&P ADULT - PROBLEM SELECTOR PLAN 6
- Patient with history of HLD  - Patient on  at home  - Continue home antihyperlipidemic - Patient with history of HLD  - Patient on Simvastatin at home  - Continue home antihyperlipidemic

## 2024-04-08 NOTE — H&P ADULT - PROBLEM SELECTOR PLAN 1
- Presented with outpatient fever, and positive UA at PCP  - Hemodynamically stable and afebrile in ED  - Positive UA  - No leukocytosis  - Does not meet SIRS sepsis criteria  - Exchanged salmon cath in ED  - F/U Blood cultures   - F/U Urine cultures  - Prior urine culture from 2022 showing Pseudomonas and prior showing Klebsiella pneumoniae  - Start Cefepime for Pseudomonal coverage given history of CAUTI - Presented with outpatient fever, and positive UA at PCP  - Hemodynamically stable and afebrile in ED  - Positive UA  - No leukocytosis  - Does not meet SIRS sepsis criteria  - Exchanged salmon cath in ED  - F/U Blood cultures   - F/U Urine cultures  - Prior urine culture from 2022 showing Pseudomonas and prior showing Klebsiella pneumoniae  - Start Cefepime for Pseudomonal coverage given history of CAUTI  - ID Consulted Dr. Garcia

## 2024-04-08 NOTE — H&P ADULT - PROBLEM SELECTOR PLAN 5
- Patient with history of HTN  - Patient on  at home  - Continue home antihypertensives with holding parameters - Patient with history of HTN  - Patient on Metoprolol, Hydralazine at home  - Continue home antihypertensives with holding parameters
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
## 2024-04-08 NOTE — H&P ADULT - PROBLEM SELECTOR PLAN 4
- History of CHF  - Echo showing 55-60% 8/23 - History of CHF  - Echo showing 55-60% 8/23  - Continue home Metoprolol, Imdur

## 2024-04-08 NOTE — H&P ADULT - PROBLEM SELECTOR PLAN 3
- History of A. Fib  - [id number]: 4 points  - Continue home Eliquis 2.5mg BID and Metoprolol 25mg BID

## 2024-04-08 NOTE — H&P ADULT - PROBLEM SELECTOR PLAN 2
- History of CKD  - Baseline creatine of .19-2  - SCr on admission of 2.14  - Britney Chinchilla  - Monitor Renal function  - IV Fluid

## 2024-04-08 NOTE — H&P ADULT - ATTENDING COMMENTS
89 year old male from home with daughter, with PMH of HTN, HLD,Chronic  A Fib (On Eliquis) S/P Micra PPM, CKD, HFrEF, BPH (Chronic Chinchilla) who presented to the ED fever and low blood pressure,complicated UTI.  1.Complicated UTI-ABX as per ID.Chinchilla was changed in ER.  2.Pan cx.  3.Chronixc afib-eliquis,lopressor.  4.HTN-cont bp medication.  5.CRI-f/u lytes.  6.Lipid d/o-statin.  7.PPI.

## 2024-04-08 NOTE — ED ADULT NURSE NOTE - NSFALLUNIVINTERV_ED_ALL_ED
Bed/Stretcher in lowest position, wheels locked, appropriate side rails in place/Call bell, personal items and telephone in reach/Instruct patient to call for assistance before getting out of bed/chair/stretcher/Non-slip footwear applied when patient is off stretcher/[place] to call system/Physically safe environment - no spills, clutter or unnecessary equipment/Purposeful proactive rounding/Room/bathroom lighting operational, light cord in reach

## 2024-04-08 NOTE — ED PROVIDER NOTE - CLINICAL SUMMARY MEDICAL DECISION MAKING FREE TEXT BOX
Patient presenting concern for UTI fever and low blood pressure vital currently stable will obtain lab change Chinchilla UA U culture antibiotics discussed case with Dr. Garcia will admit

## 2024-04-09 LAB
ANION GAP SERPL CALC-SCNC: 7 MMOL/L — SIGNIFICANT CHANGE UP (ref 5–17)
BUN SERPL-MCNC: 45 MG/DL — HIGH (ref 7–18)
CALCIUM SERPL-MCNC: 9.6 MG/DL — SIGNIFICANT CHANGE UP (ref 8.4–10.5)
CHLORIDE SERPL-SCNC: 110 MMOL/L — HIGH (ref 96–108)
CO2 SERPL-SCNC: 24 MMOL/L — SIGNIFICANT CHANGE UP (ref 22–31)
CREAT SERPL-MCNC: 2.01 MG/DL — HIGH (ref 0.5–1.3)
EGFR: 30 ML/MIN/1.73M2 — LOW
GLUCOSE SERPL-MCNC: 114 MG/DL — HIGH (ref 70–99)
HCT VFR BLD CALC: 28.2 % — LOW (ref 39–50)
HGB BLD-MCNC: 8.9 G/DL — LOW (ref 13–17)
MAGNESIUM SERPL-MCNC: 2.4 MG/DL — SIGNIFICANT CHANGE UP (ref 1.6–2.6)
MCHC RBC-ENTMCNC: 28.2 PG — SIGNIFICANT CHANGE UP (ref 27–34)
MCHC RBC-ENTMCNC: 31.6 GM/DL — LOW (ref 32–36)
MCV RBC AUTO: 89.2 FL — SIGNIFICANT CHANGE UP (ref 80–100)
NRBC # BLD: 0 /100 WBCS — SIGNIFICANT CHANGE UP (ref 0–0)
PHOSPHATE SERPL-MCNC: 3.3 MG/DL — SIGNIFICANT CHANGE UP (ref 2.5–4.5)
PLATELET # BLD AUTO: 189 K/UL — SIGNIFICANT CHANGE UP (ref 150–400)
POTASSIUM SERPL-MCNC: 4.1 MMOL/L — SIGNIFICANT CHANGE UP (ref 3.5–5.3)
POTASSIUM SERPL-SCNC: 4.1 MMOL/L — SIGNIFICANT CHANGE UP (ref 3.5–5.3)
RBC # BLD: 3.16 M/UL — LOW (ref 4.2–5.8)
RBC # FLD: 13.8 % — SIGNIFICANT CHANGE UP (ref 10.3–14.5)
SODIUM SERPL-SCNC: 141 MMOL/L — SIGNIFICANT CHANGE UP (ref 135–145)
WBC # BLD: 7.62 K/UL — SIGNIFICANT CHANGE UP (ref 3.8–10.5)
WBC # FLD AUTO: 7.62 K/UL — SIGNIFICANT CHANGE UP (ref 3.8–10.5)

## 2024-04-09 RX ORDER — ACETAMINOPHEN 500 MG
650 TABLET ORAL EVERY 6 HOURS
Refills: 0 | Status: DISCONTINUED | OUTPATIENT
Start: 2024-04-09 | End: 2024-04-12

## 2024-04-09 RX ORDER — ISOSORBIDE MONONITRATE 60 MG/1
30 TABLET, EXTENDED RELEASE ORAL DAILY
Refills: 0 | Status: DISCONTINUED | OUTPATIENT
Start: 2024-04-09 | End: 2024-04-12

## 2024-04-09 RX ORDER — RISPERIDONE 4 MG/1
1 TABLET ORAL DAILY
Refills: 0 | Status: DISCONTINUED | OUTPATIENT
Start: 2024-04-09 | End: 2024-04-12

## 2024-04-09 RX ORDER — INFLUENZA VIRUS VACCINE 15; 15; 15; 15 UG/.5ML; UG/.5ML; UG/.5ML; UG/.5ML
0.7 SUSPENSION INTRAMUSCULAR ONCE
Refills: 0 | Status: COMPLETED | OUTPATIENT
Start: 2024-04-09 | End: 2024-04-09

## 2024-04-09 RX ORDER — CEFEPIME 1 G/1
1000 INJECTION, POWDER, FOR SOLUTION INTRAMUSCULAR; INTRAVENOUS DAILY
Refills: 0 | Status: DISCONTINUED | OUTPATIENT
Start: 2024-04-09 | End: 2024-04-12

## 2024-04-09 RX ORDER — SIMVASTATIN 20 MG/1
20 TABLET, FILM COATED ORAL AT BEDTIME
Refills: 0 | Status: DISCONTINUED | OUTPATIENT
Start: 2024-04-09 | End: 2024-04-12

## 2024-04-09 RX ORDER — METOPROLOL TARTRATE 50 MG
25 TABLET ORAL EVERY 12 HOURS
Refills: 0 | Status: DISCONTINUED | OUTPATIENT
Start: 2024-04-09 | End: 2024-04-12

## 2024-04-09 RX ORDER — APIXABAN 2.5 MG/1
2.5 TABLET, FILM COATED ORAL
Refills: 0 | Status: DISCONTINUED | OUTPATIENT
Start: 2024-04-09 | End: 2024-04-12

## 2024-04-09 RX ORDER — ALPRAZOLAM 0.25 MG
0.25 TABLET ORAL DAILY
Refills: 0 | Status: DISCONTINUED | OUTPATIENT
Start: 2024-04-09 | End: 2024-04-12

## 2024-04-09 RX ORDER — POLYETHYLENE GLYCOL 3350 17 G/17G
17 POWDER, FOR SOLUTION ORAL AT BEDTIME
Refills: 0 | Status: DISCONTINUED | OUTPATIENT
Start: 2024-04-09 | End: 2024-04-12

## 2024-04-09 RX ORDER — ALLOPURINOL 300 MG
100 TABLET ORAL DAILY
Refills: 0 | Status: DISCONTINUED | OUTPATIENT
Start: 2024-04-09 | End: 2024-04-12

## 2024-04-09 RX ORDER — HYDRALAZINE HCL 50 MG
50 TABLET ORAL THREE TIMES A DAY
Refills: 0 | Status: DISCONTINUED | OUTPATIENT
Start: 2024-04-09 | End: 2024-04-12

## 2024-04-09 RX ADMIN — Medication 100 MILLIGRAM(S): at 12:22

## 2024-04-09 RX ADMIN — Medication 25 MILLIGRAM(S): at 17:48

## 2024-04-09 RX ADMIN — CEFEPIME 100 MILLIGRAM(S): 1 INJECTION, POWDER, FOR SOLUTION INTRAMUSCULAR; INTRAVENOUS at 12:22

## 2024-04-09 RX ADMIN — APIXABAN 2.5 MILLIGRAM(S): 2.5 TABLET, FILM COATED ORAL at 17:48

## 2024-04-09 RX ADMIN — APIXABAN 2.5 MILLIGRAM(S): 2.5 TABLET, FILM COATED ORAL at 06:02

## 2024-04-09 RX ADMIN — ISOSORBIDE MONONITRATE 30 MILLIGRAM(S): 60 TABLET, EXTENDED RELEASE ORAL at 14:33

## 2024-04-09 RX ADMIN — POLYETHYLENE GLYCOL 3350 17 GRAM(S): 17 POWDER, FOR SOLUTION ORAL at 21:54

## 2024-04-09 RX ADMIN — Medication 25 MILLIGRAM(S): at 06:02

## 2024-04-09 RX ADMIN — Medication 50 MILLIGRAM(S): at 14:33

## 2024-04-09 RX ADMIN — Medication 50 MILLIGRAM(S): at 06:02

## 2024-04-09 RX ADMIN — SIMVASTATIN 20 MILLIGRAM(S): 20 TABLET, FILM COATED ORAL at 21:53

## 2024-04-09 NOTE — PATIENT PROFILE ADULT - FALL HARM RISK - HARM RISK INTERVENTIONS
Assistance OOB with selected safe patient handling equipment/Communicate Risk of Fall with Harm to all staff/Discuss with provider need for PT consult/Monitor gait and stability/Provide patient with walking aids - walker, cane, crutches/Reinforce activity limits and safety measures with patient and family/Tailored Fall Risk Interventions/Visual Cue: Yellow wristband and red socks/Bed in lowest position, wheels locked, appropriate side rails in place/Call bell, personal items and telephone in reach/Instruct patient to call for assistance before getting out of bed or chair/Non-slip footwear when patient is out of bed/[place] to call system/Physically safe environment - no spills, clutter or unnecessary equipment/Purposeful Proactive Rounding/Room/bathroom lighting operational, light cord in reach Assistance with ambulation/Assistance OOB with selected safe patient handling equipment/Communicate Risk of Fall with Harm to all staff/Discuss with provider need for PT consult/Monitor for mental status changes/Monitor gait and stability/Provide patient with walking aids - walker, cane, crutches/Reinforce activity limits and safety measures with patient and family/Review medications for side effects contributing to fall risk/Sit up slowly, dangle for a short time, stand at bedside before walking/Tailored Fall Risk Interventions/Use of alarms - bed, chair and/or voice tab/Visual Cue: Yellow wristband and red socks/Bed in lowest position, wheels locked, appropriate side rails in place/Call bell, personal items and telephone in reach/Instruct patient to call for assistance before getting out of bed or chair/Non-slip footwear when patient is out of bed/[place] to call system/Physically safe environment - no spills, clutter or unnecessary equipment/Purposeful Proactive Rounding/Room/bathroom lighting operational, light cord in reach

## 2024-04-09 NOTE — PROGRESS NOTE ADULT - SUBJECTIVE AND OBJECTIVE BOX
NP Note discussed with  Primary Attending    Patient is a 89y old  Male who presents with a chief complaint of Catheter associated UTI (08 Apr 2024 21:08).  Pt. seen at bedside, Mongolian speaking, comfortable.      INTERVAL HPI/OVERNIGHT EVENTS: no new complaints    MEDICATIONS  (STANDING):  allopurinol 100 milliGRAM(s) Oral daily  ALPRAZolam 0.25 milliGRAM(s) Oral daily  apixaban 2.5 milliGRAM(s) Oral two times a day  cefepime   IVPB 1000 milliGRAM(s) IV Intermittent daily  hydrALAZINE 50 milliGRAM(s) Oral three times a day  influenza  Vaccine (HIGH DOSE) 0.7 milliLiter(s) IntraMuscular once  isosorbide   mononitrate ER Tablet (IMDUR) 30 milliGRAM(s) Oral daily  metoprolol tartrate 25 milliGRAM(s) Oral every 12 hours  polyethylene glycol 3350 17 Gram(s) Oral at bedtime  risperiDONE   Tablet 1 milliGRAM(s) Oral daily  simvastatin 20 milliGRAM(s) Oral at bedtime    MEDICATIONS  (PRN):  acetaminophen     Tablet .. 650 milliGRAM(s) Oral every 6 hours PRN Temp greater or equal to 38C (100.4F), Mild Pain (1 - 3)      __________________________________________________  REVIEW OF SYSTEMS:    CONSTITUTIONAL: No fever,   EYES: no acute visual disturbances  NECK: No pain or stiffness  RESPIRATORY: No cough; No shortness of breath  CARDIOVASCULAR: No chest pain, no palpitations  GASTROINTESTINAL: No pain. No nausea or vomiting; No diarrhea   NEUROLOGICAL: No headache or numbness, no tremors  MUSCULOSKELETAL: No joint pain, no muscle pain  GENITOURINARY: no dysuria, no frequency, no hesitancy  PSYCHIATRY: no depression , no anxiety  ALL OTHER  ROS negative        Vital Signs Last 24 Hrs  T(C): 36.7 (09 Apr 2024 05:09), Max: 36.8 (08 Apr 2024 15:51)  T(F): 98.1 (09 Apr 2024 05:09), Max: 98.2 (08 Apr 2024 15:51)  HR: 70 (09 Apr 2024 05:09) (58 - 73)  BP: 162/72 (09 Apr 2024 05:09) (143/59 - 169/81)  BP(mean): --  RR: 16 (09 Apr 2024 05:09) (16 - 19)  SpO2: 94% (09 Apr 2024 05:09) (94% - 97%)    Parameters below as of 09 Apr 2024 05:09  Patient On (Oxygen Delivery Method): room air        ________________________________________________  PHYSICAL EXAM:  GENERAL: NAD  HEENT: Normocephalic;  conjunctivae and sclerae clear; moist mucous membranes;   NECK : supple  CHEST/LUNG: Clear to auscultation bilaterally with good air entry   HEART: S1 S2  regular; no murmurs, gallops or rubs  ABDOMEN: Soft, Nontender, Nondistended; Bowel sounds present  EXTREMITIES: no cyanosis; no edema; no calf tenderness  SKIN: warm and dry; no rash  NERVOUS SYSTEM:  Awake and alert; Oriented  to place, person and time ; no new deficits    _________________________________________________  LABS:                        8.9    7.62  )-----------( 189      ( 09 Apr 2024 06:30 )             28.2     04-09    141  |  110<H>  |  45<H>  ----------------------------<  114<H>  4.1   |  24  |  2.01<H>    Ca    9.6      09 Apr 2024 06:30  Phos  3.3     04-09  Mg     2.4     04-09    TPro  7.3  /  Alb  3.5  /  TBili  0.3  /  DBili  x   /  AST  13  /  ALT  18  /  AlkPhos  90  04-08      Urinalysis Basic - ( 09 Apr 2024 06:30 )    Color: x / Appearance: x / SG: x / pH: x  Gluc: 114 mg/dL / Ketone: x  / Bili: x / Urobili: x   Blood: x / Protein: x / Nitrite: x   Leuk Esterase: x / RBC: x / WBC x   Sq Epi: x / Non Sq Epi: x / Bacteria: x    Urinalysis + Microscopic Examination (04.08.24 @ 18:20)    pH Urine: 6.0   Urine Appearance: Cloudy   Color: Red   Specific Gravity: 1.012   Protein, Urine: 300 mg/dL   Glucose Qualitative, Urine: Negative mg/dL   Ketone - Urine: Negative mg/dL   Blood, Urine: Large   Bilirubin: Negative   Urobilinogen: 0.2 mg/dL   Leukocyte Esterase Concentration: Large   Nitrite: Negative   White Blood Cell - Urine: Too Numerous to count /HPF   Red Blood Cell - Urine: Too Numerous to count /HPF   Bacteria: Moderate /HPF   Granular Cast: Present   Comment - Urine: many mucus strands seen      CAPILLARY BLOOD GLUCOSE    RADIOLOGY & ADDITIONAL TESTS:      Imaging Personally Reviewed:  YES/NO    Consultant(s) Notes Reviewed:   YES/ No    Care Discussed with Consultants :     Plan of care was discussed with patient and /or primary care giver; all questions and concerns were addressed and care was aligned with patient's wishes.

## 2024-04-09 NOTE — PATIENT PROFILE ADULT - FUNCTIONAL ASSESSMENT - BASIC MOBILITY 6.
2-calculated by average/Not able to assess (calculate score using [facility] averaging method)

## 2024-04-09 NOTE — CONSULT NOTE ADULT - SUBJECTIVE AND OBJECTIVE BOX
Patient is a 89y old  Male who is from home and with PMH of HTN, HLD, A Fib (On Eliquis) S/P Micra PPM, CKD, HFrEF, BPH (Chronic Salmon) who presents to the ER for evaluation of fever and low blood pressure.  Patient states he was at his PCP for a routine office visit and had a Urine study was positive for an infection and his doctor adviced him to come to the hospital to get treated.  Patient states he had his salmon cath changed in the ER.  On admission, he found to have no fever but positive Urine analysis. She has started on cefepime and the ID consult requested to assist with further evaluation and antibiotic management.        REVIEW OF SYSTEMS: Total of twelve systems have been reviewed with patient and found to be negative unless mentioned in HPI      PAST MEDICAL & SURGICAL HISTORY:  Hypertension  Atrial fibrillation  BPH (benign prostatic hyperplasia)  Chronic kidney disease, unspecified CKD stage  Hyperlipidemia  S/P placement of cardiac pacemaker        SOCIAL HISTORY  Alcohol: Does not drink  Tobacco: Does not smoke  Illicit substance use: None      FAMILY HISTORY: Non contributory to the present illness        ALLERGIES: No Known Allergies        Vital Signs Last 24 Hrs  T(C): 36.7 (09 Apr 2024 05:09), Max: 36.8 (08 Apr 2024 15:51)  T(F): 98.1 (09 Apr 2024 05:09), Max: 98.2 (08 Apr 2024 15:51)  HR: 70 (09 Apr 2024 05:09) (58 - 73)  BP: 162/72 (09 Apr 2024 05:09) (143/59 - 169/81)  BP(mean): --  RR: 16 (09 Apr 2024 05:09) (16 - 19)  SpO2: 94% (09 Apr 2024 05:09) (94% - 97%)    Parameters below as of 09 Apr 2024 05:09  Patient On (Oxygen Delivery Method): room air        PHYSICAL EXAM:  GENERAL: Not in distress   CHEST/LUNG: Not using accessory muscles   HEART: s1 and s2 present  ABDOMEN:  Nontender and  Nondistended  EXTREMITIES: No pedal  edema  CNS: Awake and Alert      LABS:                        8.9    7.62  )-----------( 189      ( 09 Apr 2024 06:30 )             28.2       04-09    141  |  110<H>  |  45<H>  ----------------------------<  114<H>  4.1   |  24  |  2.01<H>    Ca    9.6      09 Apr 2024 06:30  Phos  3.3     04-09  Mg     2.4     04-09    TPro  7.3  /  Alb  3.5  /  TBili  0.3  /  DBili  x   /  AST  13  /  ALT  18  /  AlkPhos  90  04-08        MEDICATIONS  (STANDING):  allopurinol 100 milliGRAM(s) Oral daily  ALPRAZolam 0.25 milliGRAM(s) Oral daily  apixaban 2.5 milliGRAM(s) Oral two times a day  cefepime   IVPB 1000 milliGRAM(s) IV Intermittent daily  hydrALAZINE 50 milliGRAM(s) Oral three times a day  isosorbide   mononitrate ER Tablet (IMDUR) 30 milliGRAM(s) Oral daily  metoprolol tartrate 25 milliGRAM(s) Oral every 12 hours  polyethylene glycol 3350 17 Gram(s) Oral at bedtime  risperiDONE   Tablet 1 milliGRAM(s) Oral daily  simvastatin 20 milliGRAM(s) Oral at bedtime    MEDICATIONS  (PRN):  acetaminophen     Tablet .. 650 milliGRAM(s) Oral every 6 hours PRN Temp greater or equal to 38C (100.4F), Mild Pain (1 - 3)        RADIOLOGY & ADDITIONAL TESTS:    None           Patient is a 89y old  Male who is from home and with PMH of HTN, HLD, A Fib (On Eliquis) S/P Micra PPM, CKD, HFrEF, BPH (Chronic Salmon) who presents to the ER for evaluation of fever and low blood pressure.  Patient states he was at his PCP for a routine office visit and had a Urine study was positive for an infection and his doctor adviced him to come to the hospital to get treated.  Patient states he had his salmon cath changed in the ER.  On admission, he found to have no fever but positive Urine analysis. She has started on cefepime and the ID consult requested to assist with further evaluation and antibiotic management.      REVIEW OF SYSTEMS: Total of twelve systems have been reviewed with patient and found to be negative unless mentioned in HPI      PAST MEDICAL & SURGICAL HISTORY:  Hypertension  Atrial fibrillation  BPH (benign prostatic hyperplasia)  Chronic kidney disease, unspecified CKD stage  Hyperlipidemia  S/P placement of cardiac pacemaker        SOCIAL HISTORY  Alcohol: Does not drink  Tobacco: Does not smoke  Illicit substance use: None      FAMILY HISTORY: Non contributory to the present illness      ALLERGIES: No Known Allergies      Vital Signs Last 24 Hrs  T(C): 36.7 (09 Apr 2024 05:09), Max: 36.8 (08 Apr 2024 15:51)  T(F): 98.1 (09 Apr 2024 05:09), Max: 98.2 (08 Apr 2024 15:51)  HR: 70 (09 Apr 2024 05:09) (58 - 73)  BP: 162/72 (09 Apr 2024 05:09) (143/59 - 169/81)  BP(mean): --  RR: 16 (09 Apr 2024 05:09) (16 - 19)  SpO2: 94% (09 Apr 2024 05:09) (94% - 97%)    Parameters below as of 09 Apr 2024 05:09  Patient On (Oxygen Delivery Method): room air      PHYSICAL EXAM:  GENERAL: Not in distress   CHEST/LUNG: Not using accessory muscles   HEART: s1 and s2 present  ABDOMEN:  Nontender and  Nondistended  EXTREMITIES: No pedal  edema  CNS: Awake and Alert      LABS:                        8.9    7.62  )-----------( 189      ( 09 Apr 2024 06:30 )             28.2       04-09    141  |  110<H>  |  45<H>  ----------------------------<  114<H>  4.1   |  24  |  2.01<H>    Ca    9.6      09 Apr 2024 06:30  Phos  3.3     04-09  Mg     2.4     04-09    TPro  7.3  /  Alb  3.5  /  TBili  0.3  /  DBili  x   /  AST  13  /  ALT  18  /  AlkPhos  90  04-08        MEDICATIONS  (STANDING):  allopurinol 100 milliGRAM(s) Oral daily  ALPRAZolam 0.25 milliGRAM(s) Oral daily  apixaban 2.5 milliGRAM(s) Oral two times a day  cefepime   IVPB 1000 milliGRAM(s) IV Intermittent daily  hydrALAZINE 50 milliGRAM(s) Oral three times a day  isosorbide   mononitrate ER Tablet (IMDUR) 30 milliGRAM(s) Oral daily  metoprolol tartrate 25 milliGRAM(s) Oral every 12 hours  polyethylene glycol 3350 17 Gram(s) Oral at bedtime  risperiDONE   Tablet 1 milliGRAM(s) Oral daily  simvastatin 20 milliGRAM(s) Oral at bedtime    MEDICATIONS  (PRN):  acetaminophen     Tablet .. 650 milliGRAM(s) Oral every 6 hours PRN Temp greater or equal to 38C (100.4F), Mild Pain (1 - 3)        RADIOLOGY & ADDITIONAL TESTS:    None

## 2024-04-09 NOTE — CONSULT NOTE ADULT - ASSESSMENT
Patient is a 89y old  Male who is from home and with PMH of HTN, HLD, A Fib (On Eliquis) S/P Micra PPM, CKD, HFrEF, BPH (Chronic Salmon) who presents to the ER for evaluation of fever and low blood pressure.  Patient states he was at his PCP for a routine office visit and had a Urine study was positive for an infection and his doctor adviced him to come to the hospital to get treated.  Patient states he had his salmon cath changed in the ER.  On admission, he found to have no fever but positive Urine analysis. She has started on cefepime and the ID consult requested to assist with further evaluation and antibiotic management.    # Complicated UTI - s/p exchanged Salmon catheter in ER    would recommend:    1. Follow up Urine and Blood cultures  2. Continue Cefepime until work up is done  3. Monitor kidney function and IVF    will follow the patient with you and make further recommendation based on the clinical course and Lab results  Thank you for the opportunity to participate in Ms. PEMBERTON's care    Attending Attestation:    Spent more than 65 minutes on total encounter, more than 50 % of the visit was spent counseling and/or coordinating care by the Attending physician.

## 2024-04-09 NOTE — PROGRESS NOTE ADULT - PROBLEM SELECTOR PLAN 1
- Presented with outpatient fever, and positive UA at PCP  - Hemodynamically stable and afebrile in ED  - Positive UA  - No leukocytosis  - Does not meet SIRS sepsis criteria  - Exchanged salmon cath in ED  - F/U Blood cultures   - F/U Urine cultures  - Prior urine culture from 2022 showing Pseudomonas and prior showing Klebsiella pneumoniae  - Cont Cefepime for Pseudomonal coverage given history of CAUTI  - ID Consulted Dr. Garcia

## 2024-04-09 NOTE — PROGRESS NOTE ADULT - ASSESSMENT
Patient is a 89 year old male from home, ambulates independently, with PMH of HTN, HLD, A Fib (On Eliquis) S/P Micra PPM, CKD, HFrEF, BPH (Chronic Chinchilla) who presented to the ED fever and low blood pressure.  In the ED patient hemodynamically stable and afebrile.   Patients labs show no leukocytosis with positive UA.  Patient Chinchilla cath was ex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.  Patient  admitted to medicine for Catheter associated UTI, treated with IV Cefepime.

## 2024-04-10 DIAGNOSIS — Z02.9 ENCOUNTER FOR ADMINISTRATIVE EXAMINATIONS, UNSPECIFIED: ICD-10-CM

## 2024-04-10 LAB
ANION GAP SERPL CALC-SCNC: 7 MMOL/L — SIGNIFICANT CHANGE UP (ref 5–17)
BUN SERPL-MCNC: 36 MG/DL — HIGH (ref 7–18)
CALCIUM SERPL-MCNC: 9.1 MG/DL — SIGNIFICANT CHANGE UP (ref 8.4–10.5)
CHLORIDE SERPL-SCNC: 104 MMOL/L — SIGNIFICANT CHANGE UP (ref 96–108)
CO2 SERPL-SCNC: 24 MMOL/L — SIGNIFICANT CHANGE UP (ref 22–31)
CREAT SERPL-MCNC: 1.8 MG/DL — HIGH (ref 0.5–1.3)
EGFR: 34 ML/MIN/1.73M2 — LOW
GLUCOSE SERPL-MCNC: 124 MG/DL — HIGH (ref 70–99)
HCT VFR BLD CALC: 32 % — LOW (ref 39–50)
HGB BLD-MCNC: 10.1 G/DL — LOW (ref 13–17)
MCHC RBC-ENTMCNC: 28.2 PG — SIGNIFICANT CHANGE UP (ref 27–34)
MCHC RBC-ENTMCNC: 31.6 GM/DL — LOW (ref 32–36)
MCV RBC AUTO: 89.4 FL — SIGNIFICANT CHANGE UP (ref 80–100)
NRBC # BLD: 0 /100 WBCS — SIGNIFICANT CHANGE UP (ref 0–0)
PLATELET # BLD AUTO: 229 K/UL — SIGNIFICANT CHANGE UP (ref 150–400)
POTASSIUM SERPL-MCNC: 4.2 MMOL/L — SIGNIFICANT CHANGE UP (ref 3.5–5.3)
POTASSIUM SERPL-SCNC: 4.2 MMOL/L — SIGNIFICANT CHANGE UP (ref 3.5–5.3)
RBC # BLD: 3.58 M/UL — LOW (ref 4.2–5.8)
RBC # FLD: 13.7 % — SIGNIFICANT CHANGE UP (ref 10.3–14.5)
SARS-COV-2 RNA SPEC QL NAA+PROBE: SIGNIFICANT CHANGE UP
SODIUM SERPL-SCNC: 135 MMOL/L — SIGNIFICANT CHANGE UP (ref 135–145)
WBC # BLD: 10.92 K/UL — HIGH (ref 3.8–10.5)
WBC # FLD AUTO: 10.92 K/UL — HIGH (ref 3.8–10.5)

## 2024-04-10 RX ADMIN — Medication 50 MILLIGRAM(S): at 05:33

## 2024-04-10 RX ADMIN — POLYETHYLENE GLYCOL 3350 17 GRAM(S): 17 POWDER, FOR SOLUTION ORAL at 21:13

## 2024-04-10 RX ADMIN — CEFEPIME 100 MILLIGRAM(S): 1 INJECTION, POWDER, FOR SOLUTION INTRAMUSCULAR; INTRAVENOUS at 12:19

## 2024-04-10 RX ADMIN — Medication 0.25 MILLIGRAM(S): at 12:18

## 2024-04-10 RX ADMIN — Medication 100 MILLIGRAM(S): at 12:19

## 2024-04-10 RX ADMIN — Medication 25 MILLIGRAM(S): at 05:32

## 2024-04-10 RX ADMIN — ISOSORBIDE MONONITRATE 30 MILLIGRAM(S): 60 TABLET, EXTENDED RELEASE ORAL at 12:19

## 2024-04-10 RX ADMIN — APIXABAN 2.5 MILLIGRAM(S): 2.5 TABLET, FILM COATED ORAL at 05:32

## 2024-04-10 RX ADMIN — RISPERIDONE 1 MILLIGRAM(S): 4 TABLET ORAL at 12:19

## 2024-04-10 RX ADMIN — Medication 50 MILLIGRAM(S): at 21:13

## 2024-04-10 RX ADMIN — Medication 25 MILLIGRAM(S): at 17:34

## 2024-04-10 RX ADMIN — APIXABAN 2.5 MILLIGRAM(S): 2.5 TABLET, FILM COATED ORAL at 17:35

## 2024-04-10 RX ADMIN — SIMVASTATIN 20 MILLIGRAM(S): 20 TABLET, FILM COATED ORAL at 21:13

## 2024-04-10 NOTE — PROGRESS NOTE ADULT - ASSESSMENT
Patient is a 89 year old male from home, ambulates independently, with PMH of HTN, HLD, A Fib (On Eliquis) S/P Micra PPM, CKD, HFrEF, BPH (Chronic Chinchilla) who presented to the ED fever and low blood pressure.  In the ED patient hemodynamically stable and afebrile.   Patients labs show no leukocytosis with positive UA.  Patient Chinchilla cath was ex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.  Patient  admitted to medicine for Catheter associated UTI, treated with IV Cefepime. Patient is a 89 year old male from home, ambulates independently, with PMH of HTN, HLD, A Fib (On Eliquis) S/P Micra PPM, CKD, HFrEF, BPH (Chronic Chinchilla) who presented to the ED fever and low blood pressure.  In the ED patient hemodynamically stable and afebrile.   Patients labs show no leukocytosis with positive UA.  Patient Chinchilla cath was ex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.  Patient  admitted to medicine for Catheter associated UTI, treated with IV Cefepime. Urine culture still testing.

## 2024-04-10 NOTE — PROGRESS NOTE ADULT - PROBLEM SELECTOR PLAN 1
- Presented with outpatient fever, and positive UA at PCP  - Hemodynamically stable and afebrile in ED  - Positive UA  - No leukocytosis  - Does not meet SIRS sepsis criteria  - Exchanged salmon cath in ED  - F/U Blood cultures   - F/U Urine cultures  - Prior urine culture from 2022 showing Pseudomonas and prior showing Klebsiella pneumoniae  - Cont Cefepime for Pseudomonal coverage given history of CAUTI  - ID Consulted Dr. Garcia - Presented with outpatient fever, and positive UA at PCP  - Hemodynamically stable and afebrile in ED  - Positive UA  - No leukocytosis  - Does not meet SIRS sepsis criteria  - Exchanged salmon cath in ED  - Blood cultures NTD  - F/U Urine cultures  - Prior urine culture from 2022 showing Pseudomonas and prior showing Klebsiella pneumoniae  - Cont Cefepime for Pseudomonal coverage given history of CAUTI  - ID Dr. Garcia following

## 2024-04-10 NOTE — PROGRESS NOTE ADULT - SUBJECTIVE AND OBJECTIVE BOX
NP Note discussed with  Primary Attending    Patient is a 89y old  Male who presents with a chief complaint of Catheter associated UTI (09 Apr 2024 13:03).  Seen at bedside, very anxious about his blood pressure medications, states his b/p is elevated because he has not received his meds.  French  #[id number] utilized to explain medication scheduling and elevated b/p due to anxiety.  Demonstrated to pt. how his b/p goes down when he relaxes.  Pt. is now calm with, improved b/p.      INTERVAL HPI/OVERNIGHT EVENTS: no new complaints    MEDICATIONS  (STANDING):  allopurinol 100 milliGRAM(s) Oral daily  ALPRAZolam 0.25 milliGRAM(s) Oral daily  apixaban 2.5 milliGRAM(s) Oral two times a day  cefepime   IVPB 1000 milliGRAM(s) IV Intermittent daily  hydrALAZINE 50 milliGRAM(s) Oral three times a day  isosorbide   mononitrate ER Tablet (IMDUR) 30 milliGRAM(s) Oral daily  metoprolol tartrate 25 milliGRAM(s) Oral every 12 hours  polyethylene glycol 3350 17 Gram(s) Oral at bedtime  risperiDONE   Tablet 1 milliGRAM(s) Oral daily  simvastatin 20 milliGRAM(s) Oral at bedtime    MEDICATIONS  (PRN):  acetaminophen     Tablet .. 650 milliGRAM(s) Oral every 6 hours PRN Temp greater or equal to 38C (100.4F), Mild Pain (1 - 3)      __________________________________________________  REVIEW OF SYSTEMS:    CONSTITUTIONAL: No fever,   EYES: no acute visual disturbances  NECK: No pain or stiffness  RESPIRATORY: No cough; No shortness of breath  CARDIOVASCULAR: No chest pain, no palpitations  GASTROINTESTINAL: No pain. No nausea or vomiting; No diarrhea   NEUROLOGICAL: No headache or numbness, no tremors  MUSCULOSKELETAL: No joint pain, no muscle pain  GENITOURINARY: no dysuria, no frequency, no hesitancy  PSYCHIATRY: no depression , no anxiety  ALL OTHER  ROS negative        Vital Signs Last 24 Hrs  T(C): 36.8 (10 Apr 2024 05:09), Max: 36.8 (09 Apr 2024 20:39)  T(F): 98.3 (10 Apr 2024 05:09), Max: 98.3 (09 Apr 2024 20:39)  HR: 66 (10 Apr 2024 05:09) (64 - 77)  BP: 150/71 (10 Apr 2024 05:09) (104/52 - 165/66)  BP(mean): --  RR: 16 (10 Apr 2024 05:09) (16 - 17)  SpO2: 94% (10 Apr 2024 05:09) (94% - 96%)    Parameters below as of 10 Apr 2024 05:09  Patient On (Oxygen Delivery Method): room air        ________________________________________________  PHYSICAL EXAM:  GENERAL: NAD  HEENT: Normocephalic;  conjunctivae and sclerae clear; moist mucous membranes;   NECK : supple  CHEST/LUNG: Clear to auscultation bilaterally with good air entry   HEART: S1 S2  regular; no murmurs, gallops or rubs  ABDOMEN: Soft, Nontender, Nondistended; Bowel sounds present  EXTREMITIES: no cyanosis; no edema; no calf tenderness  SKIN: warm and dry; no rash  NERVOUS SYSTEM:  Awake and alert; Oriented  to place, person and time ; no new deficits    _________________________________________________  LABS:                        10.1   10.92 )-----------( 229      ( 10 Apr 2024 07:35 )             32.0     04-10    135  |  104  |  36<H>  ----------------------------<  124<H>  4.2   |  24  |  1.80<H>    Ca    9.1      10 Apr 2024 07:35  Phos  3.3     04-09  Mg     2.4     04-09    TPro  7.3  /  Alb  3.5  /  TBili  0.3  /  DBili  x   /  AST  13  /  ALT  18  /  AlkPhos  90  04-08      Urinalysis Basic - ( 10 Apr 2024 07:35 )    Color: x / Appearance: x / SG: x / pH: x  Gluc: 124 mg/dL / Ketone: x  / Bili: x / Urobili: x   Blood: x / Protein: x / Nitrite: x   Leuk Esterase: x / RBC: x / WBC x   Sq Epi: x / Non Sq Epi: x / Bacteria: x    Urinalysis + Microscopic Examination (04.08.24 @ 18:20)    pH Urine: 6.0   Urine Appearance: Cloudy   Color: Red   Specific Gravity: 1.012   Protein, Urine: 300 mg/dL   Glucose Qualitative, Urine: Negative mg/dL   Ketone - Urine: Negative mg/dL   Blood, Urine: Large   Bilirubin: Negative   Urobilinogen: 0.2 mg/dL   Leukocyte Esterase Concentration: Large   Nitrite: Negative   White Blood Cell - Urine: Too Numerous to count /HPF   Red Blood Cell - Urine: Too Numerous to count /HPF   Bacteria: Moderate /HPF   Granular Cast: Present   Comment - Urine: many mucus strands seen      CAPILLARY BLOOD GLUCOSE    RADIOLOGY & ADDITIONAL TESTS:      Imaging Personally Reviewed:  YES/NO    Consultant(s) Notes Reviewed:   YES/ No    Care Discussed with Consultants :     Plan of care was discussed with patient and /or primary care giver; all questions and concerns were addressed and care was aligned with patient's wishes.     NP Note discussed with  Primary Attending    Patient is a 89y old  Male who presents with a chief complaint of Catheter associated UTI (09 Apr 2024 13:03).  Seen at bedside, very anxious about his blood pressure medications, states his b/p is elevated because he has not received his meds.  Guamanian  #[id number] utilized to explain medication scheduling and elevated b/p due to anxiety.  Demonstrated to pt. how his b/p goes down when he relaxes.  Pt. is now calm with, improved b/p.      INTERVAL HPI/OVERNIGHT EVENTS: no new complaints    MEDICATIONS  (STANDING):  allopurinol 100 milliGRAM(s) Oral daily  ALPRAZolam 0.25 milliGRAM(s) Oral daily  apixaban 2.5 milliGRAM(s) Oral two times a day  cefepime   IVPB 1000 milliGRAM(s) IV Intermittent daily  hydrALAZINE 50 milliGRAM(s) Oral three times a day  isosorbide   mononitrate ER Tablet (IMDUR) 30 milliGRAM(s) Oral daily  metoprolol tartrate 25 milliGRAM(s) Oral every 12 hours  polyethylene glycol 3350 17 Gram(s) Oral at bedtime  risperiDONE   Tablet 1 milliGRAM(s) Oral daily  simvastatin 20 milliGRAM(s) Oral at bedtime    MEDICATIONS  (PRN):  acetaminophen     Tablet .. 650 milliGRAM(s) Oral every 6 hours PRN Temp greater or equal to 38C (100.4F), Mild Pain (1 - 3)      __________________________________________________  REVIEW OF SYSTEMS:    CONSTITUTIONAL: No fever,   EYES: no acute visual disturbances  NECK: No pain or stiffness  RESPIRATORY: No cough; No shortness of breath  CARDIOVASCULAR: No chest pain, no palpitations  GASTROINTESTINAL: No pain. No nausea or vomiting; No diarrhea   NEUROLOGICAL: No headache or numbness, no tremors  MUSCULOSKELETAL: No joint pain, no muscle pain  GENITOURINARY: no dysuria, no frequency, no hesitancy  PSYCHIATRY: no depression , no anxiety  ALL OTHER  ROS negative        Vital Signs Last 24 Hrs  T(C): 36.8 (10 Apr 2024 05:09), Max: 36.8 (09 Apr 2024 20:39)  T(F): 98.3 (10 Apr 2024 05:09), Max: 98.3 (09 Apr 2024 20:39)  HR: 66 (10 Apr 2024 05:09) (64 - 77)  BP: 150/71 (10 Apr 2024 05:09) (104/52 - 165/66)  BP(mean): --  RR: 16 (10 Apr 2024 05:09) (16 - 17)  SpO2: 94% (10 Apr 2024 05:09) (94% - 96%)    Parameters below as of 10 Apr 2024 05:09  Patient On (Oxygen Delivery Method): room air        ________________________________________________  PHYSICAL EXAM:  Well developed, anxious at times, Guamanian speaking  GENERAL: NAD  HEENT: Normocephalic;  conjunctivae and sclerae clear; moist mucous membranes;   NECK : supple  CHEST/LUNG: Clear to auscultation bilaterally with good air entry   HEART: S1 S2  regular; no murmurs, gallops or rubs  ABDOMEN: Soft, Nontender, Nondistended; Bowel sounds present  EXTREMITIES: no cyanosis; no edema; no calf tenderness  SKIN: warm and dry; no rash  NERVOUS SYSTEM:  Awake and alert; Oriented  to place, person and time ; no new deficits  URO:  Chronic salmon, 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    _________________________________________________  LABS:                        10.1   10.92 )-----------( 229      ( 10 Apr 2024 07:35 )             32.0     04-10    135  |  104  |  36<H>  ----------------------------<  124<H>  4.2   |  24  |  1.80<H>    Ca    9.1      10 Apr 2024 07:35  Phos  3.3     04-09  Mg     2.4     04-09    TPro  7.3  /  Alb  3.5  /  TBili  0.3  /  DBili  x   /  AST  13  /  ALT  18  /  AlkPhos  90  04-08      Urinalysis Basic - ( 10 Apr 2024 07:35 )    Color: x / Appearance: x / SG: x / pH: x  Gluc: 124 mg/dL / Ketone: x  / Bili: x / Urobili: x   Blood: x / Protein: x / Nitrite: x   Leuk Esterase: x / RBC: x / WBC x   Sq Epi: x / Non Sq Epi: x / Bacteria: x    Urinalysis + Microscopic Examination (04.08.24 @ 18:20)    pH Urine: 6.0   Urine Appearance: Cloudy   Color: Red   Specific Gravity: 1.012   Protein, Urine: 300 mg/dL   Glucose Qualitative, Urine: Negative mg/dL   Ketone - Urine: Negative mg/dL   Blood, Urine: Large   Bilirubin: Negative   Urobilinogen: 0.2 mg/dL   Leukocyte Esterase Concentration: Large   Nitrite: Negative   White Blood Cell - Urine: Too Numerous to count /HPF   Red Blood Cell - Urine: Too Numerous to count /HPF   Bacteria: Moderate /HPF   Granular Cast: Present   Comment - Urine: many mucus strands seen      CAPILLARY BLOOD GLUCOSE    RADIOLOGY & ADDITIONAL TESTS:      Imaging Personally Reviewed:  YES/NO    Consultant(s) Notes Reviewed:   YES/ No    Care Discussed with Consultants :     Plan of care was discussed with patient and /or primary care giver; all questions and concerns were addressed and care was aligned with patient's wishes.

## 2024-04-10 NOTE — PROGRESS NOTE ADULT - SUBJECTIVE AND OBJECTIVE BOX
Patient is seen and examined at the bed side, is afebrile.      REVIEW OF SYSTEMS: All other review systems are negative        ALLERGIES: No Known Allergies      Vital Signs Last 24 Hrs  T(C): 36.3 (10 Apr 2024 13:10), Max: 36.8 (09 Apr 2024 20:39)  T(F): 97.4 (10 Apr 2024 13:10), Max: 98.3 (09 Apr 2024 20:39)  HR: 72 (10 Apr 2024 13:10) (64 - 77)  BP: 104/51 (10 Apr 2024 13:10) (104/51 - 150/71)  BP(mean): --  RR: 17 (10 Apr 2024 13:10) (16 - 17)  SpO2: 95% (10 Apr 2024 13:10) (94% - 95%)    Parameters below as of 10 Apr 2024 13:10  Patient On (Oxygen Delivery Method): room air        PHYSICAL EXAM:  GENERAL: Not in distress   CHEST/LUNG: Not using accessory muscles   HEART: s1 and s2 present  ABDOMEN:  Nontender and  Nondistended  EXTREMITIES: No pedal  edema  CNS: Awake and Alert      LABS:                        8.9    7.62  )-----------( 189      ( 09 Apr 2024 06:30 )             28.2       04-09    141  |  110<H>  |  45<H>  ----------------------------<  114<H>  4.1   |  24  |  2.01<H>    Ca    9.6      09 Apr 2024 06:30  Phos  3.3     04-09  Mg     2.4     04-09    TPro  7.3  /  Alb  3.5  /  TBili  0.3  /  DBili  x   /  AST  13  /  ALT  18  /  AlkPhos  90  04-08        MEDICATIONS  (STANDING):    allopurinol 100 milliGRAM(s) Oral daily  ALPRAZolam 0.25 milliGRAM(s) Oral daily  apixaban 2.5 milliGRAM(s) Oral two times a day  cefepime   IVPB 1000 milliGRAM(s) IV Intermittent daily  hydrALAZINE 50 milliGRAM(s) Oral three times a day  isosorbide   mononitrate ER Tablet (IMDUR) 30 milliGRAM(s) Oral daily  metoprolol tartrate 25 milliGRAM(s) Oral every 12 hours  polyethylene glycol 3350 17 Gram(s) Oral at bedtime  risperiDONE   Tablet 1 milliGRAM(s) Oral daily  simvastatin 20 milliGRAM(s) Oral at bedtime      RADIOLOGY & ADDITIONAL TESTS:    None             Patient is seen and examined at the bed side, is afebrile. The Blood cultures from 4/8 have NGTD but Urine cx grew GNR.       REVIEW OF SYSTEMS: All other review systems are negative      ALLERGIES: No Known Allergies      Vital Signs Last 24 Hrs  T(C): 36.3 (10 Apr 2024 13:10), Max: 36.8 (09 Apr 2024 20:39)  T(F): 97.4 (10 Apr 2024 13:10), Max: 98.3 (09 Apr 2024 20:39)  HR: 72 (10 Apr 2024 13:10) (64 - 77)  BP: 104/51 (10 Apr 2024 13:10) (104/51 - 150/71)  BP(mean): --  RR: 17 (10 Apr 2024 13:10) (16 - 17)  SpO2: 95% (10 Apr 2024 13:10) (94% - 95%)    Parameters below as of 10 Apr 2024 13:10  Patient On (Oxygen Delivery Method): room air      PHYSICAL EXAM:  GENERAL: Not in distress   CHEST/LUNG: Not using accessory muscles   HEART: s1 and s2 present  ABDOMEN:  Nontender and  Nondistended  EXTREMITIES: No pedal  edema  CNS: Awake and Alert      LABS:                        10.1   10.92 )-----------( 229      ( 10 Apr 2024 07:35 )             32.0                           8.9    7.62  )-----------( 189      ( 09 Apr 2024 06:30 )             28.2       04-10    135  |  104  |  36<H>  ----------------------------<  124<H>  4.2   |  24  |  1.80<H>    Ca    9.1      10 Apr 2024 07:35  Phos  3.3     04-09  Mg     2.4     04-09      04-09    141  |  110<H>  |  45<H>  ----------------------------<  114<H>  4.1   |  24  |  2.01<H>    Ca    9.6      09 Apr 2024 06:30  Phos  3.3     04-09  Mg     2.4     04-09    TPro  7.3  /  Alb  3.5  /  TBili  0.3  /  DBili  x   /  AST  13  /  ALT  18  /  AlkPhos  90  04-08        MEDICATIONS  (STANDING):    allopurinol 100 milliGRAM(s) Oral daily  ALPRAZolam 0.25 milliGRAM(s) Oral daily  apixaban 2.5 milliGRAM(s) Oral two times a day  cefepime   IVPB 1000 milliGRAM(s) IV Intermittent daily  hydrALAZINE 50 milliGRAM(s) Oral three times a day  isosorbide   mononitrate ER Tablet (IMDUR) 30 milliGRAM(s) Oral daily  metoprolol tartrate 25 milliGRAM(s) Oral every 12 hours  polyethylene glycol 3350 17 Gram(s) Oral at bedtime  risperiDONE   Tablet 1 milliGRAM(s) Oral daily  simvastatin 20 milliGRAM(s) Oral at bedtime      RADIOLOGY & ADDITIONAL TESTS:      Culture - Blood (04.08.24 @ 18:30)   Specimen Source: .Blood Blood-Peripheral  Culture Results:   No growth at 24 hours  Culture - Blood (04.08.24 @ 18:20)   Specimen Source: .Blood Blood-Peripheral  Culture Results:   No growth at 24 hours  Culture - Urine (04.08.24 @ 18:20)   Specimen Source: Clean Catch Clean Catch (Midstream)  Culture Results:   50,000 - 99,000 CFU/mL Gram Negative Rods  None

## 2024-04-10 NOTE — PROGRESS NOTE ADULT - PROBLEM SELECTOR PLAN 2
- History of CKD  - Baseline creatine of .19-2  - SCr on admission of 2.14  - Britney Chinchilla  - Monitor Renal function  - IV Fluid Likely due to dehydration  - History of CKD  - Baseline creatine of .19-2  - SCr on admission of 2.14-->2.01-->1.80  - Chinchilla ex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  - Monitor Renal function  - IV Fluid

## 2024-04-10 NOTE — PROGRESS NOTE ADULT - SUBJECTIVE AND OBJECTIVE BOX
Date of Service 04-10-24 @ 11:50    CHIEF COMPLAINT:Patient is a 89y old  Male who presents with a chief complaint of Catheter associated UTI.Pt appears comfortable.    	  REVIEW OF SYSTEMS:  CONSTITUTIONAL: No fever, weight loss, or fatigue  EYES: No eye pain, visual disturbances, or discharge  ENT:  No difficulty hearing, tinnitus, vertigo; No sinus or throat pain  NECK: No pain or stiffness  RESPIRATORY: No cough, wheezing, chills or hemoptysis; No Shortness of Breath  CARDIOVASCULAR: No chest pain, palpitations, passing out, dizziness, or leg swelling  GASTROINTESTINAL: No abdominal or epigastric pain. No nausea, vomiting, or hematemesis; No diarrhea or constipation. No melena or hematochezia.  GENITOURINARY: No dysuria, frequency, hematuria, or incontinence  NEUROLOGICAL: No headaches, memory loss, loss of strength, numbness, or tremors  SKIN: No itching, burning, rashes, or lesions   LYMPH Nodes: No enlarged glands  ENDOCRINE: No heat or cold intolerance; No hair loss  MUSCULOSKELETAL: No joint pain or swelling; No muscle, back, or extremity pain  PSYCHIATRIC: No depression, anxiety, mood swings, or difficulty sleeping  HEME/LYMPH: No easy bruising, or bleeding gums  ALLERGY AND IMMUNOLOGIC: No hives or eczema	      PHYSICAL EXAM:  T(C): 36.8 (04-10-24 @ 05:09), Max: 36.8 (04-09-24 @ 20:39)  HR: 66 (04-10-24 @ 05:09) (64 - 77)  BP: 150/71 (04-10-24 @ 05:09) (104/52 - 165/66)  RR: 16 (04-10-24 @ 05:09) (16 - 17)  SpO2: 94% (04-10-24 @ 05:09) (94% - 96%)  Wt(kg): --  I&O's Summary    09 Apr 2024 07:01  -  10 Apr 2024 07:00  --------------------------------------------------------  IN: 0 mL / OUT: 1200 mL / NET: -1200 mL        Appearance: Normal	  HEENT:   Normal oral mucosa, PERRL, EOMI	  Lymphatic: No lymphadenopathy  Cardiovascular: Normal S1 S2, No JVD, No murmurs, No edema  Respiratory: Lungs clear to auscultation	  Psychiatry: A & O x 3, Mood & affect appropriate  Gastrointestinal:  Soft, Non-tender, + BS	  Skin: No rashes, No ecchymoses, No cyanosis	  Neurologic: Non-focal  Extremities: Normal range of motion, No clubbing, cyanosis or edema  Vascular: Peripheral pulses palpable 2+ bilaterally    MEDICATIONS  (STANDING):  allopurinol 100 milliGRAM(s) Oral daily  ALPRAZolam 0.25 milliGRAM(s) Oral daily  apixaban 2.5 milliGRAM(s) Oral two times a day  cefepime   IVPB 1000 milliGRAM(s) IV Intermittent daily  hydrALAZINE 50 milliGRAM(s) Oral three times a day  isosorbide   mononitrate ER Tablet (IMDUR) 30 milliGRAM(s) Oral daily  metoprolol tartrate 25 milliGRAM(s) Oral every 12 hours  polyethylene glycol 3350 17 Gram(s) Oral at bedtime  risperiDONE   Tablet 1 milliGRAM(s) Oral daily  simvastatin 20 milliGRAM(s) Oral at bedtime      LABS:	 	                          10.1   10.92 )-----------( 229      ( 10 Apr 2024 07:35 )             32.0     04-10    135  |  104  |  36<H>  ----------------------------<  124<H>  4.2   |  24  |  1.80<H>    Ca    9.1      10 Apr 2024 07:35  Phos  3.3     04-09  Mg     2.4     04-09    TPro  7.3  /  Alb  3.5  /  TBili  0.3  /  DBili  x   /  AST  13  /  ALT  18  /  AlkPhos  90  04-08    Culture - Urine (04.08.24 @ 18:20)   Specimen Source: Clean Catch Clean Catch (Midstream)  Culture Results:   50,000 - 99,000 CFU/mL Gram Negative Rods    	         Date of Service 04-10-24 @ 11:50    CHIEF COMPLAINT:Patient is a 89y old  Male who presents with a chief complaint of Catheter associated UTI.Pt appears comfortable.    	  REVIEW OF SYSTEMS:  CONSTITUTIONAL: No fever, weight loss, or fatigue  EYES: No eye pain, visual disturbances, or discharge  ENT:  No difficulty hearing, tinnitus, vertigo; No sinus or throat pain  NECK: No pain or stiffness  RESPIRATORY: No cough, wheezing, chills or hemoptysis; No Shortness of Breath  CARDIOVASCULAR: No chest pain, palpitations, passing out, dizziness, or leg swelling  GASTROINTESTINAL: No abdominal or epigastric pain. No nausea, vomiting, or hematemesis; No diarrhea or constipation. No melena or hematochezia.  GENITOURINARY: No dysuria, frequency, hematuria, or incontinence  NEUROLOGICAL: No headaches, memory loss, loss of strength, numbness, or tremors  SKIN: No itching, burning, rashes, or lesions   LYMPH Nodes: No enlarged glands  ENDOCRINE: No heat or cold intolerance; No hair loss  MUSCULOSKELETAL: No joint pain or swelling; No muscle, back, or extremity pain  PSYCHIATRIC: No depression, anxiety, mood swings, or difficulty sleeping  HEME/LYMPH: No easy bruising, or bleeding gums  ALLERGY AND IMMUNOLOGIC: No hives or eczema	      PHYSICAL EXAM:  T(C): 36.8 (04-10-24 @ 05:09), Max: 36.8 (04-09-24 @ 20:39)  HR: 66 (04-10-24 @ 05:09) (64 - 77)  BP: 150/71 (04-10-24 @ 05:09) (104/52 - 165/66)  RR: 16 (04-10-24 @ 05:09) (16 - 17)  SpO2: 94% (04-10-24 @ 05:09) (94% - 96%)  Wt(kg): --  I&O's Summary    09 Apr 2024 07:01  -  10 Apr 2024 07:00  --------------------------------------------------------  IN: 0 mL / OUT: 1200 mL / NET: -1200 mL        Appearance: Normal	  HEENT:   Normal oral mucosa, PERRL, EOMI	  Lymphatic: No lymphadenopathy  Cardiovascular: Normal S1 S2, No JVD, No murmurs, No edema  Respiratory: Lungs clear to auscultation	  Psychiatry: A & O x 3, Mood & affect appropriate  Gastrointestinal:  Soft, Non-tender, + BS	  Skin: No rashes, No ecchymoses, No cyanosis	  Neurologic: Non-focal  Extremities: Normal range of motion, No clubbing, cyanosis or edema  Vascular: Peripheral pulses palpable 2+ bilaterally    MEDICATIONS  (STANDING):  allopurinol 100 milliGRAM(s) Oral daily  ALPRAZolam 0.25 milliGRAM(s) Oral daily  apixaban 2.5 milliGRAM(s) Oral two times a day  cefepime   IVPB 1000 milliGRAM(s) IV Intermittent daily  hydrALAZINE 50 milliGRAM(s) Oral three times a day  isosorbide   mononitrate ER Tablet (IMDUR) 30 milliGRAM(s) Oral daily  metoprolol tartrate 25 milliGRAM(s) Oral every 12 hours  polyethylene glycol 3350 17 Gram(s) Oral at bedtime  risperiDONE   Tablet 1 milliGRAM(s) Oral daily  simvastatin 20 milliGRAM(s) Oral at bedtime      LABS:	 	                          10.1   10.92 )-----------( 229      ( 10 Apr 2024 07:35 )             32.0     04-10    135  |  104  |  36<H>  ----------------------------<  124<H>  4.2   |  24  |  1.80<H>    Ca    9.1      10 Apr 2024 07:35  Phos  3.3     04-09  Mg     2.4     04-09    TPro  7.3  /  Alb  3.5  /  TBili  0.3  /  DBili  x   /  AST  13  /  ALT  18  /  AlkPhos  90  04-08    Culture - Urine (04.08.24 @ 18:20)   Specimen Source: Clean Catch Clean Catch (Midstream)  Culture Results:   50,000 - 99,000 CFU/mL Gram Negative Rods    	      Blood cx-.

## 2024-04-10 NOTE — PROGRESS NOTE ADULT - ASSESSMENT
Patient is a 89y old  Male who is from home and with PMH of HTN, HLD, A Fib (On Eliquis) S/P Micra PPM, CKD, HFrEF, BPH (Chronic Salmon) who presents to the ER for evaluation of fever and low blood pressure.  Patient states he was at his PCP for a routine office visit and had a Urine study was positive for an infection and his doctor adviced him to come to the hospital to get treated.  Patient states he had his salmon cath changed in the ER.  On admission, he found to have no fever but positive Urine analysis. She has started on cefepime and the ID consult requested to assist with further evaluation and antibiotic management.    # Complicated UTI - s/p exchanged Salmon catheter in ER    would recommend:    1. Follow up Urine and Blood cultures  2. Continue Cefepime until work up is done  3. Monitor kidney function and IVF    Attending Attestation:    Spent more than 45 minutes on total encounter, more than 50 % of the visit was spent counseling and/or coordinating care by the Attending physician.         Patient is a 89y old  Male who is from home and with PMH of HTN, HLD, A Fib (On Eliquis) S/P Micra PPM, CKD, HFrEF, BPH (Chronic Salmon) who presents to the ER for evaluation of fever and low blood pressure.  Patient states he was at his PCP for a routine office visit and had a Urine study was positive for an infection and his doctor adviced him to come to the hospital to get treated.  Patient states he had his salmon cath changed in the ER.  On admission, he found to have no fever but positive Urine analysis. She has started on cefepime and the ID consult requested to assist with further evaluation and antibiotic management.    # Complicated UTI - s/p exchanged Salmon catheter in ER- UCX grew GNR    would recommend:    1. Follow up final Urine culture for ID and sensitivities   2. Continue Cefepime until work up is done  3. Monitor kidney function and IVF    Attending Attestation:    Spent more than 45 minutes on total encounter, more than 50 % of the visit was spent counseling and/or coordinating care by the Attending physician.

## 2024-04-10 NOTE — PROGRESS NOTE ADULT - ASSESSMENT
89 year old male from home with daughter, with PMH of HTN, HLD,Chronic  A Fib (On Eliquis) S/P Micra PPM, CKD, HFrEF, BPH (Chronic Chinchilla) who presented to the ED fever and low blood pressure,complicated UTI.  1.Complicated UTI-ABX as per ID.Chinchilla was changed in ER.  2.Chronixc afib-eliquis,lopressor.  3.HTN-cont bp medication.  4.CRI-f/u lytes.  5.Lipid d/o-statin.  6.PPI.

## 2024-04-11 ENCOUNTER — TRANSCRIPTION ENCOUNTER (OUTPATIENT)
Age: 89
End: 2024-04-11

## 2024-04-11 RX ADMIN — ISOSORBIDE MONONITRATE 30 MILLIGRAM(S): 60 TABLET, EXTENDED RELEASE ORAL at 11:17

## 2024-04-11 RX ADMIN — SIMVASTATIN 20 MILLIGRAM(S): 20 TABLET, FILM COATED ORAL at 21:58

## 2024-04-11 RX ADMIN — POLYETHYLENE GLYCOL 3350 17 GRAM(S): 17 POWDER, FOR SOLUTION ORAL at 21:58

## 2024-04-11 RX ADMIN — Medication 50 MILLIGRAM(S): at 21:58

## 2024-04-11 RX ADMIN — Medication 25 MILLIGRAM(S): at 17:00

## 2024-04-11 RX ADMIN — CEFEPIME 100 MILLIGRAM(S): 1 INJECTION, POWDER, FOR SOLUTION INTRAMUSCULAR; INTRAVENOUS at 11:17

## 2024-04-11 RX ADMIN — Medication 50 MILLIGRAM(S): at 05:50

## 2024-04-11 RX ADMIN — RISPERIDONE 1 MILLIGRAM(S): 4 TABLET ORAL at 11:17

## 2024-04-11 RX ADMIN — Medication 0.25 MILLIGRAM(S): at 11:18

## 2024-04-11 RX ADMIN — Medication 25 MILLIGRAM(S): at 05:51

## 2024-04-11 RX ADMIN — APIXABAN 2.5 MILLIGRAM(S): 2.5 TABLET, FILM COATED ORAL at 17:00

## 2024-04-11 RX ADMIN — Medication 100 MILLIGRAM(S): at 11:17

## 2024-04-11 RX ADMIN — APIXABAN 2.5 MILLIGRAM(S): 2.5 TABLET, FILM COATED ORAL at 05:50

## 2024-04-11 NOTE — DISCHARGE NOTE PROVIDER - NSDCMRMEDTOKEN_GEN_ALL_CORE_FT
allopurinol 100 mg oral tablet: 1 tab(s) orally once a day  Eliquis 2.5 mg oral tablet: 1 tab(s) orally 2 times a day  hydrALAZINE 50 mg oral tablet: 1 tab(s) orally 3 times a day  ID follow up with Dr. Gurjit Mahan via Telehealth [phone] and Fax [phone]: ID follow up with Dr. Gurjit Mahan via Telehealth [phone] and Fax [phone]  isosorbide mononitrate 30 mg oral tablet, extended release: 1 tab(s) orally once a day  metoprolol tartrate 25 mg oral tablet: 1 tab(s) orally every 12 hours  polyethylene glycol 3350 oral powder for reconstitution: 17 gram(s) orally once a day (at bedtime)  risperiDONE 1 mg oral tablet: 1 tab(s) orally once a day  simvastatin 20 mg oral tablet: 1 tab(s) orally once a day (at bedtime)  Xanax 0.25 mg oral tablet: 1 tab(s) orally once a day   acetaminophen 325 mg oral tablet: 2 tab(s) orally every 6 hours As needed Temp greater or equal to 38C (100.4F), Mild Pain (1 - 3)  allopurinol 100 mg oral tablet: 1 tab(s) orally once a day  ALPRAZolam 0.25 mg oral tablet: 1 tab(s) orally once a day  apixaban 2.5 mg oral tablet: 1 tab(s) orally 2 times a day  Cipro 500 mg oral tablet: 1 tab(s) orally once a day  hydrALAZINE 50 mg oral tablet: 1 tab(s) orally 3 times a day  isosorbide mononitrate 30 mg oral tablet, extended release: 1 tab(s) orally once a day  metoprolol tartrate 25 mg oral tablet: 1 tab(s) orally every 12 hours  polyethylene glycol 3350 oral powder for reconstitution: 17 gram(s) orally once a day  risperiDONE 1 mg oral tablet: 1 tab(s) orally once a day  simvastatin 20 mg oral tablet: 1 tab(s) orally once a day (at bedtime)

## 2024-04-11 NOTE — PROGRESS NOTE ADULT - SUBJECTIVE AND OBJECTIVE BOX
Date of Service 04-11-24 @ 12:32    CHIEF COMPLAINT:Patient is a 89y old  Male who presents with a chief complaint of Catheter associated UTI.Pt appears comfortable.    	  REVIEW OF SYSTEMS:  CONSTITUTIONAL: No fever, weight loss, or fatigue  EYES: No eye pain, visual disturbances, or discharge  ENT:  No difficulty hearing, tinnitus, vertigo; No sinus or throat pain  NECK: No pain or stiffness  RESPIRATORY: No cough, wheezing, chills or hemoptysis; No Shortness of Breath  CARDIOVASCULAR: No chest pain, palpitations, passing out, dizziness, or leg swelling  GASTROINTESTINAL: No abdominal or epigastric pain. No nausea, vomiting, or hematemesis; No diarrhea or constipation. No melena or hematochezia.  GENITOURINARY: No dysuria, frequency, hematuria, or incontinence  NEUROLOGICAL: No headaches, memory loss, loss of strength, numbness, or tremors  SKIN: No itching, burning, rashes, or lesions   LYMPH Nodes: No enlarged glands  ENDOCRINE: No heat or cold intolerance; No hair loss  MUSCULOSKELETAL: No joint pain or swelling; No muscle, back, or extremity pain  PSYCHIATRIC: No depression, anxiety, mood swings, or difficulty sleeping  HEME/LYMPH: No easy bruising, or bleeding gums  ALLERGY AND IMMUNOLOGIC: No hives or eczema	        PHYSICAL EXAM:  T(C): 36.6 (04-11-24 @ 05:34), Max: 36.6 (04-10-24 @ 20:47)  HR: 65 (04-11-24 @ 05:34) (65 - 72)  BP: 143/65 (04-11-24 @ 05:34) (104/51 - 143/65)  RR: 18 (04-11-24 @ 05:34) (17 - 18)  SpO2: 94% (04-11-24 @ 05:34) (94% - 95%)  Wt(kg): --  I&O's Summary    10 Apr 2024 07:01  -  11 Apr 2024 07:00  --------------------------------------------------------  IN: 0 mL / OUT: 2700 mL / NET: -2700 mL        Appearance: Normal	  HEENT:   Normal oral mucosa, PERRL, EOMI	  Lymphatic: No lymphadenopathy  Cardiovascular: Normal S1 S2, No JVD, No murmurs, No edema  Respiratory: Lungs clear to auscultation	  Psychiatry: A & O x 3, Mood & affect appropriate  Gastrointestinal:  Soft, Non-tender, + BS	  Skin: No rashes, No ecchymoses, No cyanosis	  Neurologic: Non-focal  Extremities: Normal range of motion, No clubbing, cyanosis or edema  Vascular: Peripheral pulses palpable 2+ bilaterally    MEDICATIONS  (STANDING):  allopurinol 100 milliGRAM(s) Oral daily  ALPRAZolam 0.25 milliGRAM(s) Oral daily  apixaban 2.5 milliGRAM(s) Oral two times a day  cefepime   IVPB 1000 milliGRAM(s) IV Intermittent daily  hydrALAZINE 50 milliGRAM(s) Oral three times a day  isosorbide   mononitrate ER Tablet (IMDUR) 30 milliGRAM(s) Oral daily  metoprolol tartrate 25 milliGRAM(s) Oral every 12 hours  polyethylene glycol 3350 17 Gram(s) Oral at bedtime  risperiDONE   Tablet 1 milliGRAM(s) Oral daily  simvastatin 20 milliGRAM(s) Oral at bedtime        LABS:	 	                         10.1   10.92 )-----------( 229      ( 10 Apr 2024 07:35 )             32.0     04-10    135  |  104  |  36<H>  ----------------------------<  124<H>  4.2   |  24  |  1.80<H>    Ca    9.1      10 Apr 2024 07:35      Culture - Urine (04.08.24 @ 18:20)   Specimen Source: Clean Catch Clean Catch (Midstream)  Culture Results:   50,000 - 99,000 CFU/mL Pseudomonas aeruginosa

## 2024-04-11 NOTE — PROGRESS NOTE ADULT - SUBJECTIVE AND OBJECTIVE BOX
Patient is seen and examined at the bed side, is afebrile. The Urine culture grew Pseudomonas, sensitivities is pending.      REVIEW OF SYSTEMS: All other review systems are negative      ALLERGIES: No Known Allergies      Vital Signs Last 24 Hrs  T(C): 36.7 (11 Apr 2024 13:15), Max: 36.7 (11 Apr 2024 13:15)  T(F): 98.1 (11 Apr 2024 13:15), Max: 98.1 (11 Apr 2024 13:15)  HR: 69 (11 Apr 2024 13:15) (65 - 69)  BP: 104/52 (11 Apr 2024 13:15) (104/52 - 143/65)  BP(mean): --  RR: 18 (11 Apr 2024 13:15) (17 - 18)  SpO2: 95% (11 Apr 2024 13:15) (94% - 95%)    Parameters below as of 11 Apr 2024 13:15  Patient On (Oxygen Delivery Method): room air        PHYSICAL EXAM:  GENERAL: Not in distress   CHEST/LUNG: Not using accessory muscles   HEART: s1 and s2 present  ABDOMEN:  Nontender and  Nondistended  EXTREMITIES: No pedal  edema  CNS: Awake and Alert      LABS:                        10.1   10.92 )-----------( 229      ( 10 Apr 2024 07:35 )             32.0                           10.1   10.92 )-----------( 229      ( 10 Apr 2024 07:35 )             32.0                  04-10    135  |  104  |  36<H>  ----------------------------<  124<H>  4.2   |  24  |  1.80<H>    Ca    9.1      10 Apr 2024 07:35      TPro  7.3  /  Alb  3.5  /  TBili  0.3  /  DBili  x   /  AST  13  /  ALT  18  /  AlkPhos  90  04-08        MEDICATIONS  (STANDING):    allopurinol 100 milliGRAM(s) Oral daily  ALPRAZolam 0.25 milliGRAM(s) Oral daily  apixaban 2.5 milliGRAM(s) Oral two times a day  cefepime   IVPB 1000 milliGRAM(s) IV Intermittent daily  hydrALAZINE 50 milliGRAM(s) Oral three times a day  isosorbide   mononitrate ER Tablet (IMDUR) 30 milliGRAM(s) Oral daily  metoprolol tartrate 25 milliGRAM(s) Oral every 12 hours  polyethylene glycol 3350 17 Gram(s) Oral at bedtime  risperiDONE   Tablet 1 milliGRAM(s) Oral daily  simvastatin 20 milliGRAM(s) Oral at bedtime      RADIOLOGY & ADDITIONAL TESTS:      Culture - Blood (04.08.24 @ 18:30)   Specimen Source: .Blood Blood-Peripheral  Culture Results:   No growth at 24 hours  Culture - Blood (04.08.24 @ 18:20)   Specimen Source: .Blood Blood-Peripheral  Culture Results:   No growth at 24 hours  Culture - Urine (04.08.24 @ 18:20)   Specimen Source: Clean Catch Clean Catch (Midstream)  Culture Results:   50,000 - 99,000 CFU/mL Gram Negative Rods  None

## 2024-04-11 NOTE — PROGRESS NOTE ADULT - PROBLEM SELECTOR PLAN 2
Likely due to dehydration  - History of CKD  - Baseline creatine of .19-2  - SCr on admission of 2.14-->2.01-->1.80  - Chinchilla ex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  IMPROVING

## 2024-04-11 NOTE — PROGRESS NOTE ADULT - ASSESSMENT
Patient is a 89y old  Male who is from home and with PMH of HTN, HLD, A Fib (On Eliquis) S/P Micra PPM, CKD, HFrEF, BPH (Chronic Salmon) who presents to the ER for evaluation of fever and low blood pressure.  Patient states he was at his PCP for a routine office visit and had a Urine study was positive for an infection and his doctor adviced him to come to the hospital to get treated.  Patient states he had his salmon cath changed in the ER.  On admission, he found to have no fever but positive Urine analysis. She has started on cefepime and the ID consult requested to assist with further evaluation and antibiotic management.    # Complicated UTI - s/p exchanged Salmon catheter in ER- UCX grew Pseudomona    would recommend:    1. Follow up final Urine culture for sensitivities of Pseudomonas  2. Adjust Cefepime doses based on the Crcl  3. Monitor kidney function and IVF  4. Need at least 7 days of Abx    Attending Attestation:    Spent more than 35 minutes on total encounter, more than 50 % of the visit was spent counseling and/or coordinating care by the Attending physician.

## 2024-04-11 NOTE — DISCHARGE NOTE PROVIDER - NSDCCPCAREPLAN_GEN_ALL_CORE_FT
PRINCIPAL DISCHARGE DIAGNOSIS  Diagnosis: Acute UTI  Assessment and Plan of Treatment: You presented to the hospital with fever and were found to have UTI.  Your salmon catherter was changed on admission.  You were treated with IV antibitoics and your uirine cutlure grew*_*_*  You will need to take *_*_*_*  Please follow up with your PCP within 1-2 weeks for follow-up.        SECONDARY DISCHARGE DIAGNOSES  Diagnosis: Acute kidney injury superimposed on CKD  Assessment and Plan of Treatment: Your kidney function was elevated more than your baseline likley due to UTI, you were given IV fluids   your kidney function improved and remained stable at your baseline  while your kidneys are healing you should avoid agents that can cause kidney injury.  Some of these agents include NSAIDs, Gadolinium contrast, and Phosphate containing enemas.      Diagnosis: Heart failure with mildly reduced ejection fraction (HFmrEF)  Assessment and Plan of Treatment: Continue to take your home medications    Diagnosis: HTN (hypertension)  Assessment and Plan of Treatment: Low salt diet  Activity as tolerated.  Take all medication as prescribed.  Follow up with your medical doctor for routine blood pressure monitoring at your next visit.  Notify your doctor if you have any of the following symptoms:   Dizziness, Lightheadedness, Blurry vision, Headache, Chest pain, Shortness of breath      Diagnosis: Atrial fibrillation  Assessment and Plan of Treatment: Atrial fibrillation is the most common heart rhythm problem & has the risk of stroke & heart attack  It helps if you control your blood pressure, not drink more than 1-2 alcohol drinks per day, cut down on caffeine, getting treatment for over active thyroid gland, & getting exercise  Call your doctor if you feel your heart racing or beating unusually, chest tightness or pain, lightheaded, faint, shortness of breath especially with exercise  It is important to take your heart medication as prescribed  You may be on anticoagulation which is very important to take as directed - you may need blood work to monitor drug levels       PRINCIPAL DISCHARGE DIAGNOSIS  Diagnosis: Complicated UTI (urinary tract infection)  Assessment and Plan of Treatment: You were found to have a multi drug resistant UTI, you were followed by the infectious disease specialist and treated with intravenous antibiotics. You are reccomended to continue Cipro 500mg daily to complete 7 day course (3 more days)      SECONDARY DISCHARGE DIAGNOSES  Diagnosis: Atrial fibrillation  Assessment and Plan of Treatment: Atrial fibrillation is the most common heart rhythm problem & has the risk of stroke & heart attack  It helps if you control your blood pressure, not drink more than 1-2 alcohol drinks per day, cut down on caffeine, getting treatment for over active thyroid gland, & getting exercise  Call your doctor if you feel your heart racing or beating unusually, chest tightness or pain, lightheaded, faint, shortness of breath especially with exercise  It is important to take your heart medication as prescribed  You may be on anticoagulation which is very important to take as directed - you may need blood work to monitor drug levels      Diagnosis: Heart failure with mildly reduced ejection fraction (HFmrEF)  Assessment and Plan of Treatment: Continue to take your home medications    Diagnosis: HTN (hypertension)  Assessment and Plan of Treatment: Low salt diet  Activity as tolerated.  Take all medication as prescribed.  Follow up with your medical doctor for routine blood pressure monitoring at your next visit.  Notify your doctor if you have any of the following symptoms:   Dizziness, Lightheadedness, Blurry vision, Headache, Chest pain, Shortness of breath      Diagnosis: Acute kidney injury superimposed on CKD  Assessment and Plan of Treatment: Your kidney function was elevated more than your baseline likley due to UTI, you were given IV fluids   your kidney function improved and remained stable at your baseline  while your kidneys are healing you should avoid agents that can cause kidney injury.  Some of these agents include NSAIDs, Gadolinium contrast, and Phosphate containing enemas.      Diagnosis: Obstructive uropathy  Assessment and Plan of Treatment: You have a chronic salmon, this was changed on admission

## 2024-04-11 NOTE — DISCHARGE NOTE PROVIDER - HOSPITAL COURSE
89 year old male from home, ambulates independently, with PMH of HTN, HLD, A Fib (On Eliquis) S/P Micra PPM, CKD, HFrEF, BPH (Chronic Chinchilla) who presented to the ED fever and low blood pressure.  In the ED patient hemodynamically stable and afebrile, positive UA.  Patient Chinchilla cath was ex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.  Admitted to medicine for Catheter associated UTI, treated with IV Cefepime. Urine culture grew *_*_*_*_*        Medically optimized for discharge   Discharge discussed with attending  Note this is just a brief for full four course please refer to daily progress and consult notes.  89 year old male from home, ambulates independently, with PMH of HTN, HLD, A Fib (On Eliquis) S/P Micra PPM, CKD, HFrEF, BPH (Chronic Chinchilla) who presented to the ED fever and low blood pressure.  In the ED patient hemodynamically stable and afebrile, positive UA.  Patient Chinchilla cath was ex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.  Admitted to medicine for Catheter associated UTI, treated with IV Cefepime. Urine culture grew PSEUDAMONAS, sensitivies  noted. Abx changed to cefepime to complete 7 day course     Medically optimized for discharge   Discharge discussed with attending  Note this is just a brief for full four course please refer to daily progress and consult notes.

## 2024-04-11 NOTE — PROGRESS NOTE ADULT - ASSESSMENT
89 year old male from home, ambulates independently, with PMH of HTN, HLD, A Fib (On Eliquis) S/P Micra PPM, CKD, HFrEF, BPH (Chronic Chinchilla) who presented to the ED fever and low blood pressure.  In the ED patient hemodynamically stable and afebrile, positive UA.  Patient Chinchilla cath was ex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.  Admitted to medicine for Catheter associated UTI, treated with IV Cefepime. Urine culture still testing.

## 2024-04-11 NOTE — CONSULT NOTE ADULT - SUBJECTIVE AND OBJECTIVE BOX
Chief complain/HPI  Patient is a 89 year old male from home with daughter, with PMH of HTN, HLD, A Fib (On Eliquis) S/P Micra PPM, CKD, HFrEF, BPH (Chronic Salmon, OBSTRUCTIVE UROPATHY) who presented to the ED fever and low blood pressure.  Patient states he was at his PCP for a routine office visit and had a Urine study done when he told his doctor that he had fevers.  His urine study resulted as positive for an infection and his doctor adviced him to come to the hospital to get treated.  patient states he does not currently had a fever and is feeling well.  patient states his doctor also mentioned his blood pressure was lower than normal.  Multiple attmepts made to talk with patients daughter over the phone overnight however unable to reach her ([phone], Della Martino).  Patient states he had his salmon cath changed in the ED.  Patient denies nausea, vomiting, headache, blurry vision, dizziness, chest pain, abdominal pain, constipation, diarrhea, leg swelling.   Culture - Urine (04.08.24 @ 18:20) Specimen Source: Clean Catch Clean Catch (Midstream)  50,000 - 99,000 CFU/mL, Organism: Pseudomonas aeruginosa        Home Medications:   * Incomplete Medication History as of 09-Apr-2024 00:23 documented in Structured Notes  · 	ID follow up with Dr. Gurjit Mahan via [facility] [phone] and Fax [phone]: Last Dose Taken:  , ID follow up with Dr. Gurjit Mahan via [facility] [phone] and Fax [phone]  · 	metoprolol tartrate 25 mg oral tablet: Last Dose Taken:  , 1 tab(s) orally every 12 hours  · 	isosorbide mononitrate 30 mg oral tablet, extended release: Last Dose Taken:  , 1 tab(s) orally once a day  · 	polyethylene glycol 3350 oral powder for reconstitution: Last Dose Taken:  , 17 gram(s) orally once a day (at bedtime)  · 	Xanax 0.25 mg oral tablet: Last Dose Taken:  , 1 tab(s) orally once a day  · 	simvastatin 20 mg oral tablet: Last Dose Taken:  , 1 tab(s) orally once a day (at bedtime)  · 	risperiDONE 1 mg oral tablet: Last Dose Taken:  , 1 tab(s) orally once a day  · 	Eliquis 2.5 mg oral tablet: Last Dose Taken:  , 1 tab(s) orally 2 times a day  · 	allopurinol 100 mg oral tablet: Last Dose Taken:  , 1 tab(s) orally once a day  · 	hydrALAZINE 50 mg oral tablet: Last Dose Taken:  , 1 tab(s) orally 3 times a day          PAST MEDICAL & SURGICAL HISTORY:  Hypertension      Atrial fibrillation      BPH (benign prostatic hyperplasia)      Chronic kidney disease, unspecified CKD stage      Hyperlipidemia      S/P placement of cardiac pacemaker          Home Medications Reviewed    Hospital Medications:   MEDICATIONS  (STANDING):  allopurinol 100 milliGRAM(s) Oral daily  ALPRAZolam 0.25 milliGRAM(s) Oral daily  apixaban 2.5 milliGRAM(s) Oral two times a day  cefepime   IVPB 1000 milliGRAM(s) IV Intermittent daily  hydrALAZINE 50 milliGRAM(s) Oral three times a day  isosorbide   mononitrate ER Tablet (IMDUR) 30 milliGRAM(s) Oral daily  metoprolol tartrate 25 milliGRAM(s) Oral every 12 hours  polyethylene glycol 3350 17 Gram(s) Oral at bedtime  risperiDONE   Tablet 1 milliGRAM(s) Oral daily  simvastatin 20 milliGRAM(s) Oral at bedtime    MEDICATIONS  (PRN):  acetaminophen     Tablet .. 650 milliGRAM(s) Oral every 6 hours PRN Temp greater or equal to 38C (100.4F), Mild Pain (1 - 3)      Allergies    No Known Allergies    Intolerances                              10.1   10.92 )-----------( 229      ( 10 Apr 2024 07:35 )             32.0     04-10    135  |  104  |  36<H>  ----------------------------<  124<H>  4.2   |  24  |  1.80<H>    Ca    9.1      10 Apr 2024 07:35        Culture - Urine (04.08.24 @ 18:20) Specimen Source: Clean Catch Clean Catch (Midstream)  50,000 - 99,000 CFU/mL, Organism: Pseudomonas aeruginosa.      pH Urine: 6.0  Urine Appearance: Cloudy  Color: Red  Specific Gravity: 1.012  Protein, Urine: 300 mg/dL  Glucose Qualitative, Urine: Negative mg/dL  Ketone - Urine: Negative mg/dL  Blood, Urine: Large  Bilirubin: Negative  Urobilinogen: 0.2 mg/dL  Leukocyte Esterase Concentration: Large  Nitrite: Negative  White Blood Cell - Urine: Too Numerous to count /HPF  Red Blood Cell - Urine: Too Numerous to count /HPF  Bacteria: Moderate /HPF  Granular Cast: Present  Comment - Urine: many mucus strands seen  Urine Microscopic-Add On (NC) (08.17.23 @ 08:04)   Squamous Epithelial Cells: Present  Bacteria: Many /HPF  Red Blood Cell - Urine: >50 /HPF  White Blood Cell - Urine: >50 /HPF            RADIOLOGY & ADDITIONAL STUDIES:          REVIEW OF SYSTEMS:  CONSTITUTIONAL: No malaise, No fatigue, No fevers or chills, well developed, no diaphoresis    RESPIRATORY: No cough, wheezing, hemoptysis; No shortness of breath  CARDIOVASCULAR: No chest pain or palpitations. No edema  GASTROINTESTINAL: No abdominal or epigastric pain. No nausea, vomiting, or hematemesis; No diarrhea or constipation. No melena or hematochezia.  GENITOURINARY: No dysuria, frequency, foamy urine, urinary urgency, incontinence or hematuria  NEUROLOGICAL: No numbness or weakness, No tremor  SKIN: No itching, burning, rashes, or lesions   VASCULAR: No claudication  Musculoskeletal: no arthralgia, no myalgia  All other review of systems is negative unless indicated above.    VITALS:  Vital Signs Last 24 Hrs  T(C): 36.6 (11 Apr 2024 05:34), Max: 36.6 (10 Apr 2024 20:47)  T(F): 97.9 (11 Apr 2024 05:34), Max: 97.9 (10 Apr 2024 20:47)  HR: 65 (11 Apr 2024 05:34) (65 - 72)  BP: 143/65 (11 Apr 2024 05:34) (104/51 - 143/65)  BP(mean): --  RR: 18 (11 Apr 2024 05:34) (17 - 18)  SpO2: 94% (11 Apr 2024 05:34) (94% - 95%)    Parameters below as of 11 Apr 2024 05:34  Patient On (Oxygen Delivery Method): room air        04-10 @ 07:01  -  04-11 @ 07:00  --------------------------------------------------------  IN: 0 mL / OUT: 2700 mL / NET: -2700 mL          PHYSICAL EXAM:  Constitutional: NAD  HEENT: anicteric sclera, oropharynx clear, MMM  Neck: No JVD  Respiratory: good air entrance B/L, no wheezes, rales or rhonchi  Cardiovascular: S1, S2, RRR, no pericardial rub, no murmur  Gastrointestinal: BS+, soft, no tenderness, no distension, no bruit  Pelvis: bladder non-distended, no CVA tenderness  Extremities: No cyanosis or clubbing. No peripheral edema  Neurological: A/O x 3, no focal deficits  Vascular: all pulses present                       Chief complain/HPI  Patient is a 89 year old male from home with daughter, with PMH of HTN, HLD, A Fib (On Eliquis) S/P Micra PPM, CKD, HFrEF, BPH (Chronic Salmon, OBSTRUCTIVE UROPATHY) who presented to the ED fever and low blood pressure.  Patient states he was at his PCP for a routine office visit and had a Urine study done when he told his doctor that he had fevers.  His urine study resulted as positive for an infection and his doctor adviced him to come to the hospital to get treated.  patient states he does not currently had a fever and is feeling well.  patient states his doctor also mentioned his blood pressure was lower than normal. .  Patient states he had his salmon cath changed in the ED.  Patient denies nausea, vomiting, headache, blurry vision, dizziness, chest pain, abdominal pain, constipation, diarrhea, leg swelling.   Culture - Urine (04.08.24 @ 18:20) Specimen Source: Clean Catch Clean Catch (Midstream)  50,000 - 99,000 CFU/mL, Organism: Pseudomonas aeruginosa. Placed on Cefepime yesterday 1 gm per day.  At present he feels well no c/o dysuria  Appetite is good           Home Medications:   * Incomplete Medication History as of 09-Apr-2024 00:23 documented in Structured Notes  · 	ID follow up with Dr. Gurjit Mahan via [facility] [phone] and Fax [phone]: Last Dose Taken:  , ID follow up with Dr. Gurjit Mahan via [facility] [phone] and Fax [phone]  · 	metoprolol tartrate 25 mg oral tablet: Last Dose Taken:  , 1 tab(s) orally every 12 hours  · 	isosorbide mononitrate 30 mg oral tablet, extended release: Last Dose Taken:  , 1 tab(s) orally once a day  · 	polyethylene glycol 3350 oral powder for reconstitution: Last Dose Taken:  , 17 gram(s) orally once a day (at bedtime)  · 	Xanax 0.25 mg oral tablet: Last Dose Taken:  , 1 tab(s) orally once a day  · 	simvastatin 20 mg oral tablet: Last Dose Taken:  , 1 tab(s) orally once a day (at bedtime)  · 	risperiDONE 1 mg oral tablet: Last Dose Taken:  , 1 tab(s) orally once a day  · 	Eliquis 2.5 mg oral tablet: Last Dose Taken:  , 1 tab(s) orally 2 times a day  · 	allopurinol 100 mg oral tablet: Last Dose Taken:  , 1 tab(s) orally once a day  · 	hydrALAZINE 50 mg oral tablet: Last Dose Taken:  , 1 tab(s) orally 3 times a day          PAST MEDICAL & SURGICAL HISTORY:  Hypertension      Atrial fibrillation      BPH (benign prostatic hyperplasia)      Chronic kidney disease, unspecified CKD stage      Hyperlipidemia      S/P placement of cardiac pacemaker          Home Medications Reviewed    Hospital Medications:   MEDICATIONS  (STANDING):  allopurinol 100 milliGRAM(s) Oral daily  ALPRAZolam 0.25 milliGRAM(s) Oral daily  apixaban 2.5 milliGRAM(s) Oral two times a day  cefepime   IVPB 1000 milliGRAM(s) IV Intermittent daily  hydrALAZINE 50 milliGRAM(s) Oral three times a day  isosorbide   mononitrate ER Tablet (IMDUR) 30 milliGRAM(s) Oral daily  metoprolol tartrate 25 milliGRAM(s) Oral every 12 hours  polyethylene glycol 3350 17 Gram(s) Oral at bedtime  risperiDONE   Tablet 1 milliGRAM(s) Oral daily  simvastatin 20 milliGRAM(s) Oral at bedtime    MEDICATIONS  (PRN):  acetaminophen     Tablet .. 650 milliGRAM(s) Oral every 6 hours PRN Temp greater or equal to 38C (100.4F), Mild Pain (1 - 3)      Allergies    No Known Allergies    Intolerances                              10.1   10.92 )-----------( 229      ( 10 Apr 2024 07:35 )             32.0     04-10    135  |  104  |  36<H>  ----------------------------<  124<H> e  4.2   |  24  |  1.80<H>    Ca    9.1      10 Apr 2024 07:35        Culture - Urine (04.08.24 @ 18:20) Specimen Source: Clean Catch Clean Catch (Midstream)  50,000 - 99,000 CFU/mL, Organism: Pseudomonas aeruginosa.      pH Urine: 6.0  Urine Appearance: Cloudy  Color: Red  Specific Gravity: 1.012  Protein, Urine: 300 mg/dL  Glucose Qualitative, Urine: Negative mg/dL  Ketone - Urine: Negative mg/dL  Blood, Urine: Large  Bilirubin: Negative  Urobilinogen: 0.2 mg/dL  Leukocyte Esterase Concentration: Large  Nitrite: Negative  White Blood Cell - Urine: Too Numerous to count /HPF  Red Blood Cell - Urine: Too Numerous to count /HPF  Bacteria: Moderate /HPF  Granular Cast: Present  Comment - Urine: many mucus strands seen  Urine Microscopic-Add On (NC) (08.17.23 @ 08:04)   Squamous Epithelial Cells: Present  Bacteria: Many /HPF  Red Blood Cell - Urine: >50 /HPF  White Blood Cell - Urine: >50 /HPF            RADIOLOGY & ADDITIONAL STUDIES:          REVIEW OF SYSTEMS:  CONSTITUTIONAL: No malaise, No fatigue, No fevers or chills, well developed, no diaphoresis    RESPIRATORY: No cough, wheezing, hemoptysis; No shortness of breath  CARDIOVASCULAR: No chest pain or palpitations. No edema  GASTROINTESTINAL: No abdominal or epigastric pain. No nausea, vomiting, or hematemesis; No diarrhea or constipation. No melena or hematochezia.  GENITOURINARY: No dysuria, frequency, foamy urine, urinary urgency, incontinence or hematuria  NEUROLOGICAL: No numbness or weakness, No tremor  SKIN: No itching, burning, rashes, or lesions       VITALS:  Vital Signs Last 24 Hrs  T(C): 36.6 (11 Apr 2024 05:34), Max: 36.6 (10 Apr 2024 20:47)  T(F): 97.9 (11 Apr 2024 05:34), Max: 97.9 (10 Apr 2024 20:47)  HR: 65 (11 Apr 2024 05:34) (65 - 72)  BP: 143/65 (11 Apr 2024 05:34) (104/51 - 143/65)  BP(mean): --  RR: 18 (11 Apr 2024 05:34) (17 - 18)  SpO2: 94% (11 Apr 2024 05:34) (94% - 95%)    Parameters below as of 11 Apr 2024 05:34  Patient On (Oxygen Delivery Method): room air        04-10 @ 07:01  -  04-11 @ 07:00  --------------------------------------------------------  IN: 0 mL / OUT: 2700 mL / NET: -2700 mL          PHYSICAL EXAM:  Constitutional: NAD  HEENT: anicteric sclera, oropharynx clear, MMM  Neck: No JVD  Respiratory: good air entrance B/L, no wheezes, rales or rhonchi  Cardiovascular: S1, S2, RRR, no pericardial rub, no murmur  Gastrointestinal: BS+, soft, no tenderness, no distension, no bruit  Pelvis: bladder non-distended, no CVA tenderness, salmon catheter.

## 2024-04-11 NOTE — CONSULT NOTE ADULT - ASSESSMENT
CKD  JAMES  Pseudomonas UTI  Obstructive uropathy with permant Chinchilla catheter.    CKD, result of Hypertension and hydronephrosis in the past.  JAMES due to UTI, pyelonephritis/    Pseudomonas UTI placed on Cefepime 1 gm daily adequate to his Renal function. Urine appears jesus.   Obstructive uropathy with permeant Chinchilla catheter.

## 2024-04-11 NOTE — PROGRESS NOTE ADULT - PROBLEM SELECTOR PLAN 1
- Presented with outpatient fever, and positive UA at PCP  - Hemodynamically stable and afebrile in ED  - Positive UA  - No leukocytosis  - Does not meet SIRS sepsis criteria  -  salmon excahnged cath in ED  - Blood cultures NTD  - F/U Urine cultures  - Prior urine culture from 2022 showing Pseudomonas and prior showing Klebsiella pneumoniae  - Cont Cefepime for Pseudomonal coverage given history of CAUTI  - ID Dr. Garcia following

## 2024-04-11 NOTE — DISCHARGE NOTE PROVIDER - CARE PROVIDER_API CALL
Emerald Garcia  Cardiology  [address] [address]  Phone: [phone]  Fax: [phone]  Follow Up Time: 1 week

## 2024-04-11 NOTE — PROGRESS NOTE ADULT - SUBJECTIVE AND OBJECTIVE BOX
NP Note discussed with  Primary Attending    Patient is a 89y old  Male who presents with a chief complaint of Catheter associated UTI (11 Apr 2024 09:28)      INTERVAL HPI/OVERNIGHT EVENTS: no acute events overnight    MEDICATIONS  (STANDING):  allopurinol 100 milliGRAM(s) Oral daily  ALPRAZolam 0.25 milliGRAM(s) Oral daily  apixaban 2.5 milliGRAM(s) Oral two times a day  cefepime   IVPB 1000 milliGRAM(s) IV Intermittent daily  hydrALAZINE 50 milliGRAM(s) Oral three times a day  isosorbide   mononitrate ER Tablet (IMDUR) 30 milliGRAM(s) Oral daily  metoprolol tartrate 25 milliGRAM(s) Oral every 12 hours  polyethylene glycol 3350 17 Gram(s) Oral at bedtime  risperiDONE   Tablet 1 milliGRAM(s) Oral daily  simvastatin 20 milliGRAM(s) Oral at bedtime    MEDICATIONS  (PRN):  acetaminophen     Tablet .. 650 milliGRAM(s) Oral every 6 hours PRN Temp greater or equal to 38C (100.4F), Mild Pain (1 - 3)      __________________________________________________  REVIEW OF SYSTEMS:    CONSTITUTIONAL: No fever,   EYES: no acute visual disturbances  NECK: No pain or stiffness  RESPIRATORY: No cough; No shortness of breath  CARDIOVASCULAR: No chest pain, no palpitations  GASTROINTESTINAL: No pain. No nausea or vomiting; No diarrhea   NEUROLOGICAL: No headache or numbness, no tremors  MUSCULOSKELETAL: No joint pain, no muscle pain  GENITOURINARY: no dysuria, no frequency, no hesitancy  PSYCHIATRY: no depression , no anxiety  ALL OTHER  ROS negative        Vital Signs Last 24 Hrs  T(C): 36.6 (11 Apr 2024 05:34), Max: 36.6 (10 Apr 2024 20:47)  T(F): 97.9 (11 Apr 2024 05:34), Max: 97.9 (10 Apr 2024 20:47)  HR: 65 (11 Apr 2024 05:34) (65 - 72)  BP: 143/65 (11 Apr 2024 05:34) (104/51 - 143/65)  BP(mean): --  RR: 18 (11 Apr 2024 05:34) (17 - 18)  SpO2: 94% (11 Apr 2024 05:34) (94% - 95%)    Parameters below as of 11 Apr 2024 05:34  Patient On (Oxygen Delivery Method): room air        ________________________________________________  PHYSICAL EXAM:  GENERAL: NAD  HEENT: Normocephalic;  conjunctivae and sclerae clear; moist mucous membranes;   NECK : supple  CHEST/LUNG: Clear to auscultation bilaterally with good air entry   HEART: S1 S2  regular; no murmurs, gallops or rubs  ABDOMEN: Soft, Nontender, Nondistended; Bowel sounds present  EXTREMITIES: no cyanosis; no edema; no calf tenderness  SKIN: warm and dry; no rash  NERVOUS SYSTEM:  Awake and alert; Oriented  to place, person and time ; no new deficits    _________________________________________________  LABS:                        10.1   10.92 )-----------( 229      ( 10 Apr 2024 07:35 )             32.0     04-10    135  |  104  |  36<H>  ----------------------------<  124<H>  4.2   |  24  |  1.80<H>    Ca    9.1      10 Apr 2024 07:35        Urinalysis Basic - ( 10 Apr 2024 07:35 )    Color: x / Appearance: x / SG: x / pH: x  Gluc: 124 mg/dL / Ketone: x  / Bili: x / Urobili: x   Blood: x / Protein: x / Nitrite: x   Leuk Esterase: x / RBC: x / WBC x   Sq Epi: x / Non Sq Epi: x / Bacteria: x      CAPILLARY BLOOD GLUCOSE            RADIOLOGY & ADDITIONAL TESTS:    Imaging Personally Reviewed:  YES/NO    Consultant(s) Notes Reviewed:   YES/ No    Care Discussed with Consultants :     Plan of care was discussed with patient and /or primary care giver; all questions and concerns were addressed and care was aligned with patient's wishes.     NP Note discussed with  Primary Attending    Patient is a 89y old  Male who presents with a chief complaint of Catheter associated UTI (11 Apr 2024 09:28)      INTERVAL HPI/OVERNIGHT EVENTS: no acute events overnight    MEDICATIONS  (STANDING):  allopurinol 100 milliGRAM(s) Oral daily  ALPRAZolam 0.25 milliGRAM(s) Oral daily  apixaban 2.5 milliGRAM(s) Oral two times a day  cefepime   IVPB 1000 milliGRAM(s) IV Intermittent daily  hydrALAZINE 50 milliGRAM(s) Oral three times a day  isosorbide   mononitrate ER Tablet (IMDUR) 30 milliGRAM(s) Oral daily  metoprolol tartrate 25 milliGRAM(s) Oral every 12 hours  polyethylene glycol 3350 17 Gram(s) Oral at bedtime  risperiDONE   Tablet 1 milliGRAM(s) Oral daily  simvastatin 20 milliGRAM(s) Oral at bedtime    MEDICATIONS  (PRN):  acetaminophen     Tablet .. 650 milliGRAM(s) Oral every 6 hours PRN Temp greater or equal to 38C (100.4F), Mild Pain (1 - 3)      __________________________________________________  REVIEW OF SYSTEMS:    CONSTITUTIONAL: No fever,   EYES: no acute visual disturbances  NECK: No pain or stiffness  RESPIRATORY: No cough; No shortness of breath  CARDIOVASCULAR: No chest pain, no palpitations  GASTROINTESTINAL: No pain. No nausea or vomiting; No diarrhea   NEUROLOGICAL: No headache or numbness, no tremors  MUSCULOSKELETAL: No joint pain, no muscle pain  GENITOURINARY: no dysuria, no frequency, no hesitancy  PSYCHIATRY: no depression , no anxiety  ALL OTHER  ROS negative        Vital Signs Last 24 Hrs  T(C): 36.6 (11 Apr 2024 05:34), Max: 36.6 (10 Apr 2024 20:47)  T(F): 97.9 (11 Apr 2024 05:34), Max: 97.9 (10 Apr 2024 20:47)  HR: 65 (11 Apr 2024 05:34) (65 - 72)  BP: 143/65 (11 Apr 2024 05:34) (104/51 - 143/65)  BP(mean): --  RR: 18 (11 Apr 2024 05:34) (17 - 18)  SpO2: 94% (11 Apr 2024 05:34) (94% - 95%)    Parameters below as of 11 Apr 2024 05:34  Patient On (Oxygen Delivery Method): room air        ________________________________________________  PHYSICAL EXAM:  GENERAL: NAD  HEENT: Normocephalic  NECK : supple  CHEST/LUNG: Clear to auscultation bilaterally   HEART: S1 S2  regular  ABDOMEN: Soft, Nontender, Nondistended; Bowel sounds present  EXTREMITIES: no edema  SKIN: warm and dry; no rash  NERVOUS SYSTEM:  Awake and alert; Oriented  to place, person and time    _________________________________________________  LABS:                        10.1   10.92 )-----------( 229      ( 10 Apr 2024 07:35 )             32.0     04-10    135  |  104  |  36<H>  ----------------------------<  124<H>  4.2   |  24  |  1.80<H>    Ca    9.1      10 Apr 2024 07:35        Urinalysis Basic - ( 10 Apr 2024 07:35 )    Color: x / Appearance: x / SG: x / pH: x  Gluc: 124 mg/dL / Ketone: x  / Bili: x / Urobili: x   Blood: x / Protein: x / Nitrite: x   Leuk Esterase: x / RBC: x / WBC x   Sq Epi: x / Non Sq Epi: x / Bacteria: x      CAPILLARY BLOOD GLUCOSE                Imaging Personally Reviewed:  YES    Consultant(s) Notes Reviewed:   YES      Plan of care was discussed with patient and /or primary care giver; all questions and concerns were addressed and care was aligned with patient's wishes.

## 2024-04-12 ENCOUNTER — TRANSCRIPTION ENCOUNTER (OUTPATIENT)
Age: 89
End: 2024-04-12

## 2024-04-12 VITALS
SYSTOLIC BLOOD PRESSURE: 157 MMHG | RESPIRATION RATE: 17 BRPM | TEMPERATURE: 98 F | HEART RATE: 70 BPM | OXYGEN SATURATION: 95 % | DIASTOLIC BLOOD PRESSURE: 69 MMHG

## 2024-04-12 LAB
-  AMIKACIN: SIGNIFICANT CHANGE UP
-  AZTREONAM: SIGNIFICANT CHANGE UP
-  CEFEPIME: SIGNIFICANT CHANGE UP
-  CEFTAZIDIME: SIGNIFICANT CHANGE UP
-  CIPROFLOXACIN: SIGNIFICANT CHANGE UP
-  IMIPENEM: SIGNIFICANT CHANGE UP
-  LEVOFLOXACIN: SIGNIFICANT CHANGE UP
-  MEROPENEM: SIGNIFICANT CHANGE UP
-  PIPERACILLIN/TAZOBACTAM: SIGNIFICANT CHANGE UP
CULTURE RESULTS: ABNORMAL
CULTURE RESULTS: ABNORMAL
METHOD TYPE: SIGNIFICANT CHANGE UP
ORGANISM # SPEC MICROSCOPIC CNT: ABNORMAL
ORGANISM # SPEC MICROSCOPIC CNT: ABNORMAL
SPECIMEN SOURCE: SIGNIFICANT CHANGE UP

## 2024-04-12 PROCEDURE — 84100 ASSAY OF PHOSPHORUS: CPT

## 2024-04-12 PROCEDURE — 80053 COMPREHEN METABOLIC PANEL: CPT

## 2024-04-12 PROCEDURE — 87040 BLOOD CULTURE FOR BACTERIA: CPT

## 2024-04-12 PROCEDURE — 87635 SARS-COV-2 COVID-19 AMP PRB: CPT

## 2024-04-12 PROCEDURE — 83605 ASSAY OF LACTIC ACID: CPT

## 2024-04-12 PROCEDURE — 87077 CULTURE AEROBIC IDENTIFY: CPT

## 2024-04-12 PROCEDURE — 81001 URINALYSIS AUTO W/SCOPE: CPT

## 2024-04-12 PROCEDURE — 85027 COMPLETE CBC AUTOMATED: CPT

## 2024-04-12 PROCEDURE — 87186 SC STD MICRODIL/AGAR DIL: CPT

## 2024-04-12 PROCEDURE — 80048 BASIC METABOLIC PNL TOTAL CA: CPT

## 2024-04-12 PROCEDURE — 99285 EMERGENCY DEPT VISIT HI MDM: CPT

## 2024-04-12 PROCEDURE — 87086 URINE CULTURE/COLONY COUNT: CPT

## 2024-04-12 PROCEDURE — 85025 COMPLETE CBC W/AUTO DIFF WBC: CPT

## 2024-04-12 PROCEDURE — 96374 THER/PROPH/DIAG INJ IV PUSH: CPT

## 2024-04-12 PROCEDURE — 36415 COLL VENOUS BLD VENIPUNCTURE: CPT

## 2024-04-12 PROCEDURE — 83735 ASSAY OF MAGNESIUM: CPT

## 2024-04-12 RX ORDER — SIMVASTATIN 20 MG/1
1 TABLET, FILM COATED ORAL
Qty: 0 | Refills: 0 | DISCHARGE

## 2024-04-12 RX ORDER — HYDRALAZINE HCL 50 MG
1 TABLET ORAL
Qty: 0 | Refills: 0 | DISCHARGE
Start: 2024-04-12

## 2024-04-12 RX ORDER — APIXABAN 2.5 MG/1
1 TABLET, FILM COATED ORAL
Qty: 0 | Refills: 0 | DISCHARGE

## 2024-04-12 RX ORDER — METOPROLOL TARTRATE 50 MG
1 TABLET ORAL
Qty: 0 | Refills: 0 | DISCHARGE
Start: 2024-04-12

## 2024-04-12 RX ORDER — ACETAMINOPHEN 500 MG
2 TABLET ORAL
Qty: 0 | Refills: 0 | DISCHARGE
Start: 2024-04-12

## 2024-04-12 RX ORDER — RISPERIDONE 4 MG/1
1 TABLET ORAL
Qty: 0 | Refills: 0 | DISCHARGE
Start: 2024-04-12

## 2024-04-12 RX ORDER — ALPRAZOLAM 0.25 MG
1 TABLET ORAL
Refills: 0 | DISCHARGE

## 2024-04-12 RX ORDER — ALPRAZOLAM 0.25 MG
1 TABLET ORAL
Qty: 0 | Refills: 0 | DISCHARGE
Start: 2024-04-12

## 2024-04-12 RX ORDER — HYDRALAZINE HCL 50 MG
1 TABLET ORAL
Refills: 0 | DISCHARGE

## 2024-04-12 RX ORDER — APIXABAN 2.5 MG/1
1 TABLET, FILM COATED ORAL
Qty: 0 | Refills: 0 | DISCHARGE
Start: 2024-04-12

## 2024-04-12 RX ORDER — ALLOPURINOL 300 MG
1 TABLET ORAL
Refills: 0 | DISCHARGE

## 2024-04-12 RX ORDER — ISOSORBIDE MONONITRATE 60 MG/1
1 TABLET, EXTENDED RELEASE ORAL
Qty: 0 | Refills: 0 | DISCHARGE
Start: 2024-04-12

## 2024-04-12 RX ORDER — RISPERIDONE 4 MG/1
1 TABLET ORAL
Refills: 0 | DISCHARGE

## 2024-04-12 RX ORDER — ALLOPURINOL 300 MG
1 TABLET ORAL
Qty: 0 | Refills: 0 | DISCHARGE
Start: 2024-04-12

## 2024-04-12 RX ORDER — SIMVASTATIN 20 MG/1
1 TABLET, FILM COATED ORAL
Qty: 0 | Refills: 0 | DISCHARGE
Start: 2024-04-12

## 2024-04-12 RX ORDER — CIPROFLOXACIN LACTATE 400MG/40ML
1 VIAL (ML) INTRAVENOUS
Qty: 3 | Refills: 0
Start: 2024-04-12 | End: 2024-04-14

## 2024-04-12 RX ADMIN — APIXABAN 2.5 MILLIGRAM(S): 2.5 TABLET, FILM COATED ORAL at 06:06

## 2024-04-12 RX ADMIN — RISPERIDONE 1 MILLIGRAM(S): 4 TABLET ORAL at 13:33

## 2024-04-12 RX ADMIN — CEFEPIME 100 MILLIGRAM(S): 1 INJECTION, POWDER, FOR SOLUTION INTRAMUSCULAR; INTRAVENOUS at 12:27

## 2024-04-12 RX ADMIN — Medication 100 MILLIGRAM(S): at 12:31

## 2024-04-12 RX ADMIN — Medication 0.25 MILLIGRAM(S): at 12:29

## 2024-04-12 RX ADMIN — Medication 50 MILLIGRAM(S): at 12:32

## 2024-04-12 RX ADMIN — ISOSORBIDE MONONITRATE 30 MILLIGRAM(S): 60 TABLET, EXTENDED RELEASE ORAL at 12:28

## 2024-04-12 RX ADMIN — Medication 25 MILLIGRAM(S): at 06:06

## 2024-04-12 RX ADMIN — Medication 50 MILLIGRAM(S): at 06:06

## 2024-04-12 NOTE — PROGRESS NOTE ADULT - PROBLEM SELECTOR PLAN 6
- Patient with history of HLD  - Patient on Simvastatin at home  - Continue home antihyperlipidemic

## 2024-04-12 NOTE — PROGRESS NOTE ADULT - PROBLEM SELECTOR PLAN 5
- Patient with history of HTN  - Patient on Metoprolol, Hydralazine at home  - Continue home antihypertensives with holding parameters

## 2024-04-12 NOTE — PROGRESS NOTE ADULT - ASSESSMENT
Patient is a 89y old  Male who is from home and with PMH of HTN, HLD, A Fib (On Eliquis) S/P Micra PPM, CKD, HFrEF, BPH (Chronic Salmon) who presents to the ER for evaluation of fever and low blood pressure.  Patient states he was at his PCP for a routine office visit and had a Urine study was positive for an infection and his doctor adviced him to come to the hospital to get treated.  Patient states he had his salmon cath changed in the ER.  On admission, he found to have no fever but positive Urine analysis. She has started on cefepime and the ID consult requested to assist with further evaluation and antibiotic management.    # Complicated UTI - s/p exchanged Salmon catheter in ER- UCX grew Pseudomona    would recommend:    1. Continue Cefepime while inpatient   2. May change to oral Levaquin 500 mg daily o on discharge if QTc is less than 500, until 4/15/24  3. Monitor kidney function and IVF  4. OOB to chair    d/w Covering NPLaura.    Attending Attestation:    Spent more than 35 minutes on total encounter, more than 50 % of the visit was spent counseling and/or coordinating care by the Attending physician.

## 2024-04-12 NOTE — PROGRESS NOTE ADULT - SUBJECTIVE AND OBJECTIVE BOX
Patient is seen and examined at the bed side, is afebrile. The sensitivities of Pseudomonas has been reviewed.       REVIEW OF SYSTEMS: All other review systems are negative      ALLERGIES: No Known Allergies      Vital Signs Last 24 Hrs  T(C): 36.7 (12 Apr 2024 13:33), Max: 37.1 (12 Apr 2024 05:09)  T(F): 98 (12 Apr 2024 13:33), Max: 98.7 (12 Apr 2024 05:09)  HR: 70 (12 Apr 2024 13:33) (66 - 70)  BP: 157/69 (12 Apr 2024 13:33) (139/61 - 157/69)  BP(mean): --  RR: 17 (12 Apr 2024 13:33) (17 - 18)  SpO2: 95% (12 Apr 2024 13:33) (94% - 95%)    Parameters below as of 12 Apr 2024 13:33  Patient On (Oxygen Delivery Method): room air      PHYSICAL EXAM:  GENERAL: Not in distress   CHEST/LUNG: Not using accessory muscles   HEART: s1 and s2 present  ABDOMEN:  Nontender and  Nondistended  EXTREMITIES: No pedal  edema  CNS: Awake and Alert      LABS: No new Labs                          10.1   10.92 )-----------( 229      ( 10 Apr 2024 07:35 )             32.0                           10.1   10.92 )-----------( 229      ( 10 Apr 2024 07:35 )             32.0                  04-10    135  |  104  |  36<H>  ----------------------------<  124<H>  4.2   |  24  |  1.80<H>    Ca    9.1      10 Apr 2024 07:35      TPro  7.3  /  Alb  3.5  /  TBili  0.3  /  DBili  x   /  AST  13  /  ALT  18  /  AlkPhos  90  04-08        MEDICATIONS  (STANDING):    allopurinol 100 milliGRAM(s) Oral daily  ALPRAZolam 0.25 milliGRAM(s) Oral daily  apixaban 2.5 milliGRAM(s) Oral two times a day  cefepime   IVPB 1000 milliGRAM(s) IV Intermittent daily  hydrALAZINE 50 milliGRAM(s) Oral three times a day  isosorbide   mononitrate ER Tablet (IMDUR) 30 milliGRAM(s) Oral daily  metoprolol tartrate 25 milliGRAM(s) Oral every 12 hours  polyethylene glycol 3350 17 Gram(s) Oral at bedtime  risperiDONE   Tablet 1 milliGRAM(s) Oral daily  simvastatin 20 milliGRAM(s) Oral at bedtime        RADIOLOGY & ADDITIONAL TESTS:    Culture - Urine (04.08.24 @ 18:20)   - Ciprofloxacin: S <=0.25  - Imipenem: S <=1  - Levofloxacin: S <=0.5  - Meropenem: S <=1  - Piperacillin/Tazobactam: S <=8  - Amikacin: S <=16  - Aztreonam: S <=4  - Cefepime: S <=2  - Ceftazidime: S <=1  Specimen Source: Clean Catch Clean Catch (Midstream)  Culture Results:   50,000 - 99,000 CFU/mL Pseudomonas aeruginosa   <10,000 CFU/ml Normal Urogenital darien present  Organism Identification: Pseudomonas aeruginosa  Organism: Pseudomonas aeruginosa  Method Type: NOEMY      Culture - Blood (04.08.24 @ 18:30)   Specimen Source: .Blood Blood-Peripheral  Culture Results: No growth at 48 hours    Culture - Blood (04.08.24 @ 18:20)   Specimen Source: .Blood Blood-Peripheral  Culture Results: No growth at 48 hours    Culture - Urine (04.08.24 @ 18:20)   Specimen Source: Clean Catch Clean Catch (Midstream)  Culture Results:   50,000 - 99,000 CFU/mL Gram Negative Rods  None

## 2024-04-12 NOTE — PROGRESS NOTE ADULT - ASSESSMENT
CKD, result of Hypertension and hydronephrosis in the past.  JAMES due to UTI, pyelonephritis/creatinine trending down.  Suggest to repeat  lab today . Last creatinine 1.9 on  04/10/24    Pseudomonas UTI placed on Cefepime 1 gm daily adequate to his Renal function. Urine appears clear.   Obstructive uropathy with permeant Chinchilla catheter.

## 2024-04-12 NOTE — PROGRESS NOTE ADULT - PROBLEM SELECTOR PLAN 9
Lives at home with daughter   Discharge likely back to home   Urine culture results pending  Abx per ID

## 2024-04-12 NOTE — PROGRESS NOTE ADULT - PROBLEM SELECTOR PLAN 4
- History of CHF  - Echo showing 55-60% 8/23  - Continue home Metoprolol, Imdur

## 2024-04-12 NOTE — PROGRESS NOTE ADULT - PROBLEM SELECTOR PLAN 8
- Eliquis 2.5mg BID for DVT PPX

## 2024-04-12 NOTE — DISCHARGE NOTE NURSING/CASE MANAGEMENT/SOCIAL WORK - PATIENT PORTAL LINK FT
You can access the FollowMyHealth Patient Portal offered by [facility] by registering at the following website: [email or web address]. By joining [employer]’s FollowMyHealth portal, you will also be able to view your health information using other applications (apps) compatible with our system.

## 2024-04-12 NOTE — PROGRESS NOTE ADULT - SUBJECTIVE AND OBJECTIVE BOX
Date of Service 04-12-24 @ 13:32    CHIEF COMPLAINT:Patient is a 89y old  Male who presents with a chief complaint of Catheter associated UT.Pt appears comfortable.    	  REVIEW OF SYSTEMS:  CONSTITUTIONAL: No fever, weight loss, or fatigue  EYES: No eye pain, visual disturbances, or discharge  ENT:  No difficulty hearing, tinnitus, vertigo; No sinus or throat pain  NECK: No pain or stiffness  RESPIRATORY: No cough, wheezing, chills or hemoptysis; No Shortness of Breath  CARDIOVASCULAR: No chest pain, palpitations, passing out, dizziness, or leg swelling  GASTROINTESTINAL: No abdominal or epigastric pain. No nausea, vomiting, or hematemesis; No diarrhea or constipation. No melena or hematochezia.  GENITOURINARY: No dysuria, frequency, hematuria, or incontinence  NEUROLOGICAL: No headaches, memory loss, loss of strength, numbness, or tremors  SKIN: No itching, burning, rashes, or lesions   LYMPH Nodes: No enlarged glands  ENDOCRINE: No heat or cold intolerance; No hair loss  MUSCULOSKELETAL: No joint pain or swelling; No muscle, back, or extremity pain  PSYCHIATRIC: No depression, anxiety, mood swings, or difficulty sleeping  HEME/LYMPH: No easy bruising, or bleeding gums  ALLERGY AND IMMUNOLOGIC: No hives or eczema	        PHYSICAL EXAM:  T(C): 37.1 (04-12-24 @ 05:09), Max: 37.1 (04-12-24 @ 05:09)  HR: 66 (04-12-24 @ 05:09) (66 - 67)  BP: 139/61 (04-12-24 @ 05:09) (139/61 - 143/62)  RR: 17 (04-12-24 @ 05:09) (17 - 18)  SpO2: 94% (04-12-24 @ 05:09) (94% - 94%)  Wt(kg): --  I&O's Summary    11 Apr 2024 07:01  -  12 Apr 2024 07:00  --------------------------------------------------------  IN: 0 mL / OUT: 1200 mL / NET: -1200 mL        Appearance: Normal	  HEENT:   Normal oral mucosa, PERRL, EOMI	  Lymphatic: No lymphadenopathy  Cardiovascular: Normal S1 S2, No JVD, No murmurs, No edema  Respiratory: Lungs clear to auscultation	  Psychiatry: A & O x 3, Mood & affect appropriate  Gastrointestinal:  Soft, Non-tender, + BS	  Skin: No rashes, No ecchymoses, No cyanosis	  Neurologic: Non-focal  Extremities: Normal range of motion, No clubbing, cyanosis or edema  Vascular: Peripheral pulses palpable 2+ bilaterally    MEDICATIONS  (STANDING):  allopurinol 100 milliGRAM(s) Oral daily  ALPRAZolam 0.25 milliGRAM(s) Oral daily  apixaban 2.5 milliGRAM(s) Oral two times a day  cefepime   IVPB 1000 milliGRAM(s) IV Intermittent daily  hydrALAZINE 50 milliGRAM(s) Oral three times a day  isosorbide   mononitrate ER Tablet (IMDUR) 30 milliGRAM(s) Oral daily  metoprolol tartrate 25 milliGRAM(s) Oral every 12 hours  polyethylene glycol 3350 17 Gram(s) Oral at bedtime  risperiDONE   Tablet 1 milliGRAM(s) Oral daily  simvastatin 20 milliGRAM(s) Oral at bedtime      	  	  LABS:	 	      Culture - Urine (04.08.24 @ 18:20)   - Amikacin: S <=16  - Aztreonam: S <=4  - Cefepime: S <=2  - Ceftazidime: S <=1  - Ciprofloxacin: S <=0.25  - Imipenem: S <=1  - Levofloxacin: S <=0.5  - Meropenem: S <=1  - Piperacillin/Tazobactam: S <=8  Specimen Source: Clean Catch Clean Catch (Midstream)  Culture Results:   50,000 - 99,000 CFU/mL Pseudomonas aeruginosa   <10,000 CFU/ml Normal Urogenital darien present  Organism Identification: Pseudomonas aeruginosa  Organism: Pseudomonas aeruginosa  Method Type: NOEMY

## 2024-04-12 NOTE — PROGRESS NOTE ADULT - PROBLEM SELECTOR PLAN 3
- History of A. Fib  - [id number]: 4 points  - Continue home Eliquis 2.5mg BID and Metoprolol 25mg BID
- History of A. Fib  - [id number]: 4 points  - Continue home Eliquis 2.5mg BID and Metoprolol 25mg BID
- History of A. Fib  - [id number]: 4 points  - Continue home Eliquis 2.5mg BID and Metoprolol 25mg BID
- History of A. Fib  - [id number]: 4 points  - Continue home Eliquis 2.5mg BID and Metoprolol 25mg BID

## 2024-04-12 NOTE — PROGRESS NOTE ADULT - PROBLEM SELECTOR PROBLEM 1
Infection associated with indwelling urinary catheter

## 2024-04-12 NOTE — PROGRESS NOTE ADULT - PROBLEM SELECTOR PLAN 7
- History of BPH  - Exchanged Chinchilla Cath in ED

## 2024-04-12 NOTE — PROGRESS NOTE ADULT - PROVIDER SPECIALTY LIST ADULT
Infectious Disease
Internal Medicine
Nephrology
Infectious Disease
Internal Medicine
Infectious Disease
Internal Medicine

## 2024-04-12 NOTE — PROGRESS NOTE ADULT - ASSESSMENT
89 year old male from home with daughter, with PMH of HTN, HLD,Chronic  A Fib (On Eliquis) S/P Micra PPM, CKD, HFrEF, BPH (Chronic Chinchilla) who presented to the ED fever and low blood pressure,complicated UTI.  1.Complicated UTI-ABX as per ID.Chinchilla was changed in ER.  2.Chronixc afib-eliquis,lopressor.  3.HTN-cont bp medication.  4.CRI-f/u lytes.  5.Lipid d/o-statin.  6.PPI.  7.D/C home today.

## 2024-04-12 NOTE — PROGRESS NOTE ADULT - PROBLEM SELECTOR PROBLEM 4
You are receiving this message because this member is on the state government program [facility] [facility] or [facility] [facility].  are required to notify the primary care physician with all ED visits, admissions and discharges from hospitals and nursing homes. If you have further questions please feel free to contact me.  Malathi Bacon RN [facility] [phone]   
Heart failure with mildly reduced ejection fraction (HFmrEF)

## 2024-04-12 NOTE — PROGRESS NOTE ADULT - SUBJECTIVE AND OBJECTIVE BOX
Problem List:  Patient is a 89 year old male from home with daughter, with PMH of HTN, HLD, A Fib (On Eliquis) S/P Micra PPM, CKD, HFrEF, BPH (Chronic Salmon, OBSTRUCTIVE UROPATHY) who presented to the ED fever and low blood pressure.  Patient states he was at his PCP for a routine office visit and had a Urine study done when he told his doctor that he had fevers.  His urine study resulted as positive for an infection and his doctor adviced him to come to the hospital to get treated.  patient states he does not currently had a fever and is feeling well.  patient states his doctor also mentioned his blood pressure was lower than normal. .  Patient states he had his salmon cath changed in the ED.  Patient denies nausea, vomiting, headache, blurry vision, dizziness, chest pain, abdominal pain, constipation, diarrhea, leg swelling.   Culture - Urine (04.08.24 @ 18:20) Specimen Source: Clean Catch Clean Catch (Midstream)  50,000 - 99,000 CFU/mL, Organism: Pseudomonas aeruginosa. Placed on Cefepime yesterday 1 gm per day.  At present he feels well no c/o dysuria  Appetite is good     Renal function continues to improve        PAST MEDICAL & SURGICAL HISTORY:  Hypertension      Atrial fibrillation      BPH (benign prostatic hyperplasia)      Chronic kidney disease, unspecified CKD stage      Hyperlipidemia      S/P placement of cardiac pacemaker          No Known Allergies      MEDICATIONS  (STANDING):  allopurinol 100 milliGRAM(s) Oral daily  ALPRAZolam 0.25 milliGRAM(s) Oral daily  apixaban 2.5 milliGRAM(s) Oral two times a day  cefepime   IVPB 1000 milliGRAM(s) IV Intermittent daily  hydrALAZINE 50 milliGRAM(s) Oral three times a day  isosorbide   mononitrate ER Tablet (IMDUR) 30 milliGRAM(s) Oral daily  metoprolol tartrate 25 milliGRAM(s) Oral every 12 hours  polyethylene glycol 3350 17 Gram(s) Oral at bedtime  risperiDONE   Tablet 1 milliGRAM(s) Oral daily  simvastatin 20 milliGRAM(s) Oral at bedtime    MEDICATIONS  (PRN):  acetaminophen     Tablet .. 650 milliGRAM(s) Oral every 6 hours PRN Temp greater or equal to 38C (100.4F), Mild Pain (1 - 3)      REVIEW OF SYSTEMS:  General: no fever no chills, no weight loss.    RESPIRATORY: No cough, wheezing, hemoptysis; No shortness of breath  CARDIOVASCULAR: No chest pain or palpitations. No Edema  GASTROINTESTINAL: No abdominal or epigastric pain. No nausea, vomiting. No diarrhea or constipation. No melena.  GENITOURINARY: No dysuria, frequency, foamy urine, urinary urgency, incontinence or hematuria  NEUROLOGICAL: No numbness or weakness, no tremor , no dizziness.   Muscle skeletal : no joint pain and no swelling of joints and limbs.  SKIN: No itching, burning, rashes.        VITALS:  T(F): 98.7 (04-12-24 @ 05:09), Max: 98.7 (04-12-24 @ 05:09)  HR: 66 (04-12-24 @ 05:09)  BP: 139/61 (04-12-24 @ 05:09)  RR: 17 (04-12-24 @ 05:09)  SpO2: 94% (04-12-24 @ 05:09)  Wt(kg): --    04-11 @ 07:01  -  04-12 @ 07:00  --------------------------------------------------------  IN: 0 mL / OUT: 1200 mL / NET: -1200 mL        PHYSICAL EXAM:  Constitutional: well developed, no diaphoresis, no distress.  Neck: No JVD, no carotid bruit, supple, no adenopathy  Respiratory: air entrance B/L, no wheezes, rales or rhonchi  Cardiovascular: S1, S2, RRR, no pericardial rub, no murmur  Abdomen: BS+, soft, no tenderness, no bruit  Pelvis: bladder nondistended  Extremities: No cyanosis or clubbing. No peripheral edema.   Alert and awake oriented by 3.

## 2024-04-13 LAB
CULTURE RESULTS: SIGNIFICANT CHANGE UP
CULTURE RESULTS: SIGNIFICANT CHANGE UP
SPECIMEN SOURCE: SIGNIFICANT CHANGE UP
SPECIMEN SOURCE: SIGNIFICANT CHANGE UP

## 2024-09-07 ENCOUNTER — NON-APPOINTMENT (OUTPATIENT)
Age: 89
End: 2024-09-07

## 2024-11-11 ENCOUNTER — NON-APPOINTMENT (OUTPATIENT)
Age: 89
End: 2024-11-11

## 2024-11-12 NOTE — ED PROVIDER NOTE - GASTROINTESTINAL, MLM
Do You Have A Family History Of Psoriasis?: yes Have You Been Diagnosed With Psoriatic Arthritis?: no Abdomen soft, non-tender, no guarding.

## 2024-12-23 ENCOUNTER — NON-APPOINTMENT (OUTPATIENT)
Age: 88
End: 2024-12-23

## 2025-01-03 NOTE — PATIENT PROFILE ADULT - FALL HARM RISK - FALL HARM RISK
CT Chest:  2.6 cm left lobe thyroid nodule and evidence of granulomatous infection in lungs, liver and spleen  - f/u AM TSH  - Primary team to consider non-urgent thyroid US.    ??? Chronic CHF.   ·  Plan: Pt with documented hx of CHF, unspecified type however on GDMT for HFrEF  - TTE (9/2024): LVEF 55-60%, no diastolic dysfunction, moderate MR  - Evaluated by cardiology during admission at [facility] (9/2024) for acute CHF, started on GDMT  - Pro-BNP on admission 1988  - s/p 1000cc NS bolus x2  - c/w IVF @ 60cc/hr, closely monitor volume status  - Hold home metoprolol, spironolactone and lasix in setting of hypotension  - Strict Is&Os q6h  - Does not appear clinically volume overloaded  - Cardiology (Only group) consulted, recs appreciated. Age Chronic CHF.   H/O of CHF, unspecified type however on GDMT for HFrEF  - TTE (9/2024): LVEF 55-60%, no diastolic dysfunction, moderate MR  - Evaluated by cardiology during admission at [facility] (9/2024) for acute CHF, started on GDMT  - c/w home metoprolol, spironolactone and lasix with hold parameters   - Strict Is&Os q6h  - Does not appear clinically volume overloaded  - Cardiology ([place] group) consulted, recs appreciated.

## 2025-05-01 NOTE — PROGRESS NOTE ADULT - SUBJECTIVE AND OBJECTIVE BOX
INTERVAL HPI/OVERNIGHT EVENTS: ***    PRESSORS: [ ] YES [ ] NO  WHICH:    Antimicrobial:    Cardiovascular:    Pulmonary:    Hematalogic:  apixaban 2.5 milliGRAM(s) Oral two times a day    Other:  chlorhexidine 2% Cloths 1 Application(s) Topical <User Schedule>  finasteride 5 milliGRAM(s) Oral daily  polyethylene glycol 3350 17 Gram(s) Oral at bedtime  senna 2 Tablet(s) Oral at bedtime  simvastatin 20 milliGRAM(s) Oral at bedtime    apixaban 2.5 milliGRAM(s) Oral two times a day  chlorhexidine 2% Cloths 1 Application(s) Topical <User Schedule>  finasteride 5 milliGRAM(s) Oral daily  polyethylene glycol 3350 17 Gram(s) Oral at bedtime  senna 2 Tablet(s) Oral at bedtime  simvastatin 20 milliGRAM(s) Oral at bedtime    Drug Dosing Weight  Height (cm): 170.2 (12 Apr 2022 17:52)  Weight (kg): 78.7 (12 Apr 2022 17:52)  BMI (kg/m2): 27.2 (12 Apr 2022 17:52)  BSA (m2): 1.9 (12 Apr 2022 17:52)    CENTRAL LINE: [ ] YES [ ] NO  LOCATION:         ARROYO: [ ] YES [ ] NO          A-LINE:  [ ] YES [ ] NO  LOCATION:             ICU Vital Signs Last 24 Hrs  T(C): 36.4 (15 Apr 2022 07:00), Max: 36.7 (14 Apr 2022 12:00)  T(F): 97.6 (15 Apr 2022 07:00), Max: 98 (14 Apr 2022 12:00)  HR: 76 (15 Apr 2022 08:00) (67 - 82)  BP: 124/50 (15 Apr 2022 08:00) (103/32 - 159/52)  BP(mean): 70 (15 Apr 2022 08:00) (48 - 87)  ABP: --  ABP(mean): --  RR: 24 (15 Apr 2022 08:00) (12 - 24)  SpO2: 97% (15 Apr 2022 08:00) (96% - 100%)            04-14 @ 07:01  -  04-15 @ 07:00  --------------------------------------------------------  IN: 560 mL / OUT: 2100 mL / NET: -1540 mL              PHYSICAL EXAM:    GENERAL: NAD, well-groomed, well-developed  EYES: EOMI, PERRLA,   NECK: Supple, No JVD; Normal thyroid; Trachea midline  NERVOUS SYSTEM:  Alert & Oriented X3,  Motor Strength 5/5 B/L upper and lower extremities; DTRs 2+ intact and symmetric  CHEST/LUNG: No rales, rhonchi, wheezing   HEART: Regular rate and rhythm; No murmurs,   ABDOMEN: Soft, Nontender, Nondistended; Bowel sounds present  EXTREMITIES:  2+ Peripheral Pulses, No clubbing, cyanosis, or edema        LABS:  CBC Full  -  ( 15 Apr 2022 04:33 )  WBC Count : 8.44 K/uL  RBC Count : 4.03 M/uL  Hemoglobin : 11.4 g/dL  Hematocrit : 34.5 %  Platelet Count - Automated : 212 K/uL  Mean Cell Volume : 85.6 fl  Mean Cell Hemoglobin : 28.3 pg  Mean Cell Hemoglobin Concentration : 33.0 gm/dL  Auto Neutrophil # : x  Auto Lymphocyte # : x  Auto Monocyte # : x  Auto Eosinophil # : x  Auto Basophil # : x  Auto Neutrophil % : x  Auto Lymphocyte % : x  Auto Monocyte % : x  Auto Eosinophil % : x  Auto Basophil % : x    04-15    140  |  112<H>  |  46<H>  ----------------------------<  97  4.8   |  22  |  1.84<H>    Ca    9.2      15 Apr 2022 04:33  Phos  3.0     04-15  Mg     2.1     04-15          Culture Results:   No growth to date. (04-13 @ 02:53)  Culture Results:   No growth to date. (04-13 @ 02:53)      RADIOLOGY & ADDITIONAL STUDIES REVIEWED:  ***      CRITICAL CARE TIME SPENT: 35 minutes INTERVAL HPI/OVERNIGHT EVENTS: No acute overnight events       Hematalogic:  apixaban 2.5 milliGRAM(s) Oral two times a day    Other:  chlorhexidine 2% Cloths 1 Application(s) Topical <User Schedule>  finasteride 5 milliGRAM(s) Oral daily  polyethylene glycol 3350 17 Gram(s) Oral at bedtime  senna 2 Tablet(s) Oral at bedtime  simvastatin 20 milliGRAM(s) Oral at bedtime    apixaban 2.5 milliGRAM(s) Oral two times a day  chlorhexidine 2% Cloths 1 Application(s) Topical <User Schedule>  finasteride 5 milliGRAM(s) Oral daily  polyethylene glycol 3350 17 Gram(s) Oral at bedtime  senna 2 Tablet(s) Oral at bedtime  simvastatin 20 milliGRAM(s) Oral at bedtime    Drug Dosing Weight  Height (cm): 170.2 (12 Apr 2022 17:52)  Weight (kg): 78.7 (12 Apr 2022 17:52)  BMI (kg/m2): 27.2 (12 Apr 2022 17:52)  BSA (m2): 1.9 (12 Apr 2022 17:52)    CENTRAL LINE: [ ] YES [ ] NO  LOCATION:         ARROYO: [ ] YES [ ] NO          A-LINE:  [ ] YES [ ] NO  LOCATION:             ICU Vital Signs Last 24 Hrs  T(C): 36.4 (15 Apr 2022 07:00), Max: 36.7 (14 Apr 2022 12:00)  T(F): 97.6 (15 Apr 2022 07:00), Max: 98 (14 Apr 2022 12:00)  HR: 76 (15 Apr 2022 08:00) (67 - 82)  BP: 124/50 (15 Apr 2022 08:00) (103/32 - 159/52)  BP(mean): 70 (15 Apr 2022 08:00) (48 - 87)  ABP: --  ABP(mean): --  RR: 24 (15 Apr 2022 08:00) (12 - 24)  SpO2: 97% (15 Apr 2022 08:00) (96% - 100%)            04-14 @ 07:01  -  04-15 @ 07:00  --------------------------------------------------------  IN: 560 mL / OUT: 2100 mL / NET: -1540 mL              PHYSICAL EXAM:    GENERAL: NAD  EYES: EOMI, PERRLA,   NECK: Supple, No JVD; Normal thyroid; Trachea midline  NERVOUS SYSTEM:  Alert & Oriented X3,  Motor Strength 5/5 B/L upper and lower extremities; DTRs 2+ intact and symmetric  CHEST/LUNG: No rales, rhonchi, wheezing   HEART: Regular rate and rhythm; No murmurs,   ABDOMEN: Soft, Nontender, Nondistended; Bowel sounds present  EXTREMITIES:  2+ Peripheral Pulses, No clubbing, cyanosis, or edema        LABS:  CBC Full  -  ( 15 Apr 2022 04:33 )  WBC Count : 8.44 K/uL  RBC Count : 4.03 M/uL  Hemoglobin : 11.4 g/dL  Hematocrit : 34.5 %  Platelet Count - Automated : 212 K/uL  Mean Cell Volume : 85.6 fl  Mean Cell Hemoglobin : 28.3 pg  Mean Cell Hemoglobin Concentration : 33.0 gm/dL  Auto Neutrophil # : x  Auto Lymphocyte # : x  Auto Monocyte # : x  Auto Eosinophil # : x  Auto Basophil # : x  Auto Neutrophil % : x  Auto Lymphocyte % : x  Auto Monocyte % : x  Auto Eosinophil % : x  Auto Basophil % : x    04-15    140  |  112<H>  |  46<H>  ----------------------------<  97  4.8   |  22  |  1.84<H>    Ca    9.2      15 Apr 2022 04:33  Phos  3.0     04-15  Mg     2.1     04-15          Culture Results:   No growth to date. (04-13 @ 02:53)  Culture Results:   No growth to date. (04-13 @ 02:53)      RADIOLOGY & ADDITIONAL STUDIES REVIEWED:  ***      CRITICAL CARE TIME SPENT: 35 minutes (1) Other Medications/None

## 2025-05-26 NOTE — CHART NOTE - NSCHARTNOTEFT_GEN_A_CORE
Called pt.'s daughter Della Martino at [phone] to let her know pt. was exposed to COVID by way of rooming with pt. who tested positive.  Mr. Cotto is asymptomatic , comfortable.  Will test pt. for COVID and report result to family.  Isolation precautions in progress.  Pt.'s daughter verbalized understanding.
(E4) spontaneous

## 2025-07-16 ENCOUNTER — EMERGENCY (EMERGENCY)
Facility: HOSPITAL | Age: 89
LOS: 1 days | End: 2025-07-16
Attending: EMERGENCY MEDICINE
Payer: MEDICAID

## 2025-07-16 VITALS
HEART RATE: 77 BPM | TEMPERATURE: 98 F | RESPIRATION RATE: 17 BRPM | DIASTOLIC BLOOD PRESSURE: 73 MMHG | OXYGEN SATURATION: 98 % | HEIGHT: 66 IN | SYSTOLIC BLOOD PRESSURE: 168 MMHG | WEIGHT: 166.89 LBS

## 2025-07-16 VITALS
DIASTOLIC BLOOD PRESSURE: 71 MMHG | TEMPERATURE: 98 F | RESPIRATION RATE: 18 BRPM | OXYGEN SATURATION: 98 % | HEART RATE: 76 BPM | SYSTOLIC BLOOD PRESSURE: 154 MMHG

## 2025-07-16 DIAGNOSIS — Z95.0 PRESENCE OF CARDIAC PACEMAKER: Chronic | ICD-10-CM

## 2025-07-16 LAB
APPEARANCE UR: CLEAR — SIGNIFICANT CHANGE UP
BACTERIA # UR AUTO: ABNORMAL /HPF
BILIRUB UR-MCNC: NEGATIVE — SIGNIFICANT CHANGE UP
COLOR SPEC: YELLOW — SIGNIFICANT CHANGE UP
DIFF PNL FLD: ABNORMAL
EPI CELLS # UR: SIGNIFICANT CHANGE UP
GLUCOSE UR QL: NEGATIVE MG/DL — SIGNIFICANT CHANGE UP
KETONES UR QL: NEGATIVE MG/DL — SIGNIFICANT CHANGE UP
LEUKOCYTE ESTERASE UR-ACNC: ABNORMAL
NITRITE UR-MCNC: NEGATIVE — SIGNIFICANT CHANGE UP
PH UR: 7 — SIGNIFICANT CHANGE UP (ref 5–8)
PROT UR-MCNC: 30 MG/DL
RBC CASTS # UR COMP ASSIST: 120 /HPF — SIGNIFICANT CHANGE UP (ref 0–4)
SP GR SPEC: 1.01 — SIGNIFICANT CHANGE UP (ref 1–1.03)
UROBILINOGEN FLD QL: 0.2 MG/DL — SIGNIFICANT CHANGE UP (ref 0.2–1)
WBC UR QL: 6 /HPF — HIGH (ref 0–5)

## 2025-07-16 PROCEDURE — 81001 URINALYSIS AUTO W/SCOPE: CPT

## 2025-07-16 PROCEDURE — 99283 EMERGENCY DEPT VISIT LOW MDM: CPT | Mod: 25

## 2025-07-16 PROCEDURE — 51702 INSERT TEMP BLADDER CATH: CPT

## 2025-07-16 PROCEDURE — 99284 EMERGENCY DEPT VISIT MOD MDM: CPT
